# Patient Record
Sex: MALE | Race: WHITE | NOT HISPANIC OR LATINO | Employment: OTHER | URBAN - METROPOLITAN AREA
[De-identification: names, ages, dates, MRNs, and addresses within clinical notes are randomized per-mention and may not be internally consistent; named-entity substitution may affect disease eponyms.]

---

## 2017-02-06 ENCOUNTER — ALLSCRIPTS OFFICE VISIT (OUTPATIENT)
Dept: OTHER | Facility: OTHER | Age: 77
End: 2017-02-06

## 2017-02-17 ENCOUNTER — ALLSCRIPTS OFFICE VISIT (OUTPATIENT)
Dept: OTHER | Facility: OTHER | Age: 77
End: 2017-02-17

## 2017-02-24 ENCOUNTER — ALLSCRIPTS OFFICE VISIT (OUTPATIENT)
Dept: OTHER | Facility: OTHER | Age: 77
End: 2017-02-24

## 2017-03-13 ENCOUNTER — HOSPITAL ENCOUNTER (OUTPATIENT)
Dept: RADIOLOGY | Facility: MEDICAL CENTER | Age: 77
Discharge: HOME | End: 2017-03-13
Payer: COMMERCIAL

## 2017-03-13 ENCOUNTER — TRANSCRIBE ORDERS (OUTPATIENT)
Dept: ADMINISTRATIVE | Facility: HOSPITAL | Age: 77
End: 2017-03-13

## 2017-03-13 ENCOUNTER — ALLSCRIPTS OFFICE VISIT (OUTPATIENT)
Dept: OTHER | Facility: OTHER | Age: 77
End: 2017-03-13

## 2017-03-13 DIAGNOSIS — J20.9 ACUTE BRONCHITIS: ICD-10-CM

## 2017-03-13 DIAGNOSIS — J30.9 ALLERGIC RHINITIS: ICD-10-CM

## 2017-03-13 PROCEDURE — 71020 HB CHEST X-RAY 2VW FRONTAL&LATL: CPT

## 2017-03-15 ENCOUNTER — GENERIC CONVERSION - ENCOUNTER (OUTPATIENT)
Dept: OTHER | Facility: OTHER | Age: 77
End: 2017-03-15

## 2017-04-06 ENCOUNTER — ALLSCRIPTS OFFICE VISIT (OUTPATIENT)
Dept: OTHER | Facility: OTHER | Age: 77
End: 2017-04-06

## 2017-04-06 ENCOUNTER — LAB REQUISITION (OUTPATIENT)
Dept: LAB | Facility: HOSPITAL | Age: 77
End: 2017-04-06
Payer: COMMERCIAL

## 2017-04-06 DIAGNOSIS — J30.9 ALLERGIC RHINITIS: ICD-10-CM

## 2017-04-06 DIAGNOSIS — R05.9 COUGH: ICD-10-CM

## 2017-04-06 PROCEDURE — 86003 ALLG SPEC IGE CRUDE XTRC EA: CPT | Performed by: FAMILY MEDICINE

## 2017-04-06 PROCEDURE — 82785 ASSAY OF IGE: CPT | Performed by: FAMILY MEDICINE

## 2017-04-07 LAB
A ALTERNATA IGE QN: <0.1 KUA/I
A FUMIGATUS IGE QN: <0.1 KUA/I
ALLERGEN COMMENT: ABNORMAL
BERMUDA GRASS IGE QN: <0.1 KUA/I
BOXELDER IGE QN: <0.1 KUA/I
C HERBARUM IGE QN: <0.1 KUA/I
CAT DANDER IGE QN: <0.1 KUA/I
CMN PIGWEED IGE QN: <0.1 KUA/I
COMMON RAGWEED IGE QN: <0.1 KUA/I
COTTONWOOD IGE QN: <0.1 KUA/I
D FARINAE IGE QN: <0.1 KUA/I
D PTERONYSS IGE QN: <0.1 KUA/I
DOG DANDER IGE QN: <0.1 KUA/I
LONDON PLANE IGE QN: <0.1 KUA/I
MOUSE URINE PROT IGE QN: <0.1 KUA/I
MT JUNIPER IGE QN: <0.1 KUA/I
MUGWORT IGE QN: <0.1 KUA/I
P NOTATUM IGE QN: <0.1 KUA/I
ROACH IGE QN: <0.1 KUA/I
SHEEP SORREL IGE QN: <0.1 KUA/I
SILVER BIRCH IGE QN: <0.1 KUA/I
TIMOTHY IGE QN: <0.1 KUA/I
TOTAL IGE SMQN RAST: 150 KU/L (ref 0–113)
WALNUT IGE QN: <0.1 KUA/I
WHITE ASH IGE QN: <0.1 KUA/I
WHITE ELM IGE QN: <0.1 KUA/I
WHITE MULBERRY IGE QN: <0.1 KUA/I
WHITE OAK IGE QN: <0.1 KUA/I

## 2017-04-10 ENCOUNTER — GENERIC CONVERSION - ENCOUNTER (OUTPATIENT)
Dept: OTHER | Facility: OTHER | Age: 77
End: 2017-04-10

## 2017-04-18 ENCOUNTER — HOSPITAL ENCOUNTER (OUTPATIENT)
Dept: CT IMAGING | Facility: CLINIC | Age: 77
Discharge: HOME | End: 2017-04-18
Payer: COMMERCIAL

## 2017-04-18 DIAGNOSIS — J30.9 ALLERGIC RHINITIS: ICD-10-CM

## 2017-04-18 PROCEDURE — 70486 CT MAXILLOFACIAL W/O DYE: CPT

## 2017-04-20 ENCOUNTER — ALLSCRIPTS OFFICE VISIT (OUTPATIENT)
Dept: OTHER | Facility: OTHER | Age: 77
End: 2017-04-20

## 2017-04-25 ENCOUNTER — TRANSCRIBE ORDERS (OUTPATIENT)
Dept: ADMINISTRATIVE | Facility: HOSPITAL | Age: 77
End: 2017-04-25

## 2017-04-25 ENCOUNTER — ALLSCRIPTS OFFICE VISIT (OUTPATIENT)
Dept: OTHER | Facility: OTHER | Age: 77
End: 2017-04-25

## 2017-04-25 DIAGNOSIS — R05.9 COUGH: Primary | ICD-10-CM

## 2017-04-25 DIAGNOSIS — R06.02 SHORTNESS OF BREATH: ICD-10-CM

## 2017-05-04 ENCOUNTER — ALLSCRIPTS OFFICE VISIT (OUTPATIENT)
Dept: OTHER | Facility: OTHER | Age: 77
End: 2017-05-04

## 2017-05-19 ENCOUNTER — ALLSCRIPTS OFFICE VISIT (OUTPATIENT)
Dept: OTHER | Facility: OTHER | Age: 77
End: 2017-05-19

## 2017-06-19 ENCOUNTER — ALLSCRIPTS OFFICE VISIT (OUTPATIENT)
Dept: OTHER | Facility: OTHER | Age: 77
End: 2017-06-19

## 2017-06-27 ENCOUNTER — HOSPITAL ENCOUNTER (OUTPATIENT)
Dept: PULMONOLOGY | Facility: HOSPITAL | Age: 77
Discharge: HOME | End: 2017-06-27
Attending: INTERNAL MEDICINE
Payer: COMMERCIAL

## 2017-06-27 ENCOUNTER — GENERIC CONVERSION - ENCOUNTER (OUTPATIENT)
Dept: OTHER | Facility: OTHER | Age: 77
End: 2017-06-27

## 2017-06-27 DIAGNOSIS — R06.02 SHORTNESS OF BREATH: ICD-10-CM

## 2017-06-27 DIAGNOSIS — R05.9 COUGH: ICD-10-CM

## 2017-06-27 PROCEDURE — 94726 PLETHYSMOGRAPHY LUNG VOLUMES: CPT

## 2017-06-27 PROCEDURE — 94010 BREATHING CAPACITY TEST: CPT

## 2017-06-27 PROCEDURE — 94729 DIFFUSING CAPACITY: CPT

## 2017-06-27 PROCEDURE — 94760 N-INVAS EAR/PLS OXIMETRY 1: CPT

## 2017-06-27 RX ORDER — ALBUTEROL SULFATE 2.5 MG/3ML
2.5 SOLUTION RESPIRATORY (INHALATION) ONCE AS NEEDED
Status: DISCONTINUED | OUTPATIENT
Start: 2017-06-27 | End: 2017-07-01 | Stop reason: HOSPADM

## 2017-06-27 RX ORDER — ALBUTEROL SULFATE 2.5 MG/3ML
SOLUTION RESPIRATORY (INHALATION)
Status: DISCONTINUED
Start: 2017-06-27 | End: 2017-06-27 | Stop reason: WASHOUT

## 2017-07-19 ENCOUNTER — ALLSCRIPTS OFFICE VISIT (OUTPATIENT)
Dept: OTHER | Facility: OTHER | Age: 77
End: 2017-07-19

## 2017-07-24 ENCOUNTER — LAB REQUISITION (OUTPATIENT)
Dept: LAB | Facility: HOSPITAL | Age: 77
End: 2017-07-24
Payer: COMMERCIAL

## 2017-07-24 ENCOUNTER — ALLSCRIPTS OFFICE VISIT (OUTPATIENT)
Dept: OTHER | Facility: OTHER | Age: 77
End: 2017-07-24

## 2017-07-24 DIAGNOSIS — E78.2 MIXED HYPERLIPIDEMIA: ICD-10-CM

## 2017-07-24 DIAGNOSIS — Z12.5 ENCOUNTER FOR SCREENING FOR MALIGNANT NEOPLASM OF PROSTATE: ICD-10-CM

## 2017-07-24 LAB
ALBUMIN SERPL BCP-MCNC: 3.4 G/DL (ref 3.5–5)
ALP SERPL-CCNC: 73 U/L (ref 46–116)
ALT SERPL W P-5'-P-CCNC: 16 U/L (ref 12–78)
ANION GAP SERPL CALCULATED.3IONS-SCNC: 4 MMOL/L (ref 4–13)
AST SERPL W P-5'-P-CCNC: 16 U/L (ref 5–45)
BASOPHILS # BLD AUTO: 0.01 THOUSANDS/ΜL (ref 0–0.1)
BASOPHILS NFR BLD AUTO: 0 % (ref 0–1)
BILIRUB SERPL-MCNC: 0.54 MG/DL (ref 0.2–1)
BUN SERPL-MCNC: 23 MG/DL (ref 5–25)
CALCIUM SERPL-MCNC: 8.7 MG/DL (ref 8.3–10.1)
CHLORIDE SERPL-SCNC: 108 MMOL/L (ref 100–108)
CHOLEST SERPL-MCNC: 175 MG/DL (ref 50–200)
CO2 SERPL-SCNC: 28 MMOL/L (ref 21–32)
CREAT SERPL-MCNC: 0.97 MG/DL (ref 0.6–1.3)
EOSINOPHIL # BLD AUTO: 0.16 THOUSAND/ΜL (ref 0–0.61)
EOSINOPHIL NFR BLD AUTO: 3 % (ref 0–6)
ERYTHROCYTE [DISTWIDTH] IN BLOOD BY AUTOMATED COUNT: 14.7 % (ref 11.6–15.1)
GFR SERPL CREATININE-BSD FRML MDRD: 74 ML/MIN/1.73SQ M
GLUCOSE P FAST SERPL-MCNC: 90 MG/DL (ref 65–99)
HCT VFR BLD AUTO: 40.1 % (ref 36.5–49.3)
HDLC SERPL-MCNC: 62 MG/DL (ref 40–60)
HGB BLD-MCNC: 13.2 G/DL (ref 12–17)
LDLC SERPL CALC-MCNC: 96 MG/DL (ref 0–100)
LYMPHOCYTES # BLD AUTO: 1.69 THOUSANDS/ΜL (ref 0.6–4.47)
LYMPHOCYTES NFR BLD AUTO: 30 % (ref 14–44)
MCH RBC QN AUTO: 30.7 PG (ref 26.8–34.3)
MCHC RBC AUTO-ENTMCNC: 32.9 G/DL (ref 31.4–37.4)
MCV RBC AUTO: 93 FL (ref 82–98)
MONOCYTES # BLD AUTO: 0.69 THOUSAND/ΜL (ref 0.17–1.22)
MONOCYTES NFR BLD AUTO: 12 % (ref 4–12)
NEUTROPHILS # BLD AUTO: 3.09 THOUSANDS/ΜL (ref 1.85–7.62)
NEUTS SEG NFR BLD AUTO: 55 % (ref 43–75)
NRBC BLD AUTO-RTO: 0 /100 WBCS
PLATELET # BLD AUTO: 218 THOUSANDS/UL (ref 149–390)
PMV BLD AUTO: 10.3 FL (ref 8.9–12.7)
POTASSIUM SERPL-SCNC: 4.7 MMOL/L (ref 3.5–5.3)
PROT SERPL-MCNC: 6.8 G/DL (ref 6.4–8.2)
PSA SERPL-MCNC: 0.2 NG/ML (ref 0–4)
RBC # BLD AUTO: 4.3 MILLION/UL (ref 3.88–5.62)
SODIUM SERPL-SCNC: 140 MMOL/L (ref 136–145)
TRIGL SERPL-MCNC: 87 MG/DL
TSH SERPL DL<=0.05 MIU/L-ACNC: 1.21 UIU/ML (ref 0.36–3.74)
WBC # BLD AUTO: 5.64 THOUSAND/UL (ref 4.31–10.16)

## 2017-07-24 PROCEDURE — 84443 ASSAY THYROID STIM HORMONE: CPT | Performed by: FAMILY MEDICINE

## 2017-07-24 PROCEDURE — G0103 PSA SCREENING: HCPCS | Performed by: FAMILY MEDICINE

## 2017-07-24 PROCEDURE — 80061 LIPID PANEL: CPT | Performed by: FAMILY MEDICINE

## 2017-07-24 PROCEDURE — 80053 COMPREHEN METABOLIC PANEL: CPT | Performed by: FAMILY MEDICINE

## 2017-07-24 PROCEDURE — 85025 COMPLETE CBC W/AUTO DIFF WBC: CPT | Performed by: FAMILY MEDICINE

## 2017-07-26 ENCOUNTER — GENERIC CONVERSION - ENCOUNTER (OUTPATIENT)
Dept: OTHER | Facility: OTHER | Age: 77
End: 2017-07-26

## 2017-09-01 ENCOUNTER — ALLSCRIPTS OFFICE VISIT (OUTPATIENT)
Dept: OTHER | Facility: OTHER | Age: 77
End: 2017-09-01

## 2017-10-12 ENCOUNTER — ALLSCRIPTS OFFICE VISIT (OUTPATIENT)
Dept: OTHER | Facility: OTHER | Age: 77
End: 2017-10-12

## 2017-10-13 NOTE — PROGRESS NOTES
Assessment  1  Memory difficulty (780 93) (R41 3)   2  Mild cognitive impairment (331 83) (G31 84)   3  Shortness of breath (786 05) (R06 02)   4  Renal lithiasis (592 0) (N20 0)    Plan  Memory difficulty, Mild cognitive impairment    · *1 -  NEUROLOGY Co-Management  *  Status: Hold For - Scheduling  Requested for:  87UVG5866  Care Summary provided  : Yes  PMH: History of renal calculi    · TraMADol HCl - 50 MG Oral Tablet; TAKE 1 TABLET EVERY 6 TO 8 HOURS AS  NEEDED FOR PAIN    Discussion/Summary    Reviewed the patient's pulmonary workup  He does go to the gym and is working on his cardiovascular in [place] son encouraged him to continue  I do not believe additional workup is necessary though he will continue to follow up with Pulmonary Medicine  His next visit should be in January  far as the patient's cognitive decline  His symptoms and his exam at this time did not show any significant deficits however we will refer him back to Neurology for an evaluation  We also discussed the possibility starting medication such as Aricept at a low dose though he is somewhat reluctant to start medication at this time  lithiasis  This is been present for 6 some time and is asymptomatic though the patient would like to see a urologist regarding this problem and I will send him though I think it is unlikely that any treatment is necessary  The patient was counseled regarding diagnostic results,-instructions for management,-risk factor reductions,-impressions,-risks and benefits of treatment options,-importance of compliance with treatment  total time of encounter was 30 [id number] minutes was spent counseling  Chief Complaint  Patient presents for a f/u from pulmonologist He stated that he still has SOB and his PFT showed an oxygen problem  He stated that he would also like to discuss his memory problems and discuss possible dementia with someone        History of Present Illness  lung  sleep issue staying timeemelissa referralpresented for for follow-up of multiple topics  He continues to have concerns about shortness of breath  He saw Pulmonary Medicine on multiple occasions and his workup was essentially negative  Back pulmonologist discontinued his inhaler  Pulmonary function tests were essentially normal with only a very mild decrease in diffusing capacity  He feels that the patient's shortness of breath is more likely due to some mild cardiac deconditioning  He will be seeing him again in 4 months  Patient also complains of some mild memory issues and cognitive decline  He had an appointment with neurology but began to think that he was doing better and cancel that appointment  Now he is concerned that he is still having his memory issues  He does have remote history of Alzheimer's disease within his family  Lastly he wishes to discuss his kidney stone  He does have renal lithiasis noted on CT scan of the abdomen and pelvis done approximately 1 year ago which did show stable renal cysts in the right kidney along with a right midpole stone  An acquaintance of his had a good result seeing a urologist in [address] [place] [address] and the patient would like to be referred to the same doctor for a consult  Review of Systems    Constitutional: feeling tired  Eyes: No complaints of eye pain, no red eyes, no discharge from eyes, no itchy eyes  ENT: no complaints of earache, no hearing loss, no nosebleeds, no nasal discharge, no sore throat, no hoarseness  Cardiovascular: No complaints of slow heart rate, no fast heart rate, no chest pain, no palpitations, no leg claudication, no lower extremity  Gastrointestinal: No complaints of abdominal pain, no constipation, no nausea or vomiting, no diarrhea or bloody stools  Genitourinary: as noted in HPI  The patient presents with complaints of gradual onset of intermittent episodes of mild confusion     Psychiatric: Is not suicidal, no sleep disturbances, no anxiety or depression, no change in personality, no emotional problems  Active Problems  1  Abnormal PFT (794 2) (R94 2)   2  Acute right-sided low back pain without sciatica (724 2) (M54 5)   3  Allergic rhinitis (477 9) (J30 9)   4  Antithrombinemia (286 59) (D68 318)   5  Anxiety (300 00) (F41 9)   6  Arthritis (716 90) (M19 90)   7  Cervicalgia (723 1) (M54 2)   8  Dysplastic nevus of face (216 3) (D22 30)   9  Ear itch (698 9) (L29 9)   10  Enlarged prostate without lower urinary tract symptoms (luts) (600 00) (N40 0)   11  Gastroesophageal reflux disease without esophagitis (530 81) (K21 9)   12  Glaucoma (365 9) (H40 9)   13  Irritable bowel syndrome (564 1) (K58 9)   14  Lipoma of right upper extremity (214 8) (D17 21)   15  Memory loss (780 93) (R41 3)   16  Mixed hyperlipidemia (272 2) (E78 2)   17  Mouth pain (528 9) (K13 79)   18  Oropharyngeal dysphagia (787 22) (R13 12)   19  Persistent cough for 3 weeks or longer (786 2) (R05)   20  Shortness of breath (786 05) (R06 02)   21  Sicca syndrome (710 2) (M35 00)    Past Medical History  1  History of Abdominal pain, periumbilical (582 00) (N39 30)   2  History of Abdominal pain, suprapubic (789 09) (R10 2)   3  History of Abrasion Of The Right Leg (916 0)   4  History of Acute bronchitis, unspecified organism (466 0) (J20 9)   5  History of Acute sinusitis, recurrence not specified, unspecified location (461 9) (J01 90)   6  History of Adverse Effect Of Medicinal Substance Properly Administered (995 20)   7  History of Allergic rhinitis (477 9) (J30 9)   8  History of Arthralgia (719 40) (M25 50)   9  History of Bruise (924 9) (T14 8XXA)   10  History of Bruise without fracture or open wound (924 9) (T14 8XXA)   11  History of Change in bowel habits (787 99) (R19 4)   12  History of Change in bowel habits (787 99) (R19 4)   13  History of Chest pain (786 50) (R07 9)   14  History of Chronic allergic rhinitis (477 9) (J30 9)   15   History of Chronic bronchitis, unspecified chronic bronchitis type (491 9) (J42)   16  History of Chronic sinusitis, unspecified location (473 9) (J32 9)   17  History of Depression screen (V79 0) (Z13 89)   18  History of Diverticulosis (562 10) (K57 90)   19  History of Diverticulosis (562 10) (K57 90)   20  History of Dizziness (780 4) (R42)   21  History of Dry mouth (527 7) (R68 2)   22  History of Dysphagia (787 20) (R13 10)   23  History of Dysphagia (787 20) (R13 10)   24  History of Dysphagia (787 20) (R13 10)   25  History of Encounter for prostate cancer screening (V76 44) (Z12 5)   26  History of Encounter for prostate cancer screening (V76 44) (Z12 5)   27  History of Encounter for screening colonoscopy (V76 51) (Z12 11)   28  History of Fatigue (780 79) (R53 83)   29  History of Flank pain (789 09) (R10 9)   30  History of abdominal pain (V13 89) (Z87 898)   31  History of abdominal pain (V13 89) (Z87 898)   32  History of actinic keratosis (V13 3) (Z87 2)   33  History of acute sinusitis (V12 69) (Z87 09)   34  History of candidiasis of mouth (V12 09) (Z86 19)   35  History of dermatitis (V13 3) (Z87 2)   36  History of glaucoma (V12 49) (Z86 69)   37  History of influenza vaccination (V49 89) (Z92 29)   38  History of left inguinal hernia repair (V15 29) (Z98 890,Z87 19)   39  History of renal calculi (V13 01) (Z87 442)   40  History of sebaceous cyst (V13 3) (Z87 2)   41  History of sebaceous cyst (V13 3) (Z87 2)   42  History of sebaceous cyst (V13 3) (Z87 2)   43  History of seborrheic keratosis (V13 3) (Z87 2)   44  History of skin disorder (V13 3) (Z87 2)   45  History of Influenza vaccine needed (V04 81) (Z23)   46  History of Inguinal hernia (550 90) (K40 90)   47  History of Inguinal hernia, left (550 90) (K40 90)   48  History of Joint pain (719 40) (M25 50)   49  History of Left groin pain (789 09) (R10 32)   50  History of Left inguinal hernia (550 90) (K40 90)   51   History of Left lower quadrant pain (789 04) (R10 32)   52  History of Lower back pain (724 2) (M54 5)   53  History of Lump of skin (782 2) (R22 9)   54  History of Malaise and fatigue (780 79) (R53 81,R53 83)   55  History of Male erectile dysfunction (607 84) (N52 9)   56  History of Need for immunization against influenza (V04 81) (Z23)   57  History of Need for vaccination for pneumococcus (V03 82) (Z23)   58  History of Nonvenomous Insect Bite Of Right Ear (910 4)   59  History of Open comedone (706 1) (L70 0)   60  History of Otitis externa, unspecified laterality   61  History of Pain of scalp (784 0) (R51)   62  History of Palpitations (785 1) (R00 2)   63  History of Pharyngitis (462) (J02 9)   64  History of Preop examination (V72 84) (Z01 818)   65  History of Pre-op examination (V72 84) (Z01 818)   66  History of Pre-procedural laboratory examination (V72 63) (Z01 812)   67  History of Prostate cancer screening (V76 44) (Z12 5)   68  History of Prostate cancer screening (V76 44) (Z12 5)   69  History of Scratch (919 0) (T14 8XXA)   70  History of Screening for depression (V79 0) (Z13 89)   71  History of Screening for other and unspecified genitourinary condition (V81 6) (Z13 89)   72  History of Screening for other and unspecified genitourinary condition (V81 6) (Z13 89)   73  History of Screening for other and unspecified genitourinary condition (V81 6) (Z13 89)   74  History of Skin lesion (709 9) (L98 9)   75  History of Skin lesion (709 9) (L98 9)   76  History of Skin lesion (709 9) (L98 9)   77  History of Skin lesion (709 9) (L98 9)   78  History of Skin lesion (709 9) (L98 9)   79  History of Skin rash (782 1) (R21)   80  History of Skin rash (782 1) (R21)   81  History of Special screening for other neurological conditions (V80 09) (Z13 89)   82  History of Special screening for other neurological conditions (V80 09) (Z13 89)   83  History of Special screening for other neurological conditions (V80 09) (Z13 89)   84   History of Standardised adult depression screening tool completed   85  History of Symptoms involving urinary system (788 99) (R39 9)   86  History of Tick bite (919 4,E906 4) (W57 XXXA)   87  History of Tick bite (919 4,E906 4) (W57 XXXA)   88  History of Tick bite of abdominal wall, initial encounter (911 4,E906 4)    (Z31 230N,Q66  XXXA)   89  History of Tick bite, initial encounter (919 4,E906 4) (W57 XXXA)   90  History of Urinary frequency (788 41) (R35 0)   91  History of Urticaria Allergic Due To Envenomation (708 0)   92  History of Visual disturbances (368 9) (H53 9)   93  History of Wound, open, leg (891 0) (S81 809A)    The active problems and past medical history were reviewed and updated today  Surgical History  1  History of Complete Colonoscopy   2  History of Hernia Repair Inguinal Unilateral   3  History of Oral Surgery    Family History  Mother    1  Family history of Brain Cancer (V16 8)   2  Family history of Congestive Heart Failure   3  Family history of Prostate Cancer (V16 42)    Social History   · Denied: History of Alcohol Use (History)   · Former smoker (V15 82) (Y14 943)   · Primary language is English   · Reads English   · History of Social alcohol use (Z78 9)  The social history was reviewed and updated today  Current Meds   1  Centrum Silver Ultra Mens Oral Tablet; TAKE 1 TABLET DAILY; Therapy: 51HUU3673 to Recorded   2  ChlordiazePOXIDE HCl - 10 MG Oral Capsule; TAKE 1 CAPSULE Every morning; Therapy: 02QYN2227 to (Evaluate:18Feb2018); Last Rx:37Iji4955 Ordered   3  Levocetirizine Dihydrochloride 5 MG Oral Tablet; TAKE 1 TABLET DAILY IN THE EVENING; Therapy: 69Rgb8537 to (Marcial Cuevas)  Requested for: 36HNS9672; Last   Rx:07Nov2016 Ordered   4  Meclizine HCl - 25 MG Oral Tablet; take 0 5 tablet bedtime  Requested for: 03Apr2017;   Last Rx:03Apr2017 Ordered   5  PrednisoLONE Sodium Phosphate 1 % Ophthalmic Solution;  Insert 2-3 drops in affected   ear 4 times daily as needed; Therapy: 43Usq4107 to (Last Rx:38Gzv3958)  Requested for: 74Eqv9974 Ordered   6  Rapaflo 8 MG Oral Capsule; take 1 capsule daily; Therapy: 75HXW9579 to (Last Rx:56Nim5325)  Requested for: 13Efq2772 Ordered   7  Red Yeast Rice 600 MG Oral Tablet; Take 1 tablet twice daily; Therapy: (30-62-69-73) to Recorded   8  Simbrinza 1-0 2 % Ophthalmic Suspension; twice daily; Therapy: 17JKK8530 to (Evaluate:87Uou6835) Recorded   9  Travatan Z 0 004 % Ophthalmic Solution; once daily; Therapy: 34JRV0091 to (Evaluate:13Nov2014) Recorded   10  Triamcinolone Acetonide 0 1 % External Cream; APPLY  AND RUB  IN A THIN FILM TO    AFFECTED AREAS TWICE DAILY  (AM AND PM); Therapy: 89NIS0035 to (Last Rx:66Nvi9995)  Requested for: 92UZE2656 Ordered    The medication list was reviewed and updated today  Allergies  1  Levaquin TABS   2  Azithromycin TABS   3  Penicillins   4  ketorolac    Vitals  Vital Signs    Recorded: 77DZP4014 11:08AM   Temperature 96 9 F, Tympanic   Heart Rate 82   Pulse Quality Normal   Systolic 453, RUE, Sitting   Diastolic 74, RUE, Sitting   Height 5 ft 9 in   Weight 163 lb 3 oz   BMI Calculated 24 1   BSA Calculated 1 89   O2 Saturation 98, RA     Physical Exam    Constitutional   General appearance: No acute distress, well appearing and well nourished  Pulmonary   Respiratory effort: No increased work of breathing or signs of respiratory distress  Auscultation of lungs: Clear to auscultation, equal breath sounds bilaterally, no wheezes, no rales, no rhonci  Cardiovascular   Palpation of heart: Normal PMI, no thrills  Auscultation of heart: Normal rate and rhythm, normal S1 and S2, without murmurs  Examination of extremities for edema and/or varicosities: Normal     Carotid pulses: Normal     Lymphatic   Palpation of lymph nodes in neck: No lymphadenopathy  Neurologic   Cranial nerves: Cranial nerves 2-12 intact      Psychiatric   Orientation to person, place and time: Normal     Mood and affect: Normal          Health Management  History of glaucoma   *VB - Eye Exam; every 1 year; Last 99PRE2944; Next Due: 32EEX4845;  Overdue    Signatures   Electronically signed by : Ernst Cruz DO; Oct 12 2017 12:08PM EST                       (Author)

## 2017-10-30 ENCOUNTER — GENERIC CONVERSION - ENCOUNTER (OUTPATIENT)
Dept: OTHER | Facility: OTHER | Age: 77
End: 2017-10-30

## 2017-12-06 ENCOUNTER — ALLSCRIPTS OFFICE VISIT (OUTPATIENT)
Dept: OTHER | Facility: OTHER | Age: 77
End: 2017-12-06

## 2017-12-11 ENCOUNTER — ALLSCRIPTS OFFICE VISIT (OUTPATIENT)
Dept: OTHER | Facility: OTHER | Age: 77
End: 2017-12-11

## 2017-12-12 NOTE — PROGRESS NOTES
Assessment    1  Acute bronchitis, unspecified organism (466 0) (J20 9)    Plan  Acute bronchitis, unspecified organism    · PredniSONE 10 MG Oral Tablet; 6 x1 day 5x 1 day 4 x 2 days 3 x 2 days2 x 2 days 1x 2 days    Discussion/Summary    Patient is a 27-year-old maleacute bronchitis - patient's symptoms have been persisting  He was advised that he must take his antibiotics as directed  He must not miss doses of this treatment  He does have to multiple continue with antibiotics until Thursday of this week  Start treatment with prednisone taper  He may continue as well with his Breo  If his symptoms are persisting, he may likely need to see pulmonology for further evaluation of his persistent respiratory problems  Chief Complaint  Patient presents for a f/u for bronchitis  He stated that he is not much better and developed diarrhea last night  History of Present Illness  Patient is a 65year-old male presents today with a persistent cough  He states that he was seen 1 week ago secondary to bronchitis  He was placed on doxycycline as well as [facility] [employer] is he has been using his inhaler regularly  He has been taking his antibiotic ink intermittently  Over the past 3 days, he states that he has been taking more regularly  He states that he noticed mild however last night started developing diarrhea  He has recently started taking probiotics  Review of Systems   Constitutional: feeling poorly-- and-- feeling tired  Eyes: no eyesight problems-- and-- no purulent discharge from the eyes  ENT: no sore throat-- and-- no nasal discharge  Cardiovascular: no chest pain-- and-- no palpitations  Respiratory: cough, but-- no shortness of breath during exertion  Gastrointestinal: no nausea-- and-- no diarrhea  Genitourinary: no dysuria-- and-- no nocturia  Musculoskeletal: no arthralgias-- and-- no myalgias  Integumentary: no rashes-- and-- no skin lesions    Neurological: no headache,-- no numbness-- and-- no dizziness  Psychiatric: no anxiety-- and-- no depression  Endocrine: no muscle weakness-- and-- no erectile dysfunction  Hematologic/Lymphatic: no tendency for easy bleeding-- and-- no tendency for easy bruising  Active Problems  1  Abnormal PFT (794 2) (R94 2)   2  Acute bronchitis, unspecified organism (466 0) (J20 9)   3  Acute right-sided low back pain without sciatica (724 2) (M54 5)   4  Allergic rhinitis (477 9) (J30 9)   5  Anxiety (300 00) (F41 9)   6  Arthritis (716 90) (M19 90)   7  Cervicalgia (723 1) (M54 2)   8  Dysplastic nevus of face (216 3) (D23 30)   9  Enlarged prostate without lower urinary tract symptoms (luts) (600 00) (N40 0)   10  Gastroesophageal reflux disease without esophagitis (530 81) (K21 9)   11  Glaucoma (365 9) (H40 9)   12  Irritable bowel syndrome (564 1) (K58 9)   13  Lipoma of right upper extremity (214 8) (D17 21)   14  Memory difficulty (780 93) (R41 3)   15  Memory loss (780 93) (R41 3)   16  Mild cognitive impairment (331 83) (G31 84)   17  Mixed hyperlipidemia (272 2) (E78 2)   18  Mouth pain (528 9) (K13 79)   19  Oropharyngeal dysphagia (787 22) (R13 12)   20  Persistent cough for 3 weeks or longer (786 2) (R05)   21  Renal lithiasis (592 0) (N20 0)   22  Shortness of breath (786 05) (R06 02)   23  Sicca syndrome (710 2) (M35 00)    Past Medical History    1  History of Abdominal pain, periumbilical (411 12) (Q85 65)   2  History of Abdominal pain, suprapubic (789 09) (R10 2)   3  History of Abrasion Of The Right Leg (916 0)   4  History of Acute sinusitis, recurrence not specified, unspecified location (461 9) (J01 90)   5  History of Adverse Effect Of Medicinal Substance Properly Administered (995 20)   6  History of Allergic rhinitis (477 9) (J30 9)   7  History of Arthralgia (719 40) (M25 50)   8  History of Bruise (924 9) (T14 8XXA)   9  History of Bruise without fracture or open wound (924 9) (T14 8XXA)   10   History of Change in bowel habits (787 99) (R19 4)   11  History of Change in bowel habits (787 99) (R19 4)   12  History of Chest pain (786 50) (R07 9)   13  History of Chronic allergic rhinitis (477 9) (J30 9)   14  History of Chronic bronchitis, unspecified chronic bronchitis type (491 9) (J42)   15  History of Chronic sinusitis, unspecified location (473 9) (J32 9)   16  History of Depression screen (V79 0) (Z13 89)   17  History of Diverticulosis (562 10) (K57 90)   18  History of Diverticulosis (562 10) (K57 90)   19  History of Dizziness (780 4) (R42)   20  History of Dry mouth (527 7) (R68 2)   21  History of Dysphagia (787 20) (R13 10)   22  History of Dysphagia (787 20) (R13 10)   23  History of Dysphagia (787 20) (R13 10)   24  History of Encounter for prostate cancer screening (V76 44) (Z12 5)   25  History of Encounter for prostate cancer screening (V76 44) (Z12 5)   26  History of Encounter for screening colonoscopy (V76 51) (Z12 11)   27  History of Fatigue (780 79) (R53 83)   28  History of Flank pain (789 09) (R10 9)   29  History of abdominal pain (V13 89) (Z87 898)   30  History of abdominal pain (V13 89) (Z87 898)   31  History of actinic keratosis (V13 3) (Z87 2)   32  History of acute sinusitis (V12 69) (Z87 09)   33  History of candidiasis of mouth (V12 09) (Z86 19)   34  History of dermatitis (V13 3) (Z87 2)   35  History of glaucoma (V12 49) (Z86 69)   36  History of influenza vaccination (V49 89) (Z92 29)   37  History of left inguinal hernia repair (V15 29) (Z98 890,Z87 19)   38  History of renal calculi (V13 01) (Z87 442)   39  History of sebaceous cyst (V13 3) (Z87 2)   40  History of sebaceous cyst (V13 3) (Z87 2)   41  History of sebaceous cyst (V13 3) (Z87 2)   42  History of seborrheic keratosis (V13 3) (Z87 2)   43  History of skin disorder (V13 3) (Z87 2)   44  History of Influenza vaccine needed (V04 81) (Z23)   45  History of Inguinal hernia (550 90) (K40 90)   46  History of Inguinal hernia, left (550 90) (K40 90)   47  History of Joint pain (719 40) (M25 50)   48  History of Left groin pain (789 09) (R10 32)   49  History of Left inguinal hernia (550 90) (K40 90)   50  History of Left lower quadrant pain (789 04) (R10 32)   51  History of Lower back pain (724 2) (M54 5)   52  History of Lump of skin (782 2) (R22 9)   53  History of Malaise and fatigue (780 79) (R53 81,R53 83)   54  History of Male erectile dysfunction (607 84) (N52 9)   55  History of Need for immunization against influenza (V04 81) (Z23)   56  History of Need for vaccination for pneumococcus (V03 82) (Z23)   57  History of Nonvenomous Insect Bite Of Right Ear (910 4)   58  History of Open comedone (706 1) (L70 0)   59  History of Otitis externa, unspecified laterality   60  History of Pain of scalp (784 0) (R51)   61  History of Palpitations (785 1) (R00 2)   62  History of Pharyngitis (462) (J02 9)   63  History of Preop examination (V72 84) (Z01 818)   64  History of Pre-op examination (V72 84) (Z01 818)   65  History of Pre-procedural laboratory examination (V72 63) (Z01 812)   66  History of Prostate cancer screening (V76 44) (Z12 5)   67  History of Prostate cancer screening (V76 44) (Z12 5)   68  History of Scratch (919 0) (T14 8XXA)   69  History of Screening for depression (V79 0) (Z13 89)   70  History of Screening for other and unspecified genitourinary condition (V81 6) (Z13 89)   71  History of Screening for other and unspecified genitourinary condition (V81 6) (Z13 89)   72  History of Screening for other and unspecified genitourinary condition (V81 6) (Z13 89)   73  History of Skin lesion (709 9) (L98 9)   74  History of Skin lesion (709 9) (L98 9)   75  History of Skin lesion (709 9) (L98 9)   76  History of Skin lesion (709 9) (L98 9)   77  History of Skin lesion (709 9) (L98 9)   78  History of Skin rash (782 1) (R21)   79  History of Skin rash (782 1) (R21)   80   History of Special screening for other neurological conditions (V80 09) (Z13 89) 81  History of Special screening for other neurological conditions (V80 09) (Z13 89)   82  History of Special screening for other neurological conditions (V80 09) (Z13 89)   83  History of Standardised adult depression screening tool completed   80  History of Symptoms involving urinary system (788 99) (R39 9)   85  History of Tick bite (919 4,E906 4) (W57 XXXA)   86  History of Tick bite (919 4,E906 4) (W57 XXXA)   87  History of Tick bite of abdominal wall, initial encounter (911 4,E906 4)  (K27 609Q,C13  XXXA)   88  History of Tick bite, initial encounter (919 4,E906 4) (W57 XXXA)   89  History of Urinary frequency (788 41) (R35 0)   90  History of Urticaria Allergic Due To Envenomation (708 0)   91  History of Visual disturbances (368 9) (H53 9)   92  History of Wound, open, leg (891 0) (S81 809A)    The active problems and past medical history were reviewed and updated today  Surgical History  1  History of Complete Colonoscopy   2  History of Hernia Repair Inguinal Unilateral   3  History of Oral Surgery    The surgical history was reviewed and updated today  Family History  Mother    1  Family history of Brain Cancer (V16 8)   2  Family history of Congestive Heart Failure   3  Family history of Prostate Cancer (V16 42)    The family history was reviewed and updated today  Social History     · Denied: History of Alcohol Use (History)   · Former smoker (V15 82) (C97 051)   · Primary language is English   · Reads English   · History of Social alcohol use (Z78 9)  The social history was reviewed and updated today  The social history was reviewed and is unchanged  Current Meds   1  Breo Ellipta 100-25 MCG/INH Inhalation Aerosol Powder Breath Activated; INHALE 1 PUFFS Daily rinse mouth after using; Therapy: 98QIZ1059 to (Complete:85Ysr4875); Last Rx:26Sbb4412 Ordered   2  Centrum Silver Ultra Mens Oral Tablet; TAKE 1 TABLET DAILY; Therapy: 26DJL9633 to Recorded   3   ChlordiazePOXIDE HCl - 10 MG Oral Capsule; TAKE 1 CAPSULE Every morning; Therapy: 30OEZ2936 to (Evaluate:87Cqe5949); Last Rx:56Wlu9609 Ordered   4  Donepezil HCl - 5 MG Oral Tablet; TAKE 1 TABLET AT BEDTIME; Therapy: 38HSM5713 to (Evaluate:48Lqp9586)  Requested for: 19QSM1871; Last Rx:12Oct2017 Ordered   5  Doxycycline Hyclate 100 MG Oral Capsule; Take 1 capsule twice daily; Therapy: 18XKN4038 to (Evaluate:90Yfr0935)  Requested for: 01ZPV7755; Last Rx:17Yzk2138 Ordered   6  Levocetirizine Dihydrochloride 5 MG Oral Tablet; TAKE 1 TABLET DAILY IN THE EVENING; Therapy: 56Ouw2526 to (95 886082)  Requested for: 56QKJ3469; Last Rx:07Nov2016 Ordered   7  Meclizine HCl - 25 MG Oral Tablet; take 0 5 tablet bedtime  Requested for: 96Lxb1640; Last Rx:08Kbm8092 Ordered   8  PrednisoLONE Sodium Phosphate 1 % Ophthalmic Solution; Insert 2-3 drops in affected ear 4 times daily as needed; Therapy: 56Byj8380 to (Last Rx:17Nov2017)  Requested for: 71NLC8936 Ordered   9  Rapaflo 8 MG Oral Capsule; take 1 capsule daily; Therapy: 07JDH8077 to (Last Rx:90Iht9496)  Requested for: 04Duo0587 Ordered   10  Red Yeast Rice 600 MG Oral Tablet; Take 1 tablet twice daily; Therapy: ([phone]) to Recorded   11  Simbrinza 1-0 2 % Ophthalmic Suspension; twice daily; Therapy: 84UNH4120 to (Evaluate:55Iyq8601) Recorded   12  TraMADol HCl - 50 MG Oral Tablet; TAKE 1 TABLET EVERY 6 TO 8 HOURS AS NEEDED  FOR PAIN;  Therapy: 51Mlh4361 to (Last Rx:12Oct2017) Ordered   13  Travatan Z 0 004 % Ophthalmic Solution; once daily; Therapy: 14OIW0071 to (Evaluate:13Nov2014) Recorded   14  Triamcinolone Acetonide 0 1 % External Cream; APPLY  AND RUB  IN A THIN FILM TO  AFFECTED AREAS TWICE DAILY  (AM AND PM); Therapy: 27BKT0119 to (Last Rx:77Udz1631)  Requested for: 52XWH6268 Ordered    The medication list was reviewed and updated today  Allergies  1  Levaquin TABS   2  Azithromycin TABS   3   Penicillins   4  ketorolac    Vitals  Vital Signs    Recorded: 38EQO8517 01: 09PM   Temperature 95 6 F, Tympanic   Heart Rate 96   Pulse Quality Normal   Systolic 467, LUE, Sitting   Diastolic 78, LUE, Sitting   BP CUFF SIZE Standard   Height 5 ft 9 in   Weight 158 lb 6 oz   BMI Calculated 23 39   BSA Calculated 1 87   O2 Saturation 95, RA       Physical Exam   Constitutional  General appearance: No acute distress, well appearing and well nourished  Eyes  Conjunctiva and lids: No swelling, erythema, or discharge  Pupils and irises: Equal, round and reactive to light  Ears, Nose, Mouth, and Throat  External inspection of ears and nose: Normal    Nasal mucosa, septum, and turbinates: Normal without edema or erythema  Oropharynx: Normal with no erythema, edema, exudate or lesions  Pulmonary  Respiratory effort: No increased work of breathing or signs of respiratory distress  Auscultation of lungs: Abnormal   Auscultation of the lungs revealed expiratory wheezing-- and-- inspiratory wheezing  diffuse rhonchi bilaterally  Cardiovascular  Auscultation of heart: Normal rate and rhythm, normal S1 and S2, without murmurs  Examination of extremities for edema and/or varicosities: Normal    Abdomen  Abdomen: Non-tender, no masses  Liver and spleen: No hepatomegaly or splenomegaly  Lymphatic  Palpation of lymph nodes in neck: No lymphadenopathy  Musculoskeletal  Gait and station: Normal    Inspection/palpation of joints, bones, and muscles: Normal    Skin  Skin and subcutaneous tissue: Normal without rashes or lesions  Neurologic  Cranial nerves: Cranial nerves 2-12 intact  Sensation: No sensory loss  Psychiatric  Orientation to person, place and time: Normal    Mood and affect: Normal          Health Management  History of glaucoma   *VB - Eye Exam; every 1 year; Last 37VXF3083; Next Due: 55ICZ0741; Overdue    Signatures   Electronically signed by :  Lindie Dancer, DO; Dec 11 2017  1:40PM EST                       (Author)

## 2017-12-21 ENCOUNTER — GENERIC CONVERSION - ENCOUNTER (OUTPATIENT)
Dept: FAMILY MEDICINE CLINIC | Facility: CLINIC | Age: 77
End: 2017-12-21

## 2017-12-26 NOTE — PROGRESS NOTES
Assessment   1  Acute bronchitis, unspecified organism (466 0) (J20 9)    Plan    Acute bronchitis, unspecified organism    · Doxycycline Hyclate 100 MG Oral Capsule; Take 1 capsule twice daily      Acute bronchitis, unspecified organism (466 0) (J20 9)               Discussion/Summary      76y/o male here today for acute resp  sxs  suma cover for bacteria with a course of doxycycline BID  I recommended muciex DM OTC  he also feels he would benefit from an inhaler which he has had in the past, so I provided him with a sample of Breo, and used his first dose in office to ensure proper use of the device  advised to use 1 puff a day and rinse mouth out after  was advised to sleep propped up on pillows at night  will see how he does with tx over the next few days and was advised to call with any concerns or persistent sxs  Possible side effects of new medications were reviewed with the patient/guardian today  The treatment plan was reviewed with the patient/guardian  The patient/guardian understands and agrees with the treatment plan      Chief Complaint   Pt c/o cough and congestion x 1 week  History of Present Illness   HPI: 76y/o male here today for URI sxs past week  reports fatigue, weakness, excessive cough dry and chest congestion  Denies nasal sxs or ear pain  no fevers  tried cough drops  Review of Systems        Constitutional: as noted in HPI       ENT: as noted in HPI  Cardiovascular: no complaints of slow or fast heart rate, no chest pain, no palpitations, no leg claudication or lower extremity edema  Respiratory: as noted in HPI  Active Problems   1  Abnormal PFT (794 2) (R94 2)   2  Acute right-sided low back pain without sciatica (724 2) (M54 5)   3  Allergic rhinitis (477 9) (J30 9)   4  Anxiety (300 00) (F41 9)   5  Arthritis (716 90) (M19 90)   6  Cervicalgia (723 1) (M54 2)   7  Dysplastic nevus of face (216 3) (D23 30)   8   Enlarged prostate without lower urinary tract symptoms (luts) (600 00) (N40 0)   9  Gastroesophageal reflux disease without esophagitis (530 81) (K21 9)   10  Glaucoma (365 9) (H40 9)   11  Irritable bowel syndrome (564 1) (K58 9)   12  Lipoma of right upper extremity (214 8) (D17 21)   13  Memory difficulty (780 93) (R41 3)   14  Memory loss (780 93) (R41 3)   15  Mild cognitive impairment (331 83) (G31 84)   16  Mixed hyperlipidemia (272 2) (E78 2)   17  Mouth pain (528 9) (K13 79)   18  Oropharyngeal dysphagia (787 22) (R13 12)   19  Persistent cough for 3 weeks or longer (786 2) (R05)   20  Renal lithiasis (592 0) (N20 0)   21  Shortness of breath (786 05) (R06 02)   22  Sicca syndrome (710 2) (M35 00)    Past Medical History   1  History of Abdominal pain, periumbilical (917 39) (S28 82)   2  History of Abdominal pain, suprapubic (789 09) (R10 2)   3  History of Abrasion Of The Right Leg (916 0)   4  History of Acute sinusitis, recurrence not specified, unspecified location (461 9) (J01 90)   5  History of Adverse Effect Of Medicinal Substance Properly Administered (995 20)   6  History of Allergic rhinitis (477 9) (J30 9)   7  History of Arthralgia (719 40) (M25 50)   8  History of Bruise (924 9) (T14 8XXA)   9  History of Bruise without fracture or open wound (924 9) (T14 8XXA)   10  History of Change in bowel habits (787 99) (R19 4)   11  History of Change in bowel habits (787 99) (R19 4)   12  History of Chest pain (786 50) (R07 9)   13  History of Chronic allergic rhinitis (477 9) (J30 9)   14  History of Chronic bronchitis, unspecified chronic bronchitis type (491 9) (J42)   15  History of Chronic sinusitis, unspecified location (473 9) (J32 9)   16  History of Depression screen (V79 0) (Z13 89)   17  History of Diverticulosis (562 10) (K57 90)   18  History of Diverticulosis (562 10) (K57 90)   19  History of Dizziness (780 4) (R42)   20  History of Dry mouth (527 7) (R68 2)   21  History of Dysphagia (787 20) (R13 10)   22   History of Dysphagia (787 20) (R13 10)   23  History of Dysphagia (787 20) (R13 10)   24  History of Encounter for prostate cancer screening (V76 44) (Z12 5)   25  History of Encounter for prostate cancer screening (V76 44) (Z12 5)   26  History of Encounter for screening colonoscopy (V76 51) (Z12 11)   27  History of Fatigue (780 79) (R53 83)   28  History of Flank pain (789 09) (R10 9)   29  History of abdominal pain (V13 89) (Z87 898)   30  History of abdominal pain (V13 89) (Z87 898)   31  History of actinic keratosis (V13 3) (Z87 2)   32  History of acute sinusitis (V12 69) (Z87 09)   33  History of candidiasis of mouth (V12 09) (Z86 19)   34  History of dermatitis (V13 3) (Z87 2)   35  History of glaucoma (V12 49) (Z86 69)   36  History of influenza vaccination (V49 89) (Z92 29)   37  History of left inguinal hernia repair (V15 29) (Z98 890,Z87 19)   38  History of renal calculi (V13 01) (Z87 442)   39  History of sebaceous cyst (V13 3) (Z87 2)   40  History of sebaceous cyst (V13 3) (Z87 2)   41  History of sebaceous cyst (V13 3) (Z87 2)   42  History of seborrheic keratosis (V13 3) (Z87 2)   43  History of skin disorder (V13 3) (Z87 2)   44  History of Influenza vaccine needed (V04 81) (Z23)   45  History of Inguinal hernia (550 90) (K40 90)   46  History of Inguinal hernia, left (550 90) (K40 90)   47  History of Joint pain (719 40) (M25 50)   48  History of Left groin pain (789 09) (R10 32)   49  History of Left inguinal hernia (550 90) (K40 90)   50  History of Left lower quadrant pain (789 04) (R10 32)   51  History of Lower back pain (724 2) (M54 5)   52  History of Lump of skin (782 2) (R22 9)   53  History of Malaise and fatigue (780 79) (R53 81,R53 83)   54  History of Male erectile dysfunction (607 84) (N52 9)   55  History of Need for immunization against influenza (V04 81) (Z23)   56  History of Need for vaccination for pneumococcus (V03 82) (Z23)   57  History of Nonvenomous Insect Bite Of Right Ear (910 4)   58  History of Open comedone (706 1) (L70 0)   59  History of Otitis externa, unspecified laterality   60  History of Pain of scalp (784 0) (R51)   61  History of Palpitations (785 1) (R00 2)   62  History of Pharyngitis (462) (J02 9)   63  History of Preop examination (V72 84) (Z01 818)   64  History of Pre-op examination (V72 84) (Z01 818)   65  History of Pre-procedural laboratory examination (V72 63) (Z01 812)   66  History of Prostate cancer screening (V76 44) (Z12 5)   67  History of Prostate cancer screening (V76 44) (Z12 5)   68  History of Scratch (919 0) (T14 8XXA)   69  History of Screening for depression (V79 0) (Z13 89)   70  History of Screening for other and unspecified genitourinary condition (V81 6) (Z13 89)   71  History of Screening for other and unspecified genitourinary condition (V81 6) (Z13 89)   72  History of Screening for other and unspecified genitourinary condition (V81 6) (Z13 89)   73  History of Skin lesion (709 9) (L98 9)   74  History of Skin lesion (709 9) (L98 9)   75  History of Skin lesion (709 9) (L98 9)   76  History of Skin lesion (709 9) (L98 9)   77  History of Skin lesion (709 9) (L98 9)   78  History of Skin rash (782 1) (R21)   79  History of Skin rash (782 1) (R21)   80  History of Special screening for other neurological conditions (V80 09) (Z13 89)   81  History of Special screening for other neurological conditions (V80 09) (Z13 89)   82  History of Special screening for other neurological conditions (V80 09) (Z13 89)   83  History of Standardised adult depression screening tool completed   80  History of Symptoms involving urinary system (788 99) (R39 9)   85  History of Tick bite (919 4,E906 4) (W57 XXXA)   86  History of Tick bite (919 4,E906 4) (W57 XXXA)   87  History of Tick bite of abdominal wall, initial encounter (911 4,E906 4)      (N13 669Q,N16  XXXA)   88  History of Tick bite, initial encounter (919 4,E906 4) (W57 XXXA)   89   History of Urinary frequency (788 41) (R35 0)   90  History of Urticaria Allergic Due To Envenomation (708 0)   91  History of Visual disturbances (368 9) (H53 9)   92  History of Wound, open, leg (891 0) (S81 809A)    Family History   Mother    1  Family history of Brain Cancer (V16 8)   2  Family history of Congestive Heart Failure   3  Family history of Prostate Cancer (V16 42)    Social History    · Denied: History of Alcohol Use (History)   · Former smoker (V15 82) (X84 849)   · Primary language is English   · Reads English   · History of Social alcohol use (Z78 9)  The social history was reviewed and updated today  Surgical History   1  History of Complete Colonoscopy   2  History of Hernia Repair Inguinal Unilateral   3  History of Oral Surgery    Current Meds    1  Centrum Silver Ultra Mens Oral Tablet; TAKE 1 TABLET DAILY; Therapy: 60FZB3736 to Recorded   2  ChlordiazePOXIDE HCl - 10 MG Oral Capsule; TAKE 1 CAPSULE Every morning; Therapy: 64APO7040 to (Evaluate:47Fie2370); Last Rx:97Mfu5833 Ordered   3  Donepezil HCl - 5 MG Oral Tablet; TAKE 1 TABLET AT BEDTIME; Therapy: 36NCI9852 to (Evaluate:38Wcl8003)  Requested for: 02QRZ9524; Last     Rx:62Fis8141 Ordered   4  Levocetirizine Dihydrochloride 5 MG Oral Tablet; TAKE 1 TABLET DAILY IN THE     EVENING; Therapy: 76Cfx9980 to (Shoaib Garcia)  Requested for: 46IAN9793; Last     Rx:07Nov2016 Ordered   5  Meclizine HCl - 25 MG Oral Tablet; take 0 5 tablet bedtime  Requested for: 63Whg6472;     Last Rx:48Akx3274 Ordered   6  PrednisoLONE Sodium Phosphate 1 % Ophthalmic Solution; Insert 2-3 drops in affected     ear 4 times daily as needed; Therapy: 23Tiq5836 to (Last Rx:04Wvx1946)  Requested for: 32PXT9584 Ordered   7  Rapaflo 8 MG Oral Capsule; take 1 capsule daily; Therapy: 52NRH0874 to (Last Rx:19Guu7021)  Requested for: 56Ziq9113 Ordered   8  Red Yeast Rice 600 MG Oral Tablet; Take 1 tablet twice daily; Therapy: (Dada Briceño) to Recorded   9  Simbrinza 1-0 2 % Ophthalmic Suspension; twice daily; Therapy: 73HRG9181 to (Evaluate:78Bxf6961) Recorded   10  TraMADol HCl - 50 MG Oral Tablet; TAKE 1 TABLET EVERY 6 TO 8 HOURS AS NEEDED      FOR PAIN;      Therapy: 81Ics0456 to (Last Rx:12Oct2017) Ordered   11  Travatan Z 0 004 % Ophthalmic Solution; once daily; Therapy: 60YAC6400 to (Evaluate:13Nov2014) Recorded   12  Triamcinolone Acetonide 0 1 % External Cream; APPLY  AND RUB  IN A THIN FILM TO      AFFECTED AREAS TWICE DAILY  (AM AND PM); Therapy: 51NIF0356 to (Last Rx:83Ybm4049)  Requested for: 75MOJ8057 Ordered     The medication list was reviewed and updated today  Allergies   1  Levaquin TABS   2  Azithromycin TABS   3  Penicillins   4  ketorolac    Vitals    Recorded: 97IDO1158 11:26AM   Temperature 98 9 F, Tympanic   Heart Rate 86   Respiration Quality Normal   Respiration 16   Systolic 897, LUE, Sitting   Diastolic 68, LUE, Sitting   Weight 163 lb 6 oz   BMI Calculated 24 13   BSA Calculated 1 9   O2 Saturation 96, RA   Pain Scale 0     Physical Exam        Constitutional      General appearance: Abnormal   acutely ill,-- comfortable,-- within normal limits of ideal weight,-- appears tired-- and-- appearance reflects stated age  Eyes      Conjunctiva and lids: No swelling, erythema, or discharge  Ears, Nose, Mouth, and Throat      External inspection of ears and nose: Normal        Otoscopic examination: Tympanic membrance translucent with normal light reflex  Canals patent without erythema  Nasal mucosa, septum, and turbinates: Normal without edema or erythema  Oropharynx: Abnormal  -- PND  Pulmonary      Respiratory effort: Abnormal  -- occasional dry cough  Auscultation of lungs: Clear to auscultation, equal breath sounds bilaterally, no wheezes, no rales, no rhonci  Cardiovascular      Auscultation of heart: Normal rate and rhythm, normal S1 and S2, without murmurs         Lymphatic Palpation of lymph nodes in neck: No lymphadenopathy  Psychiatric      Orientation to person, place and time: Normal        Mood and affect: Normal        Additional Exam:  vitals reviewed  Future Appointments      Date/Time Provider Specialty Site   01/25/2018 02:40 PM SHIRLEY Lopez   Pulmonary Medicine [address] [facility] [place]     Signatures    Electronically signed by : Carmen Kim [facility]; Dec  6 2017 12:12PM EST                       (Author)     Electronically signed by : Blaze Salazar DO; Dec 26 2017  2:32AM EST                       (Co-author)

## 2018-01-09 NOTE — PROGRESS NOTES
Assessment    1  Memory loss (780 93) (R41 3)   2  Dysphagia (787 20) (R13 10)   3  Dysplastic nevus of face (216 3) (D22 30)   4  Irritable bowel syndrome (564 1) (K58 9)    Plan  Dysphagia    · 2 - Justice Hyde MD, Matt Bui  (Gastroenterology) Physician Referral  Consult  Status: Hold For -  Scheduling  Requested for: 32JNW9995  Care Summary provided  : Yes  Dysplastic nevus of face    · 2 Anastacio Araya MD, Carley Ernst  (Plastic And Reconstructive Surgery) Physician Referral   Consult  Status: Hold For - Scheduling  Requested for: 65FBU6035  Care Summary provided  : Yes  Memory loss    · *1 -  NEUROLOGY Physician Referral  Consult  Status: Hold For - Scheduling   Requested for: 20RTQ8352  Care Summary provided  : Yes    Discussion/Summary    --Dysplastic nevus L cheek: hx of basal cell CA (seen by advanced derm)  lesion is suspicious  darkly pigmented w/ irregular borders  will refer to plastic surgeon (dr Kalpana Marshall)  --Memory probs: worsening lately  will refer to  neuro for cognitive eval  --Dysphagia: x years, but worsening again  will refer back to GI (dr Claudia Bar)  --IBS: still w/ frequent bouts of cramping and loose BMS  (non-bloody)  cont anticholinergics and dietary modification  Chief Complaint  Pt c/o a brown skin lesion located on the L/cheek which he noticed yesterday while getting a shape up at the [employer]  Pt states about a week ago he had a skin lesion removed on the R/side of his neck and it was determined to be cancer unknown type  History of Present Illness  HPI: pt had his beard trimmed yesterday revealing a spot on his L cheek  he is uncertain how long lesion has been present  pt also has been having increasing probs w/ his memory lately    pt also having swallowing probs    Ken Manifold he will be seeing GI next week  Review of Systems    Constitutional: no fever or chills, feels well, no tiredness, no recent weight loss or weight gain     Cardiovascular: no complaints of slow or fast heart rate, no chest pain, no palpitations, no leg claudication or lower extremity edema  Respiratory: no complaints of shortness of breath, no wheezing or cough, no dyspnea on exertion, no orthopnea or PND  Integumentary: as noted in HPI  Neurological: as noted in HPI  Active Problems    1  Anxiety (300 00) (F41 9)   2  Arthritis (716 90) (M19 90)   3  Benign enlargement of prostate (600 00) (N40 0)   4  Cervicalgia (723 1) (M54 2)   5  Chronic sinusitis, unspecified location (473 9) (J32 9)   6  Ear itch (698 9) (L29 9)   7  Glaucoma (365 9) (H40 9)   8  Irritable bowel syndrome (564 1) (K58 9)   9  Mixed hyperlipidemia (272 2) (E78 2)   10  Pain of scalp (784 0) (R51)   11  Sicca syndrome (710 2) (M35 00)    Past Medical History    1  History of Abdominal pain (789 00) (R10 9)   2  History of Abdominal pain, periumbilical (823 19) (P10 71)   3  History of Abdominal pain, suprapubic (789 09) (R10 30)   4  History of Abrasion Of The Right Leg (916 0)   5  History of Adverse Effect Of Medicinal Substance Properly Administered (995 20)   6  History of Allergic rhinitis (477 9) (J30 9)   7  History of Arthralgia (719 40) (M25 50)   8  History of Bruise (924 9) (T14 8)   9  History of Change in bowel habits (787 99) (R19 4)   10  History of Change in bowel habits (787 99) (R19 4)   11  History of Chest pain (786 50) (R07 9)   12  History of Diverticulosis (562 10) (K57 90)   13  History of Diverticulosis (562 10) (K57 90)   14  History of Dizziness (780 4) (R42)   15  History of Dry mouth (527 7) (R68 2)   16  History of Dysphagia (787 20) (R13 10)   17  History of Dysphagia (787 20) (R13 10)   18  History of Encounter for screening colonoscopy (V76 51) (Z12 11)   19  History of Fatigue (780 79) (R53 83)   20  History of Flank pain (789 09) (R10 9)   21  History of abdominal pain (V13 89) (Z87 898)   22  History of actinic keratosis (V13 3) (Z87 2)   23  History of acute sinusitis (V12 69) (Z87 09)   24   History of left inguinal hernia repair (V15 29) (Z98 89)   25  History of sebaceous cyst (V13 3) (Z87 2)   26  History of sebaceous cyst (V13 3) (Z87 2)   27  History of seborrheic keratosis (V13 3) (Z87 2)   28  History of skin disorder (V13 3) (Z87 2)   29  History of Influenza vaccine needed (V04 81) (Z23)   30  History of Inguinal hernia (550 90) (K40 90)   31  History of Inguinal hernia, left (550 90) (K40 90)   32  History of Joint pain (719 40) (M25 50)   33  History of Left groin pain (789 09) (R10 30)   34  History of Left inguinal hernia (550 90) (K40 90)   35  History of Lower back pain (724 2) (M54 5)   36  History of Lump of skin (782 2) (R22 9)   37  History of Male erectile dysfunction (607 84) (N52 9)   38  History of Need for immunization against influenza (V04 81) (Z23)   39  History of Need for vaccination for pneumococcus (V03 82) (Z23)   40  History of Nonvenomous Insect Bite Of Right Ear (910 4)   41  History of Otitis externa, unspecified laterality   42  History of Palpitations (785 1) (R00 2)   43  History of Pharyngitis (462) (J02 9)   44  History of Preop examination (V72 84) (Z01 818)   45  History of Prostate cancer screening (V76 44) (Z12 5)   46  History of Prostate cancer screening (V76 44) (Z12 5)   47  History of Scratch (919 0) (T14 8)   48  History of Screening for other and unspecified genitourinary condition (V81 6) (Z13 89)   49  History of Skin lesion (709 9) (L98 9)   50  History of Skin lesion (709 9) (L98 9)   51  History of Skin lesion (709 9) (L98 9)   52  History of Skin lesion (709 9) (L98 9)   53  History of Skin rash (782 1) (R21)   54  History of Special screening for other neurological conditions (V80 09) (Z13 89)   55  History of Standardised adult depression screening tool completed   64  History of Symptoms involving urinary system (788 99) (R39 9)   57  History of Tick bite (919 4,E906 4) (T14 8,W57  XXXA)   62  History of Urinary frequency (788 41) (R35 0)   59   History of Urticaria Allergic Due To Envenomation (708 0)   60  History of Visual disturbances (368 9) (H53 9)   61  History of Wound, open, leg (891 0) (C86 330J)  Active Problems And Past Medical History Reviewed: The active problems and past medical history were reviewed and updated today  Family History    1  Family history of Brain Cancer (V16 8)   2  Family history of Congestive Heart Failure   3  Family history of Prostate Cancer (O18 52)  Family History Reviewed: The family history was reviewed and updated today  Social History    · Denied: History of Alcohol Use (History)   · Former smoker (V15 82) (T93 951)   · Primary language is English   · Reads English   · History of Social alcohol use (F10 99)  The social history was reviewed and updated today  The social history was reviewed and is unchanged  Surgical History    1  History of Complete Colonoscopy   2  History of Hernia Repair Inguinal Unilateral   3  History of Oral Surgery  Surgical History Reviewed: The surgical history was reviewed and updated today  Current Meds   1  Centrum Silver Ultra Mens Oral Tablet; Therapy: 92JZI5554 to Recorded   2  ChlordiazePOXIDE HCl - 10 MG Oral Capsule; TAKE 1 CAPSULE Every morning; Therapy: 49VZV2520 to (Evaluate:07Nov2015); Last Rx:76Zfv7259 Ordered   3  Levocetirizine Dihydrochloride 5 MG Oral Tablet; TAKE 1 TABLET DAILY IN THE   EVENING; Therapy: 80Jtb6890 to (Evaluate:11Aug2015)  Requested for: 97WEA8187; Last   Rx:15Esu9418 Ordered   4  Meclizine HCl - 25 MG Oral Tablet; take 0 5 tablet bedtime  Requested for: 70BXO6425; Last Rx:16Jan2015 Ordered   5  Metoclopramide HCl - 5 MG Oral Tablet; TAKE 1 TABLET TWICE DAILY; Therapy: 41YDE0377 to (Evaluate:19Apr2016)  Requested for: 21Jan2016; Last   Rx:21Jan2016 Ordered   6  Neomycin-Polymyxin-HC 3 5-96960-2 Otic Solution; SOLN OT X 7;   Therapy: 25QKN1021 to (Vinayak Youngblood)  Requested for: 02MQE0440; Last   Rx:11Nov2015 Ordered   7  Rapaflo 8 MG Oral Capsule; take 1 capsule daily; Therapy: 89ZNQ8368 to (Last Rx:13Jan2016)  Requested for: 42NIC7741 Ordered   8  Red Yeast Rice 600 MG Oral Tablet; Take 1 tablet twice daily; Therapy: ([phone]) to Recorded   9  Simbrinza 1-0 2 % Ophthalmic Suspension; Therapy: 83NRA7154 to (Evaluate:41Rvx5021) Recorded   10  Travatan Z 0 004 % Ophthalmic Solution; Therapy: 57VLR9615 to (Evaluate:13Nov2014) Recorded    The medication list was reviewed and updated today  Allergies    1  Levaquin TABS   2  Azithromycin TABS   3  Penicillins   4  ketorolac    Vitals   Recorded: 21MKQ9818 09:23AM   Temperature 97 8 F   Heart Rate 85   Pulse Quality Norm   Respiration 15   Respiration Quality Norm   Systolic 998, LUE, Sitting   Diastolic 64, LUE, Sitting   Height 5 ft 9 in   Weight 179 lb 6 08 oz   BMI Calculated 26 49   BSA Calculated 1 97   O2 Saturation 98     Physical Exam    Constitutional   General appearance: No acute distress, well appearing and well nourished  Pulmonary   Respiratory effort: No increased work of breathing or signs of respiratory distress  Auscultation of lungs: Clear to auscultation, equal breath sounds bilaterally, no wheezes, no rales, no rhonci  Cardiovascular   Palpation of heart: Normal PMI, no thrills  Auscultation of heart: Normal rate and rhythm, normal S1 and S2, without murmurs  Examination of extremities for edema and/or varicosities: Normal     Carotid pulses: Normal     Lymphatic   Palpation of lymph nodes in neck: No lymphadenopathy  Skin   Skin and subcutaneous tissue: Abnormal   5 mm pigmented lesion L cheek  Psychiatric   Orientation to person, place and time: Normal     Mood and affect: Normal          Signatures   Electronically signed by :  Chon Quispe DO; Feb 5 2016  9:45AM EST                       (Author)

## 2018-01-10 NOTE — RESULT NOTES
Verified Results  (1) CBC/PLT/DIFF [id number] 10:54AM Alissa Jimenes Order Number: [id number]     Test Name Result Flag Reference   WBC COUNT 5 64 Thousand/uL  4 31-10 16   RBC COUNT 4 30 Million/uL  3 88-5 62   HEMOGLOBIN 13 2 g/dL  12 0-17 0   HEMATOCRIT 40 1 %  36 5-49 3   MCV 93 fL  82-98   MCH 30 7 pg  26 8-34 3   MCHC 32 9 g/dL  31 4-37 4   RDW 14 7 %  11 6-15 1   MPV 10 3 fL  8 9-12 7   PLATELET COUNT 031 Thousands/uL  149-390   nRBC AUTOMATED 0 /100 WBCs     NEUTROPHILS RELATIVE PERCENT 55 %  43-75   LYMPHOCYTES RELATIVE PERCENT 30 %  14-44   MONOCYTES RELATIVE PERCENT 12 %  4-12   EOSINOPHILS RELATIVE PERCENT 3 %  0-6   BASOPHILS RELATIVE PERCENT 0 %  0-1   NEUTROPHILS ABSOLUTE COUNT 3 09 Thousands/? ??L  1 85-7 62   LYMPHOCYTES ABSOLUTE COUNT 1 69 Thousands/? ??L  0 60-4 47   MONOCYTES ABSOLUTE COUNT 0 69 Thousand/? ??L  0 17-1 22   EOSINOPHILS ABSOLUTE COUNT 0 16 Thousand/? ??L  0 00-0 61   BASOPHILS ABSOLUTE COUNT 0 01 Thousands/? ??L  0 00-0 10     (1) COMPREHENSIVE METABOLIC PANEL [id number] 92:33FU Alissa Jimenes Order Number: [id number]     Test Name Result Flag Reference   SODIUM 140 mmol/L  136-145   POTASSIUM 4 7 mmol/L  3 5-5 3   CHLORIDE 108 mmol/L  100-108   CARBON DIOXIDE 28 mmol/L  21-32   ANION GAP (CALC) 4 mmol/L  4-13   BLOOD UREA NITROGEN 23 mg/dL  5-25   CREATININE 0 97 mg/dL  0 60-1 30   Standardized to IDMS reference method   CALCIUM 8 7 mg/dL  8 3-10 1   BILI, TOTAL 0 54 mg/dL  0 20-1 00   ALK PHOSPHATAS 73 U/L     ALT (SGPT) 16 U/L  12-78   AST(SGOT) 16 U/L  5-45   ALBUMIN 3 4 g/dL L 3 5-5 0   TOTAL PROTEIN 6 8 g/dL  6 4-8 2   eGFR 74 ml/min/1 73sq m     National Kidney Disease Education Program recommendations are as follows:  GFR calculation is accurate only with a steady state creatinine  Chronic Kidney disease less than 60 ml/min/1 73 sq  meters  Kidney failure less than 15 ml/min/1 73 sq  meters     GLUCOSE FASTING 90 mg/dL  65-99     (1) LIPID PANEL FASTING W DIRECT LDL REFLEX [id number] 10:54AM Bj Abdullahi Order Number: [id number]     Test Name Result Flag Reference   CHOLESTEROL 175 mg/dL     LDL CHOLESTEROL CALCULATED 96 mg/dL  0-100   Triglyceride:         Normal              <150 mg/dl       Borderline High    150-199 mg/dl       High               200-499 mg/dl       Very High          >499 mg/dl  Cholesterol:         Desirable        <200 mg/dl      Borderline High  200-239 mg/dl      High             >239 mg/dl  HDL Cholesterol:        High    >59 mg/dL      Low     <41 mg/dL  LDL Cholesterol:        Optimal          <100 mg/dl        Near Optimal     100-129 mg/dl        Above Optimal          Borderline High   130-159 mg/dl          High              160-189 mg/dl          Very High        >189 mg/dl  LDL CALCULATED:    This screening LDL is a calculated result  It does not have the accuracy of the Direct Measured LDL in the monitoring of patients with hyperlipidemia and/or statin therapy  Direct Measure LDL (WKI002) must be ordered separately in these patients  TRIGLYCERIDES 87 mg/dL  <=150   Specimen collection should occur prior to N-Acetylcysteine or Metamizole administration due to the potential for falsely depressed results  HDL,DIRECT 62 mg/dL H 40-60   Specimen collection should occur prior to Metamizole administration due to the potential for falsely depressed results  (1) TSH [id number] 10:54AM Bj Abdullahi Order Number: [id number]     Test Name Result Flag Reference   TSH 1 210 uIU/mL  0 358-3 740   Patients undergoing fluorescein dye angiography may retain small amounts of fluorescein in the body for 48-72 hours post procedure  Samples containing fluorescein can produce falsely depressed TSH values  If the patient had this procedure,a specimen should be resubmitted post fluorescein clearance       (1) PSA (SCREEN) (Dx V76 44 Screen for Prostate Cancer) [id number] 10:54AM Hannah Belia Santizo Order Number: [id number]     Test Name Result Flag Reference   PROSTATE SPECIFIC ANTIGEN 0 2 ng/mL  0 0-4 0   American Urological Association Guidelines define biochemical recurrence of prostate cancer as a detectable or rising PSA value post-radical prostatectomy that is greater than or equal to 0 2 ng/mL with a second confirmatory level of greater than or equal to 0 2 ng/mL

## 2018-01-10 NOTE — RESULT NOTES
Verified Results  * XR CHEST PA & LATERAL [id number] 03:20PM Mendoza Singer Order Number: [id number]     Test Name Result Flag Reference   XR CHEST PA & LATERAL (Report)     CHEST      INDICATION: Productive cough, shortness of breath, wheezing, fatigue     COMPARISON: None     VIEWS: Frontal and lateral projections     IMAGES: 2     FINDINGS:        Cardiomediastinal silhouette appears unremarkable  Coarsened chronic interstitial lung densities  No focal consolidation  No pneumothorax or pleural effusion  Visualized osseous structures appear within normal limits for the patient's age  IMPRESSION:   Coarsened chronic interstitial lung densities  No focal consolidation         Workstation performed: [id number]     Signed by:   Edson Renee MD   3/14/17

## 2018-01-10 NOTE — RESULT NOTES
Message   Please call patient, recent Lyme test was normal   Thank you     Verified Results  (1) LYME ANTIBODY PROFILE W/REFLEX TO WESTERN BLOT [id number] 10:27AM Edilberto Parcel Order Number: [id number]     Test Name Result Flag Reference   LYME IGG 0 07  0 00-0 79   NEGATIVE(0 00-0 79)-Absence of detectable Borrelia IgG Antibodies  A negative result does not exclude the possibility of Borrelia infection  If early Lyme disease is suspected,a second sample should be collected & tested 4 weeks after initial testing  LYME IGM 0 13  0 00-0 79   NEGATIVE (0 00-0 79)-Absence of detectable Borrelia IgM antibodies  A negative result does not exclude the possibility of Borrelia infection  If early lyme disease is suspected, a second sample should be collected & tested 4 weeks after initial testing

## 2018-01-11 NOTE — PROCEDURES
Procedures by SONIA Morales at 5/16/2016  2:00 PM      Author:  SONIA Morales Service:  (none) Author Type:  Speech and Language Pathologist     Filed:  5/16/2016  3:25 PM Date of Service:  5/16/2016  2:00 PM Status:  Signed     :  SONIA Morales (Speech and Language Pathologist)         Pre-procedure Diagnoses:       1  Gastroesophageal reflux disease, esophagitis presence not specified [K21 9]                Post-procedure Diagnoses:       1  Pharyngoesophageal dysphagia [R13 14]                Procedures:       1  FL BARIUM Quinn Cap SPEECH [FWM519 (Custom)]                                                      Video Swallow Study      Patient Name: Yeimy Tinoco  Today's Date: 5/16/2016  par  par  Past Medical History  No past medical history on file  par  Past Surgical History  No past surgical history on file  General Information:  General Information  Ordering Physician: Dr Lindsey Zhang  Date of Order: 05/16/16  Type of Study: Repeat MBS  Diet Prior to this Study: Regular c thins  Past Medical History: Refer to OP SLP information for details  Positioning: Upright 90 degrees   Materials Adminstered: Puree, Soft solid food, Hard solid food, Mixed food/liquid, Honey thick liquid, Nectar thick liquid, Thin liquid      Oral Stage:        Oral Stage Performance  Oral Stage Performance: WFL  Oral Phase - Comment  Oral Phase - Comment: Pt demonstrated full mastication of softer solids, hard solids s oral residual  A-p transfer of puree, ambient fluid of mixed consistency, HT, NT and thin liquids by cup was [facility]       Pharyngeal Stage:        Pharnygeal Stage  Pharyngeal Stage Performance: Mild impaired  Swallow Mechanics  Swallowing Initiation was[de-identified] Prompt  Hyloaryngeal Excursion was[de-identified] Mildly limited  Epiglottic Inversion was[de-identified] Present  Tongue Drive was[de-identified] Appeared, Mildly weak  Pharyngeal Constriction was[de-identified] Appeared, Mildly weal  Cricopharyngeal Opening/Relaxation was[de-identified] Mildly impaired  Cricopharyngeal Prominence/Bar: Other (Comment)  Cervical Osteophytes[de-identified] Resulted in mild retention of food in the pharynx  Pharyngeal Phase - Comment  Pharyngeal Comment: Pt's swallow initiation was prompt c all consistencies  Hyolaryngeal elevation, tongue drive and pharyngeal constriction are mildly impaired, leading to mildly decreased relaxation of the cricopharyngeus observed c all consistnecies  Pt's epiglottis inverted c all consistencies assessed, but pt did have inconsistent high transient penetration c NT and thin liquids by cup  Pt noted to have moderate vallecular retention c puree, soft and hard solids which pt had mild clearance upon  subsequent secondary swallows  When taking liquids, improved clearance of vallecular retention observed  However, pt was noted to have mild pyriform sinus retention c all consistencies, due to decreased relaxation of the cricopharyngeus  Suspect cervical   degeneration at the C3-C4 level could be impacting clearance  As assessment progressed, also observed trace hold up of liquid barium around the C6 level , which did not retropulse barium to pyriform sinus  ? beginning of a cricopharygneal prominence  vs  beginning of a Zenker's divertitulum  No lauren penetration/aspiration observed on assessment  Esophageal Stage:  Esophageal Stage  Esophageal Stage: Within functional limits       Assessment Summary:  Assessment Summary  Assessment Summary[de-identified] Pt's oral stage is [facility]  Mild phayrngeal dysphagia observed due to the overall mild decreased pharyngeal constriction, hyolaryngeal excursion and tongue drive c consistencies assessed  Mild vallecular retention noted c puree, soft  and hard solids, which minimal clearance observed c secondary swallows, but decrased c liquids  Pt did have mild pyriform sinus retention c all consistencies, which lead to decreased opening of the cricopharyngeus  , 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 possible cricopharyngeal prominence vs  possible beginning  of Zenker's diverticulum  Diagnosis/Prognosis:  Diagnosis/Prognosis  Dysphagia Diagnosis: Mild pharyngeal stage dysphagia         Recommendations:  Recommendations/Treat  Diet Texture:  (regular c extra moistening agents)  Liquid Consistency: Thin  Liquid Administration: Cup, Straw  Medication Administration: As tolerated  Suggested Precautions: Aspiration precautions, Remain upright 45 degrees after meals, Feed fully upright position, Small frequent meals  Strategies:  Alternate food/liquid, Clear throat during intake to clear penetration/airway  Consider Follow-up VBS: In 4-6 months                             Received for:Provider  EPIC   May 18 2016  3:43PM [facility] Standard Time

## 2018-01-12 VITALS
DIASTOLIC BLOOD PRESSURE: 70 MMHG | TEMPERATURE: 98.3 F | BODY MASS INDEX: 23.34 KG/M2 | HEIGHT: 69 IN | OXYGEN SATURATION: 98 % | RESPIRATION RATE: 16 BRPM | SYSTOLIC BLOOD PRESSURE: 114 MMHG | HEART RATE: 75 BPM | WEIGHT: 157.56 LBS

## 2018-01-12 NOTE — PROCEDURES
Procedures by SONIA Aden at 5/16/2016  2:00 PM      Author:  SONIA Aden Service:  (none) Author Type:  Speech and Language Pathologist     Filed:  5/16/2016  3:21 PM Date of Service:  5/16/2016  2:00 PM Status:  Signed     :  SONIA Aden (Speech and Language Pathologist)         Pre-procedure Diagnoses:       1  Gastroesophageal reflux disease, esophagitis presence not specified [K21 9]                Post-procedure Diagnoses:       1  Pharyngoesophageal dysphagia [R13 14]                Procedures:       1  FL BARIUM Viv Alonso SPEECH [DZE841 (Custom)]                                                      Video Swallow Study      Patient Name: Betsey Wilkins  Today's Date: 5/16/2016  par  par  Past Medical History  No past medical history on file  par  Past Surgical History  No past surgical history on file  General Information:  General Information  Ordering Physician: Dr Ramin Greenwood      Oral Stage:        Oral Stage Performance  Oral Stage Performance: [facility]  Oral Phase - Comment  Oral Phase - Comment: Pt demonstrated full mastication of softer solids, hard solids s oral residual  A-p transfer of puree, ambient fluid of mixed consistency, HT, NT and thin liquids by cup was [facility]  Pharyngeal Stage:        Pharnygeal Stage  Pharyngeal Stage Performance: Mild impaired  Swallow Mechanics  Swallowing Initiation was[de-identified] Prompt  Hyloaryngeal Excursion was[de-identified] Mildly limited  Epiglottic Inversion was[de-identified] Present  Tongue Drive was[de-identified] Appeared, Mildly weak  Pharyngeal Constriction was[de-identified] Appeared, Mildly weal  Cricopharyngeal Opening/Relaxation was[de-identified] Mildly impaired  Cricopharyngeal Prominence/Bar: Other (Comment)  Cervical Osteophytes[de-identified] Resulted in mild retention of food in the pharynx  Pharyngeal Phase - Comment  Pharyngeal Comment: Pt's swallow initiation was prompt c all consistencies   Hyolaryngeal elevation, tongue drive and pharyngeal constriction are mildly impaired, leading to mildly decreased relaxation of the cricopharyngeus observed c all consistnecies  Pt's epiglottis inverted c all consistencies assessed, but pt did have inconsistent high transient penetration c NT and thin liquids by cup  Pt noted to have moderate vallecular retention c puree, soft and hard solids which pt had mild clearance upon  subsequent secondary swallows  When taking liquids, improved clearance of vallecular retention observed  However, pt was noted to have mild pyriform sinus retention c all consistencies, due to decreased relaxation of the cricopharyngeus  Suspect cervical   degeneration at the C3-C4 level could be impacting clearance  As assessment progressed, also observed trace hold up of liquid barium around the C6 level , which did not retropulse barium to pyriform sinus  ? beginning of a cricopharygneal prominence  vs  beginning of a Zenker's divertitulum  No lauren penetration/aspiration observed on assessment  Esophageal Stage:  Esophageal Stage  Esophageal Stage: Within functional limits       Assessment Summary:  Assessment Summary  Assessment Summary[de-identified] Pt's oral stage is [facility]  Mild phayrngeal dysphagia observed due to the overall mild decreased pharyngeal constriction, hyolaryngeal excursion and tongue drive c consistencies assessed  Mild vallecular retention noted c puree, soft  and hard solids, which minimal clearance observed c secondary swallows, but decrased c liquids  Pt did have mild pyriform sinus retention c all consistencies, which lead to decreased opening of the cricopharyngeus  , 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 possible cricopharyngeal prominence vs  possible beginning  of Zenker's diverticulum       Diagnosis/Prognosis:  Diagnosis/Prognosis  Dysphagia Diagnosis: Mild pharyngeal stage dysphagia         Recommendations:  Recommendations/Treat  Diet Texture:  (regular c extra moistening agents)  Liquid Consistency: Thin  Liquid Administration: Cup, Straw  Medication Administration: As tolerated  Suggested Precautions: Aspiration precautions, Remain upright 45 degrees after meals, Feed fully upright position, Small frequent meals  Strategies:  Alternate food/liquid, Clear throat during intake to clear penetration/airway  Consider Follow-up VBS: In 4-6 months                             Received for:Provider  EPIC   May 18 2016  3:43PM [facility] Standard Time

## 2018-01-12 NOTE — MISCELLANEOUS
Message  Message Free Text Note Form: Pt called while I was on call earlier this AM before coming into the office  I told him I would call him back when I returned because he is asking about medications that Dr Dee Anderson prescribed for him stating doxycycline is upsetting his stomach  I called him aback upon arrival to office  I spoke with dr Dee Anderson prior about patient since he has been seeing him for this issue  Dr Emile Gowers said ok to switching him to bactrim  Pt states still coughing with a lot of phlegm, asking about chest xray results he had done 3/13  Doxy causing upset stomach, wants different medication  Plan    1  Sulfamethoxazole-Trimethoprim 800-160 MG Oral Tablet; TAKE 1 TABLET TWICE   DAILY WITH FOOD    Discussion/Summary  Discussion Summary:   I told pt I would switch him to bactrim - med sent to pharm  I advised he start daily probiotic  Also recommended Mucinex DM OTC and continue dulera 2 puffs twice a day  I reviewed chest xray results with him, chronic coarse densities seen  I told pt if he is no better after this treatment he would need to f/u and determine if chest CT or pulm referral necessary  Pt rosibeltands        Signatures   Electronically signed by : Shawn Sanchez, [facility]; Mar 15 2017 12:23PM EST                       (Author)

## 2018-01-13 VITALS
RESPIRATION RATE: 17 BRPM | BODY MASS INDEX: 23.26 KG/M2 | HEART RATE: 69 BPM | DIASTOLIC BLOOD PRESSURE: 76 MMHG | WEIGHT: 162.5 LBS | TEMPERATURE: 97.9 F | SYSTOLIC BLOOD PRESSURE: 102 MMHG | HEIGHT: 70 IN | OXYGEN SATURATION: 99 %

## 2018-01-13 VITALS
BODY MASS INDEX: 24.56 KG/M2 | WEIGHT: 162.06 LBS | RESPIRATION RATE: 16 BRPM | DIASTOLIC BLOOD PRESSURE: 80 MMHG | HEIGHT: 68 IN | TEMPERATURE: 98.9 F | OXYGEN SATURATION: 97 % | HEART RATE: 85 BPM | SYSTOLIC BLOOD PRESSURE: 130 MMHG

## 2018-01-13 VITALS
OXYGEN SATURATION: 98 % | DIASTOLIC BLOOD PRESSURE: 70 MMHG | BODY MASS INDEX: 24.32 KG/M2 | HEART RATE: 65 BPM | HEIGHT: 68 IN | RESPIRATION RATE: 16 BRPM | WEIGHT: 160.5 LBS | SYSTOLIC BLOOD PRESSURE: 112 MMHG | TEMPERATURE: 96 F

## 2018-01-13 VITALS
SYSTOLIC BLOOD PRESSURE: 90 MMHG | HEIGHT: 69 IN | BODY MASS INDEX: 23.92 KG/M2 | OXYGEN SATURATION: 99 % | HEART RATE: 70 BPM | TEMPERATURE: 96.1 F | RESPIRATION RATE: 17 BRPM | WEIGHT: 161.5 LBS | DIASTOLIC BLOOD PRESSURE: 60 MMHG

## 2018-01-13 VITALS
WEIGHT: 160.31 LBS | BODY MASS INDEX: 23.75 KG/M2 | SYSTOLIC BLOOD PRESSURE: 120 MMHG | HEART RATE: 85 BPM | DIASTOLIC BLOOD PRESSURE: 60 MMHG | OXYGEN SATURATION: 98 % | HEIGHT: 69 IN | TEMPERATURE: 97.7 F

## 2018-01-14 VITALS
HEART RATE: 75 BPM | WEIGHT: 160.31 LBS | BODY MASS INDEX: 24.3 KG/M2 | OXYGEN SATURATION: 94 % | TEMPERATURE: 97.9 F | HEIGHT: 68 IN | RESPIRATION RATE: 16 BRPM | DIASTOLIC BLOOD PRESSURE: 70 MMHG | SYSTOLIC BLOOD PRESSURE: 120 MMHG

## 2018-01-14 VITALS
TEMPERATURE: 98.9 F | WEIGHT: 170.5 LBS | DIASTOLIC BLOOD PRESSURE: 72 MMHG | RESPIRATION RATE: 16 BRPM | OXYGEN SATURATION: 96 % | HEART RATE: 88 BPM | HEIGHT: 69 IN | SYSTOLIC BLOOD PRESSURE: 116 MMHG | BODY MASS INDEX: 25.25 KG/M2

## 2018-01-14 VITALS
HEIGHT: 69 IN | HEART RATE: 82 BPM | BODY MASS INDEX: 24.17 KG/M2 | OXYGEN SATURATION: 98 % | DIASTOLIC BLOOD PRESSURE: 74 MMHG | TEMPERATURE: 96.9 F | SYSTOLIC BLOOD PRESSURE: 124 MMHG | WEIGHT: 163.19 LBS

## 2018-01-14 VITALS
HEART RATE: 77 BPM | WEIGHT: 158.56 LBS | OXYGEN SATURATION: 99 % | TEMPERATURE: 97.5 F | SYSTOLIC BLOOD PRESSURE: 126 MMHG | HEIGHT: 69 IN | DIASTOLIC BLOOD PRESSURE: 74 MMHG | RESPIRATION RATE: 16 BRPM | BODY MASS INDEX: 23.48 KG/M2

## 2018-01-14 VITALS
SYSTOLIC BLOOD PRESSURE: 128 MMHG | RESPIRATION RATE: 16 BRPM | OXYGEN SATURATION: 100 % | WEIGHT: 160 LBS | TEMPERATURE: 97.1 F | DIASTOLIC BLOOD PRESSURE: 70 MMHG | BODY MASS INDEX: 23.7 KG/M2 | HEIGHT: 69 IN | HEART RATE: 73 BPM

## 2018-01-14 VITALS
OXYGEN SATURATION: 95 % | DIASTOLIC BLOOD PRESSURE: 70 MMHG | WEIGHT: 163 LBS | SYSTOLIC BLOOD PRESSURE: 110 MMHG | BODY MASS INDEX: 24.71 KG/M2 | HEART RATE: 104 BPM | RESPIRATION RATE: 16 BRPM | HEIGHT: 68 IN | TEMPERATURE: 98.9 F

## 2018-01-14 NOTE — RESULT NOTES
Verified Results  * CT ABDOMEN PELVIS W CONTRAST [id number] 10:22AM Jazmyn Leroy Order Number: [id number]   Performing Comments: Patient had CT on 7/19 - possible intussuception of small bowel   - Patient Instructions: To schedule this appointment, please contact Central Scheduling at [phone]  Test Name Result Flag Reference   CT ABDOMEN PELVIS W CONTRAST (Report)     CT ABDOMEN AND PELVIS WITH IV CONTRAST     INDICATION: 48-year-old male for follow-up left upper quadrant intussusception  COMPARISON: CT abdomen and pelvis 7/19/2016  TECHNIQUE: CT examination of the abdomen and pelvis was performed after the administration of intravenous contrast  This examination, like all CT scans performed in the [facility], was performed utilizing techniques to minimize    radiation dose exposure, including the use of iterative reconstruction and automated exposure control  Axial, sagittal, and coronal reformatted images were submitted for interpretation  100 cc of intravenous Omnipaque 350 was administered for this    examination  Enteric contrast was administered  FINDINGS:     ABDOMEN     LOWER CHEST: Atelectatic changes are noted at the lung bases  No other significant abnormality identified in the lower chest      LIVER/BILIARY TREE: Tiny hemangioma in the inferior right lobe measuring 1 cm, stable  No biliary ductal dilatation  GALLBLADDER: There are gallstone(s) within the gallbladder, without pericholecystic inflammatory changes  SPLEEN: Unremarkable  PANCREAS: Unremarkable  ADRENAL GLANDS: Unremarkable  KIDNEYS/URETERS: One or more simple renal cyst(s) is noted  Otherwise unremarkable kidneys  No hydronephrosis  Nonobstructing right mid pole nephrolithiasis stable  STOMACH AND BOWEL: There is colonic diverticulosis most severe in the sigmoid without evidence of acute diverticulitis   Previously described jejunal intussusception in the left upper quadrant has resolved and was likely transient  No bowel obstruction   or wall thickening  APPENDIX: A normal appendix was visualized  ABDOMINOPELVIC CAVITY: Postoperative changes in the left lower quadrant adjacent to the inguinal canal indicative of remote herniorrhaphy with small residual seroma stable  No abdominal pelvic lymphadenopathy or pneumoperitoneum  VESSELS: Unremarkable for patient's age  PELVIS     REPRODUCTIVE ORGANS: Unremarkable for patient's age  URINARY BLADDER: Unremarkable  ABDOMINAL WALL/INGUINAL REGIONS: Left inguinal herniorrhaphy  OSSEOUS STRUCTURES: No acute fracture or destructive osseous lesion  IMPRESSION:       1  Interval resolution of left upper quadrant small bowel intussusception, likely transient with no bowel obstruction or discrete mass  2  Sigmoid diverticulosis  3  Cholelithiasis  4  Stable 1 cm right hepatic lobe hemangioma  5  Stable renal cysts and right mid pole nephrolithiasis         Workstation performed: [id number]     Signed by:   Marciano Land MD   9/30/16   100

## 2018-01-15 VITALS
HEART RATE: 57 BPM | HEIGHT: 69 IN | BODY MASS INDEX: 23.85 KG/M2 | WEIGHT: 161 LBS | TEMPERATURE: 97 F | OXYGEN SATURATION: 100 % | RESPIRATION RATE: 16 BRPM | SYSTOLIC BLOOD PRESSURE: 98 MMHG | DIASTOLIC BLOOD PRESSURE: 58 MMHG

## 2018-01-15 VITALS
BODY MASS INDEX: 22.93 KG/M2 | HEART RATE: 70 BPM | DIASTOLIC BLOOD PRESSURE: 72 MMHG | RESPIRATION RATE: 16 BRPM | TEMPERATURE: 98.5 F | OXYGEN SATURATION: 97 % | HEIGHT: 68 IN | SYSTOLIC BLOOD PRESSURE: 104 MMHG | WEIGHT: 151.31 LBS

## 2018-01-15 NOTE — RESULT NOTES
Verified Results  (1) CBC/PLT/DIFF [id number] 12:00AM Mike Hernandez Order Number: [id number]     Order Number: [id number]     Test Name Result Flag Reference   WBC COUNT 5 94 Thousand/uL  4 31-10 16   RBC COUNT 4 33 Million/uL  3 88-5 62   HEMOGLOBIN 14 1 g/dL  12 0-17 0   HEMATOCRIT 40 9 %  36 5-49 3   MCV 95 fL  82-98   MCH 32 6 pg  26 8-34 3   MCHC 34 5 g/dL  31 4-37 4   RDW 14 4 %  11 6-15 1   MPV 10 2 fL  8 9-12 7   PLATELET COUNT 271 Thousands/uL  149-390   nRBC AUTOMATED 0 /100 WBCs     NEUTROPHILS RELATIVE PERCENT 60 %  43-75   LYMPHOCYTES RELATIVE PERCENT 30 %  14-44   MONOCYTES RELATIVE PERCENT 9 %  4-12   EOSINOPHILS RELATIVE PERCENT 1 %  0-6   BASOPHILS RELATIVE PERCENT 0 %  0-1   NEUTROPHILS ABSOLUTE COUNT 3 57 Thousands/µL  1 85-7 62   LYMPHOCYTES ABSOLUTE COUNT 1 77 Thousands/µL  0 60-4 47   MONOCYTES ABSOLUTE COUNT 0 53 Thousand/µL  0 17-1 22   EOSINOPHILS ABSOLUTE COUNT 0 06 Thousand/µL  0 00-0 61   BASOPHILS ABSOLUTE COUNT 0 00 Thousands/µL  0 00-0 10     (1) COMPREHENSIVE METABOLIC PANEL [id number] 86:00EH Mike Hernandez Order Number: [id number]      National Kidney Disease Education Program recommendations are as follows:  GFR calculation is accurate only with a steady state creatinine  Chronic Kidney disease less than 60 ml/min/1 73 sq  meters  Kidney failure less than 15 ml/min/1 73 sq  meters  Test Name Result Flag Reference   GLUCOSE,RANDM 85 mg/dL     If the patient is fasting, the ADA then defines impaired fasting glucose as > 100 mg/dL and diabetes as > or equal to 123 mg/dL     SODIUM 140 mmol/L  136-145   POTASSIUM 4 5 mmol/L  3 5-5 3   CHLORIDE 106 mmol/L  100-108   CARBON DIOXIDE 28 mmol/L  21-32   ANION GAP (CALC) 6 mmol/L  4-13   BLOOD UREA NITROGEN 21 mg/dL  5-25   CREATININE 0 88 mg/dL  0 60-1 30   Standardized to IDMS reference method   CALCIUM 8 6 mg/dL  8 3-10 1   BILI, TOTAL 0 55 mg/dL  0 20-1 00   ALK PHOSPHATAS 67 U/L     ALT (SGPT) 21 U/L 12-78   AST(SGOT) 15 U/L  5-45   ALBUMIN 3 7 g/dL  3 5-5 0   TOTAL PROTEIN 7 1 g/dL  6 4-8 2   eGFR Non-African American      >60 0 ml/min/1 73sq m     (1) TSH [id number] 12:00AM Yojana Humphreys Order Number: [id number]    Patients undergoing fluorescein dye angiography may retain small amounts of fluorescein in the body for 48-72 hours post procedure  Samples containing fluorescein can produce falsely depressed TSH values  If the patient had this procedure,a specimen should be resubmitted post fluorescein clearance       Test Name Result Flag Reference   TSH 1 630 uIU/mL  0 358-3 740

## 2018-01-15 NOTE — RESULT NOTES
Verified Results  CT SINUS WO CONTRAST 18Apr2017 10:13AM Arely Sosa Order Number: [id number]    - Patient Instructions: To schedule this appointment, please contact Central Scheduling at [phone]  Test Name Result Flag Reference   CT SINUS WO CONTRAST (Report)     CT SINUSES     INDICATION: J30 9: Allergic rhinitis, unspecified  History taken directly from the electronic ordering system  COMPARISON: None  TECHNIQUE: Axial CT imaging through the sinuses was performed  In addition, sagittal and coronal reformatted images were submitted for interpretation  Radiation dose length product (DLP) for this visit: 179 38 mGy-cm   This examination, like all CT scans performed in the [facility], was performed utilizing techniques to minimize radiation dose exposure, including the use of iterative   reconstruction and automated exposure control  IMAGE QUALITY: Diagnostic  FINDINGS:      FRONTAL SINUSES: Clear  ETHMOID AIR CELLS: Trace mucosal thickening the bilateral anterior ethmoid air cells  MAXILLARY SINUSES: Trace polypoid mucosal thickening in a septated left maxillary sinus  SPHENOID SINUSES: Clear  NASAL SEPTUM AND CAVITY: Leftward nasal septal deviation  3 mm leftward directed spur contacting the left inferior nasal turbinate  Distortion in the left middle nasal turbinate  ANTERIOR OSTEOMEATAL COMPLEX: Trace mucosal thickening within the left maxillary ostium  Right maxillary ostium remainder of the origin is patent  OSSEOUS STRUCTURES: No bony erosion or destruction  Normal cribiform plate and Planum Spendoidale  Visulaized mastoid temporal bones are clear  The bony walls of the carotid canals are intact  SOFT TISSUES: Changes of bilateral lens replacements noted  IMPRESSION:     Trace paranasal sinus disease  Mild mucosal thickening within the left maxillary ostium   Remainder of the major drainage pathways are patent  Leftward nasal septal deviation with leftward directed spurring         Workstation performed: [id number]     Signed by:   Neal Gutierrez MD   4/18/17

## 2018-01-18 NOTE — RESULT NOTES
Verified Results  (1) COMPREHENSIVE METABOLIC PANEL [id number] 77:82GK Reyes Caputoino Order Number: [id number]  TW Order Number: [id number]     Test Name Result Flag Reference   GLUCOSE,RANDM 89 mg/dL     If the patient is fasting, the ADA then defines impaired fasting glucose as > 100 mg/dL and diabetes as > or equal to 123 mg/dL  SODIUM 139 mmol/L  136-145   POTASSIUM 4 7 mmol/L  3 5-5 3   CHLORIDE 107 mmol/L  100-108   CARBON DIOXIDE 27 mmol/L  21-32   ANION GAP (CALC) 5 mmol/L  4-13   BLOOD UREA NITROGEN 22 mg/dL  5-25   CREATININE 0 97 mg/dL  0 60-1 30   Standardized to IDMS reference method   CALCIUM 8 8 mg/dL  8 3-10 1   BILI, TOTAL 0 47 mg/dL  0 20-1 00   ALK PHOSPHATAS 68 U/L     ALT (SGPT) 16 U/L  12-78   AST(SGOT) 18 U/L  5-45   ALBUMIN 3 7 g/dL  3 5-5 0   TOTAL PROTEIN 6 8 g/dL  6 4-8 2   eGFR Non-African American      >60 0 ml/min/1 73sq m   [facility] [employer] Disease Education Program recommendations are as follows:  GFR calculation is accurate only with a steady state creatinine  Chronic Kidney disease less than 60 ml/min/1 73 sq  meters  Kidney failure less than 15 ml/min/1 73 sq  meters       (1) PSA (SCREEN) (Dx V76 44 Screen for Prostate Cancer) [id number] 10:07AM Reyes Caputoino Order Number: [id number]  TW Order Number: [id number]     Test Name Result Flag Reference   PROSTATE SPECIFIC ANTIGEN <0 1 ng/mL  0 0-4 0     (1) LIPID PANEL FASTING W DIRECT LDL REFLEX [id number] 10:07AM Reyes Caputoino Order Number: [id number]  TW Order Number: [id number]     Test Name Result Flag Reference   CHOLESTEROL 190 mg/dL     LDL CHOLESTEROL CALCULATED 116 mg/dL H 0-100   Triglyceride:         Normal              <150 mg/dl       Borderline High    150-199 mg/dl       High               200-499 mg/dl       Very High          >499 mg/dl  Cholesterol:         Desirable        <200 mg/dl      Borderline High  200-239 mg/dl      High             >239 mg/dl  HDL Cholesterol:        High    >59 mg/dL      Low     <41 mg/dL  LDL Cholesterol:        Optimal          <100 mg/dl        Near Optimal     100-129 mg/dl        Above Optimal          Borderline High   130-159 mg/dl          High              160-189 mg/dl          Very High        >189 mg/dl  LDL CALCULATED:    This screening LDL is a calculated result  It does not have the accuracy of the Direct Measured LDL in the monitoring of patients with hyperlipidemia and/or statin therapy  Direct Measure LDL (WVN143) must be ordered separately in these patients  TRIGLYCERIDES 85 mg/dL  <=150   Specimen collection should occur prior to N-Acetylcysteine or Metamizole administration due to the potential for falsely depressed results  HDL,DIRECT 57 mg/dL  40-60   Specimen collection should occur prior to Metamizole administration due to the potential for falsely depressed results

## 2018-01-23 VITALS
TEMPERATURE: 95.6 F | HEART RATE: 96 BPM | DIASTOLIC BLOOD PRESSURE: 78 MMHG | WEIGHT: 158.38 LBS | SYSTOLIC BLOOD PRESSURE: 118 MMHG | OXYGEN SATURATION: 95 % | HEIGHT: 69 IN | BODY MASS INDEX: 23.46 KG/M2

## 2018-01-23 VITALS
RESPIRATION RATE: 16 BRPM | TEMPERATURE: 98.9 F | DIASTOLIC BLOOD PRESSURE: 68 MMHG | HEART RATE: 86 BPM | WEIGHT: 163.38 LBS | BODY MASS INDEX: 24.13 KG/M2 | OXYGEN SATURATION: 96 % | SYSTOLIC BLOOD PRESSURE: 104 MMHG

## 2018-01-25 ENCOUNTER — OFFICE VISIT (OUTPATIENT)
Dept: PULMONOLOGY | Facility: CLINIC | Age: 78
End: 2018-01-25
Payer: COMMERCIAL

## 2018-01-25 VITALS
HEIGHT: 69 IN | WEIGHT: 161.2 LBS | BODY MASS INDEX: 23.88 KG/M2 | SYSTOLIC BLOOD PRESSURE: 112 MMHG | TEMPERATURE: 97.6 F | HEART RATE: 78 BPM | DIASTOLIC BLOOD PRESSURE: 60 MMHG | OXYGEN SATURATION: 99 % | RESPIRATION RATE: 16 BRPM

## 2018-01-25 DIAGNOSIS — J30.9 CHRONIC ALLERGIC RHINITIS, UNSPECIFIED SEASONALITY, UNSPECIFIED TRIGGER: ICD-10-CM

## 2018-01-25 DIAGNOSIS — R94.2 ABNORMAL DIFFUSION CAPACITY DETERMINED BY PULMONARY FUNCTION TEST: ICD-10-CM

## 2018-01-25 DIAGNOSIS — R05.3 PERSISTENT COUGH FOR 3 WEEKS OR LONGER: Primary | ICD-10-CM

## 2018-01-25 DIAGNOSIS — R06.02 SHORTNESS OF BREATH: ICD-10-CM

## 2018-01-25 PROBLEM — K21.9 GASTROESOPHAGEAL REFLUX DISEASE WITHOUT ESOPHAGITIS: Status: ACTIVE | Noted: 2017-04-25

## 2018-01-25 PROBLEM — J20.9 ACUTE BRONCHITIS: Status: ACTIVE | Noted: 2017-02-17

## 2018-01-25 PROBLEM — M54.50 ACUTE RIGHT-SIDED LOW BACK PAIN WITHOUT SCIATICA: Status: ACTIVE | Noted: 2017-09-01

## 2018-01-25 PROBLEM — G31.84 MILD COGNITIVE IMPAIRMENT: Status: ACTIVE | Noted: 2017-10-12

## 2018-01-25 PROBLEM — J20.9 ACUTE BRONCHITIS: Status: RESOLVED | Noted: 2017-02-17 | Resolved: 2018-01-25

## 2018-01-25 PROBLEM — N20.0 RENAL LITHIASIS: Status: ACTIVE | Noted: 2017-10-12

## 2018-01-25 PROBLEM — R13.12 OROPHARYNGEAL DYSPHAGIA: Status: ACTIVE | Noted: 2017-04-25

## 2018-01-25 PROCEDURE — 99214 OFFICE O/P EST MOD 30 MIN: CPT | Performed by: INTERNAL MEDICINE

## 2018-01-25 RX ORDER — MECLIZINE HYDROCHLORIDE 25 MG/1
0.5 TABLET ORAL
COMMUNITY
End: 2021-04-14 | Stop reason: SDUPTHER

## 2018-01-25 RX ORDER — GUAIFENESIN 600 MG
1200 TABLET, EXTENDED RELEASE 12 HR ORAL EVERY 12 HOURS SCHEDULED
Status: ON HOLD | COMMUNITY

## 2018-01-25 RX ORDER — AMPICILLIN TRIHYDRATE 250 MG
1 CAPSULE ORAL 2 TIMES DAILY
Status: ON HOLD | COMMUNITY

## 2018-01-25 RX ORDER — LEVOCETIRIZINE DIHYDROCHLORIDE 5 MG/1
1 TABLET, FILM COATED ORAL DAILY
Status: ON HOLD | COMMUNITY
Start: 2013-08-27

## 2018-01-25 RX ORDER — SILODOSIN 8 MG/1
1 CAPSULE ORAL DAILY
COMMUNITY
Start: 2013-01-02 | End: 2018-07-12 | Stop reason: SDUPTHER

## 2018-01-25 RX ORDER — CHLORDIAZEPOXIDE HYDROCHLORIDE 10 MG/1
1 CAPSULE, GELATIN COATED ORAL
COMMUNITY
Start: 2013-11-08 | End: 2018-02-27 | Stop reason: SDUPTHER

## 2018-01-25 NOTE — ASSESSMENT & PLAN NOTE
· Discontinue Breo Ellipta as there is possibility for interference with his glaucoma  · Start Arnuity Ellipta 200 mcg    One puff daily, rinse after use  · Continue Mucinex

## 2018-01-25 NOTE — PATIENT INSTRUCTIONS
Stop taking Breo Ellipta  Start Arnuity Ellipta 200 mcg, 1 puff daily  Be sure to rinse after use  Return to the office in 3 months for re-evaluation of the cough

## 2018-01-25 NOTE — PROGRESS NOTES
Progress Note - Pulmonary   Niurka River  65 y o  male MRN: [id number]      Persistent cough for 3 weeks or longer  · Discontinue Ceclia Colby as there is possibility for interference with his glaucoma  · Start Arnuity Ellipta 200 mcg  One puff daily, rinse after use  · Continue Mucinex    Abnormal diffusion capacity determined by pulmonary function test  · Recent pulmonary function test shows reduced diffusing capacity  No evidence of restriction  · No evidence of anemia  · May be related to some mild basilar scarring and slight restriction due to kyphoscoliosis    Allergic rhinitis  · Continue antihistamine  · Could consider addition of nasal steroid spray if persistent        ----------------------------------------------------------------------------------------------------------------------    HPI/Interval History:   aPtrick Townsend returns today for pulmonary follow-up  He has been having persistent cough  He was recently given a sample of Breo Ellipta by his primary care physician and has been using it fairly regularly  He unfortunately has also been having some eye symptoms which may be a worsening of his glaucoma which could be related to Ceclia Colby  The cough is largely nonproductive but occasionally produces some phlegm  He has been taking Mucinex  He has some postnasal drip which may be contributing and he is on antihistamine preparations for this  He has not had chest pain or cardiac complaints  He has not had fever, chills, or other infection symptoms  He recently completed a course of doxycycline and prednisone  Review of Systems   Constitutional: Negative for appetite change and fever  HENT: Positive for ear pain (Ear itching bilaterally) and postnasal drip  Negative for sinus pressure and sore throat  Eyes: Positive for redness and visual disturbance  Has had active glaucoma symptoms   Cardiovascular: Negative for chest pain and leg swelling     Gastrointestinal: Positive for constipation  Negative for diarrhea  Endocrine: Negative for polyuria  Genitourinary:        Recent lithotripsy  Has chronic benign prostatic hypertrophy on [place]   Musculoskeletal: Negative  Skin: Negative for rash  Allergic/Immunologic: Positive for environmental allergies  Neurological:        Memory difficulty  Recently started on Aricept but is not taking it due to concerns for side effects   Hematological: Negative  Psychiatric/Behavioral: Negative  Vitals:   Blood pressure 112/60, pulse 78, temperature 97 6 °F (36 4 °C), resp  rate 16, height 5' 9" (1 753 m), weight 73 1 kg (161 lb 3 2 oz), SpO2 99 %  ,Body mass index is 23 81 kg/m²  SpO2: 99 %          Physical Exam:   Gen:   Alert and pleasant  HEENT:  Periorbital area is quite inflamed  Oropharynx with mild erythema, no thrush  Neck:, supple, no JVD, no adenopathy, trachea midline  Chest:  Limited chest wall excursion with thoracic kyphosis  Lungs are clear bilaterally however  Cardiac:  Regular, no murmur  Abdomen:  Soft, benign  Extremities:  No edema      Pulmonary function test were reviewed and discussed with him in detail      Imaging studies:  No new studies      Tiny Nguyen MD

## 2018-01-25 NOTE — ASSESSMENT & PLAN NOTE
· Recent pulmonary function test shows reduced diffusing capacity  No evidence of restriction    · No evidence of anemia  · May be related to some mild basilar scarring and slight restriction due to kyphoscoliosis

## 2018-01-26 ENCOUNTER — OFFICE VISIT (OUTPATIENT)
Dept: FAMILY MEDICINE CLINIC | Facility: CLINIC | Age: 78
End: 2018-01-26
Payer: COMMERCIAL

## 2018-01-26 VITALS
HEART RATE: 84 BPM | SYSTOLIC BLOOD PRESSURE: 136 MMHG | HEIGHT: 69 IN | OXYGEN SATURATION: 98 % | DIASTOLIC BLOOD PRESSURE: 82 MMHG | BODY MASS INDEX: 23.85 KG/M2 | TEMPERATURE: 97.2 F | WEIGHT: 161 LBS

## 2018-01-26 DIAGNOSIS — H60.60: Primary | ICD-10-CM

## 2018-01-26 PROCEDURE — 99213 OFFICE O/P EST LOW 20 MIN: CPT | Performed by: FAMILY MEDICINE

## 2018-01-26 RX ORDER — HYDROCORTISONE AND ACETIC ACID 20.75; 10.375 MG/ML; MG/ML
5 SOLUTION AURICULAR (OTIC) EVERY 6 HOURS SCHEDULED
Qty: 10 ML | Refills: 2 | Status: SHIPPED | OUTPATIENT
Start: 2018-01-26 | End: 2019-03-12 | Stop reason: SDUPTHER

## 2018-01-26 NOTE — PROGRESS NOTES
Assessment/Plan:    No problem-specific Assessment & Plan notes found for this encounter  Diagnoses and all orders for this visit:    Chronic non-infective otitis externa, unspecified laterality, unspecified type  -     hydrocortisone-acetic acid (VOSOL-HC) otic solution; Administer 5 drops to the right ear every 6 (six) hours          Subjective:      Patient ID: Patricia Bolivar  is a 65 y o  male  Patient presents with a chief complaint of left ear pain and irritation  He has a chronic history of irritation involving his external canals bilaterally but more so on the left ear  He has used Cortisporin drops in the past   More recently he has used prednisolone ophthalmic solution with only limited success  The following portions of the patient's history were reviewed and updated as appropriate: allergies, past family history, past medical history, past social history, past surgical history and problem list     Review of Systems   Constitutional: Negative  HENT: Positive for ear discharge and ear pain  Respiratory: Negative  Cardiovascular: Negative  Gastrointestinal: Negative  Genitourinary: Negative  Musculoskeletal: Negative  Psychiatric/Behavioral: Negative  Objective:     Physical Exam   Constitutional: He is oriented to person, place, and time  He appears well-developed and well-nourished  No distress  HENT:   Head: Normocephalic and atraumatic  Right Ear: External ear normal  No drainage or swelling  Tympanic membrane is not injected  Left Ear: External ear normal    Eyes: Conjunctivae are normal  Pupils are equal, round, and reactive to light  Right eye exhibits no discharge  Neck: Normal range of motion  No thyromegaly present  Cardiovascular: Normal rate and regular rhythm  Pulmonary/Chest: Effort normal and breath sounds normal  No respiratory distress  Lymphadenopathy:     He has no cervical adenopathy     Neurological: He is alert and oriented to person, place, and time  Skin: Skin is warm and dry  He is not diaphoretic  Psychiatric: He has a normal mood and affect   His behavior is normal  Judgment and thought content normal

## 2018-02-27 DIAGNOSIS — F41.9 ANXIETY: Primary | ICD-10-CM

## 2018-02-27 RX ORDER — CHLORDIAZEPOXIDE HYDROCHLORIDE 10 MG/1
10 CAPSULE, GELATIN COATED ORAL DAILY
Qty: 90 CAPSULE | Refills: 1 | OUTPATIENT
Start: 2018-02-27 | End: 2018-09-02 | Stop reason: SDUPTHER

## 2018-03-12 ENCOUNTER — TELEPHONE (OUTPATIENT)
Dept: FAMILY MEDICINE CLINIC | Facility: CLINIC | Age: 78
End: 2018-03-12

## 2018-03-12 NOTE — TELEPHONE ENCOUNTER
Pt called into the office and stated that he heard on the news that certain shingle shots were not working  I looked up his immunizations, and we did not give him one  He thinks it maybe [facility], so he called there, and they told him they only go back one year  Pt is concerned if he should get another shingles shot  Please advise  Thank you!

## 2018-03-13 NOTE — TELEPHONE ENCOUNTER
Call patient  Tell him there is a new shingles shot that was recently released that is much more effective than the current one  Unfortunately, we do not know which insurance is are covering it yet    I would recommend scheduling an appointment in approximately 2-3 months with us (by then we should know more, and possibly be able to give him the new shot if eligible)

## 2018-03-29 ENCOUNTER — TELEPHONE (OUTPATIENT)
Dept: FAMILY MEDICINE CLINIC | Facility: CLINIC | Age: 78
End: 2018-03-29

## 2018-03-29 DIAGNOSIS — Z23 NEED FOR VACCINATION: Primary | ICD-10-CM

## 2018-03-29 NOTE — TELEPHONE ENCOUNTER
Rx written  He can't  prescription  He should take to pharmacy and get injection    He will also need to have a booster in 6 months

## 2018-03-29 NOTE — TELEPHONE ENCOUNTER
Pt called to verify if the script that Buffy Rich wrote was for Shingrix which is the newest vaccine for shingles  I  Confirmed with one of our medical assistants that his script is for  [facility] which is the new shingles vaccine  Pt was informed he needs to  the written script and take it to the pharmacy where he will receive the injection  Pt was advised that if he has any other questions to please call the office

## 2018-03-29 NOTE — TELEPHONE ENCOUNTER
Pt stated his insurance will cover the new shingles vaccine  Pt would like to get a script to have it done at pharmacy   Thank you

## 2018-06-26 ENCOUNTER — OFFICE VISIT (OUTPATIENT)
Dept: FAMILY MEDICINE CLINIC | Facility: CLINIC | Age: 78
End: 2018-06-26
Payer: COMMERCIAL

## 2018-06-26 VITALS
OXYGEN SATURATION: 99 % | HEART RATE: 65 BPM | BODY MASS INDEX: 25.31 KG/M2 | HEIGHT: 68 IN | RESPIRATION RATE: 16 BRPM | DIASTOLIC BLOOD PRESSURE: 60 MMHG | WEIGHT: 167 LBS | TEMPERATURE: 96.1 F | SYSTOLIC BLOOD PRESSURE: 112 MMHG

## 2018-06-26 DIAGNOSIS — K12.0 APHTHOUS ULCER OF MOUTH: Primary | ICD-10-CM

## 2018-06-26 PROCEDURE — 99213 OFFICE O/P EST LOW 20 MIN: CPT | Performed by: FAMILY MEDICINE

## 2018-06-26 RX ORDER — TRIAMCINOLONE ACETONIDE 0.1 %
1 PASTE (GRAM) DENTAL 2 TIMES DAILY
Qty: 5 G | Refills: 0 | Status: SHIPPED | OUTPATIENT
Start: 2018-06-26 | End: 2019-09-24 | Stop reason: ALTCHOICE

## 2018-06-26 NOTE — PROGRESS NOTES
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  He has had them on and off for years feels that there may be slightly worse  Usually uses over-the-counter Anbesol  The following portions of the patient's history were reviewed and updated as appropriate: allergies, current medications, past family history, past medical history, past social history, past surgical history and problem list     Review of Systems   Constitutional: Negative  HENT:        Recurrent aphthous ulcers   Respiratory: Negative  Cardiovascular: Negative  Gastrointestinal: Negative  Genitourinary: Negative  Musculoskeletal: Negative  Right shoulder pain mild intermittent   Psychiatric/Behavioral: Negative  Objective:      /60 (BP Location: Left arm, Patient Position: Sitting, Cuff Size: Adult)   Pulse 65   Temp (!) 96 1 °F (35 6 °C) (Tympanic)   Resp 16   Ht 5' 8 2" (1 732 m)   Wt 75 8 kg (167 lb)   SpO2 99%   BMI 25 24 kg/m²          Physical Exam   Constitutional: He is oriented to person, place, and time  He appears well-developed and well-nourished  No distress  HENT:   Head: Normocephalic and atraumatic  Eyes: Conjunctivae are normal  Pupils are equal, round, and reactive to light  Right eye exhibits no discharge  Neck: Normal range of motion  No thyromegaly present  Cardiovascular: Normal rate and regular rhythm  Pulmonary/Chest: Effort normal and breath sounds normal  No respiratory distress  Lymphadenopathy:     He has no cervical adenopathy     Neurological: He is alert and oriented to person, place, and time  Skin: Skin is warm and dry  He is not diaphoretic  Psychiatric: He has a normal mood and affect  His behavior is normal  Judgment and thought content normal    Nursing note and vitals reviewed

## 2018-07-12 DIAGNOSIS — N40.0 BENIGN PROSTATIC HYPERPLASIA, UNSPECIFIED WHETHER LOWER URINARY TRACT SYMPTOMS PRESENT: Primary | ICD-10-CM

## 2018-07-12 RX ORDER — SILODOSIN 8 MG/1
1 CAPSULE ORAL DAILY
Qty: 90 CAPSULE | Refills: 0 | Status: SHIPPED | OUTPATIENT
Start: 2018-07-12 | End: 2018-11-20 | Stop reason: SDUPTHER

## 2018-08-03 ENCOUNTER — TELEPHONE (OUTPATIENT)
Dept: FAMILY MEDICINE CLINIC | Facility: CLINIC | Age: 78
End: 2018-08-03

## 2018-08-03 NOTE — TELEPHONE ENCOUNTER
Would not take any special action based on 1 seed  Unlikely that this is enough to cause any significant problem

## 2018-08-03 NOTE — TELEPHONE ENCOUNTER
FYI: I called and spoke to [facility] per your recommendations  Pt was thinking about mixing lemon juice and milk to make him vomit to get the seed out  Pt has no symptoms reported and is not nauseous  I recommended per your instructions to not take any special action  I informed pt that we have hours this weekend and Cuong Coyne is on call if he has nay problems

## 2018-08-03 NOTE — TELEPHONE ENCOUNTER
Pt called the office he states he has diverticulosis  And today he was eating a watermelon which had the black seeds and he swallowed one  What would you recommend? Should put make himself nasous   himself  Pt avoids seeds and nuts what should he do

## 2018-08-27 ENCOUNTER — OFFICE VISIT (OUTPATIENT)
Dept: FAMILY MEDICINE CLINIC | Facility: CLINIC | Age: 78
End: 2018-08-27
Payer: COMMERCIAL

## 2018-08-27 VITALS
TEMPERATURE: 98 F | RESPIRATION RATE: 16 BRPM | OXYGEN SATURATION: 98 % | BODY MASS INDEX: 24.59 KG/M2 | WEIGHT: 166 LBS | HEIGHT: 69 IN | HEART RATE: 69 BPM | SYSTOLIC BLOOD PRESSURE: 100 MMHG | DIASTOLIC BLOOD PRESSURE: 70 MMHG

## 2018-08-27 DIAGNOSIS — M19.90 ARTHRITIS: ICD-10-CM

## 2018-08-27 DIAGNOSIS — Z00.00 ROUTINE ADULT HEALTH MAINTENANCE: ICD-10-CM

## 2018-08-27 DIAGNOSIS — Z23 NEED FOR VACCINATION: ICD-10-CM

## 2018-08-27 DIAGNOSIS — Z00.00 MEDICARE ANNUAL WELLNESS VISIT, SUBSEQUENT: Primary | ICD-10-CM

## 2018-08-27 DIAGNOSIS — E78.2 MIXED HYPERLIPIDEMIA: ICD-10-CM

## 2018-08-27 DIAGNOSIS — K21.9 GASTROESOPHAGEAL REFLUX DISEASE WITHOUT ESOPHAGITIS: ICD-10-CM

## 2018-08-27 LAB
ALBUMIN SERPL BCP-MCNC: 3.5 G/DL (ref 3.5–5)
ALP SERPL-CCNC: 64 U/L (ref 46–116)
ALT SERPL W P-5'-P-CCNC: 16 U/L (ref 12–78)
ANION GAP SERPL CALCULATED.3IONS-SCNC: 5 MMOL/L (ref 4–13)
AST SERPL W P-5'-P-CCNC: 21 U/L (ref 5–45)
BASOPHILS # BLD AUTO: 0.01 THOUSANDS/ΜL (ref 0–0.1)
BASOPHILS NFR BLD AUTO: 0 % (ref 0–1)
BILIRUB SERPL-MCNC: 0.58 MG/DL (ref 0.2–1)
BUN SERPL-MCNC: 21 MG/DL (ref 5–25)
CALCIUM SERPL-MCNC: 8.3 MG/DL (ref 8.3–10.1)
CHLORIDE SERPL-SCNC: 105 MMOL/L (ref 100–108)
CHOLEST SERPL-MCNC: 193 MG/DL (ref 50–200)
CO2 SERPL-SCNC: 28 MMOL/L (ref 21–32)
CREAT SERPL-MCNC: 0.96 MG/DL (ref 0.6–1.3)
EOSINOPHIL # BLD AUTO: 0.11 THOUSAND/ΜL (ref 0–0.61)
EOSINOPHIL NFR BLD AUTO: 2 % (ref 0–6)
ERYTHROCYTE [DISTWIDTH] IN BLOOD BY AUTOMATED COUNT: 14.6 % (ref 11.6–15.1)
GFR SERPL CREATININE-BSD FRML MDRD: 75 ML/MIN/1.73SQ M
GLUCOSE P FAST SERPL-MCNC: 100 MG/DL (ref 65–99)
HCT VFR BLD AUTO: 40.2 % (ref 36.5–49.3)
HDLC SERPL-MCNC: 62 MG/DL (ref 40–60)
HGB BLD-MCNC: 13 G/DL (ref 12–17)
IMM GRANULOCYTES # BLD AUTO: 0.01 THOUSAND/UL (ref 0–0.2)
IMM GRANULOCYTES NFR BLD AUTO: 0 % (ref 0–2)
LDLC SERPL CALC-MCNC: 114 MG/DL (ref 0–100)
LYMPHOCYTES # BLD AUTO: 1.51 THOUSANDS/ΜL (ref 0.6–4.47)
LYMPHOCYTES NFR BLD AUTO: 27 % (ref 14–44)
MCH RBC QN AUTO: 31.6 PG (ref 26.8–34.3)
MCHC RBC AUTO-ENTMCNC: 32.3 G/DL (ref 31.4–37.4)
MCV RBC AUTO: 98 FL (ref 82–98)
MONOCYTES # BLD AUTO: 0.55 THOUSAND/ΜL (ref 0.17–1.22)
MONOCYTES NFR BLD AUTO: 10 % (ref 4–12)
NEUTROPHILS # BLD AUTO: 3.33 THOUSANDS/ΜL (ref 1.85–7.62)
NEUTS SEG NFR BLD AUTO: 61 % (ref 43–75)
NRBC BLD AUTO-RTO: 0 /100 WBCS
PLATELET # BLD AUTO: 217 THOUSANDS/UL (ref 149–390)
PMV BLD AUTO: 10.2 FL (ref 8.9–12.7)
POTASSIUM SERPL-SCNC: 4.2 MMOL/L (ref 3.5–5.3)
PROT SERPL-MCNC: 7 G/DL (ref 6.4–8.2)
RBC # BLD AUTO: 4.12 MILLION/UL (ref 3.88–5.62)
SODIUM SERPL-SCNC: 138 MMOL/L (ref 136–145)
TRIGL SERPL-MCNC: 84 MG/DL
TSH SERPL DL<=0.05 MIU/L-ACNC: 1.25 UIU/ML (ref 0.36–3.74)
WBC # BLD AUTO: 5.52 THOUSAND/UL (ref 4.31–10.16)

## 2018-08-27 PROCEDURE — 1036F TOBACCO NON-USER: CPT | Performed by: FAMILY MEDICINE

## 2018-08-27 PROCEDURE — 80053 COMPREHEN METABOLIC PANEL: CPT | Performed by: FAMILY MEDICINE

## 2018-08-27 PROCEDURE — 99397 PER PM REEVAL EST PAT 65+ YR: CPT | Performed by: FAMILY MEDICINE

## 2018-08-27 PROCEDURE — G0439 PPPS, SUBSEQ VISIT: HCPCS | Performed by: FAMILY MEDICINE

## 2018-08-27 PROCEDURE — 85025 COMPLETE CBC W/AUTO DIFF WBC: CPT | Performed by: FAMILY MEDICINE

## 2018-08-27 PROCEDURE — 90662 IIV NO PRSV INCREASED AG IM: CPT | Performed by: FAMILY MEDICINE

## 2018-08-27 PROCEDURE — 36415 COLL VENOUS BLD VENIPUNCTURE: CPT | Performed by: FAMILY MEDICINE

## 2018-08-27 PROCEDURE — 90471 IMMUNIZATION ADMIN: CPT | Performed by: FAMILY MEDICINE

## 2018-08-27 PROCEDURE — 80061 LIPID PANEL: CPT | Performed by: FAMILY MEDICINE

## 2018-08-27 PROCEDURE — 84443 ASSAY THYROID STIM HORMONE: CPT | Performed by: FAMILY MEDICINE

## 2018-08-27 RX ORDER — TRAVOPROST 0.004 %
DROPS OPHTHALMIC (EYE)
Refills: 2 | Status: ON HOLD | COMMUNITY
Start: 2018-08-07

## 2018-08-27 NOTE — PROGRESS NOTES
Assessment and Plan:  Problem List Items Addressed This Visit        Digestive    Gastroesophageal reflux disease without esophagitis    Relevant Orders    Comprehensive metabolic panel    Lipid Panel with Direct LDL reflex    TSH, 3rd generation    CBC and differential       Musculoskeletal and Integument    Arthritis    Relevant Orders    Comprehensive metabolic panel    Lipid Panel with Direct LDL reflex    TSH, 3rd generation    CBC and differential       Other    Mixed hyperlipidemia    Relevant Orders    Comprehensive metabolic panel    Lipid Panel with Direct LDL reflex    TSH, 3rd generation    CBC and differential      Other Visit Diagnoses     Medicare annual wellness visit, subsequent    -  Primary    AWV questionnaire reviewed  No new concerns  Depression screen positive but not depressed  Immunizations and screenings up-to-date    Routine adult health maintenance        Annual physical exam unremarkable  No new health concerns  Due for routine blood work today  Screenings and immunizations up-to-date    Need for vaccination        Relevant Orders    influenza vaccine, 9350-4453, high-dose, PF 0 5 mL, for patients 65 yr+ (FLUZONE HIGH-DOSE)        Health Maintenance Due   Topic Date Due    Fall Risk  02/16/2004    DTaP,Tdap,and Td Vaccines (1 - Tdap) 10/23/2014    Pneumococcal PPSV23/PCV13 65+ Years / High and Highest Risk (2 of 2 - PPSV23) 08/03/2015         HPI:  Jerman Bahena  is a 77 y o  male here for his Subsequent Wellness Visit      Patient Active Problem List   Diagnosis    Acute right-sided low back pain without sciatica    Renal lithiasis    Persistent cough for 3 weeks or longer    Mild cognitive impairment    Mixed hyperlipidemia    Oropharyngeal dysphagia    Abnormal diffusion capacity determined by pulmonary function test    Sjogren's syndrome (HCC)    Irritable bowel syndrome    Gastroesophageal reflux disease without esophagitis    Enlarged prostate without lower urinary tract symptoms (luts)    Dysplastic nevus of face    Arthritis    Anxiety    Antithrombinemia (HCC)    Allergic rhinitis    Neck pain    Glaucoma     Past Medical History:   Diagnosis Date    Abnormal diffusion capacity determined by pulmonary function test 4/25/2017    Actinic keratosis     Adverse effect of correct medicinal substance properly administered     Arthralgia     Candidiasis, mouth     Chronic allergic rhinitis     Chronic sinusitis     Dermatitis     Diverticulosis     Glaucoma     Inguinal hernia, left     Lump of skin     Male erectile dysfunction     Open comedone     Palpitations     Renal calculi     Sebaceous cyst     Seborrheic keratosis     Skin disorder     Unspecified chronic bronchitis (HCC)     Visual disturbances      Past Surgical History:   Procedure Laterality Date    COLONOSCOPY      INGUINAL HERNIA REPAIR Bilateral     left- last assessed: 11/25/14, Resolved: 5/18/15, onset 12/11/13    MOUTH SURGERY       Family History   Problem Relation Age of Onset    Brain cancer Mother     Heart failure Mother     Prostate cancer Mother      History   Smoking Status    Former Smoker   Smokeless Tobacco    Never Used     History   Alcohol Use    Yes     Comment: social      History   Drug Use No         Current Outpatient Prescriptions   Medication Sig Dispense Refill    Brinzolamide-Brimonidine (SIMBRINZA) 1-0 2 % SUSP Apply 1 drop to eye 2 (two) times a day 1 drop each eye twice daily      chlordiazePOXIDE (LIBRIUM) 10 mg capsule Take 1 capsule (10 mg total) by mouth daily 90 capsule 1    levocetirizine (XYZAL) 5 MG tablet Take 1 tablet by mouth Daily      meclizine (ANTIVERT) 25 mg tablet Take 0 5 tablets by mouth      Multiple Vitamins-Minerals (CENTRUM SILVER ULTRA MENS) TABS Take 1 tablet by mouth daily      Red Yeast Rice 600 MG CAPS Take 1 tablet by mouth 2 (two) times a day      Silodosin (RAPAFLO) 8 MG CAPS Take 1 capsule by mouth daily 90 capsule 0    Fluticasone Furoate (ARNUITY ELLIPTA) 200 MCG/ACT AEPB Inhale 1 Act daily (Patient not taking: Reported on 8/27/2018 ) 1 each 6    guaiFENesin (MUCINEX) 600 mg 12 hr tablet Take 1,200 mg by mouth every 12 (twelve) hours      hydrocortisone-acetic acid (VOSOL-HC) otic solution Administer 5 drops to the right ear every 6 (six) hours (Patient not taking: Reported on 8/27/2018 ) 10 mL 2    TRAVATAN Z 0 004 % ophthalmic solution INSTILL 1 DROP INTO BOTH EYES IN THE EVENING  2    triamcinolone (KENALOG) 0 1 % oral topical paste Apply 1 application topically 2 (two) times a day (Patient not taking: Reported on 8/27/2018 ) 5 g 0     No current facility-administered medications for this visit  Allergies   Allergen Reactions    Levofloxacin Anaphylaxis    Azithromycin GI Intolerance    Ketorolac     Penicillins Hives     Immunization History   Administered Date(s) Administered    Influenza 09/18/2015, 10/18/2016, 09/01/2017    Influenza Quadrivalent, 6-35 Months IM 09/01/2017    Influenza Split High Dose Preservative Free IM 09/25/2014, 09/18/2015, 10/18/2016    Influenza TIV (IM) 10/17/2012, 10/09/2013    Pneumococcal Conjugate 13-Valent 06/08/2015    Td (adult), adsorbed 10/22/2014       Patient Care Team:  Verona Bowling DO as PCP - Garima Cantor MD    Medicare Screening Tests and Risk Assessments:  Mar Lugo is here for his Subsequent Wellness visit  Last Medicare Wellness visit information reviewed, patient interviewed, no change since last AWV  Health Risk Assessment:  Patient rates overall health as fair  Patient feels that their physical health rating is Same  Eyesight was rated as Same  Hearing was rated as Same  Patient feels that their emotional and mental health rating is Same  Pain experienced by patient in the last 7 days has been None  Patient states that he has experienced no weight loss or gain in last 6 months       Emotional/Mental Health:  Patient has been feeling nervous/anxious  PHQ-9 Depression Screening:    Frequency of the following problems over the past two weeks:      1  Little interest or pleasure in doing things: 3 - nearly every day      2  Feeling down, depressed, or hopeless: 3 - nearly every day      3  Trouble falling or staying asleep, or sleeping too much: 3 - nearly every day      4  Feeling tired or having little energy: 2 - more than half the days      5  Poor appetite or overeatin - not at all      6  Feeling bad about yourself - or that you are a failure or have let yourself or your family down: 3 - nearly every day      7  Trouble concentrating on things, such as reading the newspaper or watching television: 3 - nearly every day      8  Moving or speaking so slowly that other people could have noticed  Or the opposite - being so fidgety or restless that you have been moving around a lot more than usual: 0 - not at all      9  Thoughts that you would be better off dead, or of hurting yourself in some way: 0 - not at all  PHQ-2 Score: 6  PHQ-9 Score: 14    Broken Bones/Falls: Fall Risk Assessment:    In the past year, patient has experienced: No history of falling in past year          Bladder/Bowel:  Patient has not leaked urine accidently in the last six months  Patient reports no loss of bowel control  Immunizations:  Patient has had a flu vaccination within the last year  Patient has not received a pneumonia shot  Patient has not received a shingles shot  Patient has not received tetanus/diphtheria shot  Home Safety:  Patient does not have trouble with stairs inside or outside of their home  Patient currently reports that there are no safety hazards present in home, working smoke alarms, working carbon monoxide detectors        Preventative Screenings:   no prostate cancer screen performed, no colon cancer screen completed, no cholesterol screen completed, glaucoma eye exam completed,     Nutrition:  Current diet: Regular and Limited junk food with servings of the following:    Medications:  Patient is currently taking over-the-counter supplements  List of OTC medications includes: multivitamins  Patient is able to manage medications  Lifestyle Choices:  Patient reports no tobacco use  Patient has not smoked or used tobacco in the past   Patient reports no alcohol use  Patient drives a vehicle  Patient wears seat belt  Activities of Daily Living:  Can get out of bed by his or her self, able to dress self, able to make own meals, able to do own shopping, able to bathe self, can do own laundry/housekeeping, can manage own money, pay bills and track expenses    Previous Hospitalizations:  No hospitalization or ED visit in past 12 months        Advanced Directives:  Patient has not decided on power of   Patient has spoken to designated power of   Patient has not completed advanced directive  Preventative Screening/Counseling:      Cardiovascular:      General: Risks and Benefits Discussed          Diabetes:      Due for labs: Blood Glucose          Colorectal Cancer:      General: Screening Current          Prostate Cancer:      General: Screening Not Indicated          Osteoporosis:      General: Screening Not Indicated          AAA:      General: Risks and Benefits Discussed          Glaucoma:      General: Screening Current          HIV:      General: Screening Not Indicated          Hepatitis C:      General: Screening Not Indicated        Advanced Directives:   Patient has living will for healthcare, has durable POA for healthcare, patient has an advanced directive  Information on ACP and/or AD not provided  No 5 wishes given  No end of life assessment reviewed with patient       Immunizations:      Influenza: Risks & Benefits Discussed and Influenza Due Today      Pneumococcal: Lifetime Vaccine Completed      Shingrix: Shingrix Vaccine UTD      Hepatitis B (Low risk patients): Series Not Indicated      TD: Td Vaccine UTD          No exam data present  Physical Exam :  History obtained from the patient  General ROS: negative  Psychological ROS: negative  Ophthalmic ROS: negative  ENT ROS: negative  Allergy and Immunology ROS: negative  Hematological and Lymphatic ROS: negative  Endocrine ROS: negative  Respiratory ROS: no cough, shortness of breath, or wheezing  Cardiovascular ROS: no chest pain or dyspnea on exertion  Gastrointestinal ROS: no abdominal pain, change in bowel habits, or black or bloody stools  Genito-Urinary ROS: no dysuria, trouble voiding, or hematuria  Musculoskeletal ROS: negative  Neurological ROS: positive for - memory loss  Dermatological ROS: negative  Physical Exam   Constitutional: He is oriented to person, place, and time  He appears well-developed and well-nourished  HENT:   Head: Atraumatic  Right Ear: External ear normal    Left Ear: External ear normal    Eyes: EOM are normal  Pupils are equal, round, and reactive to light  Neck: Neck supple  Cardiovascular: Normal rate and regular rhythm  Pulmonary/Chest: Effort normal and breath sounds normal    Musculoskeletal: Normal range of motion  Neurological: He is oriented to person, place, and time  Skin: Skin is warm and dry  Psychiatric: He has a normal mood and affect  His behavior is normal  Judgment and thought content normal    Nursing note and vitals reviewed

## 2018-09-02 ENCOUNTER — OFFICE VISIT (OUTPATIENT)
Dept: FAMILY MEDICINE CLINIC | Facility: CLINIC | Age: 78
End: 2018-09-02
Payer: COMMERCIAL

## 2018-09-02 VITALS
SYSTOLIC BLOOD PRESSURE: 130 MMHG | BODY MASS INDEX: 24.69 KG/M2 | TEMPERATURE: 97.6 F | WEIGHT: 166.7 LBS | OXYGEN SATURATION: 99 % | HEART RATE: 81 BPM | DIASTOLIC BLOOD PRESSURE: 72 MMHG

## 2018-09-02 DIAGNOSIS — F41.9 ANXIETY: ICD-10-CM

## 2018-09-02 DIAGNOSIS — L30.9 ECZEMA, UNSPECIFIED TYPE: Primary | ICD-10-CM

## 2018-09-02 PROCEDURE — 1160F RVW MEDS BY RX/DR IN RCRD: CPT | Performed by: FAMILY MEDICINE

## 2018-09-02 PROCEDURE — 99213 OFFICE O/P EST LOW 20 MIN: CPT | Performed by: FAMILY MEDICINE

## 2018-09-02 RX ORDER — ERYTHROMYCIN 5 MG/G
OINTMENT OPHTHALMIC
Refills: 5 | Status: ON HOLD | COMMUNITY
Start: 2018-08-23

## 2018-09-02 RX ORDER — CHLORDIAZEPOXIDE HYDROCHLORIDE 10 MG/1
10 CAPSULE, GELATIN COATED ORAL DAILY
Qty: 90 CAPSULE | Refills: 0 | OUTPATIENT
Start: 2018-09-02 | End: 2018-09-04 | Stop reason: SDUPTHER

## 2018-09-02 NOTE — PROGRESS NOTES
Assessment/Plan:    Anxiety  Doing well on librium 10 mg qd w/o side effects or falls  Med refilled today  Eczema  Hx of chronic foot eczema  Pt sees podiatry (dr Gisell Cardenas)  No evidence of infection  Rec: trial otc Vanicream  F/u w/ podiatry for further probs  Diagnoses and all orders for this visit:    Eczema, unspecified type    Anxiety  -     chlordiazePOXIDE (LIBRIUM) 10 mg capsule; Take 1 capsule (10 mg total) by mouth daily          Subjective:      Patient ID: Rajesh Chacon  is a 77 y o  male  History of chronic eczema /dry skin of feet  Patient has sore excoriated area on foot he would like checked today  The following portions of the patient's history were reviewed and updated as appropriate: allergies, current medications, past family history, past medical history, past social history, past surgical history and problem list     Review of Systems   HENT: Negative  Skin: Positive for rash  Objective:      /72 (BP Location: Left arm, Patient Position: Sitting)   Pulse 81   Temp 97 6 °F (36 4 °C) (Tympanic)   Wt 75 6 kg (166 lb 11 2 oz)   SpO2 99%   BMI 24 69 kg/m²          Physical Exam   Skin:   Dry cracked skin on feet b/l   No evid of infection open wound

## 2018-09-02 NOTE — ASSESSMENT & PLAN NOTE
Hx of chronic foot eczema  Pt sees podiatry (dr Medina Leal)  No evidence of infection  Rec: trial otc Vanicream  F/u w/ podiatry for further probs

## 2018-09-04 DIAGNOSIS — F41.9 ANXIETY: ICD-10-CM

## 2018-09-04 RX ORDER — CHLORDIAZEPOXIDE HYDROCHLORIDE 10 MG/1
10 CAPSULE, GELATIN COATED ORAL DAILY
Qty: 90 CAPSULE | Refills: 1 | Status: SHIPPED | OUTPATIENT
Start: 2018-09-04 | End: 2019-03-29 | Stop reason: SDUPTHER

## 2018-11-20 DIAGNOSIS — N40.0 BENIGN PROSTATIC HYPERPLASIA, UNSPECIFIED WHETHER LOWER URINARY TRACT SYMPTOMS PRESENT: ICD-10-CM

## 2018-11-20 RX ORDER — SILODOSIN 8 MG/1
1 CAPSULE ORAL DAILY
Qty: 90 CAPSULE | Refills: 1 | Status: SHIPPED | OUTPATIENT
Start: 2018-11-20 | End: 2019-04-20 | Stop reason: SDUPTHER

## 2019-03-12 DIAGNOSIS — H60.60: ICD-10-CM

## 2019-03-12 RX ORDER — HYDROCORTISONE AND ACETIC ACID 20.75; 10.375 MG/ML; MG/ML
5 SOLUTION AURICULAR (OTIC) EVERY 6 HOURS SCHEDULED
Qty: 10 ML | Refills: 0 | Status: SHIPPED | OUTPATIENT
Start: 2019-03-12 | End: 2019-04-29

## 2019-03-15 ENCOUNTER — OFFICE VISIT (OUTPATIENT)
Dept: FAMILY MEDICINE CLINIC | Facility: CLINIC | Age: 79
End: 2019-03-15
Payer: COMMERCIAL

## 2019-03-15 VITALS
HEIGHT: 69 IN | DIASTOLIC BLOOD PRESSURE: 60 MMHG | WEIGHT: 175.8 LBS | HEART RATE: 61 BPM | SYSTOLIC BLOOD PRESSURE: 104 MMHG | RESPIRATION RATE: 16 BRPM | TEMPERATURE: 98.6 F | OXYGEN SATURATION: 97 % | BODY MASS INDEX: 26.04 KG/M2

## 2019-03-15 DIAGNOSIS — L98.491 ULCER OF EXTERNAL EAR, LIMITED TO BREAKDOWN OF SKIN (HCC): Primary | ICD-10-CM

## 2019-03-15 PROCEDURE — 1160F RVW MEDS BY RX/DR IN RCRD: CPT | Performed by: FAMILY MEDICINE

## 2019-03-15 PROCEDURE — 99213 OFFICE O/P EST LOW 20 MIN: CPT | Performed by: FAMILY MEDICINE

## 2019-03-15 RX ORDER — DORZOLAMIDE HYDROCHLORIDE AND TIMOLOL MALEATE 20; 5 MG/ML; MG/ML
SOLUTION/ DROPS OPHTHALMIC
Refills: 11 | COMMUNITY
Start: 2019-02-18 | End: 2021-06-23 | Stop reason: SINTOL

## 2019-03-15 RX ORDER — POTASSIUM CITRATE 15 MEQ/1
TABLET, EXTENDED RELEASE ORAL
COMMUNITY
Start: 2019-03-10 | End: 2019-06-21 | Stop reason: SINTOL

## 2019-03-15 RX ORDER — FINASTERIDE 5 MG/1
TABLET, FILM COATED ORAL
COMMUNITY
Start: 2019-03-10 | End: 2019-06-21 | Stop reason: SINTOL

## 2019-03-15 NOTE — PROGRESS NOTES
Assessment/Plan:   1  Ulcer of external ear, limited to breakdown of skin ([address] )  Unclear as to the exact going to cause of patient's discomfort over his eyebrow  He does not appear to have any abnormalities on his exam   He did appear to have a ulceration over his left ear  At this time, he was advised on the importance of maintaining proper wound care  He may apply antibiotic ointment once a day over this area  If any symptoms are worsening, he was advised to call or follow up immediately  There are no diagnoses linked to this encounter  Subjective:    Chief Complaint   Patient presents with    Pain     above R eye brow for the past few weeks  Pt states that derm scraped the area and said it was dandruff         Patient ID: Manuel Powell  is a [de-identified] y o  male  Patient is a 67-year-old male presents today with a CC of right eyebrow discomfort  He has had this for the past few weeks  He was seen by his dermatologist and was advised that his symptoms were unclear  He only had signs of dandruff over his eyebrows  He was given a cream which she has been using with minimal relief  Review of Systems   Constitutional: Negative for activity change, chills, fatigue and fever  HENT: Negative for congestion, ear pain, sinus pressure and sore throat  Eyes: Negative for redness, itching and visual disturbance  Respiratory: Negative for cough and shortness of breath  Cardiovascular: Negative for chest pain and palpitations  Gastrointestinal: Negative for abdominal pain, diarrhea and nausea  Endocrine: Negative for cold intolerance and heat intolerance  Genitourinary: Negative for dysuria, flank pain and frequency  Musculoskeletal: Negative for arthralgias, back pain, gait problem and myalgias  Skin: Negative for color change  Allergic/Immunologic: Negative for environmental allergies  Neurological: Negative for dizziness, numbness and headaches     Psychiatric/Behavioral: Negative for behavioral problems and sleep disturbance  The following portions of the patient's history were reviewed and updated as appropriate : past family history, past medical history, past social history and past surgical history        Current Outpatient Medications:     chlordiazePOXIDE (LIBRIUM) 10 mg capsule, Take 1 capsule (10 mg total) by mouth daily, Disp: 90 capsule, Rfl: 1    dorzolamide-timolol (COSOPT) 22 3-6 8 MG/ML ophthalmic solution, INSTILL 1 DROP INTO BOTH EYES TWICE A DAY, Disp: , Rfl: 11    erythromycin (ILOTYCIN) ophthalmic ointment, APPLY (1CM) BY OPHTHALMIC ROUTE RIBBON INTO THE LOWER CONJUNCTIVAL SAC IN THE RIGHT EYE AS NEEDED, Disp: , Rfl: 5    finasteride (PROSCAR) 5 mg tablet, , Disp: , Rfl:     Fluticasone Furoate (ARNUITY ELLIPTA) 200 MCG/ACT AEPB, Inhale 1 Act daily, Disp: 1 each, Rfl: 6    guaiFENesin (MUCINEX) 600 mg 12 hr tablet, Take 1,200 mg by mouth every 12 (twelve) hours, Disp: , Rfl:     hydrocortisone 2 5 % cream, , Disp: , Rfl:     hydrocortisone-acetic acid (VOSOL-HC) otic solution, ADMINISTER 5 DROPS TO THE RIGHT EAR EVERY 6 (SIX) HOURS, Disp: 10 mL, Rfl: 0    levocetirizine (XYZAL) 5 MG tablet, Take 1 tablet by mouth Daily, Disp: , Rfl:     meclizine (ANTIVERT) 25 mg tablet, Take 0 5 tablets by mouth, Disp: , Rfl:     Multiple Vitamins-Minerals (CENTRUM SILVER ULTRA MENS) TABS, Take 1 tablet by mouth daily, Disp: , Rfl:     Potassium Citrate ER 15 MEQ (1620 MG) TBCR, , Disp: , Rfl:     Red Yeast Rice 600 MG CAPS, Take 1 tablet by mouth 2 (two) times a day, Disp: , Rfl:     Silodosin (RAPAFLO) 8 MG CAPS, Take 1 capsule by mouth daily, Disp: 90 capsule, Rfl: 1    TRAVATAN Z 0 004 % ophthalmic solution, INSTILL 1 DROP INTO BOTH EYES IN THE EVENING, Disp: , Rfl: 2    triamcinolone (KENALOG) 0 1 % oral topical paste, Apply 1 application topically 2 (two) times a day, Disp: 5 g, Rfl: 0    Objective:    Vitals:    03/15/19 1010   BP: 104/60   BP Location: Right arm   Patient Position: Sitting   Cuff Size: Adult   Pulse: 61   Resp: 16   Temp: 98 6 °F (37 °C)   TempSrc: Tympanic   SpO2: 97%   Weight: 79 7 kg (175 lb 12 8 oz)   Height: 5' 9" (1 753 m)        Physical Exam   Constitutional: He is oriented to person, place, and time  He appears well-developed and well-nourished  HENT:   Head: Normocephalic and atraumatic  Ears:    Nose: Nose normal    Mouth/Throat: No oropharyngeal exudate  Eyes: Pupils are equal, round, and reactive to light  Right eye exhibits no discharge  Left eye exhibits no discharge  Neck: Normal range of motion  Neck supple  No tracheal deviation present  Cardiovascular: Normal rate, regular rhythm and intact distal pulses  Exam reveals no gallop and no friction rub  No murmur heard  Pulses:       Dorsalis pedis pulses are 2+ on the right side, and 2+ on the left side  Posterior tibial pulses are 2+ on the right side, and 2+ on the left side  Pulmonary/Chest: Effort normal and breath sounds normal  No respiratory distress  He has no wheezes  He has no rales  Abdominal: Soft  Bowel sounds are normal  He exhibits no distension  There is no tenderness  There is no rebound and no guarding  Musculoskeletal: Normal range of motion  He exhibits no edema  Lymphadenopathy:        Head (right side): No submental and no submandibular adenopathy present  Head (left side): No submental and no submandibular adenopathy present  He has no cervical adenopathy  Right cervical: No superficial cervical, no deep cervical and no posterior cervical adenopathy present  Left cervical: No superficial cervical, no deep cervical and no posterior cervical adenopathy present  Neurological: He is alert and oriented to person, place, and time  No cranial nerve deficit or sensory deficit  Skin: Skin is warm, dry and intact  Psychiatric: His speech is normal and behavior is normal  Judgment normal  His mood appears not anxious  Cognition and memory are normal  He does not exhibit a depressed mood  Vitals reviewed

## 2019-03-29 DIAGNOSIS — F41.9 ANXIETY: ICD-10-CM

## 2019-03-29 RX ORDER — CHLORDIAZEPOXIDE HYDROCHLORIDE 10 MG/1
CAPSULE, GELATIN COATED ORAL
Qty: 90 CAPSULE | Refills: 1 | OUTPATIENT
Start: 2019-03-29

## 2019-03-31 RX ORDER — CHLORDIAZEPOXIDE HYDROCHLORIDE 10 MG/1
10 CAPSULE, GELATIN COATED ORAL DAILY
Qty: 90 CAPSULE | Refills: 1 | Status: SHIPPED | OUTPATIENT
Start: 2019-03-31 | End: 2019-04-09 | Stop reason: SDUPTHER

## 2019-04-09 ENCOUNTER — OFFICE VISIT (OUTPATIENT)
Dept: FAMILY MEDICINE CLINIC | Facility: CLINIC | Age: 79
End: 2019-04-09
Payer: COMMERCIAL

## 2019-04-09 VITALS
OXYGEN SATURATION: 98 % | HEART RATE: 84 BPM | SYSTOLIC BLOOD PRESSURE: 130 MMHG | WEIGHT: 178.7 LBS | BODY MASS INDEX: 26.47 KG/M2 | TEMPERATURE: 97.7 F | HEIGHT: 69 IN | DIASTOLIC BLOOD PRESSURE: 70 MMHG

## 2019-04-09 DIAGNOSIS — F41.9 ANXIETY: ICD-10-CM

## 2019-04-09 DIAGNOSIS — J30.2 SEASONAL ALLERGIC RHINITIS, UNSPECIFIED TRIGGER: Primary | ICD-10-CM

## 2019-04-09 PROCEDURE — 1160F RVW MEDS BY RX/DR IN RCRD: CPT | Performed by: FAMILY MEDICINE

## 2019-04-09 PROCEDURE — 1036F TOBACCO NON-USER: CPT | Performed by: FAMILY MEDICINE

## 2019-04-09 PROCEDURE — 99213 OFFICE O/P EST LOW 20 MIN: CPT | Performed by: FAMILY MEDICINE

## 2019-04-09 RX ORDER — CHLORDIAZEPOXIDE HYDROCHLORIDE 10 MG/1
10 CAPSULE, GELATIN COATED ORAL DAILY
Qty: 90 CAPSULE | Refills: 1 | Status: SHIPPED | OUTPATIENT
Start: 2019-04-09 | End: 2019-04-29

## 2019-04-09 RX ORDER — FLUTICASONE PROPIONATE 50 MCG
1 SPRAY, SUSPENSION (ML) NASAL DAILY
Qty: 1 BOTTLE | Refills: 3 | Status: SHIPPED | OUTPATIENT
Start: 2019-04-09 | End: 2019-06-30 | Stop reason: SDUPTHER

## 2019-04-20 DIAGNOSIS — N40.0 BENIGN PROSTATIC HYPERPLASIA, UNSPECIFIED WHETHER LOWER URINARY TRACT SYMPTOMS PRESENT: ICD-10-CM

## 2019-04-22 RX ORDER — SILODOSIN 8 MG/1
1 CAPSULE ORAL DAILY
Qty: 90 CAPSULE | Refills: 1 | Status: SHIPPED | OUTPATIENT
Start: 2019-04-22 | End: 2019-05-16 | Stop reason: SDUPTHER

## 2019-04-29 ENCOUNTER — OFFICE VISIT (OUTPATIENT)
Dept: FAMILY MEDICINE CLINIC | Facility: CLINIC | Age: 79
End: 2019-04-29
Payer: COMMERCIAL

## 2019-04-29 VITALS
RESPIRATION RATE: 16 BRPM | BODY MASS INDEX: 26.19 KG/M2 | HEIGHT: 69 IN | DIASTOLIC BLOOD PRESSURE: 68 MMHG | OXYGEN SATURATION: 99 % | TEMPERATURE: 98 F | WEIGHT: 176.8 LBS | SYSTOLIC BLOOD PRESSURE: 106 MMHG | HEART RATE: 72 BPM

## 2019-04-29 DIAGNOSIS — R07.81 RIB PAIN ON LEFT SIDE: Primary | ICD-10-CM

## 2019-04-29 PROCEDURE — 99213 OFFICE O/P EST LOW 20 MIN: CPT | Performed by: PHYSICIAN ASSISTANT

## 2019-05-16 DIAGNOSIS — N40.0 BENIGN PROSTATIC HYPERPLASIA, UNSPECIFIED WHETHER LOWER URINARY TRACT SYMPTOMS PRESENT: ICD-10-CM

## 2019-05-16 RX ORDER — SILODOSIN 8 MG/1
1 CAPSULE ORAL DAILY
Qty: 90 CAPSULE | Refills: 1 | Status: SHIPPED | OUTPATIENT
Start: 2019-05-16 | End: 2020-05-16 | Stop reason: SDUPTHER

## 2019-05-29 ENCOUNTER — OFFICE VISIT (OUTPATIENT)
Dept: FAMILY MEDICINE CLINIC | Facility: CLINIC | Age: 79
End: 2019-05-29
Payer: COMMERCIAL

## 2019-05-29 VITALS
HEIGHT: 69 IN | SYSTOLIC BLOOD PRESSURE: 110 MMHG | TEMPERATURE: 98.9 F | HEART RATE: 67 BPM | OXYGEN SATURATION: 98 % | WEIGHT: 174 LBS | BODY MASS INDEX: 25.77 KG/M2 | RESPIRATION RATE: 16 BRPM | DIASTOLIC BLOOD PRESSURE: 70 MMHG

## 2019-05-29 DIAGNOSIS — R22.1 NECK MASS: Primary | ICD-10-CM

## 2019-05-29 DIAGNOSIS — M54.2 NECK PAIN: ICD-10-CM

## 2019-05-29 PROCEDURE — 99214 OFFICE O/P EST MOD 30 MIN: CPT | Performed by: FAMILY MEDICINE

## 2019-06-03 ENCOUNTER — HOSPITAL ENCOUNTER (OUTPATIENT)
Dept: ULTRASOUND IMAGING | Facility: MEDICAL CENTER | Age: 79
Discharge: HOME | End: 2019-06-03
Payer: COMMERCIAL

## 2019-06-03 DIAGNOSIS — R22.1 NECK MASS: ICD-10-CM

## 2019-06-03 PROCEDURE — 76536 US EXAM OF HEAD AND NECK: CPT

## 2019-06-21 ENCOUNTER — OFFICE VISIT (OUTPATIENT)
Dept: FAMILY MEDICINE CLINIC | Facility: CLINIC | Age: 79
End: 2019-06-21
Payer: COMMERCIAL

## 2019-06-21 VITALS
HEIGHT: 69 IN | WEIGHT: 175.4 LBS | DIASTOLIC BLOOD PRESSURE: 70 MMHG | SYSTOLIC BLOOD PRESSURE: 110 MMHG | BODY MASS INDEX: 25.98 KG/M2 | OXYGEN SATURATION: 98 % | HEART RATE: 84 BPM | TEMPERATURE: 98.7 F

## 2019-06-21 DIAGNOSIS — R53.83 FATIGUE, UNSPECIFIED TYPE: ICD-10-CM

## 2019-06-21 DIAGNOSIS — I88.9 LYMPHADENITIS: Primary | ICD-10-CM

## 2019-06-21 PROCEDURE — 99214 OFFICE O/P EST MOD 30 MIN: CPT | Performed by: FAMILY MEDICINE

## 2019-06-21 PROCEDURE — 1160F RVW MEDS BY RX/DR IN RCRD: CPT | Performed by: FAMILY MEDICINE

## 2019-06-21 PROCEDURE — 1036F TOBACCO NON-USER: CPT | Performed by: FAMILY MEDICINE

## 2019-06-21 RX ORDER — DOXYCYCLINE 100 MG/1
100 TABLET ORAL 2 TIMES DAILY
Qty: 20 TABLET | Refills: 0 | Status: SHIPPED | OUTPATIENT
Start: 2019-06-21 | End: 2019-07-01

## 2019-06-21 RX ORDER — DOXYCYCLINE 100 MG/1
100 TABLET ORAL 2 TIMES DAILY
Qty: 20 TABLET | Refills: 0 | Status: SHIPPED | OUTPATIENT
Start: 2019-06-21 | End: 2019-06-21 | Stop reason: SDUPTHER

## 2019-06-25 ENCOUNTER — APPOINTMENT (OUTPATIENT)
Dept: LAB | Facility: CLINIC | Age: 79
End: 2019-06-25
Payer: COMMERCIAL

## 2019-06-25 DIAGNOSIS — R53.83 FATIGUE, UNSPECIFIED TYPE: ICD-10-CM

## 2019-06-25 LAB
ALBUMIN SERPL BCP-MCNC: 3.4 G/DL (ref 3.5–5)
ALP SERPL-CCNC: 58 U/L (ref 46–116)
ALT SERPL W P-5'-P-CCNC: 17 U/L (ref 12–78)
ANION GAP SERPL CALCULATED.3IONS-SCNC: 4 MMOL/L (ref 4–13)
AST SERPL W P-5'-P-CCNC: 14 U/L (ref 5–45)
BASOPHILS # BLD AUTO: 0.02 THOUSANDS/ΜL (ref 0–0.1)
BASOPHILS NFR BLD AUTO: 0 % (ref 0–1)
BILIRUB SERPL-MCNC: 0.54 MG/DL (ref 0.2–1)
BUN SERPL-MCNC: 27 MG/DL (ref 5–25)
CALCIUM SERPL-MCNC: 9 MG/DL (ref 8.3–10.1)
CHLORIDE SERPL-SCNC: 107 MMOL/L (ref 100–108)
CO2 SERPL-SCNC: 26 MMOL/L (ref 21–32)
CREAT SERPL-MCNC: 0.95 MG/DL (ref 0.6–1.3)
EOSINOPHIL # BLD AUTO: 0.18 THOUSAND/ΜL (ref 0–0.61)
EOSINOPHIL NFR BLD AUTO: 3 % (ref 0–6)
ERYTHROCYTE [DISTWIDTH] IN BLOOD BY AUTOMATED COUNT: 14.7 % (ref 11.6–15.1)
GFR SERPL CREATININE-BSD FRML MDRD: 75 ML/MIN/1.73SQ M
GLUCOSE SERPL-MCNC: 91 MG/DL (ref 65–140)
HCT VFR BLD AUTO: 39.8 % (ref 36.5–49.3)
HGB BLD-MCNC: 12.9 G/DL (ref 12–17)
IMM GRANULOCYTES # BLD AUTO: 0.01 THOUSAND/UL (ref 0–0.2)
IMM GRANULOCYTES NFR BLD AUTO: 0 % (ref 0–2)
LYMPHOCYTES # BLD AUTO: 1.98 THOUSANDS/ΜL (ref 0.6–4.47)
LYMPHOCYTES NFR BLD AUTO: 34 % (ref 14–44)
MCH RBC QN AUTO: 32 PG (ref 26.8–34.3)
MCHC RBC AUTO-ENTMCNC: 32.4 G/DL (ref 31.4–37.4)
MCV RBC AUTO: 99 FL (ref 82–98)
MONOCYTES # BLD AUTO: 0.63 THOUSAND/ΜL (ref 0.17–1.22)
MONOCYTES NFR BLD AUTO: 11 % (ref 4–12)
NEUTROPHILS # BLD AUTO: 3.06 THOUSANDS/ΜL (ref 1.85–7.62)
NEUTS SEG NFR BLD AUTO: 52 % (ref 43–75)
NRBC BLD AUTO-RTO: 0 /100 WBCS
PLATELET # BLD AUTO: 213 THOUSANDS/UL (ref 149–390)
PMV BLD AUTO: 10.1 FL (ref 8.9–12.7)
POTASSIUM SERPL-SCNC: 4.3 MMOL/L (ref 3.5–5.3)
PROT SERPL-MCNC: 7.2 G/DL (ref 6.4–8.2)
RBC # BLD AUTO: 4.03 MILLION/UL (ref 3.88–5.62)
SODIUM SERPL-SCNC: 137 MMOL/L (ref 136–145)
TSH SERPL DL<=0.05 MIU/L-ACNC: 1.18 UIU/ML (ref 0.36–3.74)
WBC # BLD AUTO: 5.88 THOUSAND/UL (ref 4.31–10.16)

## 2019-06-25 PROCEDURE — 80053 COMPREHEN METABOLIC PANEL: CPT

## 2019-06-25 PROCEDURE — 84443 ASSAY THYROID STIM HORMONE: CPT

## 2019-06-25 PROCEDURE — 36415 COLL VENOUS BLD VENIPUNCTURE: CPT

## 2019-06-25 PROCEDURE — 85025 COMPLETE CBC W/AUTO DIFF WBC: CPT

## 2019-06-30 DIAGNOSIS — J30.2 SEASONAL ALLERGIC RHINITIS, UNSPECIFIED TRIGGER: ICD-10-CM

## 2019-06-30 RX ORDER — FLUTICASONE PROPIONATE 50 MCG
SPRAY, SUSPENSION (ML) NASAL
Qty: 48 ML | Refills: 1 | Status: SHIPPED | OUTPATIENT
Start: 2019-06-30 | End: 2019-09-24 | Stop reason: ALTCHOICE

## 2019-07-16 ENCOUNTER — OFFICE VISIT (OUTPATIENT)
Dept: FAMILY MEDICINE CLINIC | Facility: CLINIC | Age: 79
End: 2019-07-16
Payer: COMMERCIAL

## 2019-07-16 VITALS
HEIGHT: 69 IN | HEART RATE: 67 BPM | BODY MASS INDEX: 26.04 KG/M2 | WEIGHT: 175.8 LBS | SYSTOLIC BLOOD PRESSURE: 110 MMHG | OXYGEN SATURATION: 99 % | DIASTOLIC BLOOD PRESSURE: 60 MMHG | TEMPERATURE: 97.6 F

## 2019-07-16 DIAGNOSIS — M26.621 ARTHRALGIA OF RIGHT TEMPOROMANDIBULAR JOINT: Primary | ICD-10-CM

## 2019-07-16 PROCEDURE — 99213 OFFICE O/P EST LOW 20 MIN: CPT | Performed by: FAMILY MEDICINE

## 2019-07-16 NOTE — PROGRESS NOTES
[facility]      NAME: Luís Ambrosio  AGE: [de-identified] y o  SEX: male  : 1940   MRN: [id number]    DATE: 2019  TIME: 12:04 PM    Assessment and Plan     Problem List Items Addressed This Visit     None      Visit Diagnoses     Arthralgia of right temporomandibular joint    -  Primary        Current symptoms and physical exam consistent with TMJ arthralgia  Discussed starting NSAID but patient declined  Recommended moist heat soft diet  Continue with Tylenol as needed for pain control  Return as scheduled next month for annual wellness visit  Return to office in:  P r n  Chief Complaint     Chief Complaint   Patient presents with    Follow-up       History of Present Illness     Patient returns to the office to follow-up on his right-sided jaw and neck pain  When seen last month the diagnosis of lymphadenitis was made and he was placed on antibiotics  He did complete a 10 day course of doxycycline but did not feel that it helped much  He did have some more significant pain in the region of his right TMJ over this past week though he states that is somewhat improved today  He also does complain of some fatigue ongoing  The following portions of the patient's history were reviewed and updated as appropriate: allergies, current medications, past family history, past medical history, past social history, past surgical history and problem list     Review of Systems   Review of Systems   Constitutional: Positive for fatigue  Respiratory: Negative  Cardiovascular: Negative  Gastrointestinal: Negative  Genitourinary: Negative  Musculoskeletal: Negative  Psychiatric/Behavioral: Negative          Active Problem List     Patient Active Problem List   Diagnosis    Acute right-sided low back pain without sciatica    Renal lithiasis    Persistent cough for 3 weeks or longer    Mild cognitive impairment    Mixed hyperlipidemia    Oropharyngeal dysphagia    Abnormal diffusion capacity determined by pulmonary function test    Sjogren's syndrome (HCC)    Irritable bowel syndrome    Gastroesophageal reflux disease without esophagitis    Enlarged prostate without lower urinary tract symptoms (luts)    Dysplastic nevus of face    Arthritis    Anxiety    Antithrombinemia (HCC)    Allergic rhinitis    Neck pain    Glaucoma    Eczema    Neck mass       Objective   /60 (BP Location: Right arm, Patient Position: Sitting, Cuff Size: Adult)   Pulse 67   Temp 97 6 °F (36 4 °C) (Tympanic)   Ht 5' 9" (1 753 m)   Wt 79 7 kg (175 lb 12 8 oz)   SpO2 99%   BMI 25 96 kg/m²     Physical Exam   HENT:   Positive crepitance right TMJ region           Current Medications     Current Outpatient Medications:     dorzolamide-timolol (COSOPT) 22 3-6 8 MG/ML ophthalmic solution, INSTILL 1 DROP INTO BOTH EYES TWICE A DAY, Disp: , Rfl: 11    erythromycin (ILOTYCIN) ophthalmic ointment, APPLY (1CM) BY OPHTHALMIC ROUTE RIBBON INTO THE LOWER CONJUNCTIVAL SAC IN THE RIGHT EYE AS NEEDED, Disp: , Rfl: 5    fluticasone (FLONASE) 50 mcg/act nasal spray, SPRAY 1 SPRAY INTO EACH NOSTRIL EVERY DAY, Disp: 48 mL, Rfl: 1    Fluticasone Furoate (ARNUITY ELLIPTA) 200 MCG/ACT AEPB, Inhale 1 Act daily, Disp: 1 each, Rfl: 6    guaiFENesin (MUCINEX) 600 mg 12 hr tablet, Take 1,200 mg by mouth every 12 (twelve) hours, Disp: , Rfl:     hydrocortisone 2 5 % cream, , Disp: , Rfl:     levocetirizine (XYZAL) 5 MG tablet, Take 1 tablet by mouth Daily, Disp: , Rfl:     meclizine (ANTIVERT) 25 mg tablet, Take 0 5 tablets by mouth, Disp: , Rfl:     Multiple Vitamins-Minerals (CENTRUM SILVER ULTRA MENS) TABS, Take 1 tablet by mouth daily, Disp: , Rfl:     Red Yeast Rice 600 MG CAPS, Take 1 tablet by mouth 2 (two) times a day, Disp: , Rfl:     Silodosin (RAPAFLO) 8 MG CAPS, Take 1 capsule by mouth daily, Disp: 90 capsule, Rfl: 1    TRAVATAN Z 0 004 % ophthalmic solution, INSTILL 1 DROP INTO BOTH EYES IN THE EVENING, Disp: , Rfl: 2    triamcinolone (KENALOG) 0 1 % oral topical paste, Apply 1 application topically 2 (two) times a day, Disp: 5 g, Rfl: 0    Health Maintenance     Health Maintenance   Topic Date Due    BMI: Followup Plan  02/16/1957    Pneumococcal Vaccine: 65+ Years (2 of 2 - PPSV23) 06/08/2016    INFLUENZA VACCINE  09/16/2019 (Originally 7/1/2019)    DTaP,Tdap,and Td Vaccines (1 - Tdap) 04/09/2020 (Originally 10/23/2014)    Fall Risk  08/27/2019    Depression Screening PHQ  08/27/2019    BMI: Adult  06/21/2020    Pneumococcal Vaccine: Pediatrics (0 to 5 Years) and At-Risk Patients (6 to 59 Years)  Aged Out    HEPATITIS B VACCINES  Aged [employer] History   Administered Date(s) Administered    INFLUENZA 09/18/2015, 10/18/2016, 09/01/2017, 08/27/2018    Influenza Quadrivalent, 6-35 Months IM 09/01/2017    Influenza Split High Dose Preservative Free IM 09/25/2014, 09/18/2015, 10/18/2016    Influenza TIV (IM) 10/17/2012, 10/09/2013    Influenza, high dose seasonal 0 5 mL 08/27/2018    Pneumococcal Conjugate 13-Valent 06/08/2015    Td (adult), adsorbed 10/22/2014    Zoster Vaccine Recombinant 03/30/2018, 05/29/2018       Clarisa Camarillo DO  [facility]

## 2019-08-13 ENCOUNTER — OFFICE VISIT (OUTPATIENT)
Dept: FAMILY MEDICINE CLINIC | Facility: CLINIC | Age: 79
End: 2019-08-13
Payer: COMMERCIAL

## 2019-08-13 VITALS
HEART RATE: 83 BPM | TEMPERATURE: 97.5 F | BODY MASS INDEX: 25.37 KG/M2 | WEIGHT: 177.2 LBS | RESPIRATION RATE: 16 BRPM | HEIGHT: 70 IN | SYSTOLIC BLOOD PRESSURE: 122 MMHG | OXYGEN SATURATION: 97 % | DIASTOLIC BLOOD PRESSURE: 84 MMHG
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
## 2019-08-13 DIAGNOSIS — S50.862A INSECT BITE OF LEFT FOREARM, INITIAL ENCOUNTER: Primary | ICD-10-CM

## 2019-08-13 PROCEDURE — 99213 OFFICE O/P EST LOW 20 MIN: CPT | Performed by: FAMILY MEDICINE

## 2019-08-13 PROCEDURE — 1160F RVW MEDS BY RX/DR IN RCRD: CPT | Performed by: FAMILY MEDICINE

## 2019-08-13 NOTE — PROGRESS NOTES
Assessment/Plan:  Insect bite on left forearm - can use cortisone cream that he has at home  Right TMJ/ tender cervical node - continue warm compresses  Did have US and non-suspicious lymph node is seen  Diagnoses and all orders for this visit:    Insect bite of left forearm, initial encounter          Subjective:   Chief Complaint   Patient presents with    Mass     L arm x 2 days, R swollen gland in neck x months        Patient ID: Shara Tang  is a [de-identified] y o  male  Patient has a lump on left forearm - was under the skin - he is afraid that it is a mosquito bite  Also continues with pain in right TMJ and swollen gland in neck  The following portions of the patient's history were reviewed and updated as appropriate: allergies, current medications, past family history, past medical history, past social history, past surgical history and problem list     Review of Systems   Constitutional: Negative for chills and fever  HENT: Negative for congestion and sore throat  Respiratory: Negative for chest tightness  Cardiovascular: Negative for chest pain and palpitations  Gastrointestinal: Negative for abdominal pain, constipation, diarrhea and nausea  Genitourinary: Negative for difficulty urinating  Skin: Positive for rash  Neurological: Negative for dizziness and headaches  Hematological: Positive for adenopathy  Psychiatric/Behavioral: Negative  Objective:      /84 (BP Location: Left arm, Patient Position: Sitting)   Pulse 83   Temp 97 5 °F (36 4 °C) (Tympanic)   Resp 16   Ht 5' 10" (1 778 m)   Wt 80 4 kg (177 lb 3 2 oz)   SpO2 97%   BMI 25 43 kg/m²          Physical Exam   Constitutional: He is oriented to person, place, and time  He appears well-developed  No distress  HENT:   Tenderness over right TMJ, crepitus  Neck: Carotid bruit is not present  No thyromegaly present  Scar right side of neck     Cardiovascular: Normal rate, regular rhythm and normal heart sounds  Pulmonary/Chest: Effort normal and breath sounds normal    Musculoskeletal: He exhibits no edema  Lymphadenopathy:     He has no cervical adenopathy  Neurological: He is alert and oriented to person, place, and time  Skin: Skin is warm and dry  Raised erythematous spot - on left forearm  Looks like insect bite at center  Psychiatric: He has a normal mood and affect  Nursing note and vitals reviewed

## 2019-08-30 ENCOUNTER — OFFICE VISIT (OUTPATIENT)
Dept: FAMILY MEDICINE CLINIC | Facility: CLINIC | Age: 79
End: 2019-08-30
Payer: COMMERCIAL

## 2019-08-30 VITALS
TEMPERATURE: 98 F | DIASTOLIC BLOOD PRESSURE: 70 MMHG | RESPIRATION RATE: 16 BRPM | HEART RATE: 65 BPM | HEIGHT: 69 IN | SYSTOLIC BLOOD PRESSURE: 110 MMHG | BODY MASS INDEX: 26.22 KG/M2 | OXYGEN SATURATION: 97 % | WEIGHT: 177 LBS

## 2019-08-30 DIAGNOSIS — Z00.00 MEDICARE ANNUAL WELLNESS VISIT, SUBSEQUENT: ICD-10-CM

## 2019-08-30 DIAGNOSIS — R53.83 FATIGUE, UNSPECIFIED TYPE: Primary | ICD-10-CM

## 2019-08-30 DIAGNOSIS — E78.2 MIXED HYPERLIPIDEMIA: ICD-10-CM

## 2019-08-30 DIAGNOSIS — Z23 NEED FOR VACCINATION: ICD-10-CM

## 2019-08-30 DIAGNOSIS — M19.90 ARTHRITIS: ICD-10-CM

## 2019-08-30 PROCEDURE — 1101F PT FALLS ASSESS-DOCD LE1/YR: CPT | Performed by: FAMILY MEDICINE

## 2019-08-30 PROCEDURE — 99213 OFFICE O/P EST LOW 20 MIN: CPT | Performed by: FAMILY MEDICINE

## 2019-08-30 PROCEDURE — G0439 PPPS, SUBSEQ VISIT: HCPCS | Performed by: FAMILY MEDICINE

## 2019-08-30 PROCEDURE — 90732 PPSV23 VACC 2 YRS+ SUBQ/IM: CPT | Performed by: FAMILY MEDICINE

## 2019-08-30 PROCEDURE — 90471 IMMUNIZATION ADMIN: CPT | Performed by: FAMILY MEDICINE

## 2019-08-30 NOTE — PROGRESS NOTES
Assessment and Plan:     Problem List Items Addressed This Visit     Mixed hyperlipidemia    Relevant Orders    Lipid Panel with Direct LDL reflex      Other Visit Diagnoses     Fatigue, unspecified type    -  Primary    Relevant Orders    CBC and differential    Comprehensive metabolic panel    TSH, 3rd generation with Free T4 reflex    Need for vaccination        Relevant Orders    PNEUMOCOCCAL POLYSACCHARIDE VACCINE 23-VALENT =>3YO SQ IM    Medicare annual wellness visit, subsequent        Questionnaire reviewed  Immunization and health screening guidelines discussed  Due for pneumococcal vaccine  Advance directive reviewed with patient  History of Present Illness:     Patient presents for Welcome to Medicare visit  Patient Care Team:  Kelsie Arthur DO as PCP - Francie Thomas MD     Review of Systems:     Review of Systems   Gastrointestinal: Negative for bowel incontinence  Psychiatric/Behavioral: The patient is not nervous/anxious           Problem List:     Patient Active Problem List   Diagnosis    Acute right-sided low back pain without sciatica    Renal lithiasis    Persistent cough for 3 weeks or longer    Mild cognitive impairment    Mixed hyperlipidemia    Oropharyngeal dysphagia    Abnormal diffusion capacity determined by pulmonary function test    Sjogren's syndrome (HCC)    Irritable bowel syndrome    Gastroesophageal reflux disease without esophagitis    Enlarged prostate without lower urinary tract symptoms (luts)    Dysplastic nevus of face    Arthritis    Anxiety    Antithrombinemia (HCC)    Allergic rhinitis    Neck pain    Glaucoma    Eczema    Neck mass      Past Medical and Surgical History:     Past Medical History:   Diagnosis Date    Abnormal diffusion capacity determined by pulmonary function test 4/25/2017    Actinic keratosis     Adverse effect of correct medicinal substance properly administered     Arthralgia     Candidiasis, mouth     Chronic allergic rhinitis     Chronic sinusitis     Dermatitis     Diverticulosis     Glaucoma     Inguinal hernia, left     Lump of skin     Male erectile dysfunction     Open comedone     Palpitations     Renal calculi     Sebaceous cyst     Seborrheic keratosis     Skin disorder     Unspecified chronic bronchitis (HCC)     Visual disturbances      Past Surgical History:   Procedure Laterality Date    COLONOSCOPY      INGUINAL HERNIA REPAIR Bilateral     left- last assessed: 11/25/14, Resolved: 5/18/15, onset 12/11/13    MOUTH SURGERY        Family History:     Family History   Problem Relation Age of Onset    Brain cancer Mother     Heart failure Mother     Prostate cancer Mother       Social History:     Social History     Tobacco Use   Smoking Status Former Smoker   Smokeless Tobacco Never Used     Social History     Substance and Sexual Activity   Alcohol Use Yes    Comment: social     Social History     Substance and Sexual Activity   Drug Use No      Medications and Allergies:     Current Outpatient Medications   Medication Sig Dispense Refill    dorzolamide-timolol (COSOPT) 22 3-6 8 MG/ML ophthalmic solution INSTILL 1 DROP INTO BOTH EYES TWICE A DAY  11    hydrocortisone 2 5 % cream       levocetirizine (XYZAL) 5 MG tablet Take 1 tablet by mouth Daily      meclizine (ANTIVERT) 25 mg tablet Take 0 5 tablets by mouth      Red Yeast Rice 600 MG CAPS Take 1 tablet by mouth 2 (two) times a day      Silodosin (RAPAFLO) 8 MG CAPS Take 1 capsule by mouth daily 90 capsule 1    TRAVATAN Z 0 004 % ophthalmic solution INSTILL 1 DROP INTO BOTH EYES IN THE EVENING  2    erythromycin (ILOTYCIN) ophthalmic ointment APPLY (1CM) BY OPHTHALMIC ROUTE RIBBON INTO THE LOWER CONJUNCTIVAL SAC IN THE RIGHT EYE AS NEEDED  5    fluticasone (FLONASE) 50 mcg/act nasal spray SPRAY 1 SPRAY INTO EACH NOSTRIL EVERY DAY (Patient not taking: Reported on 8/30/2019) 48 mL 1    Fluticasone Furoate (ARNUITY ELLIPTA) 200 MCG/ACT AEPB Inhale 1 Act daily (Patient not taking: Reported on 8/30/2019) 1 each 6    guaiFENesin (MUCINEX) 600 mg 12 hr tablet Take 1,200 mg by mouth every 12 (twelve) hours      Multiple Vitamins-Minerals (CENTRUM SILVER ULTRA MENS) TABS Take 1 tablet by mouth daily      triamcinolone (KENALOG) 0 1 % oral topical paste Apply 1 application topically 2 (two) times a day (Patient not taking: Reported on 8/30/2019) 5 g 0     No current facility-administered medications for this visit  Allergies   Allergen Reactions    Levofloxacin Anaphylaxis    Azithromycin GI Intolerance    Ketorolac     Penicillins Hives      Immunizations:     Immunization History   Administered Date(s) Administered    INFLUENZA 09/18/2015, 10/18/2016, 09/01/2017, 08/27/2018    Influenza Quadrivalent, 6-35 Months IM 09/01/2017    Influenza Split High Dose Preservative Free IM 09/25/2014, 09/18/2015, 10/18/2016    Influenza TIV (IM) 10/17/2012, 10/09/2013    Influenza, high dose seasonal 0 5 mL 08/27/2018    Pneumococcal Conjugate 13-Valent 06/08/2015    Td (adult), adsorbed 10/22/2014    Zoster Vaccine Recombinant 03/30/2018, 05/29/2018      Medicare Screening Tests and Risk Assessments:     Valeria Barthel is here for his Subsequent Wellness visit  Last Medicare Wellness visit information reviewed, patient interviewed and updates made to the record today  Health Risk Assessment:  Patient rates overall health as good  Patient feels that their physical health rating is Same  Eyesight was rated as Same  Hearing was rated as Same  Patient feels that their emotional and mental health rating is Same  Pain experienced by patient in the last 7 days has been None  Patient states that he has experienced no weight loss or gain in last 6 months  Emotional/Mental Health:  Patient has not been feeling nervous/anxious      PHQ-9 Depression Screening:    Frequency of the following problems over the past two weeks: 1  Little interest or pleasure in doing things: 0 - not at all      2  Feeling down, depressed, or hopeless: 0 - not at all  PHQ-2 Score: 0          Broken Bones/Falls: Fall Risk Assessment:    In the past year, patient has experienced: No history of falling in past year          Bladder/Bowel:  Patient has not leaked urine accidently in the last six months  Patient reports no loss of bowel control  Immunizations:  Patient has had a flu vaccination within the last year  Patient has received a pneumonia shot  Patient has received a shingles shot  Patient has received tetanus/diphtheria shot  Home Safety:  Patient does not have trouble with stairs inside or outside of their home  Patient currently reports that there are no safety hazards present in home, working smoke alarms, no working carbon monoxide detectors  Preventative Screenings:   prostate cancer screen performed, colon cancer screen completed, cholesterol screen completed, glaucoma eye exam completed,     Nutrition:  Current diet: Regular with servings of the following:    Medications:  Patient is currently taking over-the-counter supplements  List of OTC medications includes: vitamins  Patient is able to manage medications  Lifestyle Choices:  Patient reports no tobacco use  Patient has not smoked or used tobacco in the past   Patient reports no alcohol use  Patient drives a vehicle  Patient wears seat belt  Current level of exercise of physical activity described by patient as: walking  Activities of Daily Living:  Can get out of bed by his or her self, able to dress self, able to make own meals, able to do own shopping, able to bathe self, can do own laundry/housekeeping, can manage own money, pay bills and track expenses    Previous Hospitalizations:  No hospitalization or ED visit in past 12 months        Advanced Directives:  Patient has decided on a power of     Patient has spoken to designated power of   Patient has not completed advanced directive  Preventative Screening/Counseling:      Cardiovascular:      General: Screening Current          Diabetes:      General: Screening Current          Colorectal Cancer:      General: Screening Not Indicated          Prostate Cancer:      General: Screening Not Indicated          Osteoporosis:      General: Screening Not Indicated          AAA:      General: Screening Not Indicated          Glaucoma:      General: Risks and Benefits Discussed          HIV:      General: Screening Not Indicated          Hepatitis C:      General: Screening Not Indicated        Advanced Directives:   has durable POA for healthcare, patient does not have an advanced directive  Information on ACP and/or AD not provided  No end of life assessment reviewed with patient       Immunizations:      Influenza: Risks & Benefits Discussed and Influenza Recommended Annually      Pneumococcal: Pneumococcal Due Today      Shingrix: Shingrix Vaccine UTD      Hepatitis B (Low risk patients): Series Not Indicated      Zostavax: Vaccine Status Unknown      TD: Vaccine Status Unknown      TDAP: Vaccine Status Unknown          No exam data present     Physical Exam:     /70 (BP Location: Left arm, Patient Position: Sitting, Cuff Size: Adult)   Pulse 65   Temp 98 °F (36 7 °C) (Tympanic)   Resp 16   Ht 5' 8 9" (1 75 m)   Wt 80 3 kg (177 lb)   SpO2 97%   BMI 26 22 kg/m²     Physical Exam

## 2019-08-30 NOTE — PATIENT INSTRUCTIONS
Obesity   AMBULATORY CARE:   Obesity  is when your body mass index (BMI) is greater than 30  Your healthcare provider will use your height and weight to measure your BMI  The risks of obesity include  many health problems, such as injuries or physical disability  You may need tests to check for the following:  · Diabetes     · High blood pressure or high cholesterol     · Heart disease     · Gallbladder or liver disease     · Cancer of the colon, breast, prostate, liver, or kidney     · Sleep apnea     · Arthritis or gout  Seek care immediately if:   · You have a severe headache, confusion, or difficulty speaking  · You have weakness on one side of your body  · You have chest pain, sweating, or shortness of breath  Contact your healthcare provider if:   · You have symptoms of gallbladder or liver disease, such as pain in your upper abdomen  · You have knee or hip pain and discomfort while walking  · You have symptoms of diabetes, such as intense hunger and thirst, and frequent urination  · You have symptoms of sleep apnea, such as snoring or daytime sleepiness  · You have questions or concerns about your condition or care  Treatment for obesity  focuses on helping you lose weight to improve your health  Even a small decrease in BMI can reduce the risk for many health problems  Your healthcare provider will help you set a weight-loss goal   · Lifestyle changes  are the first step in treating obesity  These include making healthy food choices and getting regular physical activity  Your healthcare provider may suggest a weight-loss program that involves coaching, education, and therapy  · Medicine  may help you lose weight when it is used with a healthy diet and physical activity  · Surgery  can help you lose weight if you are very obese and have other health problems  There are several types of weight-loss surgery  Ask your healthcare provider for more information    Be successful losing weight:   · Set small, realistic goals  An example of a small goal is to walk for 20 minutes 5 days a week  Anther goal is to lose 5% of your body weight  · Tell friends, family members, and coworkers about your goals  and ask for their support  Ask a friend to lose weight with you, or join a weight-loss support group  · Identify foods or triggers that may cause you to overeat , and find ways to avoid them  Remove tempting high-calorie foods from your home and workplace  Place a bowl of fresh fruit on your kitchen counter  If stress causes you to eat, then find other ways to cope with stress  · Keep a diary to track what you eat and drink  Also write down how many minutes of physical activity you do each day  Weigh yourself once a week and record it in your diary  Eating changes: You will need to eat 500 to 1,000 fewer calories each day than you currently eat to lose 1 to 2 pounds a week  The following changes will help you cut calories:  · Eat smaller portions  Use small plates, no larger than 9 inches in diameter  Fill your plate half full of fruits and vegetables  Measure your food using measuring cups until you know what a serving size looks like  · Eat 3 meals and 1 or 2 snacks each day  Plan your meals in advance  Karen Stalls and eat at home most of the time  Eat slowly  · Eat fruits and vegetables at every meal   They are low in calories and high in fiber, which makes you feel full  Do not add butter, margarine, or cream sauce to vegetables  Use herbs to season steamed vegetables  · Eat less fat and fewer fried foods  Eat more baked or grilled chicken and fish  These protein sources are lower in calories and fat than red meat  Limit fast food  Dress your salads with olive oil and vinegar instead of bottled dressing  · Limit the amount of sugar you eat  Do not drink sugary beverages  Limit alcohol  Activity changes:  Physical activity is good for your body in many ways   It helps you burn calories and build strong muscles  It decreases stress and depression, and improves your mood  It can also help you sleep better  Talk to your healthcare provider before you begin an exercise program   · Exercise for at least 30 minutes 5 days a week  Start slowly  Set aside time each day for physical activity that you enjoy and that is convenient for you  It is best to do both weight training and an activity that increases your heart rate, such as walking, bicycling, or swimming  · Find ways to be more active  Do yard work and housecleaning  Walk up the stairs instead of using elevators  Spend your leisure time going to events that require walking, such as outdoor festivals or fairs  This extra physical activity can help you lose weight and keep it off  Follow up with your healthcare provider as directed: You may need to meet with a dietitian  Write down your questions so you remember to ask them during your visits  © 2017 [address] [employer] Information is for End User's use only and may not be sold, redistributed or otherwise used for commercial purposes  All illustrations and images included in CareNotes® are the copyrighted property of [employer]  or Sukumar Snider  The above information is an  only  It is not intended as medical advice for individual conditions or treatments  Talk to your doctor, nurse or pharmacist before following any medical regimen to see if it is safe and effective for you  Urinary Incontinence   WHAT YOU NEED TO KNOW:   What is urinary incontinence? Urinary incontinence (UI) is when you lose control of your bladder  What causes UI? UI occurs because your bladder cannot store or empty urine properly  The following are the most common types of UI:  · Stress incontinence  is when you leak urine due to increased bladder pressure  This may happen when you cough, sneeze, or exercise       · Urge incontinence  is when you feel the need to urinate right away and leak urine accidentally  · Mixed incontinence  is when you have both stress and urge UI  What are the signs and symptoms of UI?   · You feel like your bladder does not empty completely when you urinate  · You urinate often and need to urinate immediately  · You leak urine when you sleep, or you wake up with the urge to urinate  · You leak urine when you cough, sneeze, exercise, or laugh  How is UI diagnosed? Your healthcare provider will ask how often you leak urine and whether you have stress or urge symptoms  Tell him which medicines you take, how often you urinate, and how much liquid you drink each day  You may need any of the following tests:  · Urine tests  may show infection or kidney function  · A pelvic exam  may be done to check for blockages  A pelvic exam will also show if your bladder, uterus, or other organs have moved out of place  · An x-ray, ultrasound, or CT  may show problems with parts of your urinary system  You may be given contrast liquid to help your organs show up better in the pictures  Tell the healthcare provider if you have ever had an allergic reaction to contrast liquid  Do not enter the MRI room with anything metal  Metal can cause serious injury  Tell the healthcare provider if you have any metal in or on your body  · A bladder scan  will show how much urine is left in your bladder after you urinate  You will be asked to urinate and then healthcare providers will use a small ultrasound machine to check the urine left in your bladder  · Cystometry  is used to check the function of your urinary system  Your healthcare provider checks the pressure in your bladder while filling it with fluid  Your bladder pressure may also be tested when your bladder is full and while you urinate  How is UI treated? · Medicines  can help strengthen your bladder control      · Electrical stimulation  is used to send a small amount of electrical energy to your pelvic floor muscles  This helps control your bladder function  Electrodes may be placed outside your body or in your rectum  For women, the electrodes may be placed in the vagina  · A bulking agent  may be injected into the wall of your urethra to make it thicker  This helps keep your urethra closed and decreases urine leakage  · Devices  such as a clamp, pessary, or tampon may help stop urine leaks  Ask your healthcare provider for more information about these and other devices  · Surgery  may be needed if other treatments do not work  Several types of surgery can help improve your bladder control  Ask your healthcare provider for more information about the surgery you may need  How can I manage my symptoms? · Do pelvic muscle exercises often  Your pelvic muscles help you stop urinating  Squeeze these muscles tight for 5 seconds, then relax for 5 seconds  Gradually work up to squeezing for 10 seconds  Do 3 sets of 15 repetitions a day, or as directed  This will help strengthen your pelvic muscles and improve bladder control  · A catheter  may be used to help empty your bladder  A catheter is a tiny, plastic tube that is put into your bladder to drain your urine  Your healthcare provider may tell you to use a catheter to prevent your bladder from getting too full and leaking urine  · Keep a UI record  Write down how often you leak urine and how much you leak  Make a note of what you were doing when you leaked urine  · Train your bladder  Go to the bathroom at set times, such as every 2 hours, even if you do not feel the urge to go  You can also try to hold your urine when you feel the urge to go  For example, hold your urine for 5 minutes when you feel the urge to go  As that becomes easier, hold your urine for 10 minutes  · Drink liquids as directed  Ask your healthcare provider how much liquid to drink each day and which liquids are best for you   You may need to limit the amount of liquid you drink to help control your urine leakage  Limit or do not have drinks that contain caffeine or alcohol  Do not drink any liquid right before you go to bed  · Prevent constipation  Eat a variety of high-fiber foods  Good examples are high-fiber cereals, beans, vegetables, and whole-grain breads  Prune juice may help make your bowel movement softer  Walking is the best way to trigger your intestines to have a bowel movement  · Exercise regularly and maintain a healthy weight  Ask your healthcare provider how much you should weigh and about the best exercise plan for you  Weight loss and exercise will decrease pressure on your bladder and help you control your leakage  Ask him to help you create a weight loss plan if you are overweight  When should I seek immediate care? · You have severe pain  · You are confused or cannot think clearly  When should I contact my healthcare provider? · You have a fever  · You see blood in your urine  · You have pain when you urinate  · You have new or worse pain, even after treatment  · Your mouth feels dry or you have vision changes  · Your urine is cloudy or smells bad  · You have questions or concerns about your condition or care  CARE AGREEMENT:   You have the right to help plan your care  Learn about your health condition and how it may be treated  Discuss treatment options with your caregivers to decide what care you want to receive  You always have the right to refuse treatment  The above information is an  only  It is not intended as medical advice for individual conditions or treatments  Talk to your doctor, nurse or pharmacist before following any medical regimen to see if it is safe and effective for you  © 2017 [address] [employer] Information is for End User's use only and may not be sold, redistributed or otherwise used for commercial purposes   All illustrations and images included in CareNotes® are the copyrighted property of [employer]  or Sukumar Snider  Cigarette Smoking and Your Health   AMBULATORY CARE:   Risks to your health if you smoke:  Nicotine and other chemicals found in tobacco damage every cell in your body  Even if you are a light smoker, you have an increased risk for cancer, heart disease, and lung disease  If you are pregnant or have diabetes, smoking increases your risk for complications  Benefits to your health if you stop smoking:   · You decrease respiratory symptoms such as coughing, wheezing, and shortness of breath  · You reduce your risk for cancers of the lung, mouth, throat, kidney, bladder, pancreas, stomach, and cervix  If you already have cancer, you increase the benefits of chemotherapy  You also reduce your risk for cancer returning or a second cancer from developing  · You reduce your risk for heart disease, blood clots, heart attack, and stroke  · You reduce your risk for lung infections, and diseases such as pneumonia, asthma, chronic bronchitis, and emphysema  · Your circulation improves  More oxygen can be delivered to your body  If you have diabetes, you lower your risk for complications, such as kidney, artery, and eye diseases  You also lower your risk for nerve damage  Nerve damage can lead to amputations, poor vision, and blindness  · You improve your body's ability to heal and to fight infections  Benefits to the health of others if you stop smoking:  Tobacco is harmful to nonsmokers who breathe in your secondhand smoke  The following are ways the health of others around you may improve when you stop smoking:  · You lower the risks for lung cancer and heart disease in nonsmoking adults  · If you are pregnant, you lower the risk for miscarriage, early delivery, low birth weight, and stillbirth  You also lower your baby's risk for SIDS, obesity, developmental delay, and neurobehavioral problems, such as ADHD  · If you have children, you lower their risk for ear infections, colds, pneumonia, bronchitis, and asthma  For more information and support to stop smoking:   · Smokefree  gov  Phone: 4- 167 - 839-4984  Web Address: www smokefrHypios  Follow up with your healthcare provider as directed:  Write down your questions so you remember to ask them during your visits  © 2017 [address] [employer] Information is for End User's use only and may not be sold, redistributed or otherwise used for commercial purposes  All illustrations and images included in CareNotes® are the copyrighted property of [employer]  or Sukumar Snider  The above information is an  only  It is not intended as medical advice for individual conditions or treatments  Talk to your doctor, nurse or pharmacist before following any medical regimen to see if it is safe and effective for you  Fall Prevention   WHAT YOU NEED TO KNOW:   What is fall prevention? Fall prevention includes ways to make your home and other areas safer  It also includes ways you can move more carefully to prevent a fall  What increases my risk for falls? · Lack of vitamin D    · Not getting enough sleep each night    · Trouble walking or keeping your balance, or foot problems    · Health conditions that cause changes in your blood pressure, vision, or muscle strength and coordination    · Medicines that make you dizzy, weak, or sleepy    · Problems seeing clearly    · Shoes that have high heels or are not supportive    · Tripping hazards, such as items left on the floor, no handrails on the stairs, or broken steps  How can I help protect myself from falls? · Stand or sit up slowly  This may help you keep your balance and prevent falls  If you need to get up during the night, sit up first  Be sure you are fully awake before you stand  Turn on the light before you start walking  Go slowly in case you are still sleepy   Make sure you will not trip over any pets sleeping in the bedroom  · Use assistive devices as directed  Your healthcare provider may suggest that you use a cane or walker to help you keep your balance  You may need to have grab bars put in your bathroom near the toilet or in the shower  · Wear shoes that fit well and have soles that   Wear shoes both inside and outside  Use slippers with good   Do not wear shoes with high heels  · Wear a personal alarm  This is a device that allows you to call 911 if you fall and need help  Ask your healthcare provider for more information  · Stay active  Exercise can help strengthen your muscles and improve your balance  Your healthcare provider may recommend water aerobics or walking  He or she may also recommend physical therapy to improve your coordination  Never start an exercise program without talking to your healthcare provider first      · Manage medical conditions  Keep all appointments with your healthcare providers  Visit your eye doctor as directed  How can I make my home safer? · Add items to prevent falls in the bathroom  Put nonslip strips on your bath or shower floor to prevent you from slipping  Use a bath mat if you do not have carpet in the bathroom  This will prevent you from falling when you step out of the bath or shower  Use a shower seat so you do not need to stand while you shower  Sit on the toilet or a chair in your bathroom to dry yourself and put on clothing  This will prevent you from losing your balance from drying or dressing yourself while you are standing  · Keep paths clear  Remove books, shoes, and other objects from walkways and stairs  Place cords for telephones and lamps out of the way so that you do not need to walk over them  Tape them down if you cannot move them  Remove small rugs  If you cannot remove a rug, secure it with double-sided tape  This will prevent you from tripping  · Install bright lights in your home  Use night lights to help light paths to the bathroom or kitchen  Always turn on the light before you start walking  · Keep items you use often on shelves within reach  Do not use a step stool to help you reach an item  · Paint or place reflective tape on the edges of your stairs  This will help you see the stairs better  Call 911 or have someone else call if:   · You have fallen and are unconscious  · You have fallen and cannot move part of your body  Contact your healthcare provider if:   · You have fallen and have pain or a headache  · You have questions or concerns about your condition or care  CARE AGREEMENT:   You have the right to help plan your care  Learn about your health condition and how it may be treated  Discuss treatment options with your caregivers to decide what care you want to receive  You always have the right to refuse treatment  The above information is an  only  It is not intended as medical advice for individual conditions or treatments  Talk to your doctor, nurse or pharmacist before following any medical regimen to see if it is safe and effective for you  © 2017 [address] [facility] Information is for End User's use only and may not be sold, redistributed or otherwise used for commercial purposes  All illustrations and images included in CareNotes® are the copyrighted property of [employer]  or Sukumar Snider  Advance Directives   WHAT YOU NEED TO KNOW:   What are advance directives? Advance directives are legal documents that state your wishes and plans for medical care  These plans are made ahead of time in case you lose your ability to make decisions for yourself  Advance directives can apply to any medical decision, such as the treatments you want, and if you want to donate organs  What are the types of advance directives? There are many types of advance directives, and each state has rules about how to use them   You may choose a combination of any of the following:  · Living will: This is a written record of the treatment you want  You can also choose which treatments you do not want, which to limit, and which to stop at a certain time  This includes surgery, medicine, IV fluid, and tube feedings  · Durable power of  for healthcare [facility]): This is a written record that states who you want to make healthcare choices for you when you are unable to make them for yourself  This person, called a proxy, is usually a family member or a friend  You may choose more than 1 proxy  · Do not resuscitate (DNR) order:  A DNR order is used in case your heart stops beating or you stop breathing  It is a request not to have certain forms of treatment, such as CPR  A DNR order may be included in other types of advance directives  · Medical directive: This covers the care that you want if you are in a coma, near death, or unable to make decisions for yourself  You can list the treatments you want for each condition  Treatment may include pain medicine, surgery, blood transfusions, dialysis, IV or tube feedings, and a ventilator (breathing machine)  · Values history: This document has questions about your views, beliefs, and how you feel and think about life  This information can help others choose the care that you would choose  Why are advance directives important? An advance directive helps you control your care  Although spoken wishes may be used, it is better to have your wishes written down  Spoken wishes can be misunderstood, or not followed  Treatments may be given even if you do not want them  An advance directive may make it easier for your family to make difficult choices about your care  How do I decide what to put in my advance directives? · Make informed decisions:  Make sure you fully understand treatments or care you may receive   Think about the benefits and problems your decisions could cause for you or your family  Talk to healthcare providers if you have concerns or questions before you write down your wishes  You may also want to talk with your Mandaen or , or a   Check your state laws to make sure that what you put in your advance directive is legal      · Sign all forms:  Sign and date your advance directive when you have finished  You may also need 2 witnesses to sign the forms  Witnesses cannot be your doctor or his staff, your spouse, heirs or beneficiaries, people you owe money to, or your chosen proxy  Talk to your family, proxy, and healthcare providers about your advance directive  Give each person a copy, and keep one for yourself in a place you can get to easily  Do not keep it hidden or locked away  · Review and revise your plans: You can revise your advance directive at any time, as long as you are able to make decisions  Review your plan every year, and when there are changes in your life, or your health  When you make changes, let your family, proxy, and healthcare providers know  Give each a new copy  Where can I find more information? · [employer]  [address] , [address]  Phone: 1- 127 - 254-9861  Phone: 2- 779 - 085-7105  Web Address: http://www  aafp org  · [address] [facility])  [address] [facility], [address]  Phone: 0- [phone] - [phone]  Phone: [phone]  Web Address: Maninder ruffin  CARE AGREEMENT:   You have the right to help plan your care  To help with this plan, you must learn about your health condition and treatment options  You must also learn about advance directives and how they are used  Work with your healthcare providers to decide what care will be used to treat you  You always have the right to refuse treatment  The above information is an  only   It is not intended as medical advice for individual conditions or treatments  Talk to your doctor, nurse or pharmacist before following any medical regimen to see if it is safe and effective for you  © 2017 [address] [employer] Information is for End User's use only and may not be sold, redistributed or otherwise used for commercial purposes  All illustrations and images included in CareNotes® are the copyrighted property of [employer]  or Sukumar Snider

## 2019-08-30 NOTE — PROGRESS NOTES
[facility]      NAME: Jason Guzman  AGE: [de-identified] y o  SEX: male  : 1940   MRN: [id number]    DATE: 2019  TIME: 9:45 AM    Assessment and Plan     Problem List Items Addressed This Visit     Arthritis     Arthritis stable  Can continue with Tylenol or over-the-counter NSAIDs as necessary  Mixed hyperlipidemia     On no medications presently  Continue current treatment including diet and exercise when possible  Due for next lipid panel in February         Relevant Orders    Lipid Panel with Direct LDL reflex      Other Visit Diagnoses     Fatigue, unspecified type    -  Primary    Relevant Orders    CBC and differential    Comprehensive metabolic panel    TSH, 3rd generation with Free T4 reflex    Need for vaccination        Relevant Orders    PNEUMOCOCCAL POLYSACCHARIDE VACCINE 23-VALENT =>3YO SQ IM    Medicare annual wellness visit, subsequent        Questionnaire reviewed  Immunization and health screening guidelines discussed  Due for pneumococcal vaccine  Advance directive reviewed with patient  Return to office in:  6 months    Chief Complaint     Chief Complaint   Patient presents with    Medicare Wellness Visit       History of Present Illness     Patient was seen for routine follow-up of chronic medical problems and  He has no new concerns at this time  Overall feels generally well  The only issue he mentions is some mild changes in bowel habit  This is not a new concern to him  The following portions of the patient's history were reviewed and updated as appropriate: allergies, current medications, past family history, past medical history, past social history, past surgical history and problem list     Review of Systems   Review of Systems   Constitutional: Negative  Respiratory: Negative  Cardiovascular: Negative  Gastrointestinal: Negative  Genitourinary: Negative  Musculoskeletal: Negative      Psychiatric/Behavioral: Negative  Active Problem List     Patient Active Problem List   Diagnosis    Acute right-sided low back pain without sciatica    Renal lithiasis    Persistent cough for 3 weeks or longer    Mild cognitive impairment    Mixed hyperlipidemia    Oropharyngeal dysphagia    Abnormal diffusion capacity determined by pulmonary function test    Sjogren's syndrome (HCC)    Irritable bowel syndrome    Gastroesophageal reflux disease without esophagitis    Enlarged prostate without lower urinary tract symptoms (luts)    Dysplastic nevus of face    Arthritis    Anxiety    Antithrombinemia (HCC)    Allergic rhinitis    Neck pain    Glaucoma    Eczema    Neck mass       Objective   /70 (BP Location: Left arm, Patient Position: Sitting, Cuff Size: Adult)   Pulse 65   Temp 98 °F (36 7 °C) (Tympanic)   Resp 16   Ht 5' 8 9" (1 75 m)   Wt 80 3 kg (177 lb)   SpO2 97%   BMI 26 22 kg/m²     Physical Exam   Constitutional: He is oriented to person, place, and time  He appears well-developed and well-nourished  No distress  HENT:   Head: Normocephalic and atraumatic  Eyes: Pupils are equal, round, and reactive to light  Conjunctivae are normal  Right eye exhibits no discharge  Neck: Normal range of motion  No thyromegaly present  Cardiovascular: Normal rate and regular rhythm  Pulmonary/Chest: Effort normal and breath sounds normal  No respiratory distress  Lymphadenopathy:     He has no cervical adenopathy  Neurological: He is alert and oriented to person, place, and time  Skin: Skin is warm and dry  He is not diaphoretic  Psychiatric: He has a normal mood and affect  His behavior is normal  Judgment and thought content normal    Nursing note and vitals reviewed          Current Medications     Current Outpatient Medications:     dorzolamide-timolol (COSOPT) 22 3-6 8 MG/ML ophthalmic solution, INSTILL 1 DROP INTO BOTH EYES TWICE A DAY, Disp: , Rfl: 11    hydrocortisone 2 5 % cream, , Disp: , Rfl:     levocetirizine (XYZAL) 5 MG tablet, Take 1 tablet by mouth Daily, Disp: , Rfl:     meclizine (ANTIVERT) 25 mg tablet, Take 0 5 tablets by mouth, Disp: , Rfl:     Red Yeast Rice 600 MG CAPS, Take 1 tablet by mouth 2 (two) times a day, Disp: , Rfl:     Silodosin (RAPAFLO) 8 MG CAPS, Take 1 capsule by mouth daily, Disp: 90 capsule, Rfl: 1    TRAVATAN Z 0 004 % ophthalmic solution, INSTILL 1 DROP INTO BOTH EYES IN THE EVENING, Disp: , Rfl: 2    erythromycin (ILOTYCIN) ophthalmic ointment, APPLY (1CM) BY OPHTHALMIC ROUTE RIBBON INTO THE LOWER CONJUNCTIVAL SAC IN THE RIGHT EYE AS NEEDED, Disp: , Rfl: 5    fluticasone (FLONASE) 50 mcg/act nasal spray, SPRAY 1 SPRAY INTO EACH NOSTRIL EVERY DAY (Patient not taking: Reported on 8/30/2019), Disp: 48 mL, Rfl: 1    Fluticasone Furoate (ARNUITY ELLIPTA) 200 MCG/ACT AEPB, Inhale 1 Act daily (Patient not taking: Reported on 8/30/2019), Disp: 1 each, Rfl: 6    guaiFENesin (MUCINEX) 600 mg 12 hr tablet, Take 1,200 mg by mouth every 12 (twelve) hours, Disp: , Rfl:     Multiple Vitamins-Minerals (CENTRUM SILVER ULTRA MENS) TABS, Take 1 tablet by mouth daily, Disp: , Rfl:     triamcinolone (KENALOG) 0 1 % oral topical paste, Apply 1 application topically 2 (two) times a day (Patient not taking: Reported on 8/30/2019), Disp: 5 g, Rfl: 0    Health Maintenance     Health Maintenance   Topic Date Due    BMI: Followup Plan  02/16/1957    Pneumococcal Vaccine: 65+ Years (2 of 2 - PPSV23) 06/08/2016    INFLUENZA VACCINE  09/16/2019 (Originally 7/1/2019)    DTaP,Tdap,and Td Vaccines (1 - Tdap) 04/09/2020 (Originally 10/23/2014)    Fall Risk  08/30/2020    Depression Screening PHQ  08/30/2020    BMI: Adult  08/30/2020    Pneumococcal Vaccine: Pediatrics (0 to 5 Years) and At-Risk Patients (6 to 59 Years)  Aged Out    HEPATITIS B VACCINES  Aged [employer] History   Administered Date(s) Administered    INFLUENZA 09/18/2015, 10/18/2016, 09/01/2017, 08/27/2018    Influenza Quadrivalent, 6-35 Months IM 09/01/2017    Influenza Split High Dose Preservative Free IM 09/25/2014, 09/18/2015, 10/18/2016    Influenza TIV (IM) 10/17/2012, 10/09/2013    Influenza, high dose seasonal 0 5 mL 08/27/2018    Pneumococcal Conjugate 13-Valent 06/08/2015    Td (adult), adsorbed 10/22/2014    Zoster Vaccine Recombinant 03/30/2018, 05/29/2018       Clarisa Camarillo, DO  [facility]

## 2019-08-30 NOTE — ASSESSMENT & PLAN NOTE
On no medications presently  Continue current treatment including diet and exercise when possible    Due for next lipid panel in February

## 2019-09-24 ENCOUNTER — OFFICE VISIT (OUTPATIENT)
Dept: FAMILY MEDICINE CLINIC | Facility: CLINIC | Age: 79
End: 2019-09-24
Payer: COMMERCIAL

## 2019-09-24 VITALS
WEIGHT: 176.4 LBS | DIASTOLIC BLOOD PRESSURE: 76 MMHG | BODY MASS INDEX: 26.13 KG/M2 | HEART RATE: 56 BPM | RESPIRATION RATE: 16 BRPM | OXYGEN SATURATION: 96 % | TEMPERATURE: 97.2 F | HEIGHT: 69 IN | SYSTOLIC BLOOD PRESSURE: 124 MMHG

## 2019-09-24 DIAGNOSIS — R10.84 GENERALIZED ABDOMINAL PAIN: Primary | ICD-10-CM

## 2019-09-24 DIAGNOSIS — Z23 NEED FOR IMMUNIZATION AGAINST INFLUENZA: ICD-10-CM

## 2019-09-24 PROCEDURE — 99213 OFFICE O/P EST LOW 20 MIN: CPT | Performed by: FAMILY MEDICINE

## 2019-09-24 NOTE — PROGRESS NOTES
Assessment/Plan:   patient is an 48-year-old male seen for abdominal pain  The pain started about 3-4 days ago  He believes it was related to seeds in some pickles he ate  States he does have a history of diverticulitis  I did see a CT scan from 2016 that noted diverticulosis  Patient ate a light diet on Sunday and his pain improved  He did get some pain again Sunday night but took a Tums and this relieved it  He ate bland again yesterday and today complains of no pain  His abdominal exam is negative  Since he is asymptomatic at this time and his exam is negative we will hold off on doing any unnecessary testing  Patient was instructed to call if he does start with pain again  We will then order a CT of the abdomen and pelvis to look for diverticulitis  I did also note that he has gallstones on the CT scan, but his symptoms did not seem to be related to this  He was given a flu shot at the visit  Diagnoses and all orders for this visit:    Generalized abdominal pain    Need for immunization against influenza  -     influenza vaccine, 4601-4722, high-dose, PF 0 5 mL (FLUZONE HIGH-DOSE)          Subjective:   Chief Complaint   Patient presents with    Abdominal Pain     Pt c/o lower abd pain x3 days  Pt states he has a Hx of IBS  Patient ID: Evangelina Cartagena  is a [de-identified] y o  male  Patient started with burning pain in gut after eating pickles - concerned about the seeds  Bothered him on Sunday  Yesterday seemed to be easing up - ate cream of wheat, oat meal and rice krispies  Was also chewing TUMS  Then started to hurt again last night and he took [place]  No pain this AM       The following portions of the patient's history were reviewed and updated as appropriate: allergies, current medications, past family history, past medical history, past social history, past surgical history and problem list     Review of Systems   Constitutional: Negative for chills and fever  HENT: Negative  Respiratory: Positive for shortness of breath (gets short of breath going stairs)  Gastrointestinal: Positive for abdominal pain  Negative for constipation, diarrhea, nausea and vomiting  Genitourinary: Negative  Objective:      /76 (BP Location: Right arm, Patient Position: Sitting, Cuff Size: Adult)   Pulse 56   Temp (!) 97 2 °F (36 2 °C) (Tympanic)   Resp 16   Ht 5' 9 49" (1 765 m)   Wt 80 kg (176 lb 6 4 oz)   SpO2 96%   BMI 25 69 kg/m²          Physical Exam   Constitutional: He is oriented to person, place, and time  No distress  Eyes: No scleral icterus  Pulmonary/Chest: Effort normal and breath sounds normal    Abdominal: Bowel sounds are normal  There is no tenderness  Neurological: He is alert and oriented to person, place, and time  Skin: Skin is warm and dry  Psychiatric: He has a normal mood and affect  Nursing note and vitals reviewed

## 2019-10-17 DIAGNOSIS — F41.9 ANXIETY: ICD-10-CM

## 2019-10-20 RX ORDER — CHLORDIAZEPOXIDE HYDROCHLORIDE 10 MG/1
CAPSULE, GELATIN COATED ORAL
Qty: 90 CAPSULE | Refills: 1 | Status: SHIPPED | OUTPATIENT
Start: 2019-10-20 | End: 2020-04-21 | Stop reason: SDUPTHER

## 2019-11-01 ENCOUNTER — OFFICE VISIT (OUTPATIENT)
Dept: FAMILY MEDICINE CLINIC | Facility: CLINIC | Age: 79
End: 2019-11-01
Payer: COMMERCIAL

## 2019-11-01 ENCOUNTER — APPOINTMENT (OUTPATIENT)
Dept: RADIOLOGY | Facility: CLINIC | Age: 79
End: 2019-11-01
Payer: COMMERCIAL

## 2019-11-01 VITALS
OXYGEN SATURATION: 98 % | DIASTOLIC BLOOD PRESSURE: 70 MMHG | HEIGHT: 69 IN | WEIGHT: 179 LBS | RESPIRATION RATE: 16 BRPM | SYSTOLIC BLOOD PRESSURE: 120 MMHG | TEMPERATURE: 98 F | HEART RATE: 74 BPM | BODY MASS INDEX: 26.51 KG/M2

## 2019-11-01 DIAGNOSIS — M54.2 NECK PAIN ON RIGHT SIDE: ICD-10-CM

## 2019-11-01 DIAGNOSIS — M54.2 NECK PAIN ON RIGHT SIDE: Primary | ICD-10-CM

## 2019-11-01 PROCEDURE — 72050 X-RAY EXAM NECK SPINE 4/5VWS: CPT

## 2019-11-01 PROCEDURE — 99213 OFFICE O/P EST LOW 20 MIN: CPT | Performed by: FAMILY MEDICINE

## 2019-11-01 NOTE — PROGRESS NOTES
[facility]      NAME: Corinne Hoyt  AGE: [de-identified] y o  SEX: male  : 1940   MRN: [id number]    DATE: 2019  TIME: 8:33 AM    Assessment and Plan     Problem List Items Addressed This Visit     None      Visit Diagnoses     Neck pain on right side    -  Primary    Relevant Orders    XR spine cervical complete 4 or 5 vw non injury              Return to office in:   P r n  Chief Complaint     Chief Complaint   Patient presents with    Neck Pain     x 4 months check up       History of Present Illness      Patient presents with a chief complaint of right-sided neck pain  This is been intermittent and has gone on for at least several months  Does not seem to be related to activity  He noticed it yesterday while sitting in his recliner  It lasted for several hours  He took Tylenol which seemed to help  Today the symptoms are not present  He had a soft tissue ultrasound of his neck done several months ago which was normal except for some incidental small lymph nodes  The following portions of the patient's history were reviewed and updated as appropriate: allergies, current medications, past family history, past medical history, past social history, past surgical history and problem list     Review of Systems   Review of Systems   Musculoskeletal: Positive for neck pain         Active Problem List     Patient Active Problem List   Diagnosis    Acute right-sided low back pain without sciatica    Renal lithiasis    Persistent cough for 3 weeks or longer    Mild cognitive impairment    Mixed hyperlipidemia    Oropharyngeal dysphagia    Abnormal diffusion capacity determined by pulmonary function test    Sjogren's syndrome (HCC)    Irritable bowel syndrome    Gastroesophageal reflux disease without esophagitis    Enlarged prostate without lower urinary tract symptoms (luts)    Dysplastic nevus of face    Arthritis    Anxiety    Antithrombinemia ([address] )    Allergic rhinitis    Neck pain    Glaucoma    Eczema    Neck mass       Objective   /70 (BP Location: Left arm, Patient Position: Sitting, Cuff Size: Adult)   Pulse 74   Temp 98 °F (36 7 °C) (Tympanic)   Resp 16   Ht 5' 9 29" (1 76 m)   Wt 81 2 kg (179 lb)   SpO2 98%   BMI 26 21 kg/m²     Physical Exam   Constitutional: He is oriented to person, place, and time  He appears well-developed and well-nourished  No distress  HENT:   Head: Normocephalic and atraumatic  Eyes: Pupils are equal, round, and reactive to light  Conjunctivae are normal  Right eye exhibits no discharge  Neck: Normal range of motion  No thyromegaly present  Cardiovascular: Normal rate and regular rhythm  Pulmonary/Chest: Effort normal and breath sounds normal  No respiratory distress  Musculoskeletal:   Cervical spine normal range of motion  Neck ultrasound normal   Check C-spine x-rays  Tylenol as needed for pain  Lymphadenopathy:     He has no cervical adenopathy  Neurological: He is alert and oriented to person, place, and time  Skin: Skin is warm and dry  He is not diaphoretic  Psychiatric: He has a normal mood and affect  His behavior is normal  Judgment and thought content normal    Nursing note and vitals reviewed          Current Medications     Current Outpatient Medications:     chlordiazePOXIDE (LIBRIUM) 10 mg capsule, TAKE 1 CAPSULE BY MOUTH EVERY DAY, Disp: 90 capsule, Rfl: 1    dorzolamide-timolol (COSOPT) 22 3-6 8 MG/ML ophthalmic solution, INSTILL 1 DROP INTO BOTH EYES TWICE A DAY, Disp: , Rfl: 11    hydrocortisone 2 5 % cream, , Disp: , Rfl:     levocetirizine (XYZAL) 5 MG tablet, Take 1 tablet by mouth Daily, Disp: , Rfl:     meclizine (ANTIVERT) 25 mg tablet, Take 0 5 tablets by mouth, Disp: , Rfl:     Multiple Vitamins-Minerals (CENTRUM SILVER ULTRA MENS) TABS, Take 1 tablet by mouth daily, Disp: , Rfl:     Red Yeast Rice 600 MG CAPS, Take 1 tablet by mouth 2 (two) times a day, Disp: , Rfl:   Silodosin (RAPAFLO) 8 MG CAPS, Take 1 capsule by mouth daily, Disp: 90 capsule, Rfl: 1    TRAVATAN Z 0 004 % ophthalmic solution, INSTILL 1 DROP INTO BOTH EYES IN THE EVENING, Disp: , Rfl: 2    erythromycin (ILOTYCIN) ophthalmic ointment, APPLY (1CM) BY OPHTHALMIC ROUTE RIBBON INTO THE LOWER CONJUNCTIVAL SAC IN THE RIGHT EYE AS NEEDED, Disp: , Rfl: 5    guaiFENesin (MUCINEX) 600 mg 12 hr tablet, Take 1,200 mg by mouth every 12 (twelve) hours, Disp: , Rfl:     Health Maintenance     Health Maintenance   Topic Date Due    BMI: Followup Plan  02/16/1957    DTaP,Tdap,and Td Vaccines (1 - Tdap) 04/09/2020 (Originally 10/23/2014)    Fall Risk  08/30/2020    Depression Screening PHQ  08/30/2020    Medicare Annual Wellness Visit (AWV)  08/30/2020    BMI: Adult  11/01/2020    INFLUENZA VACCINE  Completed    Pneumococcal Vaccine: 65+ Years  Completed    Pneumococcal Vaccine: Pediatrics (0 to 5 Years) and At-Risk Patients (6 to 59 Years)  Aged Out    HEPATITIS B VACCINES  Aged [employer] History   Administered Date(s) Administered    INFLUENZA 09/18/2015, 10/18/2016, 09/01/2017, 08/27/2018    Influenza Quadrivalent, 6-35 Months IM 09/01/2017    Influenza Split High Dose Preservative Free IM 09/25/2014, 09/18/2015, 10/18/2016    Influenza TIV (IM) 10/17/2012, 10/09/2013    Influenza, high dose seasonal 0 5 mL 08/27/2018, 09/24/2019    Pneumococcal Conjugate 13-Valent 06/08/2015    Pneumococcal Polysaccharide PPV23 08/30/2019    Td (adult), adsorbed 10/22/2014    Zoster Vaccine Recombinant 03/30/2018, 05/29/2018       Emily Lowery DO  [facility]

## 2019-11-05 ENCOUNTER — TELEPHONE (OUTPATIENT)
Dept: FAMILY MEDICINE CLINIC | Facility: CLINIC | Age: 79
End: 2019-11-05

## 2019-11-05 NOTE — TELEPHONE ENCOUNTER
----- Message from Leonardo Soliman DO sent at 11/4/2019  5:29 PM EST -----  X-ray of neck shows arthritis and narrowing of discs  I do not believe however this is likely the cause of his symptoms

## 2020-01-11 ENCOUNTER — OFFICE VISIT (OUTPATIENT)
Dept: FAMILY MEDICINE CLINIC | Facility: CLINIC | Age: 80
End: 2020-01-11
Payer: COMMERCIAL

## 2020-01-11 VITALS
OXYGEN SATURATION: 98 % | HEART RATE: 80 BPM | TEMPERATURE: 97.8 F | DIASTOLIC BLOOD PRESSURE: 68 MMHG | WEIGHT: 180.4 LBS | BODY MASS INDEX: 26.72 KG/M2 | SYSTOLIC BLOOD PRESSURE: 120 MMHG | HEIGHT: 69 IN

## 2020-01-11 DIAGNOSIS — M79.604 RIGHT LEG PAIN: Primary | ICD-10-CM

## 2020-01-11 PROCEDURE — 99213 OFFICE O/P EST LOW 20 MIN: CPT | Performed by: FAMILY MEDICINE

## 2020-01-11 PROCEDURE — 1160F RVW MEDS BY RX/DR IN RCRD: CPT | Performed by: FAMILY MEDICINE

## 2020-01-11 RX ORDER — ASPIRIN 81 MG/1
81 TABLET, CHEWABLE ORAL AS NEEDED
Status: ON HOLD | COMMUNITY

## 2020-01-11 RX ORDER — LANOLIN ALCOHOL/MO/W.PET/CERES
1 CREAM (GRAM) TOPICAL 3 TIMES DAILY
Status: ON HOLD | COMMUNITY

## 2020-01-11 NOTE — PROGRESS NOTES
[facility]      NAME: Avelino Mercedes  AGE: [de-identified] y o  SEX: male  : 1940   MRN: [id number]    DATE: 2020  TIME: 9:20 AM    Assessment and Plan     Problem List Items Addressed This Visit     None      Visit Diagnoses     Right leg pain    -  Primary    Etiology unclear  Resolved with hot bath yesterday  Patient reassured that his physical exam and symptoms at this time are not consistent with DVT  Return to office in:  P r n  Chief Complaint     Chief Complaint   Patient presents with    Leg Pain     Pt c/o right leg pain symptoms going on for one day  History of Present Illness     Patient presents with a complaint of right leg pain  He woke yesterday morning with right calf pain  He has no trauma or incident to explain the onset of pain  It lasted for several hours  He took a hot bath after which it resolved  He became concerned though after that that perhaps it was a blood clot  He returns to the office today for evaluation  He still has no symptoms in his leg whatsoever at this time  The following portions of the patient's history were reviewed and updated as appropriate: allergies, current medications, past family history, past medical history, past social history, past surgical history and problem list     Review of Systems   Review of Systems   Constitutional: Negative  Respiratory: Negative  Cardiovascular: Negative  Gastrointestinal: Negative  Genitourinary: Negative  Musculoskeletal: Negative  Psychiatric/Behavioral: Negative          Active Problem List     Patient Active Problem List   Diagnosis    Acute right-sided low back pain without sciatica    Renal lithiasis    Persistent cough for 3 weeks or longer    Mild cognitive impairment    Mixed hyperlipidemia    Oropharyngeal dysphagia    Abnormal diffusion capacity determined by pulmonary function test    Sjogren's syndrome (HCC)    Irritable bowel syndrome  Gastroesophageal reflux disease without esophagitis    Enlarged prostate without lower urinary tract symptoms (luts)    Dysplastic nevus of face    Arthritis    Anxiety    Antithrombinemia (HCC)    Allergic rhinitis    Neck pain    Glaucoma    Eczema    Neck mass       Objective   /68 (BP Location: Left arm, Patient Position: Sitting, Cuff Size: Adult)   Pulse 80   Temp 97 8 °F (36 6 °C) (Tympanic)   Ht 5' 9" (1 753 m)   Wt 81 8 kg (180 lb 6 4 oz)   SpO2 98%   BMI 26 64 kg/m²     Physical Exam   Constitutional: He is oriented to person, place, and time  He appears well-developed and well-nourished  No distress  HENT:   Head: Normocephalic and atraumatic  Eyes: Pupils are equal, round, and reactive to light  Conjunctivae are normal  Right eye exhibits no discharge  Neck: Normal range of motion  No thyromegaly present  Cardiovascular: Normal rate and regular rhythm  Pulmonary/Chest: Effort normal and breath sounds normal  No respiratory distress  Lymphadenopathy:     He has no cervical adenopathy  Neurological: He is alert and oriented to person, place, and time  Skin: Skin is warm and dry  He is not diaphoretic  Psychiatric: He has a normal mood and affect  His behavior is normal  Judgment and thought content normal    Nursing note and vitals reviewed          Current Medications     Current Outpatient Medications:     aspirin 81 mg chewable tablet, Chew 81 mg daily, Disp: , Rfl:     chlordiazePOXIDE (LIBRIUM) 10 mg capsule, TAKE 1 CAPSULE BY MOUTH EVERY DAY, Disp: 90 capsule, Rfl: 1    dorzolamide-timolol (COSOPT) 22 3-6 8 MG/ML ophthalmic solution, INSTILL 1 DROP INTO BOTH EYES TWICE A DAY, Disp: , Rfl: 11    erythromycin (ILOTYCIN) ophthalmic ointment, APPLY (1CM) BY OPHTHALMIC ROUTE RIBBON INTO THE LOWER CONJUNCTIVAL SAC IN THE RIGHT EYE AS NEEDED, Disp: , Rfl: 5    glucosamine-chondroitin 500-400 MG tablet, Take 1 tablet by mouth Three times a day, Disp: , Rfl:    guaiFENesin (MUCINEX) 600 mg 12 hr tablet, Take 1,200 mg by mouth every 12 (twelve) hours, Disp: , Rfl:     hydrocortisone 2 5 % cream, , Disp: , Rfl:     levocetirizine (XYZAL) 5 MG tablet, Take 1 tablet by mouth Daily, Disp: , Rfl:     meclizine (ANTIVERT) 25 mg tablet, Take 0 5 tablets by mouth, Disp: , Rfl:     Multiple Vitamins-Minerals (CENTRUM SILVER ULTRA MENS) TABS, Take 1 tablet by mouth daily, Disp: , Rfl:     Red Yeast Rice 600 MG CAPS, Take 1 tablet by mouth 2 (two) times a day, Disp: , Rfl:     Silodosin (RAPAFLO) 8 MG CAPS, Take 1 capsule by mouth daily, Disp: 90 capsule, Rfl: 1    TRAVATAN Z 0 004 % ophthalmic solution, INSTILL 1 DROP INTO BOTH EYES IN THE EVENING, Disp: , Rfl: 2    Health Maintenance     Health Maintenance   Topic Date Due    BMI: Followup Plan  02/16/1957    DTaP,Tdap,and Td Vaccines (1 - Tdap) 04/09/2020 (Originally 2/16/1951)    Fall Risk  08/30/2020    Depression Screening PHQ  08/30/2020    BMI: Adult  01/11/2021    Influenza Vaccine  Completed    Pneumococcal Vaccine: 65+ Years  Completed    Pneumococcal Vaccine: Pediatrics (0 to 5 Years) and At-Risk Patients (6 to 59 Years)  Aged Out    HIB Vaccine  Aged Out    Hepatitis B Vaccine  Aged Out    IPV Vaccine  Aged Out    Hepatitis A Vaccine  Aged Out    Meningococcal ACWY Vaccine  Aged Out    HPV Vaccine  Aged [employer] History   Administered Date(s) Administered    INFLUENZA 09/18/2015, 10/18/2016, 09/01/2017, 08/27/2018    Influenza Quadrivalent, 6-35 Months IM 09/01/2017    Influenza Split High Dose Preservative Free IM 09/25/2014, 09/18/2015, 10/18/2016    Influenza TIV (IM) 10/17/2012, 10/09/2013    Influenza, high dose seasonal 0 5 mL 08/27/2018, 09/24/2019    Pneumococcal Conjugate 13-Valent 06/08/2015    Pneumococcal Polysaccharide PPV23 08/30/2019    Td (adult), adsorbed 10/22/2014    Zoster Vaccine Recombinant 03/30/2018, 05/29/2018       Kota Delgado DO  [facility] [address] [place] [address]

## 2020-02-10 ENCOUNTER — OFFICE VISIT (OUTPATIENT)
Dept: FAMILY MEDICINE CLINIC | Facility: CLINIC | Age: 80
End: 2020-02-10
Payer: COMMERCIAL

## 2020-02-10 VITALS
HEART RATE: 105 BPM | WEIGHT: 176 LBS | TEMPERATURE: 97.6 F | HEIGHT: 69 IN | BODY MASS INDEX: 26.07 KG/M2 | SYSTOLIC BLOOD PRESSURE: 110 MMHG | RESPIRATION RATE: 17 BRPM | DIASTOLIC BLOOD PRESSURE: 70 MMHG | OXYGEN SATURATION: 96 %

## 2020-02-10 DIAGNOSIS — J40 BRONCHITIS: Primary | ICD-10-CM

## 2020-02-10 PROCEDURE — 4040F PNEUMOC VAC/ADMIN/RCVD: CPT | Performed by: FAMILY MEDICINE

## 2020-02-10 PROCEDURE — 3008F BODY MASS INDEX DOCD: CPT | Performed by: FAMILY MEDICINE

## 2020-02-10 PROCEDURE — 99213 OFFICE O/P EST LOW 20 MIN: CPT | Performed by: FAMILY MEDICINE

## 2020-02-10 PROCEDURE — 1160F RVW MEDS BY RX/DR IN RCRD: CPT | Performed by: FAMILY MEDICINE

## 2020-02-10 PROCEDURE — 1036F TOBACCO NON-USER: CPT | Performed by: FAMILY MEDICINE

## 2020-02-10 RX ORDER — PREDNISONE 10 MG/1
10 TABLET ORAL DAILY
Qty: 21 TABLET | Refills: 0 | Status: SHIPPED | OUTPATIENT
Start: 2020-02-10 | End: 2020-02-25 | Stop reason: SDUPTHER

## 2020-02-10 RX ORDER — DOXYCYCLINE 100 MG/1
100 TABLET ORAL 2 TIMES DAILY
Qty: 20 TABLET | Refills: 0 | Status: SHIPPED | OUTPATIENT
Start: 2020-02-10 | End: 2020-02-20

## 2020-02-10 NOTE — PROGRESS NOTES
[facility]      NAME: Lisa Avendaño  AGE: [de-identified] y o  SEX: male  : 1940   MRN: [id number]    DATE: 2/10/2020  TIME: 9:58 AM    Assessment and Plan     Problem List Items Addressed This Visit     None      Visit Diagnoses     Bronchitis    -  Primary    Relevant Medications    doxycycline (ADOXA) 100 MG tablet    predniSONE 10 mg tablet              Return to office in: PRN    Chief Complaint     Chief Complaint   Patient presents with    Sore Throat     x3d (Pt taking Mucinex & using tea/honey)    Cough     x3d     Nasal Congestion     x3d    Diarrhea     x3d    Generalized Body Aches     x3d    Fever     x3d       History of Present Illness     4 days   ST, productive cough, diarrhea, weakness  ,fever      The following portions of the patient's history were reviewed and updated as appropriate: allergies, current medications, past family history, past medical history, past social history, past surgical history and problem list     Review of Systems   Review of Systems   Constitutional: Negative  HENT: Positive for congestion, postnasal drip, rhinorrhea, sinus pressure, sinus pain and sore throat  Respiratory: Positive for cough and wheezing  Cardiovascular: Negative  Gastrointestinal: Negative  Genitourinary: Negative  Musculoskeletal: Negative  Psychiatric/Behavioral: Negative          Active Problem List     Patient Active Problem List   Diagnosis    Acute right-sided low back pain without sciatica    Renal lithiasis    Persistent cough for 3 weeks or longer    Mild cognitive impairment    Mixed hyperlipidemia    Oropharyngeal dysphagia    Abnormal diffusion capacity determined by pulmonary function test    Sjogren's syndrome (HCC)    Irritable bowel syndrome    Gastroesophageal reflux disease without esophagitis    Enlarged prostate without lower urinary tract symptoms (luts)    Dysplastic nevus of face    Arthritis    Anxiety    Antithrombinemia ([facility] [address] )    Allergic rhinitis    Neck pain    Glaucoma    Eczema    Neck mass       Objective   /70 (BP Location: Left arm, Patient Position: Sitting, Cuff Size: Standard)   Pulse 105   Temp 97 6 °F (36 4 °C) (Tympanic)   Resp 17   Ht 5' 9" (1 753 m)   Wt 79 8 kg (176 lb)   SpO2 96%   BMI 25 99 kg/m²     Physical Exam   Constitutional: He is oriented to person, place, and time  He appears well-developed and well-nourished  He appears ill  No distress  HENT:   Head: Normocephalic and atraumatic  Injected pharynx, PND   Eyes: Pupils are equal, round, and reactive to light  Conjunctivae are normal  Right eye exhibits no discharge  Neck: Normal range of motion  No thyromegaly present  Cardiovascular: Normal rate and regular rhythm  Pulmonary/Chest: Effort normal  No respiratory distress  Rhonchi, wheezes   Lymphadenopathy:     He has no cervical adenopathy  Neurological: He is alert and oriented to person, place, and time  Skin: Skin is warm and dry  He is not diaphoretic  Psychiatric: He has a normal mood and affect  His behavior is normal  Judgment and thought content normal    Nursing note and vitals reviewed          Current Medications     Current Outpatient Medications:     aspirin 81 mg chewable tablet, Chew 81 mg daily, Disp: , Rfl:     chlordiazePOXIDE (LIBRIUM) 10 mg capsule, TAKE 1 CAPSULE BY MOUTH EVERY DAY, Disp: 90 capsule, Rfl: 1    dorzolamide-timolol (COSOPT) 22 3-6 8 MG/ML ophthalmic solution, INSTILL 1 DROP INTO BOTH EYES TWICE A DAY, Disp: , Rfl: 11    erythromycin (ILOTYCIN) ophthalmic ointment, APPLY (1CM) BY OPHTHALMIC ROUTE RIBBON INTO THE LOWER CONJUNCTIVAL SAC IN THE RIGHT EYE AS NEEDED, Disp: , Rfl: 5    glucosamine-chondroitin 500-400 MG tablet, Take 1 tablet by mouth Three times a day, Disp: , Rfl:     guaiFENesin (MUCINEX) 600 mg 12 hr tablet, Take 1,200 mg by mouth every 12 (twelve) hours, Disp: , Rfl:     hydrocortisone 2 5 % cream, , Disp: , Rfl:     levocetirizine (XYZAL) 5 MG tablet, Take 1 tablet by mouth Daily, Disp: , Rfl:     meclizine (ANTIVERT) 25 mg tablet, Take 0 5 tablets by mouth, Disp: , Rfl:     Multiple Vitamins-Minerals (CENTRUM SILVER ULTRA MENS) TABS, Take 1 tablet by mouth daily, Disp: , Rfl:     Red Yeast Rice 600 MG CAPS, Take 1 tablet by mouth 2 (two) times a day, Disp: , Rfl:     Silodosin (RAPAFLO) 8 MG CAPS, Take 1 capsule by mouth daily, Disp: 90 capsule, Rfl: 1    TRAVATAN Z 0 004 % ophthalmic solution, INSTILL 1 DROP INTO BOTH EYES IN THE EVENING, Disp: , Rfl: 2    doxycycline (ADOXA) 100 MG tablet, Take 1 tablet (100 mg total) by mouth 2 (two) times a day for 10 days, Disp: 20 tablet, Rfl: 0    predniSONE 10 mg tablet, Take 1 tablet (10 mg total) by mouth daily Six p o  for 1 day then 5 p o  for 1 day then 4 p o  for 1 day then 3 po for 1 day and 2 p o  for 1 day then 1 p o , Disp: 21 tablet, Rfl: 0    Health Maintenance     Health Maintenance   Topic Date Due    BMI: Followup Plan  02/16/1957    Annual Physical  08/27/2019    DTaP,Tdap,and Td Vaccines (1 - Tdap) 04/09/2020 (Originally 2/16/1951)    Fall Risk  08/30/2020    Depression Screening PHQ  08/30/2020    BMI: Adult  01/11/2021    Influenza Vaccine  Completed    Pneumococcal Vaccine: 65+ Years  Completed    Pneumococcal Vaccine: Pediatrics (0 to 5 Years) and At-Risk Patients (6 to 59 Years)  Aged Out    HIB Vaccine  Aged Out    Hepatitis B Vaccine  Aged Out    IPV Vaccine  Aged Out    Hepatitis A Vaccine  Aged Out    Meningococcal ACWY Vaccine  Aged Out    HPV Vaccine  Aged [employer] History   Administered Date(s) Administered    INFLUENZA 09/18/2015, 10/18/2016, 09/01/2017, 08/27/2018    Influenza Quadrivalent, 6-35 Months IM 09/01/2017    Influenza Split High Dose Preservative Free IM 09/25/2014, 09/18/2015, 10/18/2016    Influenza TIV (IM) 10/17/2012, 10/09/2013    Influenza, high dose seasonal 0 5 mL 08/27/2018, 09/24/2019    Pneumococcal Conjugate 13-Valent 06/08/2015    Pneumococcal Polysaccharide PPV23 08/30/2019    Td (adult), adsorbed 10/22/2014    Zoster Vaccine Recombinant 03/30/2018, 05/29/2018       Vincenzo Dixon,   [facility]

## 2020-02-11 ENCOUNTER — TELEPHONE (OUTPATIENT)
Dept: FAMILY MEDICINE CLINIC | Facility: CLINIC | Age: 80
End: 2020-02-11

## 2020-02-11 NOTE — TELEPHONE ENCOUNTER
Spoke to patient regarding his gastrointestinal concerns  He is having some loose stools since taking the Pepto-Bismol over the last several days  His having some irritation of a pre-existing hemorrhoid  I recommended that he cut back on the Pepto-Bismol  He can use preparation H, hydrocortisone cream and/or Vaseline  I also recommended warm Sitz baths

## 2020-02-11 NOTE — TELEPHONE ENCOUNTER
Pt was seen on 2/10  Pt stated he was advised to give you a call if he had any concerns  Pt stated his bowel movement is not consistent  He stated the pepto is not helping and when he goes to the bathroom it is really irritating his hemorroid  Pt would like if you could personally give him a call

## 2020-02-25 ENCOUNTER — OFFICE VISIT (OUTPATIENT)
Dept: FAMILY MEDICINE CLINIC | Facility: CLINIC | Age: 80
End: 2020-02-25
Payer: COMMERCIAL

## 2020-02-25 VITALS
BODY MASS INDEX: 25.77 KG/M2 | HEIGHT: 69 IN | HEART RATE: 74 BPM | DIASTOLIC BLOOD PRESSURE: 70 MMHG | WEIGHT: 174 LBS | RESPIRATION RATE: 17 BRPM | TEMPERATURE: 98 F | OXYGEN SATURATION: 100 % | SYSTOLIC BLOOD PRESSURE: 110 MMHG

## 2020-02-25 DIAGNOSIS — J40 BRONCHITIS: ICD-10-CM

## 2020-02-25 PROCEDURE — 4040F PNEUMOC VAC/ADMIN/RCVD: CPT | Performed by: FAMILY MEDICINE

## 2020-02-25 PROCEDURE — 3008F BODY MASS INDEX DOCD: CPT | Performed by: FAMILY MEDICINE

## 2020-02-25 PROCEDURE — 1160F RVW MEDS BY RX/DR IN RCRD: CPT | Performed by: FAMILY MEDICINE

## 2020-02-25 PROCEDURE — 99214 OFFICE O/P EST MOD 30 MIN: CPT | Performed by: FAMILY MEDICINE

## 2020-02-25 PROCEDURE — 1036F TOBACCO NON-USER: CPT | Performed by: FAMILY MEDICINE

## 2020-02-25 RX ORDER — PREDNISONE 10 MG/1
10 TABLET ORAL DAILY
Qty: 21 TABLET | Refills: 0 | Status: SHIPPED | OUTPATIENT
Start: 2020-02-25 | End: 2020-09-01 | Stop reason: ALTCHOICE

## 2020-02-25 NOTE — PROGRESS NOTES
[facility]      NAME: Pat Murphy  AGE: 79 y o  SEX: male  : 1940   MRN: [id number]    DATE: 2020  TIME: 1:24 PM    Assessment and Plan     Problem List Items Addressed This Visit     None      Visit Diagnoses     Bronchitis        Relevant Medications    predniSONE 10 mg tablet        Will re-treat with a 6 day taper of prednisone  Prednisone will likely aid in any symptoms related to bursitis of his right hip  Patient reassured regarding his intermittent mild testicular pain that without any anatomical abnormality it is very unlikely that there is any serious pathology  Encouraged him to continue warm soaks and Tylenol as needed  Return to office in:  P r n  Chief Complaint     Chief Complaint   Patient presents with    Hip Pain       History of Present Illness     Patient presents with several concerns  First he complains of his cold symptoms  They have improved significantly with the antibiotic and steroid taper that he took 10 days ago  His symptoms are not completely resolved  Feels that he would like to taken additional steroid taper to aid in his symptoms  Secondly he complains of right hip pain which comes and goes  It only bothers him at night when he lays on that side  He has no pain generally with weight-bearing or walking  Thirdly he complains of occasional intermittent testicular pain which is occurred several times over a period of years  In the past he was advised to do warm soaks which she states does help significantly in the symptoms go away  He has no palpable mass or abnormality of his now to me that he can determine  He has no voiding symptoms        The following portions of the patient's history were reviewed and updated as appropriate: allergies, current medications, past family history, past medical history, past social history, past surgical history and problem list     Review of Systems   Review of Systems   Constitutional: Negative  HENT: Positive for congestion and sinus pressure  Respiratory: Negative  Cardiovascular: Negative  Gastrointestinal: Negative  Genitourinary: Positive for testicular pain  Musculoskeletal: Positive for arthralgias  Psychiatric/Behavioral: Negative  Active Problem List     Patient Active Problem List   Diagnosis    Acute right-sided low back pain without sciatica    Renal lithiasis    Persistent cough for 3 weeks or longer    Mild cognitive impairment    Mixed hyperlipidemia    Oropharyngeal dysphagia    Abnormal diffusion capacity determined by pulmonary function test    Sjogren's syndrome (HCC)    Irritable bowel syndrome    Gastroesophageal reflux disease without esophagitis    Enlarged prostate without lower urinary tract symptoms (luts)    Dysplastic nevus of face    Arthritis    Anxiety    Antithrombinemia (HCC)    Allergic rhinitis    Neck pain    Glaucoma    Eczema    Neck mass       Objective   /70 (BP Location: Left arm, Patient Position: Sitting, Cuff Size: Adult)   Pulse 74   Temp 98 °F (36 7 °C) (Tympanic)   Resp 17   Ht 5' 8 9" (1 75 m)   Wt 78 9 kg (174 lb)   SpO2 100%   BMI 25 77 kg/m²     Physical Exam   Constitutional: He is oriented to person, place, and time  He appears well-developed and well-nourished  No distress  HENT:   Head: Normocephalic and atraumatic  Scant clear postnasal drainage   Eyes: Pupils are equal, round, and reactive to light  Conjunctivae are normal  Right eye exhibits no discharge  Neck: Normal range of motion  No thyromegaly present  Cardiovascular: Normal rate and regular rhythm  Pulmonary/Chest: Effort normal and breath sounds normal  No respiratory distress  Lymphadenopathy:     He has no cervical adenopathy  Neurological: He is alert and oriented to person, place, and time  Skin: Skin is warm and dry  He is not diaphoretic  Psychiatric: He has a normal mood and affect   His behavior is normal  Judgment and thought content normal    Nursing note and vitals reviewed          Current Medications     Current Outpatient Medications:     aspirin 81 mg chewable tablet, Chew 81 mg daily, Disp: , Rfl:     chlordiazePOXIDE (LIBRIUM) 10 mg capsule, TAKE 1 CAPSULE BY MOUTH EVERY DAY, Disp: 90 capsule, Rfl: 1    dorzolamide-timolol (COSOPT) 22 3-6 8 MG/ML ophthalmic solution, INSTILL 1 DROP INTO BOTH EYES TWICE A DAY, Disp: , Rfl: 11    erythromycin (ILOTYCIN) ophthalmic ointment, APPLY (1CM) BY OPHTHALMIC ROUTE RIBBON INTO THE LOWER CONJUNCTIVAL SAC IN THE RIGHT EYE AS NEEDED, Disp: , Rfl: 5    glucosamine-chondroitin 500-400 MG tablet, Take 1 tablet by mouth Three times a day, Disp: , Rfl:     guaiFENesin (MUCINEX) 600 mg 12 hr tablet, Take 1,200 mg by mouth every 12 (twelve) hours, Disp: , Rfl:     levocetirizine (XYZAL) 5 MG tablet, Take 1 tablet by mouth Daily, Disp: , Rfl:     meclizine (ANTIVERT) 25 mg tablet, Take 0 5 tablets by mouth, Disp: , Rfl:     Multiple Vitamins-Minerals (CENTRUM SILVER ULTRA MENS) TABS, Take 1 tablet by mouth daily, Disp: , Rfl:     Red Yeast Rice 600 MG CAPS, Take 1 tablet by mouth 2 (two) times a day, Disp: , Rfl:     Silodosin (RAPAFLO) 8 MG CAPS, Take 1 capsule by mouth daily, Disp: 90 capsule, Rfl: 1    TRAVATAN Z 0 004 % ophthalmic solution, INSTILL 1 DROP INTO BOTH EYES IN THE EVENING, Disp: , Rfl: 2    hydrocortisone 2 5 % cream, , Disp: , Rfl:     predniSONE 10 mg tablet, Take 1 tablet (10 mg total) by mouth daily Six p o  for 1 day then 5 p o  for 1 day then 4 p o  for 1 day then 3 po for 1 day and 2 p o  for 1 day then 1 p o , Disp: 21 tablet, Rfl: 0    Health Maintenance     Health Maintenance   Topic Date Due    BMI: Followup Plan  02/16/1957    Annual Physical  08/27/2019    DTaP,Tdap,and Td Vaccines (1 - Tdap) 04/09/2020 (Originally 2/16/1951)    Fall Risk  08/30/2020    Depression Screening PHQ  08/30/2020    BMI: Adult  02/10/2021    Influenza Vaccine  Completed    Pneumococcal Vaccine: 65+ Years  Completed    Pneumococcal Vaccine: Pediatrics (0 to 5 Years) and At-Risk Patients (6 to 59 Years)  Aged Out    HIB Vaccine  Aged Out    Hepatitis B Vaccine  Aged Out    IPV Vaccine  Aged Out    Hepatitis A Vaccine  Aged Out    Meningococcal ACWY Vaccine  Aged Out    HPV Vaccine  Aged [employer] History   Administered Date(s) Administered    INFLUENZA 09/18/2015, 10/18/2016, 09/01/2017, 08/27/2018    Influenza Quadrivalent, 6-35 Months IM 09/01/2017    Influenza Split High Dose Preservative Free IM 09/25/2014, 09/18/2015, 10/18/2016    Influenza TIV (IM) 10/17/2012, 10/09/2013    Influenza, high dose seasonal 0 5 mL 08/27/2018, 09/24/2019    Pneumococcal Conjugate 13-Valent 06/08/2015    Pneumococcal Polysaccharide PPV23 08/30/2019    Td (adult), adsorbed 10/22/2014    Zoster Vaccine Recombinant 03/30/2018, 05/29/2018       Daiana Sparks DO  [facility]

## 2020-03-17 ENCOUNTER — OFFICE VISIT (OUTPATIENT)
Dept: FAMILY MEDICINE CLINIC | Facility: CLINIC | Age: 80
End: 2020-03-17
Payer: COMMERCIAL

## 2020-03-17 VITALS
WEIGHT: 177 LBS | BODY MASS INDEX: 26.22 KG/M2 | HEART RATE: 85 BPM | RESPIRATION RATE: 17 BRPM | TEMPERATURE: 97.7 F | DIASTOLIC BLOOD PRESSURE: 70 MMHG | OXYGEN SATURATION: 97 % | SYSTOLIC BLOOD PRESSURE: 110 MMHG | HEIGHT: 69 IN

## 2020-03-17 DIAGNOSIS — R05.3 PERSISTENT COUGH FOR 3 WEEKS OR LONGER: Primary | ICD-10-CM

## 2020-03-17 PROCEDURE — 1160F RVW MEDS BY RX/DR IN RCRD: CPT | Performed by: FAMILY MEDICINE

## 2020-03-17 PROCEDURE — 99213 OFFICE O/P EST LOW 20 MIN: CPT | Performed by: FAMILY MEDICINE

## 2020-03-17 PROCEDURE — 4040F PNEUMOC VAC/ADMIN/RCVD: CPT | Performed by: FAMILY MEDICINE

## 2020-03-17 PROCEDURE — 3008F BODY MASS INDEX DOCD: CPT | Performed by: FAMILY MEDICINE

## 2020-03-17 PROCEDURE — 1036F TOBACCO NON-USER: CPT | Performed by: FAMILY MEDICINE

## 2020-03-17 NOTE — PROGRESS NOTES
50 [facility]      NAME: Bernarda Simental  AGE: 79 y o  SEX: male  : 1940   MRN: [id number]    DATE: 3/17/2020  TIME: 10:47 AM    Assessment and Plan     Problem List Items Addressed This Visit     Persistent cough for 3 weeks or longer - Primary     Persistent cough following upper respiratory infection with no evidence ongoing infection  Patient reassured  Can take Robitussin or Mucinex  Return to office in:  P r n  Chief Complaint     Chief Complaint   Patient presents with    Cough     x 2 months        History of Present Illness     Patient presents to follow-up on his cough  He has had an upper respiratory symptoms for approximately 8 weeks  He has been treated with antibiotics and 2 courses steroids  At this time the cough is mild with production of scant amount of discolored sputum  Denies fever chills shortness of breath  He has had no obvious sick contacts  The following portions of the patient's history were reviewed and updated as appropriate: allergies, current medications, past family history, past medical history, past social history, past surgical history and problem list     Review of Systems   Review of Systems   Constitutional: Negative  Respiratory: Positive for cough  Negative for shortness of breath  Cardiovascular: Negative  Gastrointestinal: Negative  Genitourinary: Negative  Musculoskeletal: Negative  Psychiatric/Behavioral: Negative          Active Problem List     Patient Active Problem List   Diagnosis    Acute right-sided low back pain without sciatica    Renal lithiasis    Persistent cough for 3 weeks or longer    Mild cognitive impairment    Mixed hyperlipidemia    Oropharyngeal dysphagia    Abnormal diffusion capacity determined by pulmonary function test    Sjogren's syndrome (HCC)    Irritable bowel syndrome    Gastroesophageal reflux disease without esophagitis    Enlarged prostate without lower urinary tract symptoms (luts)    Dysplastic nevus of face    Arthritis    Anxiety    Antithrombinemia (HCC)    Allergic rhinitis    Neck pain    Glaucoma    Eczema    Neck mass       Objective   /70 (BP Location: Left arm, Patient Position: Sitting, Cuff Size: Adult)   Pulse 85   Temp 97 7 °F (36 5 °C) (Tympanic)   Resp 17   Ht 5' 8 9" (1 75 m)   Wt 80 3 kg (177 lb)   SpO2 97%   BMI 26 22 kg/m²     Physical Exam   Constitutional: He is oriented to person, place, and time  He appears well-developed and well-nourished  No distress  HENT:   Head: Normocephalic and atraumatic  Eyes: Pupils are equal, round, and reactive to light  Conjunctivae are normal  Right eye exhibits no discharge  Neck: Normal range of motion  No thyromegaly present  Cardiovascular: Normal rate and regular rhythm  Pulmonary/Chest: Effort normal and breath sounds normal  No respiratory distress  Lymphadenopathy:     He has no cervical adenopathy  Neurological: He is alert and oriented to person, place, and time  Skin: Skin is warm and dry  He is not diaphoretic  Psychiatric: He has a normal mood and affect  His behavior is normal  Judgment and thought content normal    Nursing note and vitals reviewed          Current Medications     Current Outpatient Medications:     aspirin 81 mg chewable tablet, Chew 81 mg daily, Disp: , Rfl:     chlordiazePOXIDE (LIBRIUM) 10 mg capsule, TAKE 1 CAPSULE BY MOUTH EVERY DAY, Disp: 90 capsule, Rfl: 1    dorzolamide-timolol (COSOPT) 22 3-6 8 MG/ML ophthalmic solution, INSTILL 1 DROP INTO BOTH EYES TWICE A DAY, Disp: , Rfl: 11    erythromycin (ILOTYCIN) ophthalmic ointment, APPLY (1CM) BY OPHTHALMIC ROUTE RIBBON INTO THE LOWER CONJUNCTIVAL SAC IN THE RIGHT EYE AS NEEDED, Disp: , Rfl: 5    glucosamine-chondroitin 500-400 MG tablet, Take 1 tablet by mouth Three times a day, Disp: , Rfl:     guaiFENesin (MUCINEX) 600 mg 12 hr tablet, Take 1,200 mg by mouth every 12 (twelve) hours, Disp: , Rfl:     hydrocortisone 2 5 % cream, , Disp: , Rfl:     levocetirizine (XYZAL) 5 MG tablet, Take 1 tablet by mouth Daily, Disp: , Rfl:     meclizine (ANTIVERT) 25 mg tablet, Take 0 5 tablets by mouth, Disp: , Rfl:     Multiple Vitamins-Minerals (CENTRUM SILVER ULTRA MENS) TABS, Take 1 tablet by mouth daily, Disp: , Rfl:     Red Yeast Rice 600 MG CAPS, Take 1 tablet by mouth 2 (two) times a day, Disp: , Rfl:     Silodosin (RAPAFLO) 8 MG CAPS, Take 1 capsule by mouth daily, Disp: 90 capsule, Rfl: 1    TRAVATAN Z 0 004 % ophthalmic solution, INSTILL 1 DROP INTO BOTH EYES IN THE EVENING, Disp: , Rfl: 2    predniSONE 10 mg tablet, Take 1 tablet (10 mg total) by mouth daily Six p o  for 1 day then 5 p o  for 1 day then 4 p o  for 1 day then 3 po for 1 day and 2 p o  for 1 day then 1 p o  (Patient not taking: Reported on 3/17/2020), Disp: 21 tablet, Rfl: 0    Health Maintenance     Health Maintenance   Topic Date Due    BMI: Followup Plan  02/16/1957    Annual Physical  08/27/2019    DTaP,Tdap,and Td Vaccines (1 - Tdap) 04/09/2020 (Originally 2/16/1951)    Fall Risk  08/30/2020    Depression Screening PHQ  08/30/2020    BMI: Adult  02/25/2021    Influenza Vaccine  Completed    Pneumococcal Vaccine: 65+ Years  Completed    Pneumococcal Vaccine: Pediatrics (0 to 5 Years) and At-Risk Patients (6 to 59 Years)  Aged Out    HIB Vaccine  Aged Out    Hepatitis B Vaccine  Aged Out    IPV Vaccine  Aged Out    Hepatitis A Vaccine  Aged Out    Meningococcal ACWY Vaccine  Aged Out    HPV Vaccine  Aged [employer] History   Administered Date(s) Administered    INFLUENZA 09/18/2015, 10/18/2016, 09/01/2017, 08/27/2018    Influenza Quadrivalent, 6-35 Months IM 09/01/2017    Influenza Split High Dose Preservative Free IM 09/25/2014, 09/18/2015, 10/18/2016    Influenza TIV (IM) 10/17/2012, 10/09/2013    Influenza, high dose seasonal 0 5 mL 08/27/2018, 09/24/2019    Pneumococcal Conjugate 13-Valent 06/08/2015    Pneumococcal Polysaccharide PPV23 08/30/2019    Td (adult), adsorbed 10/22/2014    Zoster Vaccine Recombinant 03/30/2018, 05/29/2018       Audelia Ceballos DO  [facility]

## 2020-03-17 NOTE — ASSESSMENT & PLAN NOTE
Persistent cough following upper respiratory infection with no evidence ongoing infection  Patient reassured  Can take Robitussin or Mucinex

## 2020-04-07 ENCOUNTER — TELEPHONE (OUTPATIENT)
Dept: FAMILY MEDICINE CLINIC | Facility: CLINIC | Age: 80
End: 2020-04-07

## 2020-04-15 ENCOUNTER — TELEPHONE (OUTPATIENT)
Dept: FAMILY MEDICINE CLINIC | Facility: CLINIC | Age: 80
End: 2020-04-15

## 2020-04-16 ENCOUNTER — OFFICE VISIT (OUTPATIENT)
Dept: URGENT CARE | Facility: CLINIC | Age: 80
End: 2020-04-16
Payer: COMMERCIAL

## 2020-04-16 VITALS
HEART RATE: 72 BPM | SYSTOLIC BLOOD PRESSURE: 154 MMHG | DIASTOLIC BLOOD PRESSURE: 75 MMHG | OXYGEN SATURATION: 98 % | TEMPERATURE: 96.9 F | RESPIRATION RATE: 20 BRPM

## 2020-04-16 DIAGNOSIS — T14.8XXA SUPERFICIAL LACERATION: Primary | ICD-10-CM

## 2020-04-16 PROCEDURE — 99283 EMERGENCY DEPT VISIT LOW MDM: CPT | Performed by: NURSE PRACTITIONER

## 2020-04-16 PROCEDURE — 90715 TDAP VACCINE 7 YRS/> IM: CPT

## 2020-04-16 PROCEDURE — G0382 LEV 3 HOSP TYPE B ED VISIT: HCPCS | Performed by: NURSE PRACTITIONER

## 2020-04-16 PROCEDURE — 90471 IMMUNIZATION ADMIN: CPT | Performed by: NURSE PRACTITIONER

## 2020-04-16 RX ORDER — DUTASTERIDE 0.5 MG/1
0.5 CAPSULE, LIQUID FILLED ORAL DAILY
Status: ON HOLD | COMMUNITY
Start: 2020-03-19

## 2020-04-21 DIAGNOSIS — F41.9 ANXIETY: ICD-10-CM

## 2020-04-22 RX ORDER — CHLORDIAZEPOXIDE HYDROCHLORIDE 10 MG/1
10 CAPSULE, GELATIN COATED ORAL DAILY
Qty: 90 CAPSULE | Refills: 0 | Status: SHIPPED | OUTPATIENT
Start: 2020-04-22 | End: 2020-07-31

## 2020-05-16 DIAGNOSIS — N40.0 BENIGN PROSTATIC HYPERPLASIA, UNSPECIFIED WHETHER LOWER URINARY TRACT SYMPTOMS PRESENT: ICD-10-CM

## 2020-05-18 RX ORDER — SILODOSIN 8 MG/1
1 CAPSULE ORAL DAILY
Qty: 90 CAPSULE | Refills: 0 | Status: SHIPPED | OUTPATIENT
Start: 2020-05-18 | End: 2020-08-11

## 2020-07-31 DIAGNOSIS — F41.9 ANXIETY: ICD-10-CM

## 2020-07-31 RX ORDER — CHLORDIAZEPOXIDE HYDROCHLORIDE 10 MG/1
CAPSULE, GELATIN COATED ORAL
Qty: 90 CAPSULE | Refills: 0 | Status: SHIPPED | OUTPATIENT
Start: 2020-07-31 | End: 2020-11-05

## 2020-08-11 DIAGNOSIS — N40.0 BENIGN PROSTATIC HYPERPLASIA, UNSPECIFIED WHETHER LOWER URINARY TRACT SYMPTOMS PRESENT: ICD-10-CM

## 2020-08-11 RX ORDER — SILODOSIN 8 MG/1
CAPSULE ORAL
Qty: 90 CAPSULE | Refills: 0 | Status: SHIPPED | OUTPATIENT
Start: 2020-08-11 | End: 2020-11-03

## 2020-09-01 ENCOUNTER — APPOINTMENT (OUTPATIENT)
Dept: LAB | Facility: CLINIC | Age: 80
End: 2020-09-01
Payer: COMMERCIAL

## 2020-09-01 ENCOUNTER — OFFICE VISIT (OUTPATIENT)
Dept: FAMILY MEDICINE CLINIC | Facility: CLINIC | Age: 80
End: 2020-09-01
Payer: COMMERCIAL

## 2020-09-01 VITALS
DIASTOLIC BLOOD PRESSURE: 70 MMHG | WEIGHT: 181.6 LBS | TEMPERATURE: 97.8 F | SYSTOLIC BLOOD PRESSURE: 122 MMHG | HEART RATE: 74 BPM | OXYGEN SATURATION: 99 % | BODY MASS INDEX: 26.9 KG/M2 | HEIGHT: 69 IN

## 2020-09-01 DIAGNOSIS — R53.83 FATIGUE, UNSPECIFIED TYPE: ICD-10-CM

## 2020-09-01 DIAGNOSIS — E78.2 MIXED HYPERLIPIDEMIA: ICD-10-CM

## 2020-09-01 DIAGNOSIS — F41.9 ANXIETY: ICD-10-CM

## 2020-09-01 DIAGNOSIS — Z23 NEED FOR INFLUENZA VACCINATION: ICD-10-CM

## 2020-09-01 DIAGNOSIS — Z00.00 MEDICARE ANNUAL WELLNESS VISIT, SUBSEQUENT: Primary | ICD-10-CM

## 2020-09-01 DIAGNOSIS — M19.90 ARTHRITIS: ICD-10-CM

## 2020-09-01 DIAGNOSIS — K21.9 GASTROESOPHAGEAL REFLUX DISEASE WITHOUT ESOPHAGITIS: ICD-10-CM

## 2020-09-01 DIAGNOSIS — M35.00 SJOGREN'S SYNDROME, WITH UNSPECIFIED ORGAN INVOLVEMENT (HCC): ICD-10-CM

## 2020-09-01 LAB
ALBUMIN SERPL BCP-MCNC: 3.7 G/DL (ref 3.5–5)
ALP SERPL-CCNC: 63 U/L (ref 46–116)
ALT SERPL W P-5'-P-CCNC: 20 U/L (ref 12–78)
ANION GAP SERPL CALCULATED.3IONS-SCNC: 5 MMOL/L (ref 4–13)
AST SERPL W P-5'-P-CCNC: 15 U/L (ref 5–45)
BASOPHILS # BLD AUTO: 0.03 THOUSANDS/ΜL (ref 0–0.1)
BASOPHILS NFR BLD AUTO: 0 % (ref 0–1)
BILIRUB SERPL-MCNC: 0.59 MG/DL (ref 0.2–1)
BUN SERPL-MCNC: 27 MG/DL (ref 5–25)
CALCIUM SERPL-MCNC: 8.7 MG/DL (ref 8.3–10.1)
CHLORIDE SERPL-SCNC: 111 MMOL/L (ref 100–108)
CHOLEST SERPL-MCNC: 215 MG/DL (ref 50–200)
CO2 SERPL-SCNC: 27 MMOL/L (ref 21–32)
CREAT SERPL-MCNC: 0.94 MG/DL (ref 0.6–1.3)
EOSINOPHIL # BLD AUTO: 0.67 THOUSAND/ΜL (ref 0–0.61)
EOSINOPHIL NFR BLD AUTO: 8 % (ref 0–6)
ERYTHROCYTE [DISTWIDTH] IN BLOOD BY AUTOMATED COUNT: 14.9 % (ref 11.6–15.1)
GFR SERPL CREATININE-BSD FRML MDRD: 76 ML/MIN/1.73SQ M
GLUCOSE P FAST SERPL-MCNC: 85 MG/DL (ref 65–99)
HCT VFR BLD AUTO: 41.9 % (ref 36.5–49.3)
HDLC SERPL-MCNC: 62 MG/DL
HGB BLD-MCNC: 13.5 G/DL (ref 12–17)
IMM GRANULOCYTES # BLD AUTO: 0.02 THOUSAND/UL (ref 0–0.2)
IMM GRANULOCYTES NFR BLD AUTO: 0 % (ref 0–2)
LDLC SERPL CALC-MCNC: 129 MG/DL (ref 0–100)
LYMPHOCYTES # BLD AUTO: 2.33 THOUSANDS/ΜL (ref 0.6–4.47)
LYMPHOCYTES NFR BLD AUTO: 28 % (ref 14–44)
MCH RBC QN AUTO: 32.6 PG (ref 26.8–34.3)
MCHC RBC AUTO-ENTMCNC: 32.2 G/DL (ref 31.4–37.4)
MCV RBC AUTO: 101 FL (ref 82–98)
MONOCYTES # BLD AUTO: 0.76 THOUSAND/ΜL (ref 0.17–1.22)
MONOCYTES NFR BLD AUTO: 9 % (ref 4–12)
NEUTROPHILS # BLD AUTO: 4.52 THOUSANDS/ΜL (ref 1.85–7.62)
NEUTS SEG NFR BLD AUTO: 55 % (ref 43–75)
NRBC BLD AUTO-RTO: 0 /100 WBCS
PLATELET # BLD AUTO: 221 THOUSANDS/UL (ref 149–390)
PMV BLD AUTO: 10.4 FL (ref 8.9–12.7)
POTASSIUM SERPL-SCNC: 4.4 MMOL/L (ref 3.5–5.3)
PROT SERPL-MCNC: 7.2 G/DL (ref 6.4–8.2)
RBC # BLD AUTO: 4.14 MILLION/UL (ref 3.88–5.62)
SODIUM SERPL-SCNC: 143 MMOL/L (ref 136–145)
TRIGL SERPL-MCNC: 122 MG/DL
TSH SERPL DL<=0.05 MIU/L-ACNC: 2.22 UIU/ML (ref 0.36–3.74)
WBC # BLD AUTO: 8.33 THOUSAND/UL (ref 4.31–10.16)

## 2020-09-01 PROCEDURE — 84443 ASSAY THYROID STIM HORMONE: CPT

## 2020-09-01 PROCEDURE — 3725F SCREEN DEPRESSION PERFORMED: CPT | Performed by: FAMILY MEDICINE

## 2020-09-01 PROCEDURE — G0439 PPPS, SUBSEQ VISIT: HCPCS | Performed by: FAMILY MEDICINE

## 2020-09-01 PROCEDURE — 1160F RVW MEDS BY RX/DR IN RCRD: CPT | Performed by: FAMILY MEDICINE

## 2020-09-01 PROCEDURE — 1036F TOBACCO NON-USER: CPT | Performed by: FAMILY MEDICINE

## 2020-09-01 PROCEDURE — 90662 IIV NO PRSV INCREASED AG IM: CPT | Performed by: FAMILY MEDICINE

## 2020-09-01 PROCEDURE — 1170F FXNL STATUS ASSESSED: CPT | Performed by: FAMILY MEDICINE

## 2020-09-01 PROCEDURE — 99214 OFFICE O/P EST MOD 30 MIN: CPT | Performed by: FAMILY MEDICINE

## 2020-09-01 PROCEDURE — 85025 COMPLETE CBC W/AUTO DIFF WBC: CPT

## 2020-09-01 PROCEDURE — 36415 COLL VENOUS BLD VENIPUNCTURE: CPT

## 2020-09-01 PROCEDURE — G0008 ADMIN INFLUENZA VIRUS VAC: HCPCS | Performed by: FAMILY MEDICINE

## 2020-09-01 PROCEDURE — 1125F AMNT PAIN NOTED PAIN PRSNT: CPT | Performed by: FAMILY MEDICINE

## 2020-09-01 PROCEDURE — 80061 LIPID PANEL: CPT

## 2020-09-01 PROCEDURE — 80053 COMPREHEN METABOLIC PANEL: CPT

## 2020-09-01 RX ORDER — DOCUSATE SODIUM 100 MG/1
100 CAPSULE, LIQUID FILLED ORAL 2 TIMES DAILY
Status: ON HOLD | COMMUNITY

## 2020-09-01 NOTE — PROGRESS NOTES
[facility]      NAME: Leanne Shepard  AGE: 79 y o  SEX: male  : 1940   MRN: [id number]    DATE: 2020  TIME: 10:33 AM    Assessment and Plan     Problem List Items Addressed This Visit     Sjogren's syndrome ([address] )       Symptoms stable at this time  Mixed hyperlipidemia       Continue red yeast rice  Check lipid panel today  Relevant Orders    Lipid Panel with Direct LDL reflex    Gastroesophageal reflux disease without esophagitis    Arthritis    Anxiety      Other Visit Diagnoses     Medicare annual wellness visit, subsequent    -  Primary      Questionnaire reviewed  Immunization and health screening history is updated  Advance directive in place  Need for influenza vaccination        Relevant Orders    influenza vaccine, high-dose, PF 0 7 mL (FLUZONE HIGH-DOSE) (Completed)    Fatigue, unspecified type        Relevant Orders    CBC and differential    Comprehensive metabolic panel    TSH, 3rd generation              Return to office in:   Six months    Chief Complaint     Chief Complaint   Patient presents with    Medicare Wellness Visit     Pt is here for a SUB AWV    Follow-up     Pt is here for a 6 month follow up  History of Present Illness       Patient presents for routine follow-up of chronic medical problems  He is being treated for anxiety, arthritis, allergic rhinitis, irritable bowel syndrome and Sjogren syndrome  He takes Librium for his IBS  He takes Avodart for BPH  Takes Xyzal for allergic rhinitis  He takes red yeast rice for hyperlipidemia  He has complaints generalized muscle aches and pains as well as fatigue  These have been chronic issues for him over it least the past 2-3 years        The following portions of the patient's history were reviewed and updated as appropriate: allergies, current medications, past family history, past medical history, past social history, past surgical history and problem list     Review of Systems   Review of Systems   Constitutional: Negative  Respiratory: Positive for cough  Cardiovascular: Negative  Gastrointestinal: Negative  Genitourinary: Negative  Musculoskeletal: Negative  Psychiatric/Behavioral: Negative  Active Problem List     Patient Active Problem List   Diagnosis    Acute right-sided low back pain without sciatica    Renal lithiasis    Persistent cough for 3 weeks or longer    Mild cognitive impairment    Mixed hyperlipidemia    Oropharyngeal dysphagia    Abnormal diffusion capacity determined by pulmonary function test    Sjogren's syndrome (HCC)    Irritable bowel syndrome    Gastroesophageal reflux disease without esophagitis    Enlarged prostate without lower urinary tract symptoms (luts)    Dysplastic nevus of face    Arthritis    Anxiety    Antithrombinemia (HCC)    Allergic rhinitis    Neck pain    Glaucoma    Eczema    Neck mass       Objective   /70 (BP Location: Right arm, Patient Position: Sitting, Cuff Size: Adult)   Pulse 74   Temp 97 8 °F (36 6 °C) (Tympanic)   Ht 5' 8 9" (1 75 m)   Wt 82 4 kg (181 lb 9 6 oz)   SpO2 99%   BMI 26 90 kg/m²     Physical Exam  Vitals signs and nursing note reviewed  Constitutional:       General: He is not in acute distress  Appearance: He is well-developed  He is not diaphoretic  HENT:      Head: Normocephalic and atraumatic  Eyes:      General:         Right eye: No discharge  Conjunctiva/sclera: Conjunctivae normal       Pupils: Pupils are equal, round, and reactive to light  Neck:      Musculoskeletal: Normal range of motion  Thyroid: No thyromegaly  Cardiovascular:      Rate and Rhythm: Normal rate and regular rhythm  Pulmonary:      Effort: Pulmonary effort is normal  No respiratory distress  Breath sounds: Normal breath sounds  Lymphadenopathy:      Cervical: No cervical adenopathy  Skin:     General: Skin is warm and dry     Neurological: Mental Status: He is alert and oriented to person, place, and time  Psychiatric:         Behavior: Behavior normal          Thought Content:  Thought content normal          Judgment: Judgment normal            Current Medications     Current Outpatient Medications:     aspirin 81 mg chewable tablet, Chew 81 mg daily, Disp: , Rfl:     chlordiazePOXIDE (LIBRIUM) 10 mg capsule, TAKE 1 CAPSULE BY MOUTH EVERY DAY, Disp: 90 capsule, Rfl: 0    docusate sodium (COLACE) 100 mg capsule, Take 100 mg by mouth 2 (two) times a day, Disp: , Rfl:     dorzolamide-timolol (COSOPT) 22 3-6 8 MG/ML ophthalmic solution, INSTILL 1 DROP INTO BOTH EYES TWICE A DAY, Disp: , Rfl: 11    dutasteride (AVODART) 0 5 mg capsule, Take 0 5 mg by mouth daily, Disp: , Rfl:     erythromycin (ILOTYCIN) ophthalmic ointment, APPLY (1CM) BY OPHTHALMIC ROUTE RIBBON INTO THE LOWER CONJUNCTIVAL SAC IN THE RIGHT EYE AS NEEDED, Disp: , Rfl: 5    glucosamine-chondroitin 500-400 MG tablet, Take 1 tablet by mouth Three times a day, Disp: , Rfl:     guaiFENesin (MUCINEX) 600 mg 12 hr tablet, Take 1,200 mg by mouth every 12 (twelve) hours, Disp: , Rfl:     hydrocortisone 2 5 % cream, , Disp: , Rfl:     levocetirizine (XYZAL) 5 MG tablet, Take 1 tablet by mouth Daily, Disp: , Rfl:     meclizine (ANTIVERT) 25 mg tablet, Take 0 5 tablets by mouth, Disp: , Rfl:     Multiple Vitamins-Minerals (CENTRUM SILVER ULTRA MENS) TABS, Take 1 tablet by mouth daily, Disp: , Rfl:     Red Yeast Rice 600 MG CAPS, Take 1 tablet by mouth 2 (two) times a day, Disp: , Rfl:     Silodosin 8 MG CAPS, TAKE 1 CAPSULE BY MOUTH EVERY DAY, Disp: 90 capsule, Rfl: 0    TRAVATAN Z 0 004 % ophthalmic solution, INSTILL 1 DROP INTO BOTH EYES IN THE EVENING, Disp: , Rfl: 2    Health Maintenance     Health Maintenance   Topic Date Due    BMI: Followup Plan  02/16/1958   [employer] Medicare Annual Wellness Visit (AWV)  08/30/2020    Fall Risk  09/01/2021    Depression Screening PHQ  09/01/2021  BMI: Adult  09/01/2021    DTaP,Tdap,and Td Vaccines (2 - Td) 04/16/2030    Influenza Vaccine  Completed    Pneumococcal Vaccine: 65+ Years  Completed    HIB Vaccine  Aged Out    Hepatitis B Vaccine  Aged Out    IPV Vaccine  Aged Out    Hepatitis A Vaccine  Aged Out    Meningococcal ACWY Vaccine  Aged Out    HPV Vaccine  Aged Out     Immunization History   Administered Date(s) Administered    INFLUENZA 09/18/2015, 10/18/2016, 09/01/2017, 08/27/2018    Influenza Quadrivalent, 6-35 Months IM 09/01/2017    Influenza Split High Dose Preservative Free IM 09/25/2014, 09/18/2015, 10/18/2016    Influenza TIV (IM) 10/17/2012, 10/09/2013    Influenza, high dose seasonal 0 7 mL 08/27/2018, 09/24/2019, 09/01/2020    Pneumococcal Conjugate 13-Valent 06/08/2015    Pneumococcal Polysaccharide PPV23 08/30/2019    Td (adult), adsorbed 10/22/2014    Tdap 04/16/2020    Zoster Vaccine Recombinant 03/30/2018, 05/29/2018       Shannan Ron DO  [facility]

## 2020-09-01 NOTE — PROGRESS NOTES
Assessment and Plan:     Problem List Items Addressed This Visit     None      Visit Diagnoses     Medicare annual wellness visit, subsequent    -  Primary      Questionnaire reviewed  Immunization and health screening history is updated  Advance directive in place  Need for influenza vaccination        Relevant Orders    influenza vaccine, high-dose, PF 0 7 mL (FLUZONE HIGH-DOSE) (Completed)        BMI Counseling: Body mass index is 26 9 kg/m²  The BMI is above normal  Nutrition recommendations include decreasing portion sizes, encouraging healthy choices of fruits and vegetables, consuming healthier snacks, moderation in carbohydrate intake and increasing intake of lean protein  Exercise recommendations include exercising 3-5 times per week  No pharmacotherapy was ordered  Preventive health issues were discussed with patient, and age appropriate screening tests were ordered as noted in patient's After Visit Summary  Personalized health advice and appropriate referrals for health education or preventive services given if needed, as noted in patient's After Visit Summary  History of Present Illness:     Patient presents for Welcome to Medicare visit       Patient Care Team:  Jocelyn Cox DO as PCP - Isaak Galindo MD     Review of Systems:     Review of Systems   Problem List:     Patient Active Problem List   Diagnosis    Acute right-sided low back pain without sciatica    Renal lithiasis    Persistent cough for 3 weeks or longer    Mild cognitive impairment    Mixed hyperlipidemia    Oropharyngeal dysphagia    Abnormal diffusion capacity determined by pulmonary function test    Sjogren's syndrome (HCC)    Irritable bowel syndrome    Gastroesophageal reflux disease without esophagitis    Enlarged prostate without lower urinary tract symptoms (luts)    Dysplastic nevus of face    Arthritis    Anxiety    Antithrombinemia ([address] )    Allergic rhinitis    Neck pain    Glaucoma    Eczema    Neck mass      Past Medical and Surgical History:     Past Medical History:   Diagnosis Date    Abnormal diffusion capacity determined by pulmonary function test 4/25/2017    Actinic keratosis     Adverse effect of correct medicinal substance properly administered     Arthralgia     Candidiasis, mouth     Chronic allergic rhinitis     Chronic sinusitis     Dermatitis     Diverticulosis     Glaucoma     Inguinal hernia, left     Lump of skin     Male erectile dysfunction     Open comedone     Palpitations     Renal calculi     Sebaceous cyst     Seborrheic keratosis     Skin disorder     Unspecified chronic bronchitis (HCC)     Visual disturbances      Past Surgical History:   Procedure Laterality Date    COLONOSCOPY      INGUINAL HERNIA REPAIR Bilateral     left- last assessed: 11/25/14, Resolved: 5/18/15, onset 12/11/13    MOUTH SURGERY        Family History:     Family History   Problem Relation Age of Onset    Brain cancer Mother     Heart failure Mother     Prostate cancer Mother       Social History:     E-Cigarette/Vaping    E-Cigarette Use Never User      E-Cigarette/Vaping Substances    Nicotine No     THC No     CBD No     Flavoring No     Other No     Unknown No      Social History     Socioeconomic History    Marital status:       Spouse name: None    Number of children: None    Years of education: None    Highest education level: None   Occupational History    None   Social Needs    Financial resource strain: None    Food insecurity     Worry: None     Inability: None    Transportation needs     Medical: None     Non-medical: None   Tobacco Use    Smoking status: Former Smoker    Smokeless tobacco: Never Used   Substance and Sexual Activity    Alcohol use: Yes     Frequency: Monthly or less     Drinks per session: 1 or 2     Binge frequency: Never     Comment: social    Drug use: No    Sexual activity: Not Currently   Lifestyle  Physical activity     Days per week: None     Minutes per session: None    Stress: None   Relationships    Social connections     Talks on phone: None     Gets together: None     Attends Taoist service: None     Active member of club or organization: None     Attends meetings of clubs or organizations: None     Relationship status: None    Intimate partner violence     Fear of current or ex partner: None     Emotionally abused: None     Physically abused: None     Forced sexual activity: None   Other Topics Concern    None   Social History Narrative    None      Medications and Allergies:     Current Outpatient Medications   Medication Sig Dispense Refill    aspirin 81 mg chewable tablet Chew 81 mg daily      chlordiazePOXIDE (LIBRIUM) 10 mg capsule TAKE 1 CAPSULE BY MOUTH EVERY DAY 90 capsule 0    docusate sodium (COLACE) 100 mg capsule Take 100 mg by mouth 2 (two) times a day      dorzolamide-timolol (COSOPT) 22 3-6 8 MG/ML ophthalmic solution INSTILL 1 DROP INTO BOTH EYES TWICE A DAY  11    dutasteride (AVODART) 0 5 mg capsule Take 0 5 mg by mouth daily      erythromycin (ILOTYCIN) ophthalmic ointment APPLY (1CM) BY OPHTHALMIC ROUTE RIBBON INTO THE LOWER CONJUNCTIVAL SAC IN THE RIGHT EYE AS NEEDED  5    glucosamine-chondroitin 500-400 MG tablet Take 1 tablet by mouth Three times a day      guaiFENesin (MUCINEX) 600 mg 12 hr tablet Take 1,200 mg by mouth every 12 (twelve) hours      hydrocortisone 2 5 % cream       levocetirizine (XYZAL) 5 MG tablet Take 1 tablet by mouth Daily      meclizine (ANTIVERT) 25 mg tablet Take 0 5 tablets by mouth      Multiple Vitamins-Minerals (CENTRUM SILVER ULTRA MENS) TABS Take 1 tablet by mouth daily      Red Yeast Rice 600 MG CAPS Take 1 tablet by mouth 2 (two) times a day      Silodosin 8 MG CAPS TAKE 1 CAPSULE BY MOUTH EVERY DAY 90 capsule 0    TRAVATAN Z 0 004 % ophthalmic solution INSTILL 1 DROP INTO BOTH EYES IN THE EVENING  2    predniSONE 10 mg tablet Take 1 tablet (10 mg total) by mouth daily Six p o  for 1 day then 5 p o  for 1 day then 4 p o  for 1 day then 3 po for 1 day and 2 p o  for 1 day then 1 p o  (Patient not taking: Reported on 3/17/2020) 21 tablet 0     No current facility-administered medications for this visit  Allergies   Allergen Reactions    Levofloxacin Anaphylaxis    Azithromycin GI Intolerance    Ketorolac     Penicillins Hives      Immunizations:     Immunization History   Administered Date(s) Administered    INFLUENZA 09/18/2015, 10/18/2016, 09/01/2017, 08/27/2018    Influenza Quadrivalent, 6-35 Months IM 09/01/2017    Influenza Split High Dose Preservative Free IM 09/25/2014, 09/18/2015, 10/18/2016    Influenza TIV (IM) 10/17/2012, 10/09/2013    Influenza, high dose seasonal 0 7 mL 08/27/2018, 09/24/2019, 09/01/2020    Pneumococcal Conjugate 13-Valent 06/08/2015    Pneumococcal Polysaccharide PPV23 08/30/2019    Td (adult), adsorbed 10/22/2014    Tdap 04/16/2020    Zoster Vaccine Recombinant 03/30/2018, 05/29/2018      Health Maintenance: There are no preventive care reminders to display for this patient  There are no preventive care reminders to display for this patient  Medicare Screening Tests and Risk Assessments:     Pop Mcnamara is here for his Subsequent Wellness visit  Last Medicare Wellness visit information reviewed, patient interviewed and updates made to the record today  Health Risk Assessment:   Patient rates overall health as fair  Patient feels that their physical health rating is same  Eyesight was rated as same  Hearing was rated as same  Patient feels that their emotional and mental health rating is same  Pain experienced in the last 7 days has been some  Patient's pain rating has been 4/10  Patient states that he has experienced no weight loss or gain in last 6 months  Depression Screening:   PHQ-2 Score: 1      Fall Risk Screening:    In the past year, patient has experienced: no history of falling in past year      Home Safety:  Patient does not have trouble with stairs inside or outside of their home  Patient has working smoke alarms and has no working carbon monoxide detector  Home safety hazards include: none  Nutrition:   Current diet is Regular, Low Cholesterol and Limited junk food  Medications:   Patient is currently taking over-the-counter supplements  OTC medications include: see medication list  Patient is able to manage medications  Activities of Daily Living (ADLs)/Instrumental Activities of Daily Living (IADLs):   Walk and transfer into and out of bed and chair?: Yes  Dress and groom yourself?: Yes    Bathe or shower yourself?: Yes    Feed yourself? Yes  Do your laundry/housekeeping?: Yes  Manage your money, pay your bills and track your expenses?: Yes  Make your own meals?: Yes    Do your own shopping?: Yes    Previous Hospitalizations:   Any hospitalizations or ED visits within the last 12 months?: No      Advance Care Planning:   Living will: Yes    Durable POA for healthcare:  Yes    Advanced directive: Yes      Cognitive Screening:   Provider or family/friend/caregiver concerned regarding cognition?: No    PREVENTIVE SCREENINGS      Cardiovascular Screening:    General: Screening Not Indicated and History Lipid Disorder      Colorectal Cancer Screening:     General: Screening Not Indicated      Prostate Cancer Screening:    General: Screening Not Indicated      Osteoporosis Screening:    General: Screening Not Indicated      Abdominal Aortic Aneurysm (AAA) Screening:    Risk factors include: tobacco use        General: Screening Not Indicated      Lung Cancer Screening:     General: Screening Not Indicated      Hepatitis C Screening:    General: Screening Not Indicated    No exam data present     Physical Exam:     /70 (BP Location: Right arm, Patient Position: Sitting, Cuff Size: Adult)   Pulse 74   Temp 97 8 °F (36 6 °C) (Tympanic)   Ht 5' 8 9" (1 75 m)   Wt 82 4 kg (181 lb 9 6 oz)   SpO2 99%   BMI 26 90 kg/m²     Physical Exam     Aakash Do, DO

## 2020-11-03 DIAGNOSIS — N40.0 BENIGN PROSTATIC HYPERPLASIA, UNSPECIFIED WHETHER LOWER URINARY TRACT SYMPTOMS PRESENT: ICD-10-CM

## 2020-11-03 RX ORDER — SILODOSIN 8 MG/1
CAPSULE ORAL
Qty: 90 CAPSULE | Refills: 0 | Status: SHIPPED | OUTPATIENT
Start: 2020-11-03 | End: 2021-02-10 | Stop reason: SDUPTHER

## 2020-11-05 DIAGNOSIS — F41.9 ANXIETY: ICD-10-CM

## 2020-11-05 RX ORDER — CHLORDIAZEPOXIDE HYDROCHLORIDE 10 MG/1
CAPSULE, GELATIN COATED ORAL
Qty: 90 CAPSULE | Refills: 0 | Status: SHIPPED | OUTPATIENT
Start: 2020-11-05 | End: 2021-01-29

## 2021-01-13 ENCOUNTER — TELEMEDICINE (OUTPATIENT)
Dept: FAMILY MEDICINE CLINIC | Facility: CLINIC | Age: 81
End: 2021-01-13
Payer: COMMERCIAL

## 2021-01-13 DIAGNOSIS — K21.9 GASTROESOPHAGEAL REFLUX DISEASE WITHOUT ESOPHAGITIS: Primary | ICD-10-CM

## 2021-01-13 PROCEDURE — 1036F TOBACCO NON-USER: CPT | Performed by: FAMILY MEDICINE

## 2021-01-13 PROCEDURE — 1160F RVW MEDS BY RX/DR IN RCRD: CPT | Performed by: FAMILY MEDICINE

## 2021-01-13 PROCEDURE — 99212 OFFICE O/P EST SF 10 MIN: CPT | Performed by: FAMILY MEDICINE

## 2021-01-13 NOTE — PROGRESS NOTES
Virtual Brief Visit    Assessment/Plan:    Problem List Items Addressed This Visit     Gastroesophageal reflux disease without esophagitis - Primary      Patient with typical acid reflux symptoms  Recommend he restart Pepcid 20 mg b i d   Also recommend that he take it for at least 1 month consistently and then reassess his symptoms  It was my intent to perform this visit via video technology but the patient was not able to do a video connection so the visit was completed via audio telephone only  Reason for visit is   Chief Complaint   Patient presents with    Virtual Brief Visit        Encounter provider Kota Delgado DO    Provider located at [place] [address]  [address] [facility] [address] [facility] [address]  [phone]    Recent Visits  No visits were found meeting these conditions  Showing recent visits within past 7 days and meeting all other requirements     Today's Visits  Date Type Provider Dept   01/13/21 Telemedicine Kota Delgado DO [facility]   Showing today's visits and meeting all other requirements     Future Appointments  No visits were found meeting these conditions  Showing future appointments within next 150 days and meeting all other requirements        After connecting through telephone, the patient was identified by name and date of birth  Corinne Hoyt  was informed that this is a telemedicine visit and that the visit is being conducted through telephone  My office door was closed  No one else was in the room  He acknowledged consent and understanding of privacy and security of the platform  The patient has agreed to participate and understands he can discontinue the visit at any time  Patient is aware this is a billable service  Phillip Moody  is a 79 y o  male with complaint regarding acid reflux  Patient relates that he has a long history of acid reflux dating back more than 50 years  He has taken omeprazole in the past but did not like it  He typically takes Tums on a regular basis  He does get occasional mild dysphagia but generally his symptoms are epigastric discomfort with some acid indigestion  He has had some benefit from Pepcid in the past but took it for only short periods of time         Past Medical History:   Diagnosis Date    Abnormal diffusion capacity determined by pulmonary function test 4/25/2017    Actinic keratosis     Adverse effect of correct medicinal substance properly administered     Arthralgia     Candidiasis, mouth     Chronic allergic rhinitis     Chronic sinusitis     Dermatitis     Diverticulosis     Glaucoma     Inguinal hernia, left     Lump of skin     Male erectile dysfunction     Open comedone     Palpitations     Renal calculi     Sebaceous cyst     Seborrheic keratosis     Skin disorder     Unspecified chronic bronchitis (HCC)     Visual disturbances        Past Surgical History:   Procedure Laterality Date    COLONOSCOPY      INGUINAL HERNIA REPAIR Bilateral     left- last assessed: 11/25/14, Resolved: 5/18/15, onset 12/11/13    MOUTH SURGERY         Current Outpatient Medications   Medication Sig Dispense Refill    aspirin 81 mg chewable tablet Chew 81 mg daily      chlordiazePOXIDE (LIBRIUM) 10 mg capsule TAKE 1 CAPSULE BY MOUTH EVERY DAY 90 capsule 0    docusate sodium (COLACE) 100 mg capsule Take 100 mg by mouth 2 (two) times a day      dorzolamide-timolol (COSOPT) 22 3-6 8 MG/ML ophthalmic solution INSTILL 1 DROP INTO BOTH EYES TWICE A DAY  11    dutasteride (AVODART) 0 5 mg capsule Take 0 5 mg by mouth daily      erythromycin (ILOTYCIN) ophthalmic ointment APPLY (1CM) BY OPHTHALMIC ROUTE RIBBON INTO THE LOWER CONJUNCTIVAL SAC IN THE RIGHT EYE AS NEEDED  5    glucosamine-chondroitin 500-400 MG tablet Take 1 tablet by mouth Three times a day      guaiFENesin (MUCINEX) 600 mg 12 hr tablet Take 1,200 mg by mouth every 12 (twelve) hours      hydrocortisone 2 5 % cream       levocetirizine (XYZAL) 5 MG tablet Take 1 tablet by mouth Daily      meclizine (ANTIVERT) 25 mg tablet Take 0 5 tablets by mouth      Multiple Vitamins-Minerals (CENTRUM SILVER ULTRA MENS) TABS Take 1 tablet by mouth daily      Red Yeast Rice 600 MG CAPS Take 1 tablet by mouth 2 (two) times a day      Silodosin 8 MG CAPS TAKE 1 CAPSULE BY MOUTH EVERY DAY 90 capsule 0    TRAVATAN Z 0 004 % ophthalmic solution INSTILL 1 DROP INTO BOTH EYES IN THE EVENING  2     No current facility-administered medications for this visit  Allergies   Allergen Reactions    Levofloxacin Anaphylaxis    Azithromycin GI Intolerance    Ketorolac     Penicillins Hives       Review of Systems   Constitutional: Negative  Respiratory: Negative  Cardiovascular: Negative  Gastrointestinal: Negative  Indigestion/heartburn   Genitourinary: Negative  Musculoskeletal: Negative  Psychiatric/Behavioral: Negative  There were no vitals filed for this visit  I spent Ten minutes directly with the patient during this visit    [address]  acknowledges that he has consented to an online visit or consultation  He understands that the online visit is based solely on information provided by him, and that, in the absence of a face-to-face physical evaluation by the physician, the diagnosis he receives is both limited and provisional in terms of accuracy and completeness  This is not intended to replace a full medical face-to-face evaluation by the physician  Veronica Marshall  understands and accepts these terms

## 2021-01-29 DIAGNOSIS — F41.9 ANXIETY: ICD-10-CM

## 2021-01-29 RX ORDER — CHLORDIAZEPOXIDE HYDROCHLORIDE 10 MG/1
CAPSULE, GELATIN COATED ORAL
Qty: 90 CAPSULE | Refills: 0 | Status: SHIPPED | OUTPATIENT
Start: 2021-01-29 | End: 2021-05-03 | Stop reason: SDUPTHER

## 2021-02-10 DIAGNOSIS — N40.0 BENIGN PROSTATIC HYPERPLASIA, UNSPECIFIED WHETHER LOWER URINARY TRACT SYMPTOMS PRESENT: ICD-10-CM

## 2021-02-10 RX ORDER — SILODOSIN 8 MG/1
1 CAPSULE ORAL DAILY
Qty: 90 CAPSULE | Refills: 0 | Status: SHIPPED | OUTPATIENT
Start: 2021-02-10 | End: 2021-05-04 | Stop reason: SDUPTHER

## 2021-02-11 ENCOUNTER — TELEPHONE (OUTPATIENT)
Dept: FAMILY MEDICINE CLINIC | Facility: CLINIC | Age: 81
End: 2021-02-11

## 2021-02-12 NOTE — TELEPHONE ENCOUNTER
He can try omeprazole or Prilosec, both of which are over the counter  If they help I can order a prescription for them as well

## 2021-02-15 ENCOUNTER — TELEPHONE (OUTPATIENT)
Dept: FAMILY MEDICINE CLINIC | Facility: CLINIC | Age: 81
End: 2021-02-15

## 2021-02-15 NOTE — TELEPHONE ENCOUNTER
Patient called again regarding medications concerns    Patient is schedule for a f/u appointment on 2/22/2021  to discuss options     WINDY Hawkins

## 2021-02-17 NOTE — TELEPHONE ENCOUNTER
Tried contacting patient, but person that answered the phone was not on communication consent  He told me to call back later and hung up

## 2021-04-14 ENCOUNTER — OFFICE VISIT (OUTPATIENT)
Dept: FAMILY MEDICINE CLINIC | Facility: CLINIC | Age: 81
End: 2021-04-14
Payer: COMMERCIAL

## 2021-04-14 VITALS
TEMPERATURE: 98.6 F | SYSTOLIC BLOOD PRESSURE: 112 MMHG | HEART RATE: 83 BPM | OXYGEN SATURATION: 96 % | DIASTOLIC BLOOD PRESSURE: 80 MMHG | BODY MASS INDEX: 27.28 KG/M2 | WEIGHT: 184.2 LBS | HEIGHT: 69 IN

## 2021-04-14 DIAGNOSIS — R42 VERTIGO: ICD-10-CM

## 2021-04-14 DIAGNOSIS — R93.89 ABNORMAL CXR: ICD-10-CM

## 2021-04-14 DIAGNOSIS — R06.00 DYSPNEA ON EXERTION: Primary | ICD-10-CM

## 2021-04-14 PROCEDURE — 99213 OFFICE O/P EST LOW 20 MIN: CPT | Performed by: FAMILY MEDICINE

## 2021-04-14 PROCEDURE — 1036F TOBACCO NON-USER: CPT | Performed by: FAMILY MEDICINE

## 2021-04-14 PROCEDURE — 1160F RVW MEDS BY RX/DR IN RCRD: CPT | Performed by: FAMILY MEDICINE

## 2021-04-14 RX ORDER — MECLIZINE HYDROCHLORIDE 25 MG/1
12.5 TABLET ORAL EVERY 8 HOURS PRN
Qty: 30 TABLET | Refills: 1 | Status: SHIPPED | OUTPATIENT
Start: 2021-04-14 | End: 2022-06-14

## 2021-04-14 NOTE — PROGRESS NOTES
[facility]      NAME: Nasir Carmichael  AGE: 79 y o  SEX: male  : 1940   MRN: [id number]    DATE: 2021  TIME: 4:33 PM    Assessment and Plan     Problem List Items Addressed This Visit     None      Visit Diagnoses     Dyspnea on exertion    -  Primary    Relevant Orders    CT chest high resolution    Abnormal CXR        Relevant Orders    CT chest high resolution    Vertigo        Relevant Medications    meclizine (ANTIVERT) 25 mg tablet         patient with  Complaint shortness of breath  Chest x-ray in 2017 did show some chronic changes  Will check high-resolution CT to rule out interstitial lung disease  Physical exam today not suggestive of COPD or asthma  Return to office in:   P r n  Chief Complaint     Chief Complaint   Patient presents with    Shortness of Breath       History of Present Illness      Patient presents with a chief complaint of shortness of breath  He notes that he fatigues easily and has shortness of breath with mild exertion  He does not cough or wheeze  Symptoms have been on and off over the past several years but seem to be getting worse  States that the other day at a friend's house his pulse ox was 84%      The following portions of the patient's history were reviewed and updated as appropriate: allergies, current medications, past family history, past medical history, past social history, past surgical history and problem list     Review of Systems   Review of Systems   Constitutional: Negative  Respiratory: Positive for shortness of breath  Cardiovascular: Negative  Gastrointestinal: Negative  Genitourinary: Negative  Musculoskeletal: Negative  Psychiatric/Behavioral: Negative          Active Problem List     Patient Active Problem List   Diagnosis    Acute right-sided low back pain without sciatica    Renal lithiasis    Persistent cough for 3 weeks or longer    Mild cognitive impairment    Mixed hyperlipidemia  Oropharyngeal dysphagia    Abnormal diffusion capacity determined by pulmonary function test    Sjogren's syndrome (HCC)    Irritable bowel syndrome    Gastroesophageal reflux disease without esophagitis    Enlarged prostate without lower urinary tract symptoms (luts)    Dysplastic nevus of face    Arthritis    Anxiety    Antithrombinemia (HCC)    Allergic rhinitis    Neck pain    Glaucoma    Eczema    Neck mass       Objective   /80 (BP Location: Right arm, Patient Position: Sitting, Cuff Size: Standard)   Pulse 83   Temp 98 6 °F (37 °C) (Temporal)   Ht 5' 8 9" (1 75 m)   Wt 83 6 kg (184 lb 3 2 oz)   SpO2 96%   BMI 27 28 kg/m²     Physical Exam  Vitals signs and nursing note reviewed  Constitutional:       General: He is not in acute distress  Appearance: He is well-developed  He is not diaphoretic  HENT:      Head: Normocephalic and atraumatic  Eyes:      General:         Right eye: No discharge  Conjunctiva/sclera: Conjunctivae normal       Pupils: Pupils are equal, round, and reactive to light  Neck:      Musculoskeletal: Normal range of motion  Thyroid: No thyromegaly  Cardiovascular:      Rate and Rhythm: Normal rate and regular rhythm  Pulmonary:      Effort: Pulmonary effort is normal  No respiratory distress  Breath sounds: Normal breath sounds  Lymphadenopathy:      Cervical: No cervical adenopathy  Skin:     General: Skin is warm and dry  Neurological:      Mental Status: He is alert and oriented to person, place, and time  Psychiatric:         Behavior: Behavior normal          Thought Content:  Thought content normal          Judgment: Judgment normal            Current Medications     Current Outpatient Medications:     aspirin 81 mg chewable tablet, Chew 81 mg daily, Disp: , Rfl:     chlordiazePOXIDE (LIBRIUM) 10 mg capsule, TAKE 1 CAPSULE BY MOUTH EVERY DAY, Disp: 90 capsule, Rfl: 0    dorzolamide-timolol (COSOPT) 22 3-6 8 MG/ML ophthalmic solution, INSTILL 1 DROP INTO BOTH EYES TWICE A DAY, Disp: , Rfl: 11    dutasteride (AVODART) 0 5 mg capsule, Take 0 5 mg by mouth daily, Disp: , Rfl:     glucosamine-chondroitin 500-400 MG tablet, Take 1 tablet by mouth Three times a day, Disp: , Rfl:     guaiFENesin (MUCINEX) 600 mg 12 hr tablet, Take 1,200 mg by mouth every 12 (twelve) hours, Disp: , Rfl:     hydrocortisone 2 5 % cream, , Disp: , Rfl:     levocetirizine (XYZAL) 5 MG tablet, Take 1 tablet by mouth Daily, Disp: , Rfl:     meclizine (ANTIVERT) 25 mg tablet, Take 0 5 tablets (12 5 mg total) by mouth every 8 (eight) hours as needed for dizziness, Disp: 30 tablet, Rfl: 1    Multiple Vitamins-Minerals (CENTRUM SILVER ULTRA MENS) TABS, Take 1 tablet by mouth daily, Disp: , Rfl:     Red Yeast Rice 600 MG CAPS, Take 1 tablet by mouth 2 (two) times a day, Disp: , Rfl:     Silodosin 8 MG CAPS, Take 1 capsule by mouth daily, Disp: 90 capsule, Rfl: 0    TRAVATAN Z 0 004 % ophthalmic solution, INSTILL 1 DROP INTO BOTH EYES IN THE EVENING, Disp: , Rfl: 2    docusate sodium (COLACE) 100 mg capsule, Take 100 mg by mouth 2 (two) times a day, Disp: , Rfl:     erythromycin (ILOTYCIN) ophthalmic ointment, APPLY (1CM) BY OPHTHALMIC ROUTE RIBBON INTO THE LOWER CONJUNCTIVAL SAC IN THE RIGHT EYE AS NEEDED, Disp: , Rfl: 5    Health Maintenance     Health Maintenance   Topic Date Due    COVID-19 Vaccine (1) Never done    Annual Physical  08/27/2019    Depression Screening PHQ  09/01/2021    BMI: Followup Plan  09/01/2021    BMI: Adult  09/01/2021    Fall Risk  09/01/2021    DTaP,Tdap,and Td Vaccines (2 - Td) 04/16/2030    Pneumococcal Vaccine: 65+ Years  Completed    Influenza Vaccine  Completed    HIB Vaccine  Aged Out    Hepatitis B Vaccine  Aged Out    IPV Vaccine  Aged Out    Hepatitis A Vaccine  Aged Out    Meningococcal ACWY Vaccine  Aged Out    HPV Vaccine  Aged [employer] History   Administered Date(s) Administered   Too [employer] INFLUENZA 09/18/2015, 10/18/2016, 09/01/2017, 08/27/2018    Influenza Quadrivalent, 6-35 Months IM 09/01/2017    Influenza Split High Dose Preservative Free IM 09/25/2014, 09/18/2015, 10/18/2016    Influenza, high dose seasonal 0 7 mL 08/27/2018, 09/24/2019, 09/01/2020    Influenza, seasonal, injectable 10/17/2012, 10/09/2013    Pneumococcal Conjugate 13-Valent 06/08/2015    Pneumococcal Polysaccharide PPV23 08/30/2019    Td (adult), adsorbed 10/22/2014    Tdap 04/16/2020    Zoster Vaccine Recombinant 03/30/2018, 05/29/2018       Emmy Heredia DO  [facility]

## 2021-04-27 ENCOUNTER — HOSPITAL ENCOUNTER (OUTPATIENT)
Dept: CT IMAGING | Facility: HOSPITAL | Age: 81
Discharge: HOME | End: 2021-04-27
Payer: COMMERCIAL

## 2021-04-27 DIAGNOSIS — R06.00 DYSPNEA ON EXERTION: ICD-10-CM

## 2021-04-27 DIAGNOSIS — R93.89 ABNORMAL CXR: ICD-10-CM

## 2021-04-27 PROCEDURE — 71250 CT THORAX DX C-: CPT

## 2021-04-27 PROCEDURE — G1004 CDSM NDSC: HCPCS

## 2021-05-03 ENCOUNTER — TELEPHONE (OUTPATIENT)
Dept: FAMILY MEDICINE CLINIC | Facility: CLINIC | Age: 81
End: 2021-05-03

## 2021-05-03 DIAGNOSIS — F41.9 ANXIETY: ICD-10-CM

## 2021-05-03 RX ORDER — CHLORDIAZEPOXIDE HYDROCHLORIDE 10 MG/1
10 CAPSULE, GELATIN COATED ORAL DAILY
Qty: 90 CAPSULE | Refills: 0 | Status: SHIPPED | OUTPATIENT
Start: 2021-05-03 | End: 2021-08-09

## 2021-05-03 NOTE — TELEPHONE ENCOUNTER
Pt called to f/u on chest CT  Also requesting refill for Librium to [facility]

## 2021-05-04 DIAGNOSIS — N40.0 BENIGN PROSTATIC HYPERPLASIA, UNSPECIFIED WHETHER LOWER URINARY TRACT SYMPTOMS PRESENT: ICD-10-CM

## 2021-05-04 RX ORDER — SILODOSIN 8 MG/1
1 CAPSULE ORAL DAILY
Qty: 90 CAPSULE | Refills: 3 | Status: SHIPPED | OUTPATIENT
Start: 2021-05-04 | End: 2022-05-06

## 2021-05-06 ENCOUNTER — OFFICE VISIT (OUTPATIENT)
Dept: FAMILY MEDICINE CLINIC | Facility: CLINIC | Age: 81
End: 2021-05-06
Payer: COMMERCIAL

## 2021-05-06 VITALS
OXYGEN SATURATION: 100 % | HEART RATE: 72 BPM | DIASTOLIC BLOOD PRESSURE: 60 MMHG | SYSTOLIC BLOOD PRESSURE: 100 MMHG | WEIGHT: 181.6 LBS | TEMPERATURE: 96.4 F | HEIGHT: 69 IN | BODY MASS INDEX: 26.9 KG/M2
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
## 2021-05-06 DIAGNOSIS — S80.862A TICK BITE OF LEFT LOWER LEG, INITIAL ENCOUNTER: Primary | ICD-10-CM

## 2021-05-06 DIAGNOSIS — L98.9 SKIN LESION: ICD-10-CM

## 2021-05-06 PROCEDURE — 10120 INC&RMVL FB SUBQ TISS SMPL: CPT | Performed by: FAMILY MEDICINE

## 2021-05-06 PROCEDURE — 1160F RVW MEDS BY RX/DR IN RCRD: CPT | Performed by: FAMILY MEDICINE

## 2021-05-06 PROCEDURE — 1036F TOBACCO NON-USER: CPT | Performed by: FAMILY MEDICINE

## 2021-05-06 PROCEDURE — 99213 OFFICE O/P EST LOW 20 MIN: CPT | Performed by: FAMILY MEDICINE

## 2021-05-06 RX ORDER — DOXYCYCLINE HYCLATE 100 MG/1
200 CAPSULE ORAL ONCE
Qty: 2 CAPSULE | Refills: 0 | Status: SHIPPED | OUTPATIENT
Start: 2021-05-06 | End: 2021-05-06

## 2021-05-06 NOTE — PROGRESS NOTES
Assessment/Plan:    1  Tick bite of left lower leg, initial encounter  Assessment & Plan:  Engorged deer tick imbedded in skin of left lower calf likely there for 1-2 days  The tick was removed using forceps  Area was cleaned with alcohol prep and antibiotic ointment was applied  Patient was instructed to take Doxycycline 200 mg x 1 dose today to prevent lyme disease  Check blood work for lyme disease in 6 weeks  Orders:  -     doxycycline hyclate (VIBRAMYCIN) 100 mg capsule; Take 2 capsules (200 mg total) by mouth once for 1 dose  -     Lyme Antibody Profile with reflex to WB; Future; Expected date: 06/06/2021  -     Foreign body removal    2  Skin lesion  -     Lyme Antibody Profile with reflex to WB; Future; Expected date: 06/06/2021      Subjective:      Patient ID: Tino Reyes  is a 79 y o  male  HPI    Patient is presenting with skin lesion on his left leg  He is concerned of possible Lyme disease  He noticed a small red lesion on his left leg but can not tell if there is a tick there  It does not bother him  He spends a lot of time outdoors  All other ROS negative       The following portions of the patient's history were reviewed and updated as appropriate: allergies, current medications, past family history, past medical history, past social history, past surgical history, and problem list       Current Outpatient Medications:     aspirin 81 mg chewable tablet, Chew 81 mg daily, Disp: , Rfl:     b complex vitamins tablet, Take 1 tablet by mouth daily, Disp: , Rfl:     chlordiazePOXIDE (LIBRIUM) 10 mg capsule, Take 1 capsule (10 mg total) by mouth daily, Disp: 90 capsule, Rfl: 0    docusate sodium (COLACE) 100 mg capsule, Take 100 mg by mouth 2 (two) times a day, Disp: , Rfl:     dorzolamide-timolol (COSOPT) 22 3-6 8 MG/ML ophthalmic solution, INSTILL 1 DROP INTO BOTH EYES TWICE A DAY, Disp: , Rfl: 11    dutasteride (AVODART) 0 5 mg capsule, Take 0 5 mg by mouth daily, Disp: , Rfl:     erythromycin (ILOTYCIN) ophthalmic ointment, APPLY (1CM) BY OPHTHALMIC ROUTE RIBBON INTO THE LOWER CONJUNCTIVAL SAC IN THE RIGHT EYE AS NEEDED, Disp: , Rfl: 5    glucosamine-chondroitin 500-400 MG tablet, Take 1 tablet by mouth Three times a day, Disp: , Rfl:     guaiFENesin (MUCINEX) 600 mg 12 hr tablet, Take 1,200 mg by mouth every 12 (twelve) hours, Disp: , Rfl:     hydrocortisone 2 5 % cream, , Disp: , Rfl:     meclizine (ANTIVERT) 25 mg tablet, Take 0 5 tablets (12 5 mg total) by mouth every 8 (eight) hours as needed for dizziness, Disp: 30 tablet, Rfl: 1    Multiple Vitamins-Minerals (CENTRUM SILVER ULTRA MENS) TABS, Take 1 tablet by mouth daily, Disp: , Rfl:     Red Yeast Rice 600 MG CAPS, Take 1 tablet by mouth 2 (two) times a day, Disp: , Rfl:     Silodosin 8 MG CAPS, Take 1 capsule by mouth daily, Disp: 90 capsule, Rfl: 3    TRAVATAN Z 0 004 % ophthalmic solution, INSTILL 1 DROP INTO BOTH EYES IN THE EVENING, Disp: , Rfl: 2    doxycycline hyclate (VIBRAMYCIN) 100 mg capsule, Take 2 capsules (200 mg total) by mouth once for 1 dose, Disp: 2 capsule, Rfl: 0    levocetirizine (XYZAL) 5 MG tablet, Take 1 tablet by mouth Daily, Disp: , Rfl:       Review of Systems   Constitutional: Negative for chills and fever  HENT: Negative for ear pain and sore throat  Eyes: Negative for pain and visual disturbance  Respiratory: Negative for cough and shortness of breath  Cardiovascular: Negative for chest pain and palpitations  Gastrointestinal: Negative for abdominal pain and vomiting  Genitourinary: Negative for dysuria and hematuria  Musculoskeletal: Negative for arthralgias and back pain  Skin: Positive for color change  Negative for rash  Neurological: Negative for seizures and syncope  All other systems reviewed and are negative          Objective:      /60 (BP Location: Left arm, Patient Position: Sitting, Cuff Size: Large)   Pulse 72   Temp (!) 96 4 °F (35 8 °C) (Tympanic)   Ht 5' 8 9" (1 75 m)   Wt 82 4 kg (181 lb 9 6 oz)   SpO2 100%   BMI 26 90 kg/m²     Universal Protocol:  Procedure performed by:  Consent: Verbal consent obtained  Risks and benefits: risks, benefits and alternatives were discussed  Consent given by: patient  Time out: Immediately prior to procedure a "time out" was called to verify the correct patient, procedure, equipment, support staff and site/side marked as required  Timeout called at: 5/6/2021 2:30 PM   Patient understanding: patient states understanding of the procedure being performed  Patient identity confirmed: verbally with patient    Foreign body removal    Date/Time: 5/6/2021 2:30 PM  Performed by: Mani Luna DO  Authorized by: Mani Luna DO   Body area: skin  General location: lower extremity  Location details: left lower leg    Sedation:  Patient sedated: no  Patient restrained: no  Localization method: visualized  Removal mechanism: forceps  Dressing: antibiotic ointment  Tendon involvement: none  Depth: subcutaneous  Complexity: simple  1 objects recovered  Objects recovered: Tick   Post-procedure assessment: foreign body removed  Patient tolerance: patient tolerated the procedure well with no immediate complications           Physical Exam  Vitals signs and nursing note reviewed  Constitutional:       General: He is not in acute distress  Appearance: Normal appearance  He is not ill-appearing  HENT:      Head: Normocephalic and atraumatic  Eyes:      Extraocular Movements: Extraocular movements intact  Conjunctiva/sclera: Conjunctivae normal    Neck:      Musculoskeletal: Normal range of motion  Cardiovascular:      Rate and Rhythm: Normal rate and regular rhythm  Pulmonary:      Effort: Pulmonary effort is normal  No respiratory distress  Breath sounds: Normal breath sounds  No wheezing  Skin:     General: Skin is warm  Findings: Lesion present            Neurological:      General: No focal deficit present  Mental Status: He is alert and oriented to person, place, and time     Psychiatric:         Mood and Affect: Mood normal          Behavior: Behavior normal

## 2021-05-06 NOTE — ASSESSMENT & PLAN NOTE
Engorged deer tick imbedded in skin of left lower calf likely there for 1-2 days  The tick was removed using forceps  Area was cleaned with alcohol prep and antibiotic ointment was applied  Patient was instructed to take Doxycycline 200 mg x 1 dose today to prevent lyme disease  Check blood work for lyme disease in 6 weeks

## 2021-05-07 DIAGNOSIS — R91.1 LUNG NODULE < 6CM ON CT: Primary | ICD-10-CM

## 2021-06-17 ENCOUNTER — APPOINTMENT (OUTPATIENT)
Dept: LAB | Facility: CLINIC | Age: 81
End: 2021-06-17
Payer: COMMERCIAL

## 2021-06-17 DIAGNOSIS — S80.862A TICK BITE OF LEFT LOWER LEG, INITIAL ENCOUNTER: ICD-10-CM

## 2021-06-17 DIAGNOSIS — L98.9 SKIN LESION: ICD-10-CM
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
## 2021-06-17 PROCEDURE — 86618 LYME DISEASE ANTIBODY: CPT

## 2021-06-17 PROCEDURE — 36415 COLL VENOUS BLD VENIPUNCTURE: CPT

## 2021-06-18 LAB — B BURGDOR IGG+IGM SER-ACNC: 14

## 2021-06-23 ENCOUNTER — OFFICE VISIT (OUTPATIENT)
Dept: FAMILY MEDICINE CLINIC | Facility: CLINIC | Age: 81
End: 2021-06-23
Payer: COMMERCIAL

## 2021-06-23 VITALS
DIASTOLIC BLOOD PRESSURE: 80 MMHG | BODY MASS INDEX: 27.37 KG/M2 | SYSTOLIC BLOOD PRESSURE: 120 MMHG | OXYGEN SATURATION: 98 % | HEIGHT: 69 IN | HEART RATE: 67 BPM | WEIGHT: 184.8 LBS | TEMPERATURE: 98 F

## 2021-06-23 DIAGNOSIS — R53.83 FATIGUE, UNSPECIFIED TYPE: Primary | ICD-10-CM

## 2021-06-23 DIAGNOSIS — E78.2 MIXED HYPERLIPIDEMIA: ICD-10-CM

## 2021-06-23 PROCEDURE — 99213 OFFICE O/P EST LOW 20 MIN: CPT | Performed by: FAMILY MEDICINE

## 2021-06-23 PROCEDURE — 1036F TOBACCO NON-USER: CPT | Performed by: FAMILY MEDICINE

## 2021-06-23 PROCEDURE — 1160F RVW MEDS BY RX/DR IN RCRD: CPT | Performed by: FAMILY MEDICINE

## 2021-06-23 RX ORDER — TAFLUPROST 0 MG/.3ML
SOLUTION/ DROPS OPHTHALMIC
Status: ON HOLD | COMMUNITY

## 2021-06-23 NOTE — PROGRESS NOTES
[facility]      NAME: Winston Shelton  AGE: 79 y o  SEX: male  : 1940   MRN: [id number]    DATE: 2021  TIME: 4:34 PM    Assessment and Plan     Problem List Items Addressed This Visit     Mixed hyperlipidemia    Relevant Orders    Lipid Panel with Direct LDL reflex      Other Visit Diagnoses     Fatigue, unspecified type    -  Primary    Relevant Orders    CBC and differential    Comprehensive metabolic panel    TSH, 3rd generation with Free T4 reflex      Reviewed package insert for patient's previous glaucoma drops  He is now on product without any beta-blocker in it  Will watch over the next several months to see if his fatigue changes  He has follow-up with his eye doctor in August   We also reviewed his recent CT scan which did show a 1 6 cm ground-glass opacity  He will have and a follow-up CT scan in July  He will be due for his annual wellness exam in September and he will have routine labs done prior to that  Return to office in:  P r n  Chief Complaint     Chief Complaint   Patient presents with    Fatigue     wants to discuss medication        History of Present Illness     Patient return to the office to discuss his ongoing symptoms malaise and fatigue  He read the packaging information for his eyedrops, Cosopt which includes timolol and saw that it listed fatigue as a possible side effect  He has discussed this with his ophthalmologist and his eyedrops have been changed within the last several weeks  He has not noticed any particular change in his symptoms as of yet  The following portions of the patient's history were reviewed and updated as appropriate: allergies, current medications, past family history, past medical history, past social history, past surgical history and problem list     Review of Systems   Review of Systems   Constitutional: Positive for fatigue  Respiratory: Negative  Cardiovascular: Negative      Gastrointestinal: Negative  Genitourinary: Negative  Musculoskeletal: Negative  Psychiatric/Behavioral: Negative  Active Problem List     Patient Active Problem List   Diagnosis    Acute right-sided low back pain without sciatica    Renal lithiasis    Persistent cough for 3 weeks or longer    Mild cognitive impairment    Mixed hyperlipidemia    Oropharyngeal dysphagia    Abnormal diffusion capacity determined by pulmonary function test    Sjogren's syndrome (HCC)    Irritable bowel syndrome    Gastroesophageal reflux disease without esophagitis    Enlarged prostate without lower urinary tract symptoms (luts)    Dysplastic nevus of face    Arthritis    Anxiety    Antithrombinemia (HCC)    Allergic rhinitis    Neck pain    Glaucoma    Eczema    Neck mass    Tick bite of left lower leg       Objective   /80 (BP Location: Right arm, Patient Position: Sitting, Cuff Size: Standard)   Pulse 67   Temp 98 °F (36 7 °C) (Temporal)   Ht 5' 8 9" (1 75 m)   Wt 83 8 kg (184 lb 12 8 oz)   SpO2 98%   BMI 27 37 kg/m²     Physical Exam  Vitals and nursing note reviewed  Constitutional:       General: He is not in acute distress  Appearance: He is well-developed  He is not diaphoretic  HENT:      Head: Normocephalic and atraumatic  Eyes:      General:         Right eye: No discharge  Conjunctiva/sclera: Conjunctivae normal       Pupils: Pupils are equal, round, and reactive to light  Neck:      Thyroid: No thyromegaly  Cardiovascular:      Rate and Rhythm: Normal rate and regular rhythm  Pulmonary:      Effort: Pulmonary effort is normal  No respiratory distress  Breath sounds: Normal breath sounds  Musculoskeletal:      Cervical back: Normal range of motion  Lymphadenopathy:      Cervical: No cervical adenopathy  Skin:     General: Skin is warm and dry  Neurological:      Mental Status: He is alert and oriented to person, place, and time     Psychiatric:         Behavior: Behavior normal          Thought Content:  Thought content normal          Judgment: Judgment normal            Current Medications     Current Outpatient Medications:     aspirin 81 mg chewable tablet, Chew 81 mg daily, Disp: , Rfl:     b complex vitamins tablet, Take 1 tablet by mouth daily, Disp: , Rfl:     chlordiazePOXIDE (LIBRIUM) 10 mg capsule, Take 1 capsule (10 mg total) by mouth daily, Disp: 90 capsule, Rfl: 0    docusate sodium (COLACE) 100 mg capsule, Take 100 mg by mouth 2 (two) times a day, Disp: , Rfl:     dutasteride (AVODART) 0 5 mg capsule, Take 0 5 mg by mouth daily, Disp: , Rfl:     erythromycin (ILOTYCIN) ophthalmic ointment, APPLY (1CM) BY OPHTHALMIC ROUTE RIBBON INTO THE LOWER CONJUNCTIVAL SAC IN THE RIGHT EYE AS NEEDED, Disp: , Rfl: 5    glucosamine-chondroitin 500-400 MG tablet, Take 1 tablet by mouth Three times a day, Disp: , Rfl:     guaiFENesin (MUCINEX) 600 mg 12 hr tablet, Take 1,200 mg by mouth every 12 (twelve) hours, Disp: , Rfl:     hydrocortisone 2 5 % cream, , Disp: , Rfl:     levocetirizine (XYZAL) 5 MG tablet, Take 1 tablet by mouth Daily, Disp: , Rfl:     meclizine (ANTIVERT) 25 mg tablet, Take 0 5 tablets (12 5 mg total) by mouth every 8 (eight) hours as needed for dizziness, Disp: 30 tablet, Rfl: 1    Multiple Vitamins-Minerals (CENTRUM SILVER ULTRA MENS) TABS, Take 1 tablet by mouth daily, Disp: , Rfl:     Red Yeast Rice 600 MG CAPS, Take 1 tablet by mouth 2 (two) times a day, Disp: , Rfl:     Silodosin 8 MG CAPS, Take 1 capsule by mouth daily, Disp: 90 capsule, Rfl: 3    Tafluprost, PF, (Zioptan) 0 0015 % SOLN, Apply to eye, Disp: , Rfl:     TRAVATAN Z 0 004 % ophthalmic solution, INSTILL 1 DROP INTO BOTH EYES IN THE EVENING, Disp: , Rfl: 2    Health Maintenance     Health Maintenance   Topic Date Due    Annual Physical  08/27/2019    Fall Risk  09/01/2021    Depression Screening PHQ  09/01/2021    BMI: Followup Plan  09/01/2021    BMI: Adult 06/23/2022    DTaP,Tdap,and Td Vaccines (2 - Td or Tdap) 04/16/2030    Pneumococcal Vaccine: 65+ Years  Completed    Influenza Vaccine  Completed    COVID-19 Vaccine  Completed    HIB Vaccine  Aged Out    Hepatitis B Vaccine  Aged Out    IPV Vaccine  Aged Out    Hepatitis A Vaccine  Aged Out    Meningococcal ACWY Vaccine  Aged Out    HPV Vaccine  Aged Out     Immunization History   Administered Date(s) Administered    INFLUENZA 09/18/2015, 10/18/2016, 09/01/2017, 08/27/2018    Influenza Quadrivalent, 6-35 Months IM 09/01/2017    Influenza Split High Dose Preservative Free IM 09/25/2014, 09/18/2015, 10/18/2016    Influenza, high dose seasonal 0 7 mL 08/27/2018, 09/24/2019, 09/01/2020    Influenza, seasonal, injectable 10/17/2012, 10/09/2013    Pneumococcal Conjugate 13-Valent 06/08/2015    Pneumococcal Polysaccharide PPV23 08/30/2019    SARS-CoV-2 / COVID-19 mRNA IM (Moderna) 01/19/2021, 02/17/2021    Td (adult), adsorbed 10/22/2014    Tdap 04/16/2020    Zoster Vaccine Recombinant 03/30/2018, 05/29/2018       Salvatore Cyr DO  [facility]

## 2021-07-29 ENCOUNTER — OFFICE VISIT (OUTPATIENT)
Dept: URGENT CARE | Facility: CLINIC | Age: 81
End: 2021-07-29
Payer: COMMERCIAL

## 2021-07-29 VITALS
TEMPERATURE: 97.9 F | HEIGHT: 69 IN | DIASTOLIC BLOOD PRESSURE: 76 MMHG | SYSTOLIC BLOOD PRESSURE: 132 MMHG | WEIGHT: 186 LBS | BODY MASS INDEX: 27.55 KG/M2 | OXYGEN SATURATION: 97 % | HEART RATE: 52 BPM | RESPIRATION RATE: 14 BRPM

## 2021-07-29 DIAGNOSIS — R21 RASH: Primary | ICD-10-CM

## 2021-07-29 PROCEDURE — 99213 OFFICE O/P EST LOW 20 MIN: CPT | Performed by: NURSE PRACTITIONER

## 2021-07-29 NOTE — PROGRESS NOTES
[facility]        NAME: Georgetta Babinski  is a 79 y o  male  : 1940    MRN: [id number]  DATE: 2021  TIME: 4:02 PM    Assessment and Plan   Rash [R21]  1  Rash  hydrocortisone 2 5 % cream     Suspect secondary to a bite  Recommend continue current at home tx with neosporin and hydrocortisone  F/u with derm or pcp with worsening or unresolved symptoms  Patient Instructions     Follow up with PCP in 3-5 days  Proceed to  ER if symptoms worsen  Chief Complaint     Chief Complaint   Patient presents with    Rash     Pt c/o red circular rash on left ankle that he noticed yesterday  Denies fever  Pt has been using Neosporin for symptoms  History of Present Illness   Georgetta Babinski  presents to the clinic c/o    Pt c/o red circular rash on left ankle that he noticed yesterday  Denies fever  Pt has been using Neosporin for symptoms  Podiatry noticed it when he was at the office getting nails trimmed  Denies itching, pain      Review of Systems   Review of Systems   All other systems reviewed and are negative          Current Medications     Long-Term Medications   Medication Sig Dispense Refill    aspirin 81 mg chewable tablet Chew 81 mg daily      b complex vitamins tablet Take 1 tablet by mouth daily      chlordiazePOXIDE (LIBRIUM) 10 mg capsule Take 1 capsule (10 mg total) by mouth daily 90 capsule 0    docusate sodium (COLACE) 100 mg capsule Take 100 mg by mouth 2 (two) times a day      dutasteride (AVODART) 0 5 mg capsule Take 0 5 mg by mouth daily      hydrocortisone 2 5 % cream Apply topically 2 (two) times a day 30 g 0    levocetirizine (XYZAL) 5 MG tablet Take 1 tablet by mouth Daily      meclizine (ANTIVERT) 25 mg tablet Take 0 5 tablets (12 5 mg total) by mouth every 8 (eight) hours as needed for dizziness 30 tablet 1    Multiple Vitamins-Minerals (CENTRUM SILVER ULTRA MENS) TABS Take 1 tablet by mouth daily      Silodosin 8 MG CAPS Take 1 capsule by mouth daily 90 capsule 3    TRAVATAN Z 0 004 % ophthalmic solution INSTILL 1 DROP INTO BOTH EYES IN THE EVENING  2    [DISCONTINUED] hydrocortisone 2 5 % cream          Current Allergies     Allergies as of 07/29/2021 - Reviewed 07/29/2021   Allergen Reaction Noted    Levofloxacin Anaphylaxis 05/21/2012    Azithromycin GI Intolerance 05/07/2013    Ketorolac  10/09/2015    Penicillins Hives 12/07/2012            The following portions of the patient's history were reviewed and updated as appropriate: allergies, current medications, past family history, past medical history, past social history, past surgical history and problem list     Objective   /76   Pulse (!) 52   Temp 97 9 °F (36 6 °C) (Tympanic)   Resp 14   Ht 5' 9" (1 753 m)   Wt 84 4 kg (186 lb)   SpO2 97%   BMI 27 47 kg/m²        Physical Exam     Physical Exam  Vitals and nursing note reviewed  Constitutional:       Appearance: Normal appearance  He is well-developed  HENT:      Head: Normocephalic and atraumatic  Eyes:      General: Lids are normal       Conjunctiva/sclera: Conjunctivae normal       Pupils: Pupils are equal, round, and reactive to light  Cardiovascular:      Rate and Rhythm: Normal rate and regular rhythm  Heart sounds: Normal heart sounds, S1 normal and S2 normal    Pulmonary:      Effort: Pulmonary effort is normal       Breath sounds: Normal breath sounds  Skin:     General: Skin is warm and dry  Findings: Rash present  Rash is macular  Neurological:      Mental Status: He is alert  Psychiatric:         Speech: Speech normal          Behavior: Behavior normal          Thought Content:  Thought content normal          Judgment: Judgment normal

## 2021-08-09 DIAGNOSIS — F41.9 ANXIETY: ICD-10-CM

## 2021-08-09 RX ORDER — CHLORDIAZEPOXIDE HYDROCHLORIDE 10 MG/1
CAPSULE, GELATIN COATED ORAL
Qty: 90 CAPSULE | Refills: 0 | Status: SHIPPED | OUTPATIENT
Start: 2021-08-09 | End: 2021-08-18 | Stop reason: SDUPTHER

## 2021-08-18 ENCOUNTER — TELEPHONE (OUTPATIENT)
Dept: FAMILY MEDICINE CLINIC | Facility: CLINIC | Age: 81
End: 2021-08-18

## 2021-08-18 ENCOUNTER — OFFICE VISIT (OUTPATIENT)
Dept: FAMILY MEDICINE CLINIC | Facility: CLINIC | Age: 81
End: 2021-08-18
Payer: COMMERCIAL

## 2021-08-18 VITALS
TEMPERATURE: 97.1 F | HEIGHT: 69 IN | WEIGHT: 187.8 LBS | BODY MASS INDEX: 27.81 KG/M2 | DIASTOLIC BLOOD PRESSURE: 72 MMHG | SYSTOLIC BLOOD PRESSURE: 110 MMHG | OXYGEN SATURATION: 96 % | HEART RATE: 66 BPM

## 2021-08-18 DIAGNOSIS — M25.511 CHRONIC RIGHT SHOULDER PAIN: Primary | ICD-10-CM

## 2021-08-18 DIAGNOSIS — K58.9 IRRITABLE BOWEL SYNDROME, UNSPECIFIED TYPE: ICD-10-CM

## 2021-08-18 DIAGNOSIS — G89.29 CHRONIC RIGHT SHOULDER PAIN: Primary | ICD-10-CM

## 2021-08-18 DIAGNOSIS — F41.9 ANXIETY: ICD-10-CM

## 2021-08-18 PROCEDURE — 1036F TOBACCO NON-USER: CPT | Performed by: FAMILY MEDICINE

## 2021-08-18 PROCEDURE — 99213 OFFICE O/P EST LOW 20 MIN: CPT | Performed by: FAMILY MEDICINE

## 2021-08-18 PROCEDURE — 1160F RVW MEDS BY RX/DR IN RCRD: CPT | Performed by: FAMILY MEDICINE

## 2021-08-18 RX ORDER — BRINZOLAMIDE 1 %
SUSPENSION, DROPS(FINAL DOSAGE FORM)(ML) OPHTHALMIC (EYE)
Status: ON HOLD | COMMUNITY
Start: 2021-08-15

## 2021-08-18 RX ORDER — CHLORDIAZEPOXIDE HYDROCHLORIDE 10 MG/1
10 CAPSULE, GELATIN COATED ORAL DAILY
Qty: 90 CAPSULE | Refills: 2 | Status: SHIPPED | OUTPATIENT
Start: 2021-08-18 | End: 2021-12-02 | Stop reason: SDUPTHER

## 2021-08-18 NOTE — PROGRESS NOTES
[facility]      NAME: Mik Monaco  AGE: 79 y o  SEX: male  : 1940   MRN: [id number]    DATE: 2021  TIME: 2:34 PM    Assessment and Plan     Problem List Items Addressed This Visit     Irritable bowel syndrome    Relevant Medications    chlordiazePOXIDE (LIBRIUM) 10 mg capsule    Anxiety    Relevant Medications    chlordiazePOXIDE (LIBRIUM) 10 mg capsule      Other Visit Diagnoses     Chronic right shoulder pain    -  Primary    advil prn or topical pain reliever   Home exercise/stretches              Return to office in:  P r n  Chief Complaint     Chief Complaint   Patient presents with    Shoulder Pain     Right shoulder pain        History of Present Illness     Who presents with a primary complaint of right shoulder pain  This is been a chronic issue and off recently somewhat worse  Bothers him primarily in the morning where he has stiffness and loss of range of motion  After he uses it a bit throughout the course of the day it does loosen up  He has some mild discomfort  He has taken Tylenol for it as needed  The following portions of the patient's history were reviewed and updated as appropriate: allergies, current medications, past family history, past medical history, past social history, past surgical history and problem list     Review of Systems   Review of Systems   Musculoskeletal: Positive for arthralgias (Right shoulder)         Active Problem List     Patient Active Problem List   Diagnosis    Acute right-sided low back pain without sciatica    Renal lithiasis    Persistent cough for 3 weeks or longer    Mild cognitive impairment    Mixed hyperlipidemia    Oropharyngeal dysphagia    Abnormal diffusion capacity determined by pulmonary function test    Sjogren's syndrome (HCC)    Irritable bowel syndrome    Gastroesophageal reflux disease without esophagitis    Enlarged prostate without lower urinary tract symptoms (luts)    Dysplastic nevus of face    Arthritis    Anxiety    Antithrombinemia (HCC)    Allergic rhinitis    Neck pain    Glaucoma    Eczema    Neck mass    Tick bite of left lower leg       Objective   /72 (BP Location: Left arm, Patient Position: Sitting, Cuff Size: Standard)   Pulse 66   Temp (!) 97 1 °F (36 2 °C) (Temporal)   Ht 5' 9" (1 753 m)   Wt 85 2 kg (187 lb 12 8 oz)   SpO2 96%   BMI 27 73 kg/m²     Physical Exam  Vitals and nursing note reviewed  Constitutional:       General: He is not in acute distress  Appearance: He is well-developed  He is not diaphoretic  HENT:      Head: Normocephalic and atraumatic  Eyes:      General:         Right eye: No discharge  Conjunctiva/sclera: Conjunctivae normal       Pupils: Pupils are equal, round, and reactive to light  Neck:      Thyroid: No thyromegaly  Cardiovascular:      Rate and Rhythm: Normal rate and regular rhythm  Pulmonary:      Effort: Pulmonary effort is normal  No respiratory distress  Breath sounds: Normal breath sounds  Musculoskeletal:      Cervical back: Normal range of motion  Comments: Right shoulder mildly reduced range of motion  Lymphadenopathy:      Cervical: No cervical adenopathy  Skin:     General: Skin is warm and dry  Neurological:      Mental Status: He is alert and oriented to person, place, and time  Psychiatric:         Behavior: Behavior normal          Thought Content:  Thought content normal          Judgment: Judgment normal            Current Medications     Current Outpatient Medications:     aspirin 81 mg chewable tablet, Chew 81 mg daily, Disp: , Rfl:     b complex vitamins tablet, Take 1 tablet by mouth daily, Disp: , Rfl:     chlordiazePOXIDE (LIBRIUM) 10 mg capsule, Take 1 capsule (10 mg total) by mouth daily, Disp: 90 capsule, Rfl: 2    docusate sodium (COLACE) 100 mg capsule, Take 100 mg by mouth 2 (two) times a day, Disp: , Rfl:     dutasteride (AVODART) 0 5 mg capsule, Take 0 5 mg by mouth daily, Disp: , Rfl:     erythromycin (ILOTYCIN) ophthalmic ointment, APPLY (1CM) BY OPHTHALMIC ROUTE RIBBON INTO THE LOWER CONJUNCTIVAL SAC IN THE RIGHT EYE AS NEEDED, Disp: , Rfl: 5    glucosamine-chondroitin 500-400 MG tablet, Take 1 tablet by mouth Three times a day, Disp: , Rfl:     guaiFENesin (MUCINEX) 600 mg 12 hr tablet, Take 1,200 mg by mouth every 12 (twelve) hours, Disp: , Rfl:     hydrocortisone 2 5 % cream, Apply topically 2 (two) times a day, Disp: 30 g, Rfl: 0    levocetirizine (XYZAL) 5 MG tablet, Take 1 tablet by mouth Daily, Disp: , Rfl:     meclizine (ANTIVERT) 25 mg tablet, Take 0 5 tablets (12 5 mg total) by mouth every 8 (eight) hours as needed for dizziness, Disp: 30 tablet, Rfl: 1    Multiple Vitamins-Minerals (CENTRUM SILVER ULTRA MENS) TABS, Take 1 tablet by mouth daily, Disp: , Rfl:     Red Yeast Rice 600 MG CAPS, Take 1 tablet by mouth 2 (two) times a day, Disp: , Rfl:     Silodosin 8 MG CAPS, Take 1 capsule by mouth daily, Disp: 90 capsule, Rfl: 3    Tafluprost, PF, (Zioptan) 0 0015 % SOLN, Apply to eye, Disp: , Rfl:     TRAVATAN Z 0 004 % ophthalmic solution, INSTILL 1 DROP INTO BOTH EYES IN THE EVENING, Disp: , Rfl: 2    Azopt 1 % ophthalmic suspension, , Disp: , Rfl:     Health Maintenance     Health Maintenance   Topic Date Due    Annual Physical  08/27/2019    Fall Risk  09/01/2021    Depression Screening PHQ  09/01/2021    BMI: Followup Plan  09/01/2021    Influenza Vaccine (1) 09/01/2021    BMI: Adult  07/29/2022    DTaP,Tdap,and Td Vaccines (2 - Td or Tdap) 04/16/2030    Pneumococcal Vaccine: 65+ Years  Completed    COVID-19 Vaccine  Completed    HIB Vaccine  Aged Out    Hepatitis B Vaccine  Aged Out    IPV Vaccine  Aged Out    Hepatitis A Vaccine  Aged Out    Meningococcal ACWY Vaccine  Aged Out    HPV Vaccine  Aged [employer] History   Administered Date(s) Administered    INFLUENZA 09/18/2015, 10/18/2016, 09/01/2017, 08/27/2018    Influenza Quadrivalent, 6-35 Months IM 09/01/2017    Influenza Split High Dose Preservative Free IM 09/25/2014, 09/18/2015, 10/18/2016    Influenza, high dose seasonal 0 7 mL 08/27/2018, 09/24/2019, 09/01/2020    Influenza, seasonal, injectable 10/17/2012, 10/09/2013    Pneumococcal Conjugate 13-Valent 06/08/2015    Pneumococcal Polysaccharide PPV23 08/30/2019    SARS-CoV-2 / COVID-19 mRNA IM (Moderna) 01/19/2021, 02/17/2021    Td (adult), adsorbed 10/22/2014    Tdap 04/16/2020    Zoster Vaccine Recombinant 03/30/2018, 05/29/2018       Perry Bowman DO  [facility]

## 2021-09-14 ENCOUNTER — OFFICE VISIT (OUTPATIENT)
Dept: FAMILY MEDICINE CLINIC | Facility: CLINIC | Age: 81
End: 2021-09-14
Payer: COMMERCIAL

## 2021-09-14 VITALS
TEMPERATURE: 98.7 F | OXYGEN SATURATION: 97 % | HEIGHT: 69 IN | HEART RATE: 82 BPM | BODY MASS INDEX: 27.81 KG/M2 | SYSTOLIC BLOOD PRESSURE: 130 MMHG | DIASTOLIC BLOOD PRESSURE: 60 MMHG | WEIGHT: 187.8 LBS

## 2021-09-14 DIAGNOSIS — K58.9 IRRITABLE BOWEL SYNDROME, UNSPECIFIED TYPE: ICD-10-CM

## 2021-09-14 DIAGNOSIS — E78.2 MIXED HYPERLIPIDEMIA: ICD-10-CM

## 2021-09-14 DIAGNOSIS — N40.0 ENLARGED PROSTATE WITHOUT LOWER URINARY TRACT SYMPTOMS (LUTS): ICD-10-CM

## 2021-09-14 DIAGNOSIS — R13.10 DYSPHAGIA, UNSPECIFIED TYPE: ICD-10-CM

## 2021-09-14 DIAGNOSIS — Z23 NEED FOR VACCINATION: ICD-10-CM

## 2021-09-14 DIAGNOSIS — R13.12 OROPHARYNGEAL DYSPHAGIA: ICD-10-CM

## 2021-09-14 DIAGNOSIS — K21.9 GASTROESOPHAGEAL REFLUX DISEASE WITHOUT ESOPHAGITIS: ICD-10-CM

## 2021-09-14 DIAGNOSIS — Z00.00 MEDICARE ANNUAL WELLNESS VISIT, SUBSEQUENT: Primary | ICD-10-CM

## 2021-09-14 PROBLEM — M54.50 ACUTE RIGHT-SIDED LOW BACK PAIN WITHOUT SCIATICA: Status: RESOLVED | Noted: 2017-09-01 | Resolved: 2021-09-14

## 2021-09-14 PROCEDURE — 99214 OFFICE O/P EST MOD 30 MIN: CPT | Performed by: FAMILY MEDICINE

## 2021-09-14 PROCEDURE — 1125F AMNT PAIN NOTED PAIN PRSNT: CPT | Performed by: FAMILY MEDICINE

## 2021-09-14 PROCEDURE — 3288F FALL RISK ASSESSMENT DOCD: CPT | Performed by: FAMILY MEDICINE

## 2021-09-14 PROCEDURE — 90471 IMMUNIZATION ADMIN: CPT | Performed by: FAMILY MEDICINE

## 2021-09-14 PROCEDURE — 1036F TOBACCO NON-USER: CPT | Performed by: FAMILY MEDICINE

## 2021-09-14 PROCEDURE — 3725F SCREEN DEPRESSION PERFORMED: CPT | Performed by: FAMILY MEDICINE

## 2021-09-14 PROCEDURE — G0439 PPPS, SUBSEQ VISIT: HCPCS | Performed by: FAMILY MEDICINE

## 2021-09-14 PROCEDURE — 1170F FXNL STATUS ASSESSED: CPT | Performed by: FAMILY MEDICINE

## 2021-09-14 PROCEDURE — 90662 IIV NO PRSV INCREASED AG IM: CPT | Performed by: FAMILY MEDICINE

## 2021-09-14 PROCEDURE — 1160F RVW MEDS BY RX/DR IN RCRD: CPT | Performed by: FAMILY MEDICINE

## 2021-09-14 RX ORDER — NETARSUDIL AND LATANOPROST OPHTHALMIC SOLUTION, 0.02%/0.005% .2; .05 MG/ML; MG/ML
SOLUTION/ DROPS OPHTHALMIC; TOPICAL
Status: ON HOLD | COMMUNITY
Start: 2021-08-31

## 2021-09-14 NOTE — PATIENT INSTRUCTIONS

## 2021-09-14 NOTE — PROGRESS NOTES
Assessment and Plan:     Problem List Items Addressed This Visit     Oropharyngeal dysphagia    Mixed hyperlipidemia    Irritable bowel syndrome    Gastroesophageal reflux disease without esophagitis    Enlarged prostate without lower urinary tract symptoms (luts)      Other Visit Diagnoses     Medicare annual wellness visit, subsequent    -  Primary      Questionnaire reviewed  Immunization health screening history updated  Advance directive in place  Dysphagia, unspecified type        Relevant Orders    Ambulatory Referral to Otolaryngology        BMI Counseling: Body mass index is 27 73 kg/m²  The BMI is above normal  Nutrition recommendations include encouraging healthy choices of fruits and vegetables, consuming healthier snacks and increasing intake of lean protein  Exercise recommendations include exercising 3-5 times per week  No pharmacotherapy was ordered  Rationale for BMI follow-up plan is due to patient being overweight or obese  Depression Screening and Follow-up Plan:   Patient was screened for depression during today's encounter  They screened negative with a PHQ-2 score of 2  Preventive health issues were discussed with patient, and age appropriate screening tests were ordered as noted in patient's After Visit Summary  Personalized health advice and appropriate referrals for health education or preventive services given if needed, as noted in patient's After Visit Summary  History of Present Illness:     Patient presents for Welcome to Medicare visit       Patient Care Team:  Mayra Elizabeth DO as PCP - Lydia Murillo MD     Review of Systems:     Review of Systems   Problem List:     Patient Active Problem List   Diagnosis    Renal lithiasis    Persistent cough for 3 weeks or longer    Mild cognitive impairment    Mixed hyperlipidemia    Oropharyngeal dysphagia    Abnormal diffusion capacity determined by pulmonary function test    Sjogren's syndrome ([facility] [address] )  Irritable bowel syndrome    Gastroesophageal reflux disease without esophagitis    Enlarged prostate without lower urinary tract symptoms (luts)    Dysplastic nevus of face    Arthritis    Anxiety    Antithrombinemia (HCC)    Allergic rhinitis    Neck pain    Glaucoma    Eczema    Neck mass    Tick bite of left lower leg      Past Medical and Surgical History:     Past Medical History:   Diagnosis Date    Abnormal diffusion capacity determined by pulmonary function test 4/25/2017    Actinic keratosis     Acute right-sided low back pain without sciatica 9/1/2017    Adverse effect of correct medicinal substance properly administered     Arthralgia     Candidiasis, mouth     Chronic allergic rhinitis     Chronic sinusitis     Dermatitis     Diverticulosis     Glaucoma     Inguinal hernia, left     Lump of skin     Male erectile dysfunction     Open comedone     Palpitations     Renal calculi     Sebaceous cyst     Seborrheic keratosis     Skin disorder     Unspecified chronic bronchitis (HCC)     Visual disturbances      Past Surgical History:   Procedure Laterality Date    COLONOSCOPY      INGUINAL HERNIA REPAIR Bilateral     left- last assessed: 11/25/14, Resolved: 5/18/15, onset 12/11/13    MOUTH SURGERY        Family History:     Family History   Problem Relation Age of Onset    Brain cancer Mother     Heart failure Mother     Prostate cancer Mother       Social History:     Social History     Socioeconomic History    Marital status:       Spouse name: None    Number of children: None    Years of education: None    Highest education level: None   Occupational History    None   Tobacco Use    Smoking status: Former Smoker    Smokeless tobacco: Never Used   Vaping Use    Vaping Use: Never used   Substance and Sexual Activity    Alcohol use: Yes     Comment: social    Drug use: No    Sexual activity: Not Currently   Other Topics Concern    None   Social History Narrative    None     Social Determinants of Health     Financial Resource Strain:     Difficulty of Paying Living Expenses:    Food Insecurity:     Worried About Running Out of Food in the Last Year:     [address] in the Last Year:    Transportation Needs:     Lack of Transportation (Medical):      Lack of Transportation (Non-Medical):    Physical Activity:     Days of Exercise per Week:     Minutes of Exercise per Session:    Stress:     Feeling of Stress :    Social Connections:     Frequency of Communication with Friends and Family:     Frequency of Social Gatherings with Friends and Family:     Attends Denominational Services:     Active Member of Clubs or Organizations:     Attends Club or Organization Meetings:     Marital Status:    Intimate Partner Violence:     Fear of Current or Ex-Partner:     Emotionally Abused:     Physically Abused:     Sexually Abused:       Medications and Allergies:     Current Outpatient Medications   Medication Sig Dispense Refill    aspirin 81 mg chewable tablet Chew 81 mg daily      Azopt 1 % ophthalmic suspension       b complex vitamins tablet Take 1 tablet by mouth daily      chlordiazePOXIDE (LIBRIUM) 10 mg capsule Take 1 capsule (10 mg total) by mouth daily 90 capsule 2    docusate sodium (COLACE) 100 mg capsule Take 100 mg by mouth 2 (two) times a day      dutasteride (AVODART) 0 5 mg capsule Take 0 5 mg by mouth daily      erythromycin (ILOTYCIN) ophthalmic ointment APPLY (1CM) BY OPHTHALMIC ROUTE RIBBON INTO THE LOWER CONJUNCTIVAL SAC IN THE RIGHT EYE AS NEEDED  5    glucosamine-chondroitin 500-400 MG tablet Take 1 tablet by mouth Three times a day      guaiFENesin (MUCINEX) 600 mg 12 hr tablet Take 1,200 mg by mouth every 12 (twelve) hours      hydrocortisone 2 5 % cream Apply topically 2 (two) times a day 30 g 0    levocetirizine (XYZAL) 5 MG tablet Take 1 tablet by mouth Daily      meclizine (ANTIVERT) 25 mg tablet Take 0 5 tablets (12 5 mg total) by mouth every 8 (eight) hours as needed for dizziness 30 tablet 1    Multiple Vitamins-Minerals (CENTRUM SILVER ULTRA MENS) TABS Take 1 tablet by mouth daily      Red Yeast Rice 600 MG CAPS Take 1 tablet by mouth 2 (two) times a day      Silodosin 8 MG CAPS Take 1 capsule by mouth daily 90 capsule 3    Tafluprost, PF, (Zioptan) 0 0015 % SOLN Apply to eye      TRAVATAN Z 0 004 % ophthalmic solution INSTILL 1 DROP INTO BOTH EYES IN THE EVENING  2    Rocklatan 0 02-0 005 % SOLN INSTILL 1 DROP INTO BOTH EYES EVERY DAY IN THE EVENING       No current facility-administered medications for this visit  Allergies   Allergen Reactions    Levofloxacin Anaphylaxis    Azithromycin GI Intolerance    Ketorolac     Penicillins Hives      Immunizations:     Immunization History   Administered Date(s) Administered    INFLUENZA 09/18/2015, 10/18/2016, 09/01/2017, 08/27/2018    Influenza Quadrivalent, 6-35 Months IM 09/01/2017    Influenza Split High Dose Preservative Free IM 09/25/2014, 09/18/2015, 10/18/2016    Influenza, high dose seasonal 0 7 mL 08/27/2018, 09/24/2019, 09/01/2020    Influenza, seasonal, injectable 10/17/2012, 10/09/2013    Pneumococcal Conjugate 13-Valent 06/08/2015    Pneumococcal Polysaccharide PPV23 08/30/2019    SARS-CoV-2 / COVID-19 mRNA IM (Moderna) 01/19/2021, 02/17/2021    Td (adult), adsorbed 10/22/2014    Tdap 04/16/2020    Zoster Vaccine Recombinant 03/30/2018, 05/29/2018      Health Maintenance: There are no preventive care reminders to display for this patient  Topic Date Due    Influenza Vaccine (1) 09/01/2021      Medicare Screening Tests and Risk Assessments:     Jennifer Norris is here for his Subsequent Wellness visit  Health Risk Assessment:   Patient rates overall health as fair  Patient feels that their physical health rating is slightly worse  Patient is dissatisfied with their life  Eyesight was rated as same   Hearing was rated as slightly worse  Patient feels that their emotional and mental health rating is same  Patients states they are never, rarely angry  Patient states they are never, rarely unusually tired/fatigued  Pain experienced in the last 7 days has been some  Patient's pain rating has been 3/10  Patient states that he has experienced no weight loss or gain in last 6 months  Depression Screening:   PHQ-2 Score: 2      Fall Risk Screening: In the past year, patient has experienced: no history of falling in past year      Home Safety:  Patient has trouble with stairs inside or outside of their home  Patient has working smoke alarms and has working carbon monoxide detector  Home safety hazards include: none  Nutrition:   Current diet is Regular  Medications:   Patient is currently taking over-the-counter supplements  OTC medications include: see medication list  Patient is able to manage medications  Activities of Daily Living (ADLs)/Instrumental Activities of Daily Living (IADLs):   Walk and transfer into and out of bed and chair?: Yes  Dress and groom yourself?: Yes    Bathe or shower yourself?: Yes    Feed yourself?  Yes  Do your laundry/housekeeping?: Yes  Manage your money, pay your bills and track your expenses?: Yes  Make your own meals?: Yes    Do your own shopping?: Yes    Previous Hospitalizations:   Any hospitalizations or ED visits within the last 12 months?: No      Advance Care Planning:   Living will: Yes    Advanced directive: Yes    Five wishes given: Yes      PREVENTIVE SCREENINGS      Cardiovascular Screening:    General: Screening Not Indicated and History Lipid Disorder      Colorectal Cancer Screening:     General: Screening Not Indicated      Prostate Cancer Screening:    General: Screening Not Indicated      Osteoporosis Screening:    General: Screening Not Indicated      Abdominal Aortic Aneurysm (AAA) Screening:    Risk factors include: tobacco use        Lung Cancer Screening:     General: Screening Not Indicated    Screening, Brief Intervention, and Referral to Treatment (SBIRT)    Screening  Typical number of drinks in a day: 1  Typical number of drinks in a week: 5  Interpretation: Low risk drinking behavior  Single Item Drug Screening:  How often have you used an illegal drug (including marijuana) or a prescription medication for non-medical reasons in the past year? never    Single Item Drug Screen Score: 0  Interpretation: Negative screen for possible drug use disorder    Brief Intervention  Alcohol & drug use screenings were reviewed  No concerns regarding substance use disorder identified       No exam data present     Physical Exam:     /60 (BP Location: Left arm, Patient Position: Sitting, Cuff Size: Standard)   Pulse 82   Temp 98 7 °F (37 1 °C) (Temporal)   Ht 5' 9" (1 753 m)   Wt 85 2 kg (187 lb 12 8 oz)   SpO2 97%   BMI 27 73 kg/m²     Physical Exam     Kaylee Hair, DO

## 2021-09-14 NOTE — PROGRESS NOTES
[facility]      NAME: Elaina Cramer  AGE: 79 y o  SEX: male  : 1940   MRN: [id number]    DATE: 2021  TIME: 2:55 PM    Assessment and Plan     Problem List Items Addressed This Visit     Oropharyngeal dysphagia     Follow-up/2nd opinion with ENT         Mixed hyperlipidemia     Due for recheck of lipid panel  Ordered and will be done in the near future         Irritable bowel syndrome    Gastroesophageal reflux disease without esophagitis    Enlarged prostate without lower urinary tract symptoms (luts)     Continue Avodart and silodosin           Other Visit Diagnoses     Medicare annual wellness visit, subsequent    -  Primary      Questionnaire reviewed  Immunization health screening history updated  Advance directive in place  Dysphagia, unspecified type        Relevant Orders    Ambulatory Referral to Otolaryngology    Need for vaccination        Relevant Orders    influenza vaccine, high-dose, PF 0 7 mL (FLUZONE HIGH-DOSE) (Completed)              Return to office in:  6 months    Chief Complaint     Chief Complaint   Patient presents with    Medicare Wellness Visit    Follow-up       History of Present Illness     Patient was seen for routine follow-up of chronic medical problems  He has 2 main concerns today  He complains of some issues with swallowing and food getting stuck at times  He was sent to GI approximately 5 years ago, he had an EGD and follow-up with speech therapy  There was some mild oropharyngeal dysfunction  He was given recommendations for diet  At this time he feels that the problem has gotten somewhat worse and would like a 2nd opinion  He also complained continues to complain of weakness and fatigue  This is very nonspecific  He does not describe shortness of breath chest pain or other specific associated symptoms  His chronic medications include Librium for anxiety and IBS  He takes Avodart and sildosin for BPH symptoms        The following portions of the patient's history were reviewed and updated as appropriate: allergies, current medications, past family history, past medical history, past social history, past surgical history and problem list     Review of Systems   Review of Systems   Constitutional: Negative  Respiratory: Negative  Cardiovascular: Negative  Gastrointestinal: Negative  Genitourinary: Negative  Musculoskeletal: Negative  Psychiatric/Behavioral: Negative  Active Problem List     Patient Active Problem List   Diagnosis    Renal lithiasis    Persistent cough for 3 weeks or longer    Mild cognitive impairment    Mixed hyperlipidemia    Oropharyngeal dysphagia    Abnormal diffusion capacity determined by pulmonary function test    Sjogren's syndrome (HCC)    Irritable bowel syndrome    Gastroesophageal reflux disease without esophagitis    Enlarged prostate without lower urinary tract symptoms (luts)    Dysplastic nevus of face    Arthritis    Anxiety    Antithrombinemia ([address] )    Allergic rhinitis    Neck pain    Glaucoma    Eczema    Neck mass    Tick bite of left lower leg       Objective   /60 (BP Location: Left arm, Patient Position: Sitting, Cuff Size: Standard)   Pulse 82   Temp 98 7 °F (37 1 °C) (Temporal)   Ht 5' 9" (1 753 m)   Wt 85 2 kg (187 lb 12 8 oz)   SpO2 97%   BMI 27 73 kg/m²     Physical Exam  Vitals and nursing note reviewed  Constitutional:       General: He is not in acute distress  Appearance: He is well-developed  He is not diaphoretic  HENT:      Head: Normocephalic and atraumatic  Eyes:      General:         Right eye: No discharge  Conjunctiva/sclera: Conjunctivae normal       Pupils: Pupils are equal, round, and reactive to light  Neck:      Thyroid: No thyromegaly  Cardiovascular:      Rate and Rhythm: Normal rate and regular rhythm  Pulmonary:      Effort: Pulmonary effort is normal  No respiratory distress        Breath sounds: Normal breath sounds  Musculoskeletal:      Cervical back: Normal range of motion  Lymphadenopathy:      Cervical: No cervical adenopathy  Skin:     General: Skin is warm and dry  Neurological:      Mental Status: He is alert and oriented to person, place, and time  Psychiatric:         Behavior: Behavior normal          Thought Content:  Thought content normal          Judgment: Judgment normal            Current Medications     Current Outpatient Medications:     aspirin 81 mg chewable tablet, Chew 81 mg daily, Disp: , Rfl:     Azopt 1 % ophthalmic suspension, , Disp: , Rfl:     b complex vitamins tablet, Take 1 tablet by mouth daily, Disp: , Rfl:     chlordiazePOXIDE (LIBRIUM) 10 mg capsule, Take 1 capsule (10 mg total) by mouth daily, Disp: 90 capsule, Rfl: 2    docusate sodium (COLACE) 100 mg capsule, Take 100 mg by mouth 2 (two) times a day, Disp: , Rfl:     dutasteride (AVODART) 0 5 mg capsule, Take 0 5 mg by mouth daily, Disp: , Rfl:     erythromycin (ILOTYCIN) ophthalmic ointment, APPLY (1CM) BY OPHTHALMIC ROUTE RIBBON INTO THE LOWER CONJUNCTIVAL SAC IN THE RIGHT EYE AS NEEDED, Disp: , Rfl: 5    glucosamine-chondroitin 500-400 MG tablet, Take 1 tablet by mouth Three times a day, Disp: , Rfl:     guaiFENesin (MUCINEX) 600 mg 12 hr tablet, Take 1,200 mg by mouth every 12 (twelve) hours, Disp: , Rfl:     hydrocortisone 2 5 % cream, Apply topically 2 (two) times a day, Disp: 30 g, Rfl: 0    levocetirizine (XYZAL) 5 MG tablet, Take 1 tablet by mouth Daily, Disp: , Rfl:     meclizine (ANTIVERT) 25 mg tablet, Take 0 5 tablets (12 5 mg total) by mouth every 8 (eight) hours as needed for dizziness, Disp: 30 tablet, Rfl: 1    Multiple Vitamins-Minerals (CENTRUM SILVER ULTRA MENS) TABS, Take 1 tablet by mouth daily, Disp: , Rfl:     Red Yeast Rice 600 MG CAPS, Take 1 tablet by mouth 2 (two) times a day, Disp: , Rfl:     Silodosin 8 MG CAPS, Take 1 capsule by mouth daily, Disp: 90 capsule, Rfl: 3    Tafluprost, PF, (Zioptan) 0 0015 % SOLN, Apply to eye, Disp: , Rfl:     TRAVATAN Z 0 004 % ophthalmic solution, INSTILL 1 DROP INTO BOTH EYES IN THE EVENING, Disp: , Rfl: 2    Rocklatan 0 02-0 005 % SOLN, INSTILL 1 DROP INTO BOTH EYES EVERY DAY IN THE EVENING, Disp: , Rfl:     Health Maintenance     Health Maintenance   Topic Date Due    Annual Physical  08/27/2019    BMI: Adult  08/18/2022    Fall Risk  09/14/2022    Depression Screening  09/14/2022    BMI: Followup Plan  09/14/2022    DTaP,Tdap,and Td Vaccines (2 - Td or Tdap) 04/16/2030    Pneumococcal Vaccine: 65+ Years  Completed    Influenza Vaccine  Completed    COVID-19 Vaccine  Completed    HIB Vaccine  Aged Out    Hepatitis B Vaccine  Aged Out    IPV Vaccine  Aged Out    Hepatitis A Vaccine  Aged Out    Meningococcal ACWY Vaccine  Aged Out    HPV Vaccine  Aged Out     Immunization History   Administered Date(s) Administered    INFLUENZA 09/18/2015, 10/18/2016, 09/01/2017, 08/27/2018    Influenza Quadrivalent, 6-35 Months IM 09/01/2017    Influenza Split High Dose Preservative Free IM 09/25/2014, 09/18/2015, 10/18/2016    Influenza, high dose seasonal 0 7 mL 08/27/2018, 09/24/2019, 09/01/2020, 09/14/2021    Influenza, seasonal, injectable 10/17/2012, 10/09/2013    Pneumococcal Conjugate 13-Valent 06/08/2015    Pneumococcal Polysaccharide PPV23 08/30/2019    SARS-CoV-2 / COVID-19 mRNA IM (Moderna) 01/19/2021, 02/17/2021    Td (adult), adsorbed 10/22/2014    Tdap 04/16/2020    Zoster Vaccine Recombinant 03/30/2018, 05/29/2018       Court Cea, DO  [facility]

## 2021-09-17 ENCOUNTER — APPOINTMENT (OUTPATIENT)
Dept: LAB | Facility: CLINIC | Age: 81
End: 2021-09-17
Payer: COMMERCIAL

## 2021-09-17 DIAGNOSIS — R53.83 FATIGUE, UNSPECIFIED TYPE: ICD-10-CM

## 2021-09-17 DIAGNOSIS — E78.2 MIXED HYPERLIPIDEMIA: ICD-10-CM

## 2021-09-17 LAB
ALBUMIN SERPL BCP-MCNC: 3.3 G/DL (ref 3.5–5)
ALP SERPL-CCNC: 64 U/L (ref 46–116)
ALT SERPL W P-5'-P-CCNC: 19 U/L (ref 12–78)
ANION GAP SERPL CALCULATED.3IONS-SCNC: 3 MMOL/L (ref 4–13)
AST SERPL W P-5'-P-CCNC: 17 U/L (ref 5–45)
BASOPHILS # BLD AUTO: 0.01 THOUSANDS/ΜL (ref 0–0.1)
BASOPHILS NFR BLD AUTO: 0 % (ref 0–1)
BILIRUB SERPL-MCNC: 0.82 MG/DL (ref 0.2–1)
BUN SERPL-MCNC: 21 MG/DL (ref 5–25)
CALCIUM ALBUM COR SERPL-MCNC: 9.7 MG/DL (ref 8.3–10.1)
CALCIUM SERPL-MCNC: 9.1 MG/DL (ref 8.3–10.1)
CHLORIDE SERPL-SCNC: 107 MMOL/L (ref 100–108)
CHOLEST SERPL-MCNC: 189 MG/DL (ref 50–200)
CO2 SERPL-SCNC: 29 MMOL/L (ref 21–32)
CREAT SERPL-MCNC: 0.95 MG/DL (ref 0.6–1.3)
EOSINOPHIL # BLD AUTO: 0.28 THOUSAND/ΜL (ref 0–0.61)
EOSINOPHIL NFR BLD AUTO: 4 % (ref 0–6)
ERYTHROCYTE [DISTWIDTH] IN BLOOD BY AUTOMATED COUNT: 14.6 % (ref 11.6–15.1)
GFR SERPL CREATININE-BSD FRML MDRD: 74 ML/MIN/1.73SQ M
GLUCOSE P FAST SERPL-MCNC: 90 MG/DL (ref 65–99)
HCT VFR BLD AUTO: 42.3 % (ref 36.5–49.3)
HDLC SERPL-MCNC: 57 MG/DL
HGB BLD-MCNC: 13.8 G/DL (ref 12–17)
IMM GRANULOCYTES # BLD AUTO: 0.02 THOUSAND/UL (ref 0–0.2)
IMM GRANULOCYTES NFR BLD AUTO: 0 % (ref 0–2)
LDLC SERPL CALC-MCNC: 114 MG/DL (ref 0–100)
LYMPHOCYTES # BLD AUTO: 2.21 THOUSANDS/ΜL (ref 0.6–4.47)
LYMPHOCYTES NFR BLD AUTO: 34 % (ref 14–44)
MCH RBC QN AUTO: 32.3 PG (ref 26.8–34.3)
MCHC RBC AUTO-ENTMCNC: 32.6 G/DL (ref 31.4–37.4)
MCV RBC AUTO: 99 FL (ref 82–98)
MONOCYTES # BLD AUTO: 0.71 THOUSAND/ΜL (ref 0.17–1.22)
MONOCYTES NFR BLD AUTO: 11 % (ref 4–12)
NEUTROPHILS # BLD AUTO: 3.24 THOUSANDS/ΜL (ref 1.85–7.62)
NEUTS SEG NFR BLD AUTO: 51 % (ref 43–75)
NRBC BLD AUTO-RTO: 0 /100 WBCS
PLATELET # BLD AUTO: 231 THOUSANDS/UL (ref 149–390)
PMV BLD AUTO: 10.3 FL (ref 8.9–12.7)
POTASSIUM SERPL-SCNC: 4.1 MMOL/L (ref 3.5–5.3)
PROT SERPL-MCNC: 7.3 G/DL (ref 6.4–8.2)
RBC # BLD AUTO: 4.27 MILLION/UL (ref 3.88–5.62)
SODIUM SERPL-SCNC: 139 MMOL/L (ref 136–145)
TRIGL SERPL-MCNC: 88 MG/DL
TSH SERPL DL<=0.05 MIU/L-ACNC: 1.27 UIU/ML (ref 0.36–3.74)
WBC # BLD AUTO: 6.47 THOUSAND/UL (ref 4.31–10.16)

## 2021-09-17 PROCEDURE — 85025 COMPLETE CBC W/AUTO DIFF WBC: CPT

## 2021-09-17 PROCEDURE — 36415 COLL VENOUS BLD VENIPUNCTURE: CPT

## 2021-09-17 PROCEDURE — 80053 COMPREHEN METABOLIC PANEL: CPT

## 2021-09-17 PROCEDURE — 80061 LIPID PANEL: CPT

## 2021-09-17 PROCEDURE — 84443 ASSAY THYROID STIM HORMONE: CPT

## 2021-10-11 ENCOUNTER — OFFICE VISIT (OUTPATIENT)
Dept: FAMILY MEDICINE CLINIC | Facility: CLINIC | Age: 81
End: 2021-10-11
Payer: COMMERCIAL

## 2021-10-11 VITALS
OXYGEN SATURATION: 99 % | TEMPERATURE: 98.1 F | SYSTOLIC BLOOD PRESSURE: 122 MMHG | HEIGHT: 69 IN | DIASTOLIC BLOOD PRESSURE: 80 MMHG | HEART RATE: 76 BPM | WEIGHT: 187 LBS | BODY MASS INDEX: 27.7 KG/M2

## 2021-10-11 DIAGNOSIS — R06.00 DYSPNEA ON EXERTION: Primary | ICD-10-CM

## 2021-10-11 DIAGNOSIS — K58.9 IRRITABLE BOWEL SYNDROME, UNSPECIFIED TYPE: ICD-10-CM

## 2021-10-11 PROBLEM — R06.09 DYSPNEA ON EXERTION: Status: ACTIVE | Noted: 2021-10-11

## 2021-10-11 PROCEDURE — 99214 OFFICE O/P EST MOD 30 MIN: CPT | Performed by: FAMILY MEDICINE

## 2021-10-21 ENCOUNTER — HOSPITAL ENCOUNTER (OUTPATIENT)
Dept: NON INVASIVE DIAGNOSTICS | Facility: CLINIC | Age: 81
Discharge: HOME | End: 2021-10-21
Payer: COMMERCIAL

## 2021-10-21 VITALS
BODY MASS INDEX: 27.7 KG/M2 | HEIGHT: 69 IN | WEIGHT: 187 LBS | DIASTOLIC BLOOD PRESSURE: 80 MMHG | SYSTOLIC BLOOD PRESSURE: 122 MMHG

## 2021-10-21 DIAGNOSIS — R06.00 DYSPNEA ON EXERTION: ICD-10-CM

## 2021-10-21 LAB
AORTIC ROOT: 3.5 CM
AORTIC VALVE MEAN VELOCITY: 21.1 M/S
APICAL FOUR CHAMBER EJECTION FRACTION: 69 %
AV AREA BY CONTINUOUS VTI: 1.2 CM2
AV AREA PEAK VELOCITY: 1.3 CM2
AV LVOT MEAN GRADIENT: 2 MMHG
AV LVOT PEAK GRADIENT: 3 MMHG
AV MEAN GRADIENT: 23 MMHG
AV PEAK GRADIENT: 36 MMHG
AV VALVE AREA: 1.23 CM2
AV VELOCITY RATIO: 0.32
DOP CALC AO PEAK VEL: 3.1 M/S
DOP CALC AO VTI: 74 CM
DOP CALC LVOT AREA: 4.15 CM2
DOP CALC LVOT DIAMETER: 2.3 CM
DOP CALC LVOT PEAK VEL VTI: 22 CM
DOP CALC LVOT PEAK VEL: 1 M/S
DOP CALC LVOT STROKE INDEX: 40.3 ML/M2
DOP CALC LVOT STROKE VOLUME: 91.36 CM3
E WAVE DECELERATION TIME: 131 MS
FRACTIONAL SHORTENING: 35 % (ref 28–44)
INTERVENTRICULAR SEPTUM IN DIASTOLE (PARASTERNAL SHORT AXIS VIEW): 1.3 CM
LAAS-AP2: 17.7 CM2
LAAS-AP4: 22.7 CM2
LEFT INTERNAL DIMENSION IN SYSTOLE: 2.8 CM (ref 2.1–4)
LEFT VENTRICLE DIASTOLIC VOLUME (MOD BIPLANE): 143 ML
LEFT VENTRICLE SYSTOLIC VOLUME (MOD BIPLANE): 59 ML
LEFT VENTRICULAR INTERNAL DIMENSION IN DIASTOLE: 4.3 CM (ref 5.04–7.51)
LEFT VENTRICULAR POSTERIOR WALL IN END DIASTOLE: 1.2 CM
LEFT VENTRICULAR STROKE VOLUME: 56 ML
LV EF: 59 %
MV E'TISSUE VEL-SEP: 10 CM/S
MV PEAK A VEL: 1.09 M/S
MV PEAK E VEL: 81 CM/S
MV STENOSIS PRESSURE HALF TIME: 0 MS
RIGHT VENTRICLE ID DIMENSION: 3.1 CM
SL CV LV EF: 65
SL CV PED ECHO LEFT VENTRICLE DIASTOLIC VOLUME (MOD BIPLANE) 2D: 84 ML
SL CV PED ECHO LEFT VENTRICLE SYSTOLIC VOLUME (MOD BIPLANE) 2D: 29 ML
TR PEAK VELOCITY: 3.1 M/S
TRICUSPID VALVE PEAK REGURGITATION VELOCITY: 3.12 M/S
TRICUSPID VALVE S': 69 CM/S
TV PEAK GRADIENT: 39 MMHG
Z-SCORE OF LEFT VENTRICULAR DIMENSION IN END SYSTOLE: -3.58

## 2021-10-21 PROCEDURE — 93306 TTE W/DOPPLER COMPLETE: CPT | Performed by: INTERNAL MEDICINE

## 2021-10-21 PROCEDURE — 93306 TTE W/DOPPLER COMPLETE: CPT

## 2021-10-25 ENCOUNTER — OFFICE VISIT (OUTPATIENT)
Dept: FAMILY MEDICINE CLINIC | Facility: CLINIC | Age: 81
End: 2021-10-25
Payer: COMMERCIAL

## 2021-10-25 VITALS
BODY MASS INDEX: 27.11 KG/M2 | HEART RATE: 87 BPM | HEIGHT: 69 IN | DIASTOLIC BLOOD PRESSURE: 64 MMHG | OXYGEN SATURATION: 99 % | WEIGHT: 183 LBS | SYSTOLIC BLOOD PRESSURE: 102 MMHG | TEMPERATURE: 98.2 F

## 2021-10-25 DIAGNOSIS — K21.9 GASTROESOPHAGEAL REFLUX DISEASE WITHOUT ESOPHAGITIS: Primary | ICD-10-CM

## 2021-10-25 DIAGNOSIS — K58.9 IRRITABLE BOWEL SYNDROME, UNSPECIFIED TYPE: ICD-10-CM

## 2021-10-25 PROCEDURE — 1036F TOBACCO NON-USER: CPT | Performed by: FAMILY MEDICINE

## 2021-10-25 PROCEDURE — 1160F RVW MEDS BY RX/DR IN RCRD: CPT | Performed by: FAMILY MEDICINE

## 2021-10-25 PROCEDURE — 99213 OFFICE O/P EST LOW 20 MIN: CPT | Performed by: FAMILY MEDICINE

## 2021-10-28 ENCOUNTER — HOSPITAL ENCOUNTER (OUTPATIENT)
Dept: CT IMAGING | Facility: HOSPITAL | Age: 81
Discharge: HOME | End: 2021-10-28
Payer: COMMERCIAL

## 2021-10-28 DIAGNOSIS — R91.1 LUNG NODULE < 6CM ON CT: ICD-10-CM

## 2021-10-28 PROCEDURE — 71250 CT THORAX DX C-: CPT

## 2021-10-28 PROCEDURE — G1004 CDSM NDSC: HCPCS

## 2021-11-04 ENCOUNTER — OFFICE VISIT (OUTPATIENT)
Dept: FAMILY MEDICINE CLINIC | Facility: CLINIC | Age: 81
End: 2021-11-04
Payer: COMMERCIAL

## 2021-11-04 VITALS
TEMPERATURE: 98.2 F | BODY MASS INDEX: 27.28 KG/M2 | SYSTOLIC BLOOD PRESSURE: 122 MMHG | OXYGEN SATURATION: 99 % | HEIGHT: 69 IN | HEART RATE: 89 BPM | WEIGHT: 184.2 LBS | DIASTOLIC BLOOD PRESSURE: 74 MMHG

## 2021-11-04 DIAGNOSIS — R10.31 RLQ ABDOMINAL PAIN: Primary | ICD-10-CM

## 2021-11-04 PROCEDURE — 1036F TOBACCO NON-USER: CPT | Performed by: FAMILY MEDICINE

## 2021-11-04 PROCEDURE — 99213 OFFICE O/P EST LOW 20 MIN: CPT | Performed by: FAMILY MEDICINE

## 2021-11-04 PROCEDURE — 1160F RVW MEDS BY RX/DR IN RCRD: CPT | Performed by: FAMILY MEDICINE

## 2021-11-05 ENCOUNTER — TELEPHONE (OUTPATIENT)
Dept: FAMILY MEDICINE CLINIC | Facility: CLINIC | Age: 81
End: 2021-11-05

## 2021-12-02 DIAGNOSIS — K58.9 IRRITABLE BOWEL SYNDROME, UNSPECIFIED TYPE: ICD-10-CM

## 2021-12-02 DIAGNOSIS — F41.9 ANXIETY: ICD-10-CM

## 2021-12-03 RX ORDER — CHLORDIAZEPOXIDE HYDROCHLORIDE 10 MG/1
10 CAPSULE, GELATIN COATED ORAL DAILY
Qty: 90 CAPSULE | Refills: 2 | Status: SHIPPED | OUTPATIENT
Start: 2021-12-03 | End: 2022-06-27 | Stop reason: SDUPTHER

## 2021-12-08 ENCOUNTER — OFFICE VISIT (OUTPATIENT)
Dept: FAMILY MEDICINE CLINIC | Facility: CLINIC | Age: 81
End: 2021-12-08
Payer: COMMERCIAL

## 2021-12-08 VITALS
WEIGHT: 187 LBS | OXYGEN SATURATION: 97 % | HEART RATE: 72 BPM | TEMPERATURE: 97.1 F | BODY MASS INDEX: 27.7 KG/M2 | DIASTOLIC BLOOD PRESSURE: 70 MMHG | HEIGHT: 69 IN | SYSTOLIC BLOOD PRESSURE: 100 MMHG

## 2021-12-08 DIAGNOSIS — I35.0 NONRHEUMATIC AORTIC VALVE STENOSIS: Primary | ICD-10-CM

## 2021-12-08 PROCEDURE — 1036F TOBACCO NON-USER: CPT | Performed by: FAMILY MEDICINE

## 2021-12-08 PROCEDURE — 1160F RVW MEDS BY RX/DR IN RCRD: CPT | Performed by: FAMILY MEDICINE

## 2021-12-08 PROCEDURE — 99213 OFFICE O/P EST LOW 20 MIN: CPT | Performed by: FAMILY MEDICINE

## 2022-01-07 ENCOUNTER — TELEPHONE (OUTPATIENT)
Dept: OTHER | Facility: OTHER | Age: 82
End: 2022-01-07

## 2022-01-07 NOTE — TELEPHONE ENCOUNTER
Patient's son may have COVID, so he cancelled appointment for Monday  He will call back later to reschedule

## 2022-01-18 ENCOUNTER — TELEMEDICINE (OUTPATIENT)
Dept: FAMILY MEDICINE CLINIC | Facility: CLINIC | Age: 82
End: 2022-01-18
Payer: COMMERCIAL

## 2022-01-18 DIAGNOSIS — J06.9 UPPER RESPIRATORY TRACT INFECTION, UNSPECIFIED TYPE: Primary | ICD-10-CM

## 2022-01-18 PROCEDURE — 1160F RVW MEDS BY RX/DR IN RCRD: CPT | Performed by: FAMILY MEDICINE

## 2022-01-18 PROCEDURE — 1036F TOBACCO NON-USER: CPT | Performed by: FAMILY MEDICINE

## 2022-01-18 PROCEDURE — 99213 OFFICE O/P EST LOW 20 MIN: CPT | Performed by: FAMILY MEDICINE

## 2022-01-18 NOTE — PROGRESS NOTES
Virtual Brief Visit    Patient is located in the following state in which I hold an active license [place]    It was my intent to perform this visit via video technology but the patient was not able to do a video connection so the visit was completed via audio telephone only  Assessment/Plan:    Problem List Items Addressed This Visit     None      Visit Diagnoses     Upper respiratory tract infection, unspecified type    -  Primary      Patient was evaluated by phone visit  Had mild upper respiratory infections over this last week though gradually improving with Mucinex fluids and rest   No fever chills loss of taste or smell or significant cough shortness of breath  Also had some mild intermittent right lower quadrant abdominal pain which has disappeared  Recommended that he continue with conservative measures and observation  He is to call if symptoms worsen  Recent Visits  No visits were found meeting these conditions  Showing recent visits within past 7 days and meeting all other requirements  Today's Visits  Date Type Provider Dept   01/18/22 Telemedicine DO [facility]   Showing today's visits and meeting all other requirements  Future Appointments  No visits were found meeting these conditions    Showing future appointments within next 150 days and meeting all other requirements         I spent 15 minutes directly with the patient during this visit

## 2022-02-09 ENCOUNTER — OFFICE VISIT (OUTPATIENT)
Dept: FAMILY MEDICINE CLINIC | Facility: CLINIC | Age: 82
End: 2022-02-09
Payer: COMMERCIAL

## 2022-02-09 VITALS
WEIGHT: 185 LBS | HEIGHT: 69 IN | DIASTOLIC BLOOD PRESSURE: 60 MMHG | TEMPERATURE: 97.6 F | SYSTOLIC BLOOD PRESSURE: 104 MMHG | OXYGEN SATURATION: 97 % | HEART RATE: 59 BPM | BODY MASS INDEX: 27.4 KG/M2

## 2022-02-09 DIAGNOSIS — R19.4 CHANGE IN BOWEL HABIT: Primary | ICD-10-CM

## 2022-02-09 PROCEDURE — 1036F TOBACCO NON-USER: CPT | Performed by: FAMILY MEDICINE

## 2022-02-09 PROCEDURE — 1160F RVW MEDS BY RX/DR IN RCRD: CPT | Performed by: FAMILY MEDICINE

## 2022-02-09 PROCEDURE — 99213 OFFICE O/P EST LOW 20 MIN: CPT | Performed by: FAMILY MEDICINE

## 2022-02-10 NOTE — PROGRESS NOTES
50 [facility]      NAME: Arpita Jaramillo  AGE: 79 y o  SEX: male  : 1940   MRN: [id number]    DATE: 2022  TIME: 8:13 PM    Assessment and Plan     Problem List Items Addressed This Visit     None      Visit Diagnoses     Change in bowel habit    -  Primary      Patient experiencing change in bowel habit with loose stools likely due to increase in fiber supplement dosage  Patient is taking 8 fiber tablets per day  I recommended he decrease to 6 and possibly for dependent on his diet on a given day  He can use Pepto-Bismol or Imodium for loose stools as needed  Return to office in:  P r n  Chief Complaint     Chief Complaint   Patient presents with    GI Problem       History of Present Illness     Patient presents with a primary complaint of loose stools  He had 1 episode approximately 6 days ago of very loose stools and anal leakage  He took Pepto-Bismol and symptoms improved  He was relatively well over the last several days only to have loose stools again today  He notes that he has been taking fiber tablets and has gradually increased to the point where he is up to 8 fiber tablets per day  He also has been increasing the fiber in his diet  The following portions of the patient's history were reviewed and updated as appropriate: allergies, current medications, past family history, past medical history, past social history, past surgical history and problem list     Review of Systems   Review of Systems   Gastrointestinal: Positive for diarrhea         Active Problem List     Patient Active Problem List   Diagnosis    Renal lithiasis    Persistent cough for 3 weeks or longer    Mild cognitive impairment    Mixed hyperlipidemia    Oropharyngeal dysphagia    Abnormal diffusion capacity determined by pulmonary function test    Sjogren's syndrome (HCC)    Irritable bowel syndrome    Gastroesophageal reflux disease without esophagitis    Enlarged prostate without lower urinary tract symptoms (luts)    Dysplastic nevus of face    Arthritis    Anxiety    Antithrombinemia (HCC)    Allergic rhinitis    Neck pain    Glaucoma    Eczema    Neck mass    Tick bite of left lower leg    Dyspnea on exertion       Objective   /60 (BP Location: Left arm, Patient Position: Sitting, Cuff Size: Standard)   Pulse 59   Temp 97 6 °F (36 4 °C) (Tympanic)   Ht 5' 9" (1 753 m)   Wt 83 9 kg (185 lb)   SpO2 97%   BMI 27 32 kg/m²     Physical Exam  Constitutional:       General: He is not in acute distress  Appearance: He is well-developed  HENT:      Head: Normocephalic and atraumatic  Eyes:      Pupils: Pupils are equal, round, and reactive to light  Pulmonary:      Effort: Pulmonary effort is normal  No respiratory distress  Breath sounds: Normal breath sounds  Musculoskeletal:      Cervical back: Normal range of motion  Skin:     Coloration: Skin is not pale  Neurological:      Mental Status: He is alert and oriented to person, place, and time  Psychiatric:         Behavior: Behavior normal          Thought Content:  Thought content normal          Judgment: Judgment normal            Current Medications     Current Outpatient Medications:     aspirin 81 mg chewable tablet, Chew 81 mg daily, Disp: , Rfl:     Azopt 1 % ophthalmic suspension, , Disp: , Rfl:     b complex vitamins tablet, Take 1 tablet by mouth daily, Disp: , Rfl:     chlordiazePOXIDE (LIBRIUM) 10 mg capsule, Take 1 capsule (10 mg total) by mouth daily, Disp: 90 capsule, Rfl: 2    docusate sodium (COLACE) 100 mg capsule, Take 100 mg by mouth 2 (two) times a day, Disp: , Rfl:     dutasteride (AVODART) 0 5 mg capsule, Take 0 5 mg by mouth daily, Disp: , Rfl:     glucosamine-chondroitin 500-400 MG tablet, Take 1 tablet by mouth Three times a day, Disp: , Rfl:     guaiFENesin (MUCINEX) 600 mg 12 hr tablet, Take 1,200 mg by mouth every 12 (twelve) hours, Disp: , Rfl:    hydrocortisone 2 5 % cream, Apply topically 2 (two) times a day, Disp: 30 g, Rfl: 0    levocetirizine (XYZAL) 5 MG tablet, Take 1 tablet by mouth Daily, Disp: , Rfl:     meclizine (ANTIVERT) 25 mg tablet, Take 0 5 tablets (12 5 mg total) by mouth every 8 (eight) hours as needed for dizziness, Disp: 30 tablet, Rfl: 1    Multiple Vitamins-Minerals (CENTRUM SILVER ULTRA MENS) TABS, Take 1 tablet by mouth daily, Disp: , Rfl:     Red Yeast Rice 600 MG CAPS, Take 1 tablet by mouth 2 (two) times a day, Disp: , Rfl:     Silodosin 8 MG CAPS, Take 1 capsule by mouth daily, Disp: 90 capsule, Rfl: 3    erythromycin (ILOTYCIN) ophthalmic ointment, APPLY (1CM) BY OPHTHALMIC ROUTE RIBBON INTO THE LOWER CONJUNCTIVAL SAC IN THE RIGHT EYE AS NEEDED (Patient not taking: Reported on 10/11/2021), Disp: , Rfl: 5    Rocklatan 0 02-0 005 % SOLN, INSTILL 1 DROP INTO BOTH EYES EVERY DAY IN THE EVENING (Patient not taking: Reported on 10/11/2021), Disp: , Rfl:     Tafluprost, PF, (Zioptan) 0 0015 % SOLN, Apply to eye (Patient not taking: Reported on 2/9/2022 ), Disp: , Rfl:     TRAVATAN Z 0 004 % ophthalmic solution, INSTILL 1 DROP INTO BOTH EYES IN THE EVENING (Patient not taking: Reported on 10/11/2021), Disp: , Rfl: 2    Health Maintenance     Health Maintenance   Topic Date Due    Annual Physical  08/27/2019    COVID-19 Vaccine (3 - Booster for Moderna series) 07/17/2021    Fall Risk  09/14/2022    Depression Screening  09/14/2022    BMI: Followup Plan  09/14/2022    BMI: Adult  02/09/2023    DTaP,Tdap,and Td Vaccines (2 - Td or Tdap) 04/16/2030    Pneumococcal Vaccine: 65+ Years  Completed    Influenza Vaccine  Completed    HIB Vaccine  Aged Out    Hepatitis B Vaccine  Aged Out    IPV Vaccine  Aged Out    Hepatitis A Vaccine  Aged Out    Meningococcal ACWY Vaccine  Aged Out    HPV Vaccine  Aged Out     Immunization History   Administered Date(s) Administered    COVID-19 MODERNA VACC 0 5 ML IM 01/19/2021, 02/17/2021    INFLUENZA 09/18/2015, 10/18/2016, 09/01/2017, 08/27/2018    Influenza Quadrivalent, 6-35 Months IM 09/01/2017    Influenza Split High Dose Preservative Free IM 09/25/2014, 09/18/2015, 10/18/2016    Influenza, high dose seasonal 0 7 mL 08/27/2018, 09/24/2019, 09/01/2020, 09/14/2021    Influenza, seasonal, injectable 10/17/2012, 10/09/2013    Pneumococcal Conjugate 13-Valent 06/08/2015    Pneumococcal Polysaccharide PPV23 08/30/2019    Td (adult), adsorbed 10/22/2014    Tdap 04/16/2020    Zoster Vaccine Recombinant 03/30/2018, 05/29/2018       Tracee Reed DO  [facility]

## 2022-03-07 ENCOUNTER — OFFICE VISIT (OUTPATIENT)
Dept: FAMILY MEDICINE CLINIC | Facility: CLINIC | Age: 82
End: 2022-03-07
Payer: COMMERCIAL

## 2022-03-07 DIAGNOSIS — M54.6 RIGHT-SIDED THORACIC BACK PAIN, UNSPECIFIED CHRONICITY: Primary | ICD-10-CM

## 2022-03-07 PROCEDURE — 1036F TOBACCO NON-USER: CPT | Performed by: FAMILY MEDICINE

## 2022-03-07 PROCEDURE — 1160F RVW MEDS BY RX/DR IN RCRD: CPT | Performed by: FAMILY MEDICINE

## 2022-03-07 PROCEDURE — 99213 OFFICE O/P EST LOW 20 MIN: CPT | Performed by: FAMILY MEDICINE

## 2022-03-07 RX ORDER — MOMETASONE FUROATE 1 MG/G
CREAM TOPICAL
Status: ON HOLD | COMMUNITY
Start: 2022-03-04

## 2022-03-07 NOTE — PROGRESS NOTES
50 [facility]      NAME: Manuel Powell  AGE: 79 y o  SEX: male  : 1940   MRN: [id number]    DATE: 3/7/2022  TIME: 6:07 PM    Assessment and Plan     Problem List Items Addressed This Visit     None      Visit Diagnoses     Right-sided thoracic back pain, unspecified chronicity    -  Primary      Patient reassured  Conservative treatment indicated with moist heat gentle stretches, over-the-counter pain reliever  Return to office in:  P r n  Chief Complaint     Chief Complaint   Patient presents with    Back Pain       History of Present Illness     Patient presents with chief complaint pain in his right upper back in the parascapular region  Pain pain has been present on and off for the last several weeks  He does not have any recollection of any trauma or unusual activity to explain his pain  Pain is sometimes worse with movement or palpation  No shortness of breath  The following portions of the patient's history were reviewed and updated as appropriate: allergies, current medications, past family history, past medical history, past social history, past surgical history and problem list     Review of Systems   Review of Systems   Constitutional: Negative  Respiratory: Negative  Cardiovascular: Negative  Gastrointestinal: Negative  Genitourinary: Negative  Musculoskeletal: Positive for back pain  Psychiatric/Behavioral: Negative          Active Problem List     Patient Active Problem List   Diagnosis    Renal lithiasis    Persistent cough for 3 weeks or longer    Mild cognitive impairment    Mixed hyperlipidemia    Oropharyngeal dysphagia    Abnormal diffusion capacity determined by pulmonary function test    Sjogren's syndrome (HCC)    Irritable bowel syndrome    Gastroesophageal reflux disease without esophagitis    Enlarged prostate without lower urinary tract symptoms (luts)    Dysplastic nevus of face    Arthritis    Anxiety    [address]emia ([facility] [address]    Allergic rhinitis    Neck pain    Glaucoma    Eczema    Neck mass    Tick bite of left lower leg    Dyspnea on exertion       Objective   There were no vitals taken for this visit      Physical Exam  Musculoskeletal:      Comments: Mild paravertebral spasm in tenderness right parascapular region           Current Medications     Current Outpatient Medications:     aspirin 81 mg chewable tablet, Chew 81 mg daily, Disp: , Rfl:     Azopt 1 % ophthalmic suspension, , Disp: , Rfl:     b complex vitamins tablet, Take 1 tablet by mouth daily, Disp: , Rfl:     chlordiazePOXIDE (LIBRIUM) 10 mg capsule, Take 1 capsule (10 mg total) by mouth daily, Disp: 90 capsule, Rfl: 2    docusate sodium (COLACE) 100 mg capsule, Take 100 mg by mouth 2 (two) times a day, Disp: , Rfl:     dutasteride (AVODART) 0 5 mg capsule, Take 0 5 mg by mouth daily, Disp: , Rfl:     glucosamine-chondroitin 500-400 MG tablet, Take 1 tablet by mouth Three times a day, Disp: , Rfl:     guaiFENesin (MUCINEX) 600 mg 12 hr tablet, Take 1,200 mg by mouth every 12 (twelve) hours, Disp: , Rfl:     hydrocortisone 2 5 % cream, Apply topically 2 (two) times a day, Disp: 30 g, Rfl: 0    levocetirizine (XYZAL) 5 MG tablet, Take 1 tablet by mouth Daily, Disp: , Rfl:     meclizine (ANTIVERT) 25 mg tablet, Take 0 5 tablets (12 5 mg total) by mouth every 8 (eight) hours as needed for dizziness, Disp: 30 tablet, Rfl: 1    Multiple Vitamins-Minerals (CENTRUM SILVER ULTRA MENS) TABS, Take 1 tablet by mouth daily, Disp: , Rfl:     Red Yeast Rice 600 MG CAPS, Take 1 tablet by mouth 2 (two) times a day, Disp: , Rfl:     Silodosin 8 MG CAPS, Take 1 capsule by mouth daily, Disp: 90 capsule, Rfl: 3    erythromycin (ILOTYCIN) ophthalmic ointment, APPLY (1CM) BY OPHTHALMIC ROUTE RIBBON INTO THE LOWER CONJUNCTIVAL SAC IN THE RIGHT EYE AS NEEDED (Patient not taking: Reported on 10/11/2021), Disp: , Rfl: 5    mometasone (ELOCON) 0 1 % cream, , Disp: , Rfl:     Rocklatan 0 02-0 005 % SOLN, INSTILL 1 DROP INTO BOTH EYES EVERY DAY IN THE EVENING (Patient not taking: Reported on 10/11/2021), Disp: , Rfl:     Tafluprost, PF, (Zioptan) 0 0015 % SOLN, Apply to eye (Patient not taking: Reported on 2/9/2022 ), Disp: , Rfl:     TRAVATAN Z 0 004 % ophthalmic solution, INSTILL 1 DROP INTO BOTH EYES IN THE EVENING (Patient not taking: Reported on 10/11/2021), Disp: , Rfl: 2    Health Maintenance     Health Maintenance   Topic Date Due    Annual Physical  08/27/2019    COVID-19 Vaccine (3 - Booster for Moderna series) 07/17/2021    Fall Risk  09/14/2022    Depression Screening  09/14/2022    BMI: Followup Plan  09/14/2022    BMI: Adult  02/09/2023    DTaP,Tdap,and Td Vaccines (2 - Td or Tdap) 04/16/2030    Pneumococcal Vaccine: 65+ Years  Completed    Influenza Vaccine  Completed    HIB Vaccine  Aged Out    Hepatitis B Vaccine  Aged Out    IPV Vaccine  Aged Out    Hepatitis A Vaccine  Aged Out    Meningococcal ACWY Vaccine  Aged Out    HPV Vaccine  Aged Out     Immunization History   Administered Date(s) Administered    COVID-19 MODERNA VACC 0 5 ML IM 01/19/2021, 02/17/2021    INFLUENZA 09/18/2015, 10/18/2016, 09/01/2017, 08/27/2018    Influenza Quadrivalent, 6-35 Months IM 09/01/2017    Influenza Split High Dose Preservative Free IM 09/25/2014, 09/18/2015, 10/18/2016    Influenza, high dose seasonal 0 7 mL 08/27/2018, 09/24/2019, 09/01/2020, 09/14/2021    Influenza, seasonal, injectable 10/17/2012, 10/09/2013    Pneumococcal Conjugate 13-Valent 06/08/2015    Pneumococcal Polysaccharide PPV23 08/30/2019    Td (adult), adsorbed 10/22/2014    Tdap 04/16/2020    Zoster Vaccine Recombinant 03/30/2018, 05/29/2018       Salvatore Cyr, DO  [facility]

## 2022-04-01 ENCOUNTER — OFFICE VISIT (OUTPATIENT)
Dept: FAMILY MEDICINE CLINIC | Facility: CLINIC | Age: 82
End: 2022-04-01
Payer: COMMERCIAL

## 2022-04-01 VITALS
RESPIRATION RATE: 20 BRPM | OXYGEN SATURATION: 94 % | SYSTOLIC BLOOD PRESSURE: 122 MMHG | DIASTOLIC BLOOD PRESSURE: 74 MMHG | TEMPERATURE: 98.2 F | WEIGHT: 178.4 LBS | HEIGHT: 69 IN | BODY MASS INDEX: 26.42 KG/M2 | HEART RATE: 73 BPM

## 2022-04-01 DIAGNOSIS — H92.01 RIGHT EAR PAIN: Primary | ICD-10-CM

## 2022-04-01 DIAGNOSIS — K21.9 GASTROESOPHAGEAL REFLUX DISEASE WITHOUT ESOPHAGITIS: ICD-10-CM

## 2022-04-01 PROCEDURE — 99214 OFFICE O/P EST MOD 30 MIN: CPT | Performed by: FAMILY MEDICINE

## 2022-04-01 RX ORDER — BRIMONIDINE TARTRATE 2 MG/ML
1 SOLUTION/ DROPS OPHTHALMIC 2 TIMES DAILY
Status: ON HOLD | COMMUNITY

## 2022-04-01 RX ORDER — CLOBETASOL PROPIONATE 0.5 MG/G
AEROSOL, FOAM TOPICAL
Status: ON HOLD | COMMUNITY
Start: 2022-03-26

## 2022-04-01 NOTE — PATIENT INSTRUCTIONS
Use foam and antibiotic for 1 week  If no improvement follow up with dermatology  Continue with pepcid twice daily and follow up with gastroenterology

## 2022-04-01 NOTE — ASSESSMENT & PLAN NOTE
Mild irritation noted but obvious lesion and TM and canal WNL; appeared to start after topical steroid use; advised trial of topical antibiotic; advised f/u with dermatology if no improvement in 1 week

## 2022-04-01 NOTE — ASSESSMENT & PLAN NOTE
Advised Pepcid BID; advised on diet modifications; f/u with gastroenterology for poorly controlled sx

## 2022-04-01 NOTE — PROGRESS NOTES
Assessment/Plan:     1  Right ear pain  Assessment & Plan:  Mild irritation noted but obvious lesion and TM and canal WNL; appeared to start after topical steroid use; advised trial of topical antibiotic; advised f/u with dermatology if no improvement in 1 week    Orders:  -     mupirocin (BACTROBAN) 2 % ointment; Apply topically 3 (three) times a day    2  Gastroesophageal reflux disease without esophagitis  Assessment & Plan:  Advised Pepcid BID; advised on diet modifications; f/u with gastroenterology for poorly controlled sx    Orders:  -     Ambulatory Referral to Gastroenterology; Future        Subjective:      Patient ID: Belen Victoria  is a 79 y o  male  Pt complaining of right ear soreness  Patient states he gets an inner ear itching chronically  Uses topical tx for this  Dermatologist had given him a cream and seemed to be irritating his ear  Seemed to be getting on skin where it wasn't needed and caused itching and burning  He states if he puts finger in ear, he feels pain and soreness there  Feels better right now but has been bothering him for 6-8 weeks  Has been putting neosporin on it  Seems to help  Patient states he is concerned he may have a hiatal hernia  He states he chronically suffers from GERD for years  Friend was recently told he has a hiatal hernia and feels his symptoms are similar  Has to watch what he eats but worsens with certain foods  Right now patient uses Pepcid  States he takes it anywhere from once daily to TID  No vomiting  States can't eat cake or spicy food  The following portions of the patient's history were reviewed and updated as appropriate: allergies, current medications, past family history, past medical history, past social history, past surgical history, and problem list     Review of Systems   Constitutional: Negative for chills and fever  HENT: Positive for ear pain  Negative for congestion, ear discharge and sinus pain      Gastrointestinal: Positive for abdominal pain  Objective:      /74 (BP Location: Right arm, Patient Position: Sitting, Cuff Size: Adult)   Pulse 73   Temp 98 2 °F (36 8 °C) (Temporal)   Resp 20   Ht 5' 9" (1 753 m)   Wt 80 9 kg (178 lb 6 4 oz)   SpO2 94%   BMI 26 35 kg/m²          Physical Exam  Vitals reviewed  Constitutional:       General: He is not in acute distress  Appearance: Normal appearance  He is not ill-appearing, toxic-appearing or diaphoretic  HENT:      Head: Normocephalic and atraumatic  Right Ear: Tympanic membrane and ear canal normal       Left Ear: Tympanic membrane and ear canal normal    Eyes:      General: No scleral icterus  Right eye: No discharge  Left eye: No discharge  Conjunctiva/sclera: Conjunctivae normal    Cardiovascular:      Rate and Rhythm: Normal rate and regular rhythm  Pulses: Normal pulses  Heart sounds: Normal heart sounds  No murmur heard  No gallop  Pulmonary:      Effort: Pulmonary effort is normal  No respiratory distress  Breath sounds: Normal breath sounds  No stridor  No wheezing, rhonchi or rales  Abdominal:      Palpations: Abdomen is soft  Tenderness: There is no guarding or rebound  Comments: +mild epigastric TTP   Musculoskeletal:      Right lower leg: No edema  Left lower leg: No edema  Neurological:      General: No focal deficit present  Mental Status: He is alert and oriented to person, place, and time  Psychiatric:         Mood and Affect: Mood normal          Behavior: Behavior normal          Thought Content:  Thought content normal          Judgment: Judgment normal

## 2022-04-06 ENCOUNTER — OFFICE VISIT (OUTPATIENT)
Dept: FAMILY MEDICINE CLINIC | Facility: CLINIC | Age: 82
End: 2022-04-06
Payer: COMMERCIAL

## 2022-04-06 VITALS
HEART RATE: 57 BPM | DIASTOLIC BLOOD PRESSURE: 80 MMHG | HEIGHT: 69 IN | SYSTOLIC BLOOD PRESSURE: 128 MMHG | WEIGHT: 179 LBS | BODY MASS INDEX: 26.51 KG/M2 | OXYGEN SATURATION: 98 % | TEMPERATURE: 98 F

## 2022-04-06 DIAGNOSIS — K21.9 GASTROESOPHAGEAL REFLUX DISEASE WITHOUT ESOPHAGITIS: Primary | ICD-10-CM

## 2022-04-06 PROCEDURE — 99213 OFFICE O/P EST LOW 20 MIN: CPT | Performed by: FAMILY MEDICINE

## 2022-04-06 RX ORDER — FAMOTIDINE 40 MG/1
40 TABLET, FILM COATED ORAL 2 TIMES DAILY
Qty: 60 TABLET | Refills: 2 | Status: SHIPPED | OUTPATIENT
Start: 2022-04-06 | End: 2022-07-05

## 2022-04-06 NOTE — PROGRESS NOTES
[facility]      NAME: Damir Araujo  AGE: 79 y o  SEX: male  : 1940   MRN: [id number]    DATE: 2022  TIME: 4:45 PM    Assessment and Plan     Problem List Items Addressed This Visit     Gastroesophageal reflux disease without esophagitis - Primary    Relevant Medications    famotidine (PEPCID) 40 MG tablet      Discussed the fact that based on his prior imaging studies and scopes that hiatal hernia is unlikely unless that is a small sliding hernia in which case treatment would be conservative  I recommended he take Pepcid 40 mg twice daily and if his symptoms do not improve on that or he is unable to tolerate the medication we would then move forward with his gastroenterology referral         Return to office in:  P r n  Chief Complaint     Chief Complaint   Patient presents with    Hernia       History of Present Illness     Patient was seen to follow-up on some ongoing concerns  He is concerned about the possibility of a hiatal hernia  He does have some dyspepsia with burping and some food intolerance is  He has taken proton pump inhibitors in the past but they did not agree with him  CT scan of chest and abdomen done in the last several years showed no evidence of hiatal hernia  GI workup in 2016 including EGD did not show anything other than some esophageal irritation  He was seen in the office last week and advised to take Pepcid 20 mg twice daily  He say says that that does help but does cause some breakthrough symptoms      The following portions of the patient's history were reviewed and updated as appropriate: allergies, current medications, past family history, past medical history, past social history, past surgical history and problem list     Review of Systems   Review of Systems   Constitutional: Negative  Respiratory: Negative  Cardiovascular: Negative  Gastrointestinal: Positive for abdominal pain and nausea  Genitourinary: Negative  Musculoskeletal: Negative  Psychiatric/Behavioral: Negative  Active Problem List     Patient Active Problem List   Diagnosis    Renal lithiasis    Persistent cough for 3 weeks or longer    Mild cognitive impairment    Mixed hyperlipidemia    Oropharyngeal dysphagia    Abnormal diffusion capacity determined by pulmonary function test    Sjogren's syndrome (HCC)    Irritable bowel syndrome    Gastroesophageal reflux disease without esophagitis    Enlarged prostate without lower urinary tract symptoms (luts)    Dysplastic nevus of face    Arthritis    Anxiety    Antithrombinemia ([address] )    Allergic rhinitis    Neck pain    Glaucoma    Eczema    Neck mass    Tick bite of left lower leg    Dyspnea on exertion    Right ear pain       Objective   /80 (BP Location: Left arm, Patient Position: Sitting, Cuff Size: Standard)   Pulse 57   Temp 98 °F (36 7 °C) (Tympanic)   Ht 5' 9" (1 753 m)   Wt 81 2 kg (179 lb)   SpO2 98%   BMI 26 43 kg/m²     Physical Exam  Constitutional:       General: He is not in acute distress  Appearance: He is well-developed  HENT:      Head: Normocephalic and atraumatic  Eyes:      Pupils: Pupils are equal, round, and reactive to light  Pulmonary:      Effort: Pulmonary effort is normal  No respiratory distress  Breath sounds: Normal breath sounds  Musculoskeletal:      Cervical back: Normal range of motion  Skin:     Coloration: Skin is not pale  Neurological:      Mental Status: He is alert and oriented to person, place, and time  Psychiatric:         Behavior: Behavior normal          Thought Content:  Thought content normal          Judgment: Judgment normal            Current Medications     Current Outpatient Medications:     aspirin 81 mg chewable tablet, Chew 81 mg as needed  , Disp: , Rfl:     b complex vitamins tablet, Take 1 tablet by mouth daily, Disp: , Rfl:     brimonidine tartrate 0 2 % ophthalmic solution, Administer 1 drop to both eyes 2 (two) times a day, Disp: , Rfl:     chlordiazePOXIDE (LIBRIUM) 10 mg capsule, Take 1 capsule (10 mg total) by mouth daily, Disp: 90 capsule, Rfl: 2    clobetasol (OLUX) 0 05 % topical foam, APPLY TO AFFECTED AREA ON EARS DAILY FOR UP TO 2 WEEKS AT A TIME, Disp: , Rfl:     dutasteride (AVODART) 0 5 mg capsule, Take 0 5 mg by mouth daily, Disp: , Rfl:     glucosamine-chondroitin 500-400 MG tablet, Take 1 tablet by mouth Three times a day, Disp: , Rfl:     levocetirizine (XYZAL) 5 MG tablet, Take 1 tablet by mouth Daily, Disp: , Rfl:     Multiple Vitamins-Minerals (CENTRUM SILVER ULTRA MENS) TABS, Take 1 tablet by mouth daily, Disp: , Rfl:     mupirocin (BACTROBAN) 2 % ointment, Apply topically 3 (three) times a day, Disp: 22 g, Rfl: 0    Red Yeast Rice 600 MG CAPS, Take 1 tablet by mouth 2 (two) times a day, Disp: , Rfl:     Rocklatan 0 02-0 005 % SOLN, INSTILL 1 DROP INTO BOTH EYES EVERY DAY IN THE EVENING, Disp: , Rfl:     Silodosin 8 MG CAPS, Take 1 capsule by mouth daily, Disp: 90 capsule, Rfl: 3    Azopt 1 % ophthalmic suspension, , Disp: , Rfl:     docusate sodium (COLACE) 100 mg capsule, Take 100 mg by mouth 2 (two) times a day (Patient not taking: Reported on 4/1/2022 ), Disp: , Rfl:     erythromycin (ILOTYCIN) ophthalmic ointment, APPLY (1CM) BY OPHTHALMIC ROUTE RIBBON INTO THE LOWER CONJUNCTIVAL SAC IN THE RIGHT EYE AS NEEDED (Patient not taking: Reported on 10/11/2021), Disp: , Rfl: 5    famotidine (PEPCID) 40 MG tablet, Take 1 tablet (40 mg total) by mouth 2 (two) times a day, Disp: 60 tablet, Rfl: 2    guaiFENesin (MUCINEX) 600 mg 12 hr tablet, Take 1,200 mg by mouth every 12 (twelve) hours (Patient not taking: Reported on 4/1/2022 ), Disp: , Rfl:     hydrocortisone 2 5 % cream, Apply topically 2 (two) times a day (Patient not taking: Reported on 4/1/2022 ), Disp: 30 g, Rfl: 0    meclizine (ANTIVERT) 25 mg tablet, Take 0 5 tablets (12 5 mg total) by mouth every 8 (eight) hours as needed for dizziness (Patient not taking: Reported on 4/1/2022 ), Disp: 30 tablet, Rfl: 1    mometasone (ELOCON) 0 1 % cream, , Disp: , Rfl:     Tafluprost, PF, (Zioptan) 0 0015 % SOLN, Apply to eye (Patient not taking: Reported on 2/9/2022 ), Disp: , Rfl:     TRAVATAN Z 0 004 % ophthalmic solution, INSTILL 1 DROP INTO BOTH EYES IN THE EVENING (Patient not taking: Reported on 10/11/2021), Disp: , Rfl: 2    Health Maintenance     Health Maintenance   Topic Date Due    Annual Physical  08/27/2019    COVID-19 Vaccine (3 - Booster for Moderna series) 07/17/2021    Depression Screening  09/14/2022    BMI: Followup Plan  09/14/2022    Fall Risk  09/14/2022    BMI: Adult  04/06/2023    DTaP,Tdap,and Td Vaccines (2 - Td or Tdap) 04/16/2030    Pneumococcal Vaccine: 65+ Years  Completed    Influenza Vaccine  Completed    HIB Vaccine  Aged Out    Hepatitis B Vaccine  Aged Out    IPV Vaccine  Aged Out    Hepatitis A Vaccine  Aged Out    Meningococcal ACWY Vaccine  Aged Out    HPV Vaccine  Aged Out     Immunization History   Administered Date(s) Administered    COVID-19 MODERNA VACC 0 5 ML IM 01/19/2021, 02/17/2021    INFLUENZA 09/18/2015, 10/18/2016, 09/01/2017, 08/27/2018    Influenza Quadrivalent, 6-35 Months IM 09/01/2017    Influenza Split High Dose Preservative Free IM 09/25/2014, 09/18/2015, 10/18/2016    Influenza, high dose seasonal 0 7 mL 08/27/2018, 09/24/2019, 09/01/2020, 09/14/2021    Influenza, seasonal, injectable 10/17/2012, 10/09/2013    Pneumococcal Conjugate 13-Valent 06/08/2015    Pneumococcal Polysaccharide PPV23 08/30/2019    Td (adult), adsorbed 10/22/2014    Tdap 04/16/2020    Zoster Vaccine Recombinant 03/30/2018, 05/29/2018       Waldemar Mckeon DO  [facility]

## 2022-04-21 ENCOUNTER — CONSULT (OUTPATIENT)
Dept: CARDIOLOGY CLINIC | Facility: CLINIC | Age: 82
End: 2022-04-21
Payer: COMMERCIAL

## 2022-04-21 VITALS
DIASTOLIC BLOOD PRESSURE: 70 MMHG | BODY MASS INDEX: 25.92 KG/M2 | HEIGHT: 69 IN | WEIGHT: 175 LBS | SYSTOLIC BLOOD PRESSURE: 142 MMHG | HEART RATE: 54 BPM

## 2022-04-21 DIAGNOSIS — R53.83 OTHER FATIGUE: ICD-10-CM

## 2022-04-21 DIAGNOSIS — I35.0 NONRHEUMATIC AORTIC VALVE STENOSIS: Primary | ICD-10-CM

## 2022-04-21 DIAGNOSIS — R00.1 BRADYCARDIA: ICD-10-CM

## 2022-04-21 DIAGNOSIS — I44.1 AV BLOCK, MOBITZ 1: ICD-10-CM

## 2022-04-21 PROCEDURE — 1036F TOBACCO NON-USER: CPT | Performed by: INTERNAL MEDICINE

## 2022-04-21 PROCEDURE — 93000 ELECTROCARDIOGRAM COMPLETE: CPT | Performed by: INTERNAL MEDICINE

## 2022-04-21 PROCEDURE — 99204 OFFICE O/P NEW MOD 45 MIN: CPT | Performed by: INTERNAL MEDICINE

## 2022-04-21 PROCEDURE — 1160F RVW MEDS BY RX/DR IN RCRD: CPT | Performed by: INTERNAL MEDICINE

## 2022-04-21 NOTE — PROGRESS NOTES
Cardiology             [facility]   1940  [id number]              Assessment/Plan:    1  Exertional fatigue  2  Moderate aortic valve stenosis      · ECG today reveals sinus bradycardia 54 beats per minute with Mobitz 1 second-degree AV block  I suspect his exertional fatigue/severe tiredness may be related to his bradycardia, and we need to rule out advanced forms of heart block as well as chronotropic incompetence  I will schedule him for 2 week Zio monitor  If unremarkable, I will recommend a modified Bj stress test to evaluate for chronotropic incompetence  If any forms of advanced heart block or chronotropic incompetence are found, pacemaker placement will be recommended  Follow-up in 1 month      Interval History: This is a very pleasant 54-year-old male with no prior cardiac history  He states the past 3 years he has feeling significant exertional fatigue  He states he climbs flight of stairs and feels extremely tired, almost as if somebody was sitting on his shoulder  He denies exertional chest discomfort, shoulder pain, arm pain, but will feel very tired with the least amount of exertion activity  He also feels tired occasionally when he simply sitting and resting, but most of his fatigue since to happen with physical exertion  He does admit to exertional shortness of breath, but does differentiate that his exertional tiredness is different than his shortness of breath  He underwent an echocardiogram 10/21/2021 demonstrating ejection fraction 65% with moderate aortic valve stenosis                           Vitals:  Vitals:    04/21/22 1005   BP: 142/70   Pulse: (!) 54   Weight: 79 4 kg (175 lb)   Height: 5' 9" (1 753 m)         Past Medical History:   Diagnosis Date    Abnormal diffusion capacity determined by pulmonary function test 4/25/2017    Actinic keratosis     Acute right-sided low back pain without sciatica 9/1/2017    Adverse effect of correct medicinal substance properly administered     Arthralgia     Candidiasis, mouth     Chronic allergic rhinitis     Chronic bronchitis (HCC)     Chronic sinusitis     Cough     Dermatitis     Diverticulosis     LUCIANO (dyspnea on exertion)     Former smoker     GERD (gastroesophageal reflux disease)     Glaucoma     Hyperlipidemia     Inguinal hernia, left     Lump of skin     Male erectile dysfunction     Open comedone     Palpitations     Renal calculi     Sebaceous cyst     Seborrheic keratosis     Skin disorder     Unspecified chronic bronchitis (HCC)     Visual disturbances      Social History     Socioeconomic History    Marital status:       Spouse name: Not on file    Number of children: Not on file    Years of education: Not on file    Highest education level: Not on file   Occupational History    Not on file   Tobacco Use    Smoking status: Former Smoker    Smokeless tobacco: Never Used   Vaping Use    Vaping Use: Never used   Substance and Sexual Activity    Alcohol use: Yes     Comment: social    Drug use: No    Sexual activity: Not Currently   Other Topics Concern    Not on file   Social History Narrative    Not on file     Social Determinants of Health     Financial Resource Strain: Not on file   Food Insecurity: Not on file   Transportation Needs: Not on file   Physical Activity: Not on file   Stress: Not on file   Social Connections: Not on file   Intimate Partner Violence: Not on file   Housing Stability: Not on file      Family History   Problem Relation Age of Onset    Brain cancer Mother     Heart failure Mother     Prostate cancer Mother      Past Surgical History:   Procedure Laterality Date    COLONOSCOPY      INGUINAL HERNIA REPAIR Bilateral     left- last assessed: 11/25/14, Resolved: 5/18/15, onset 12/11/13    MOUTH SURGERY         Current Outpatient Medications:     aspirin 81 mg chewable tablet, Chew 81 mg as needed  , Disp: , Rfl:     Azopt 1 % ophthalmic suspension,  , Disp: , Rfl:     b complex vitamins tablet, Take 1 tablet by mouth daily, Disp: , Rfl:     brimonidine tartrate 0 2 % ophthalmic solution, Administer 1 drop to both eyes 2 (two) times a day, Disp: , Rfl:     chlordiazePOXIDE (LIBRIUM) 10 mg capsule, Take 1 capsule (10 mg total) by mouth daily, Disp: 90 capsule, Rfl: 2    clobetasol (OLUX) 0 05 % topical foam, APPLY TO AFFECTED AREA ON EARS DAILY FOR UP TO 2 WEEKS AT A TIME, Disp: , Rfl:     docusate sodium (COLACE) 100 mg capsule, Take 100 mg by mouth 2 (two) times a day  , Disp: , Rfl:     erythromycin (ILOTYCIN) ophthalmic ointment, APPLY (1CM) BY OPHTHALMIC ROUTE RIBBON INTO THE LOWER CONJUNCTIVAL SAC IN THE RIGHT EYE AS NEEDED, Disp: , Rfl: 5    famotidine (PEPCID) 40 MG tablet, Take 1 tablet (40 mg total) by mouth 2 (two) times a day, Disp: 60 tablet, Rfl: 2    glucosamine-chondroitin 500-400 MG tablet, Take 1 tablet by mouth Three times a day, Disp: , Rfl:     hydrocortisone 2 5 % cream, Apply topically 2 (two) times a day, Disp: 30 g, Rfl: 0    levocetirizine (XYZAL) 5 MG tablet, Take 1 tablet by mouth Daily, Disp: , Rfl:     meclizine (ANTIVERT) 25 mg tablet, Take 0 5 tablets (12 5 mg total) by mouth every 8 (eight) hours as needed for dizziness, Disp: 30 tablet, Rfl: 1    Multiple Vitamins-Minerals (CENTRUM SILVER ULTRA MENS) TABS, Take 1 tablet by mouth daily, Disp: , Rfl:     Red Yeast Rice 600 MG CAPS, Take 1 tablet by mouth 2 (two) times a day, Disp: , Rfl:     Rocklatan 0 02-0 005 % SOLN, INSTILL 1 DROP INTO BOTH EYES EVERY DAY IN THE EVENING, Disp: , Rfl:     Silodosin 8 MG CAPS, Take 1 capsule by mouth daily, Disp: 90 capsule, Rfl: 3    dutasteride (AVODART) 0 5 mg capsule, Take 0 5 mg by mouth daily (Patient not taking: Reported on 4/21/2022 ), Disp: , Rfl:     guaiFENesin (MUCINEX) 600 mg 12 hr tablet, Take 1,200 mg by mouth every 12 (twelve) hours (Patient not taking: Reported on 4/1/2022 ), Disp: , Rfl:   mometasone (ELOCON) 0 1 % cream, , Disp: , Rfl:     mupirocin (BACTROBAN) 2 % ointment, Apply topically 3 (three) times a day, Disp: 22 g, Rfl: 0    Tafluprost, PF, (Zioptan) 0 0015 % SOLN, Apply to eye (Patient not taking: Reported on 2/9/2022 ), Disp: , Rfl:     TRAVATAN Z 0 004 % ophthalmic solution, INSTILL 1 DROP INTO BOTH EYES IN THE EVENING (Patient not taking: Reported on 10/11/2021), Disp: , Rfl: 2        Review of Systems:  Review of Systems   Constitutional: Positive for fatigue  Respiratory: Positive for shortness of breath  Cardiovascular: Negative  All other systems reviewed and are negative  Physical Exam:  Physical Exam  Constitutional:       General: He is not in acute distress  Appearance: He is well-developed  He is not diaphoretic  HENT:      Head: Normocephalic and atraumatic  Eyes:      General: No scleral icterus  Right eye: No discharge  Pupils: Pupils are equal, round, and reactive to light  Neck:      Thyroid: No thyromegaly  Cardiovascular:      Rate and Rhythm: Regular rhythm  Bradycardia present  Heart sounds: Murmur heard  No friction rub  No gallop  Pulmonary:      Effort: Pulmonary effort is normal       Breath sounds: Normal breath sounds  Abdominal:      General: There is no distension  Tenderness: There is no abdominal tenderness  There is no guarding or rebound  Musculoskeletal:         General: Normal range of motion  Cervical back: Normal range of motion and neck supple  Skin:     General: Skin is warm and dry  Coloration: Skin is not pale  Findings: No erythema or rash  Neurological:      Mental Status: He is alert and oriented to person, place, and time  Coordination: Coordination normal    Psychiatric:         Behavior: Behavior normal          Thought Content:  Thought content normal          Judgment: Judgment normal          This note was completed in part utilizing M-Modal Fluency Direct Software  Grammatical errors, random word insertions, spelling mistakes, and incomplete sentences can be an occasional consequence of this system secondary to software limitations, ambient noise, and hardware issues  If you have any questions or concerns about the content, text, or information contained within the body of this dictation, please contact the provider for clarification

## 2022-05-06 DIAGNOSIS — N40.0 BENIGN PROSTATIC HYPERPLASIA, UNSPECIFIED WHETHER LOWER URINARY TRACT SYMPTOMS PRESENT: ICD-10-CM

## 2022-05-06 RX ORDER — SILODOSIN 8 MG/1
CAPSULE ORAL
Qty: 90 CAPSULE | Refills: 3 | Status: ON HOLD | OUTPATIENT
Start: 2022-05-06

## 2022-05-17 ENCOUNTER — CLINICAL SUPPORT (OUTPATIENT)
Dept: CARDIOLOGY CLINIC | Facility: CLINIC | Age: 82
End: 2022-05-17
Payer: COMMERCIAL

## 2022-05-17 DIAGNOSIS — R00.1 BRADYCARDIA: ICD-10-CM

## 2022-05-17 DIAGNOSIS — I44.1 AV BLOCK, MOBITZ 1: ICD-10-CM

## 2022-05-17 PROCEDURE — 93248 EXT ECG>7D<15D REV&INTERPJ: CPT | Performed by: INTERNAL MEDICINE

## 2022-05-18 ENCOUNTER — APPOINTMENT (OUTPATIENT)
Dept: LAB | Facility: CLINIC | Age: 82
End: 2022-05-18
Payer: COMMERCIAL

## 2022-05-18 ENCOUNTER — OFFICE VISIT (OUTPATIENT)
Dept: CARDIOLOGY CLINIC | Facility: CLINIC | Age: 82
End: 2022-05-18
Payer: COMMERCIAL

## 2022-05-18 VITALS
BODY MASS INDEX: 26.4 KG/M2 | DIASTOLIC BLOOD PRESSURE: 60 MMHG | WEIGHT: 178.8 LBS | SYSTOLIC BLOOD PRESSURE: 120 MMHG | HEART RATE: 56 BPM

## 2022-05-18 DIAGNOSIS — I45.89 CHRONOTROPIC INCOMPETENCE: ICD-10-CM

## 2022-05-18 DIAGNOSIS — R53.83 OTHER FATIGUE: Primary | ICD-10-CM

## 2022-05-18 LAB — TSH SERPL DL<=0.05 MIU/L-ACNC: 1.07 UIU/ML (ref 0.45–4.5)

## 2022-05-18 PROCEDURE — 1160F RVW MEDS BY RX/DR IN RCRD: CPT | Performed by: INTERNAL MEDICINE

## 2022-05-18 PROCEDURE — 84443 ASSAY THYROID STIM HORMONE: CPT | Performed by: INTERNAL MEDICINE

## 2022-05-18 PROCEDURE — 99214 OFFICE O/P EST MOD 30 MIN: CPT | Performed by: INTERNAL MEDICINE

## 2022-05-18 PROCEDURE — 36415 COLL VENOUS BLD VENIPUNCTURE: CPT | Performed by: INTERNAL MEDICINE

## 2022-05-18 RX ORDER — ACETAMINOPHEN 325 MG/1
650 TABLET ORAL EVERY 6 HOURS PRN
Status: ON HOLD | COMMUNITY

## 2022-05-18 NOTE — PROGRESS NOTES
Cardiology             [facility]   1940  [id number]              Assessment/Plan:    1  Exertional fatigue:  High suspicion of chronotropic incompetence  I believe the patient in the hallway today with resting heart rate 68 beats per minute and heart rate with ambulation about 71 beats per minute  He did feel somewhat fatigued and short of breath  I do not think he will be able to tolerate a treadmill exercise stress test to confirm chronotropic incompetence  I recommended electrophysiology evaluation for evaluation of permanent pacemaker placement  He has been educated to call 911 right away if he has any severe episodes of lightheadedness or dizziness, and certainly if any presyncope or syncope  Will check TSH  Prior level in September unremarkable  2  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, 2 asymptomatic nocturnal pauses on Zio monitor:  Keep off AV blockers  Follow-up with electrophysiology for permanent pacemaker evaluation  ====================  From scheduling:  Pt called back stating again that he does not want to come to [facility] for appt  He only wants [place] or [place] [place] - nothing available  I offered to arrange [place] for his consult with EP - pt refused  Pt also stated he would need transportation to and from any hospital procedure  Pt has 2 sons but not dependable or cannot help due to their work  Pt is going to think about it and possibly call back  No EP appt scheduled at this time  Pat Gonzalez this note for his information  Interval History:     Is a very pleasant 79-year-old male with no prior cardiac history, she has seen for initial consultation 04/2022 for symptoms of significant fatigue mostly with exertion  Had undergone echocardiogram 10/21/2021 demonstrating ejection fraction 65% with moderate aortic stenosis  ECG had revealed sinus bradycardia with Mobitz 1 second-degree AV block    Subsequently underwent Zio monitoring  Subsequent, Zio monitor revealed minimum heart rate 26 beats per minute with average heart rate 61 beats per minute  There was 1 run of ventricular tachycardia up to 7 beats at 169 beats per minute  There are 2 distinct pauses of about 3 seconds, occurring at 12:44 a m  and 5:45 a m  with no patient triggers  Wenckebach pattern AV block was noted with no other forms of advanced AV block  He presents today for follow-up  He continues to complain of fatigue mostly with physical exertion walking  He tells me climbs up his basement steps and feels extraordinarily tired and mildly short of breath with walking that much  Additionally, he will use a self-propelled mower for about 5 minutes with subsequent severe fatigue  Vitals:  Vitals:    05/18/22 1034   BP: 120/60   BP Location: Left arm   Patient Position: Sitting   Cuff Size: Adult   Pulse: 56   Weight: 81 1 kg (178 lb 12 8 oz)         Past Medical History:   Diagnosis Date    Abnormal diffusion capacity determined by pulmonary function test 4/25/2017    Actinic keratosis     Acute right-sided low back pain without sciatica 9/1/2017    Adverse effect of correct medicinal substance properly administered     Arthralgia     Candidiasis, mouth     Chronic allergic rhinitis     Chronic bronchitis (HCC)     Chronic sinusitis     Cough     Dermatitis     Diverticulosis     LUCIANO (dyspnea on exertion)     Former smoker     GERD (gastroesophageal reflux disease)     Glaucoma     Hyperlipidemia     Inguinal hernia, left     Lump of skin     Male erectile dysfunction     Open comedone     Palpitations     Renal calculi     Sebaceous cyst     Seborrheic keratosis     Skin disorder     Unspecified chronic bronchitis (HCC)     Visual disturbances      Social History     Socioeconomic History    Marital status:       Spouse name: Not on file    Number of children: Not on file    Years of education: Not on file    Highest education level: Not on file   Occupational History    Not on file   Tobacco Use    Smoking status: Former Smoker    Smokeless tobacco: Never Used   Vaping Use    Vaping Use: Never used   Substance and Sexual Activity    Alcohol use: Yes     Comment: social    Drug use: No    Sexual activity: Not Currently   Other Topics Concern    Not on file   Social History Narrative    Not on file     Social Determinants of Health     Financial Resource Strain: Not on file   Food Insecurity: Not on file   Transportation Needs: Not on file   Physical Activity: Not on file   Stress: Not on file   Social Connections: Not on file   Intimate Partner Violence: Not on file   Housing Stability: Not on file      Family History   Problem Relation Age of Onset    Brain cancer Mother     Heart failure Mother     Prostate cancer Mother      Past Surgical History:   Procedure Laterality Date    COLONOSCOPY      INGUINAL HERNIA REPAIR Bilateral     left- last assessed: 11/25/14, Resolved: 5/18/15, onset 12/11/13    MOUTH SURGERY         Current Outpatient Medications:     acetaminophen (TYLENOL) 325 mg tablet, Take 650 mg by mouth every 6 (six) hours as needed for mild pain, Disp: , Rfl:     aspirin 81 mg chewable tablet, Chew 81 mg as needed  , Disp: , Rfl:     Azopt 1 % ophthalmic suspension,  , Disp: , Rfl:     b complex vitamins tablet, Take 1 tablet by mouth daily, Disp: , Rfl:     brimonidine tartrate 0 2 % ophthalmic solution, Administer 1 drop to both eyes 2 (two) times a day, Disp: , Rfl:     chlordiazePOXIDE (LIBRIUM) 10 mg capsule, Take 1 capsule (10 mg total) by mouth daily, Disp: 90 capsule, Rfl: 2    clobetasol (OLUX) 0 05 % topical foam, APPLY TO AFFECTED AREA ON EARS DAILY FOR UP TO 2 WEEKS AT A TIME, Disp: , Rfl:     docusate sodium (COLACE) 100 mg capsule, Take 100 mg by mouth 2 (two) times a day  , Disp: , Rfl:     dutasteride (AVODART) 0 5 mg capsule, Take 0 5 mg by mouth in the morning , Disp: , Rfl:     famotidine (PEPCID) 40 MG tablet, Take 1 tablet (40 mg total) by mouth 2 (two) times a day, Disp: 60 tablet, Rfl: 2    glucosamine-chondroitin 500-400 MG tablet, Take 1 tablet by mouth Three times a day, Disp: , Rfl:     hydrocortisone 2 5 % cream, Apply topically 2 (two) times a day, Disp: 30 g, Rfl: 0    levocetirizine (XYZAL) 5 MG tablet, Take 1 tablet by mouth Daily, Disp: , Rfl:     meclizine (ANTIVERT) 25 mg tablet, Take 0 5 tablets (12 5 mg total) by mouth every 8 (eight) hours as needed for dizziness, Disp: 30 tablet, Rfl: 1    Multiple Vitamins-Minerals (CENTRUM SILVER ULTRA MENS) TABS, Take 1 tablet by mouth daily, Disp: , Rfl:     mupirocin (BACTROBAN) 2 % ointment, Apply topically 3 (three) times a day, Disp: 22 g, Rfl: 0    Red Yeast Rice 600 MG CAPS, Take 1 tablet by mouth 2 (two) times a day, Disp: , Rfl:     Rocklatan 0 02-0 005 % SOLN, INSTILL 1 DROP INTO BOTH EYES EVERY DAY IN THE EVENING, Disp: , Rfl:     Silodosin 8 MG CAPS, TAKE 1 CAPSULE BY MOUTH EVERY DAY, Disp: 90 capsule, Rfl: 3    erythromycin (ILOTYCIN) ophthalmic ointment, APPLY (1CM) BY OPHTHALMIC ROUTE RIBBON INTO THE LOWER CONJUNCTIVAL SAC IN THE RIGHT EYE AS NEEDED (Patient not taking: Reported on 5/18/2022), Disp: , Rfl: 5    guaiFENesin (MUCINEX) 600 mg 12 hr tablet, Take 1,200 mg by mouth every 12 (twelve) hours (Patient not taking: No sig reported), Disp: , Rfl:     mometasone (ELOCON) 0 1 % cream, , Disp: , Rfl:     Tafluprost, PF, (Zioptan) 0 0015 % SOLN, Apply to eye (Patient not taking: No sig reported), Disp: , Rfl:     TRAVATAN Z 0 004 % ophthalmic solution, INSTILL 1 DROP INTO BOTH EYES IN THE EVENING (Patient not taking: No sig reported), Disp: , Rfl: 2        Review of Systems:  Review of Systems   Respiratory: Negative  Cardiovascular: Negative  All other systems reviewed and are negative          Physical Exam:  Physical Exam  Constitutional:       General: He is not in acute distress  Appearance: He is well-developed  He is not diaphoretic  HENT:      Head: Normocephalic and atraumatic  Eyes:      General: No scleral icterus  Right eye: No discharge  Pupils: Pupils are equal, round, and reactive to light  Neck:      Thyroid: No thyromegaly  Cardiovascular:      Rate and Rhythm: Normal rate and regular rhythm  Heart sounds: Normal heart sounds  No murmur heard  No friction rub  No gallop  Pulmonary:      Effort: Pulmonary effort is normal       Breath sounds: Normal breath sounds  Abdominal:      General: There is no distension  Tenderness: There is no abdominal tenderness  There is no guarding or rebound  Musculoskeletal:         General: Normal range of motion  Cervical back: Normal range of motion and neck supple  Skin:     General: Skin is warm and dry  Coloration: Skin is not pale  Findings: No erythema or rash  Neurological:      Mental Status: He is alert and oriented to person, place, and time  Coordination: Coordination normal    Psychiatric:         Behavior: Behavior normal          Thought Content: Thought content normal          Judgment: Judgment normal          This note was completed in part utilizing [employer]  Grammatical errors, random word insertions, spelling mistakes, and incomplete sentences can be an occasional consequence of this system secondary to software limitations, ambient noise, and hardware issues  If you have any questions or concerns about the content, text, or information contained within the body of this dictation, please contact the provider for clarification

## 2022-06-02 ENCOUNTER — TELEPHONE (OUTPATIENT)
Dept: CARDIOLOGY CLINIC | Facility: CLINIC | Age: 82
End: 2022-06-02

## 2022-06-02 NOTE — TELEPHONE ENCOUNTER
Pt called looking for thyroid test results and states is waiting for call  /appt in reference to speaking to EP MD can see referral in for future but patient feels needs to be addressed  Please advise

## 2022-06-14 DIAGNOSIS — R42 VERTIGO: ICD-10-CM

## 2022-06-14 RX ORDER — MECLIZINE HYDROCHLORIDE 25 MG/1
TABLET ORAL
Qty: 30 TABLET | Refills: 1 | Status: ON HOLD | OUTPATIENT
Start: 2022-06-14

## 2022-06-27 DIAGNOSIS — K58.9 IRRITABLE BOWEL SYNDROME, UNSPECIFIED TYPE: ICD-10-CM

## 2022-06-27 DIAGNOSIS — F41.9 ANXIETY: ICD-10-CM

## 2022-06-27 RX ORDER — CHLORDIAZEPOXIDE HYDROCHLORIDE 10 MG/1
10 CAPSULE, GELATIN COATED ORAL DAILY
Qty: 90 CAPSULE | Refills: 2 | Status: SHIPPED | OUTPATIENT
Start: 2022-06-27 | End: 2022-06-30 | Stop reason: SDUPTHER

## 2022-06-30 ENCOUNTER — TELEPHONE (OUTPATIENT)
Dept: FAMILY MEDICINE CLINIC | Facility: CLINIC | Age: 82
End: 2022-06-30

## 2022-06-30 DIAGNOSIS — F41.9 ANXIETY: ICD-10-CM

## 2022-06-30 DIAGNOSIS — K58.9 IRRITABLE BOWEL SYNDROME, UNSPECIFIED TYPE: ICD-10-CM

## 2022-06-30 RX ORDER — CHLORDIAZEPOXIDE HYDROCHLORIDE 10 MG/1
10 CAPSULE, GELATIN COATED ORAL DAILY
Qty: 90 CAPSULE | Refills: 2 | Status: ON HOLD | OUTPATIENT
Start: 2022-06-30

## 2022-06-30 NOTE — TELEPHONE ENCOUNTER
Pt called, his refill of chlordiazepoxide 10mg, 90 day supply was sent to [facility] in [place] instead of [facility]  He said this is the only medication he gets through mail order  He was able to  the script from [facility] but they only gave him 60 days instead of 90  He wanted to call and have it straightened out before he forgot and it became an issue   Thank you

## 2022-07-03 DIAGNOSIS — K21.9 GASTROESOPHAGEAL REFLUX DISEASE WITHOUT ESOPHAGITIS: ICD-10-CM

## 2022-07-05 RX ORDER — FAMOTIDINE 40 MG/1
TABLET, FILM COATED ORAL
Qty: 180 TABLET | Refills: 1 | Status: SHIPPED | OUTPATIENT
Start: 2022-07-05 | End: 2022-10-05 | Stop reason: ALTCHOICE

## 2022-07-12 ENCOUNTER — CONSULT (OUTPATIENT)
Dept: CARDIOLOGY CLINIC | Facility: CLINIC | Age: 82
End: 2022-07-12
Payer: COMMERCIAL

## 2022-07-12 VITALS
HEART RATE: 72 BPM | DIASTOLIC BLOOD PRESSURE: 60 MMHG | WEIGHT: 177.4 LBS | BODY MASS INDEX: 26.2 KG/M2 | SYSTOLIC BLOOD PRESSURE: 120 MMHG

## 2022-07-12 DIAGNOSIS — I49.5 SICK SINUS SYNDROME (HCC): ICD-10-CM

## 2022-07-12 DIAGNOSIS — G31.84 MILD COGNITIVE IMPAIRMENT: ICD-10-CM

## 2022-07-12 DIAGNOSIS — I35.0 AORTIC STENOSIS WITH TRILEAFLET VALVE: ICD-10-CM

## 2022-07-12 DIAGNOSIS — R53.83 OTHER FATIGUE: ICD-10-CM

## 2022-07-12 DIAGNOSIS — I44.1 MOBITZ (TYPE) I (WENCKEBACH'S) ATRIOVENTRICULAR BLOCK: ICD-10-CM

## 2022-07-12 DIAGNOSIS — I44.0 1ST DEGREE AV BLOCK: Primary | ICD-10-CM

## 2022-07-12 DIAGNOSIS — I45.89 CHRONOTROPIC INCOMPETENCE: ICD-10-CM

## 2022-07-12 PROCEDURE — 99205 OFFICE O/P NEW HI 60 MIN: CPT | Performed by: INTERNAL MEDICINE

## 2022-07-12 NOTE — PROGRESS NOTES
EPS Consultation/New Patient Evaluation - Landy Mohamud  79 y o  male MRN: [id number]           ASSESSMENT:  1  1st degree AV block     2  Other fatigue  Ambulatory referral to Cardiac Electrophysiology   3  Chronotropic incompetence  Ambulatory referral to Cardiac Electrophysiology   4  Mobitz (type) I (Wenckebach's) atrioventricular block     5  Sick sinus syndrome ([address] )     6  Mild cognitive impairment     7  Aortic stenosis with trileaflet valve         Echocardiogram and heart monitor 14 days personally reviewed    PLAN:    Symptomatic bradycardia sinus bradycardia and Mobitz one av block I personally reviewed his echocardiographic images he does have moderate but not severe aortic stenosis  He was bradycardic with significant ME prolongation during his echocardiogram   I I do believe he will benefit from pacemaker placement  I explained to him in detail how the procedure is performed and that would be acceptable to perform it as an outpatient  I explained there is proximally 1% risk of complications including but not limited to bleeding bruising damage to blood vessels serious complications such as heart lung perforation or infection are rare but can happen and occasionally leads will need to be replaced a revised  I did tell him that there are many reasons for fatigue include poor sleeping quality, depression, and certainly bradycardia  Explained that I believe he will feel better with permanent pacemaker placement but I also feel that from a safety perspective he will be more safe with placement of a pacemaker in terms of prevention of syncope or fall or bodily harm  Patient is agreeable to move forward with pacemaker and we will arrange this in the near future      CC/HPI:       Patient presents to the office today from a referral from Dr Farshad Nava for permanent pacemaker evaluation  He has had fatigue for 3-4 years  Sleeps 8-10 hours a day  Never feel well rested    Tired with any activity such a even walking up basement steps  Any degree of walking makes him short of breath and like he has a weight on his shoulders  His father lived till 80 and was killed in a car accident his mother  younger of heart disease    He is a retired       ROS: ROS   Positive for fatigue shortness of breath significant lack of energy some depression  He worries about his family a bit  He does have difficulty remembering things at times  Objective:     Vitals: Blood pressure 120/60, pulse 72, weight 80 5 kg (177 lb 6 4 oz)  , Body mass index is 26 2 kg/m²  ,        Physical Exam:    GEN: Tri Hess   appears well, alert and oriented x 3, pleasant and cooperative   HEENT: pupils equal, round, and reactive to light; extraocular muscles intact  NECK: supple, no carotid bruits   HEART: regular rhythm, 3/6 systolic ejection murmur, no murmurs, clicks, gallops or rubs   LUNGS: clear to auscultation bilaterally; no wheezes, rales, or rhonchi   ABDOMEN: normal bowel sounds, soft, no tenderness, no distention  EXTREMITIES: peripheral pulses normal; no clubbing, cyanosis, or edema  NEURO: no focal findings   SKIN: normal without suspicious lesions on exposed skin    Medications:      Current Outpatient Medications:     acetaminophen (TYLENOL) 325 mg tablet, Take 650 mg by mouth every 6 (six) hours as needed for mild pain, Disp: , Rfl:     aspirin 81 mg chewable tablet, Chew 81 mg as needed  , Disp: , Rfl:     Azopt 1 % ophthalmic suspension,  , Disp: , Rfl:     b complex vitamins tablet, Take 1 tablet by mouth daily, Disp: , Rfl:     brimonidine tartrate 0 2 % ophthalmic solution, Administer 1 drop to both eyes 2 (two) times a day, Disp: , Rfl:     chlordiazePOXIDE (LIBRIUM) 10 mg capsule, Take 1 capsule (10 mg total) by mouth daily, Disp: 90 capsule, Rfl: 2    clobetasol (OLUX) 0 05 % topical foam, APPLY TO AFFECTED AREA ON EARS DAILY FOR UP TO 2 WEEKS AT A TIME, Disp: , Rfl:     docusate sodium (COLACE) 100 mg capsule, Take 100 mg by mouth 2 (two) times a day  , Disp: , Rfl:     dutasteride (AVODART) 0 5 mg capsule, Take 0 5 mg by mouth in the morning , Disp: , Rfl:     famotidine (PEPCID) 40 MG tablet, TAKE 1 TABLET BY MOUTH TWICE A DAY, Disp: 180 tablet, Rfl: 1    glucosamine-chondroitin 500-400 MG tablet, Take 1 tablet by mouth Three times a day, Disp: , Rfl:     hydrocortisone 2 5 % cream, Apply topically 2 (two) times a day, Disp: 30 g, Rfl: 0    levocetirizine (XYZAL) 5 MG tablet, Take 1 tablet by mouth Daily, Disp: , Rfl:     meclizine (ANTIVERT) 25 mg tablet, TAKE ONE-HALF TABLET BY MOUTH EVERY 8 HOURS AS NEEDED FOR DIZZINESS, Disp: 30 tablet, Rfl: 1    Multiple Vitamins-Minerals (CENTRUM SILVER ULTRA MENS) TABS, Take 1 tablet by mouth daily, Disp: , Rfl:     mupirocin (BACTROBAN) 2 % ointment, Apply topically 3 (three) times a day, Disp: 22 g, Rfl: 0    Red Yeast Rice 600 MG CAPS, Take 1 tablet by mouth 2 (two) times a day, Disp: , Rfl:     Rocklatan 0 02-0 005 % SOLN, INSTILL 1 DROP INTO BOTH EYES EVERY DAY IN THE EVENING, Disp: , Rfl:     Silodosin 8 MG CAPS, TAKE 1 CAPSULE BY MOUTH EVERY DAY, Disp: 90 capsule, Rfl: 3    erythromycin (ILOTYCIN) ophthalmic ointment, APPLY (1CM) BY OPHTHALMIC ROUTE RIBBON INTO THE LOWER CONJUNCTIVAL SAC IN THE RIGHT EYE AS NEEDED (Patient not taking: No sig reported), Disp: , Rfl: 5    guaiFENesin (MUCINEX) 600 mg 12 hr tablet, Take 1,200 mg by mouth every 12 (twelve) hours (Patient not taking: No sig reported), Disp: , Rfl:     mometasone (ELOCON) 0 1 % cream, , Disp: , Rfl:     Tafluprost, PF, (Zioptan) 0 0015 % SOLN, Apply to eye (Patient not taking: No sig reported), Disp: , Rfl:     TRAVATAN Z 0 004 % ophthalmic solution, INSTILL 1 DROP INTO BOTH EYES IN THE EVENING (Patient not taking: No sig reported), Disp: , Rfl: 2     Family History   Problem Relation Age of Onset    Brain cancer Mother     Heart failure Mother     Prostate cancer Mother      Social History     Socioeconomic History    Marital status:      Spouse name: Not on file    Number of children: Not on file    Years of education: Not on file    Highest education level: Not on file   Occupational History    Not on file   Tobacco Use    Smoking status: Former Smoker    Smokeless tobacco: Never Used   Vaping Use    Vaping Use: Never used   Substance and Sexual Activity    Alcohol use: Yes     Comment: social    Drug use: No    Sexual activity: Not Currently   Other Topics Concern    Not on file   Social History Narrative    Not on file     Social Determinants of Health     Financial Resource Strain: Not on file   Food Insecurity: Not on file   Transportation Needs: Not on file   Physical Activity: Not on file   Stress: Not on file   Social Connections: Not on file   Intimate Partner Violence: Not on file   Housing Stability: Not on file     Social History     Tobacco Use   Smoking Status Former Smoker   Smokeless Tobacco Never Used     Social History     Substance and Sexual Activity   Alcohol Use Yes    Comment: social       Labs & Results:  Below is the patient's most recent value for Albumin, ALT, AST, BUN, Calcium, Chloride, Cholesterol, CO2, Creatinine, GFR, Glucose, HDL, Hematocrit, Hemoglobin, Hemoglobin A1C, LDL, Magnesium, Phosphorus, Platelets, Potassium, PSA, Sodium, Triglycerides, and WBC  Lab Results   Component Value Date    ALT 19 09/17/2021    AST 17 09/17/2021    BUN 21 09/17/2021    CALCIUM 9 1 09/17/2021     09/17/2021    CHOL 211 (H) 04/07/2014    CO2 29 09/17/2021    CREATININE 0 95 09/17/2021    HDL 57 09/17/2021    HCT 42 3 09/17/2021    HGB 13 8 09/17/2021     09/17/2021    K 4 1 09/17/2021    PSA 0 2 07/24/2017    TRIG 88 09/17/2021    WBC 6 47 09/17/2021     Note: for a comprehensive list of the patient's lab results, access the Results Review activity              Cardiac testing:   No results found for this or any previous visit  No results found for this or any previous visit  No results found for this or any previous visit  No results found for this or any previous visit

## 2022-07-12 NOTE — H&P (VIEW-ONLY)
EPS Consultation/New Patient Evaluation - Tino Reyes  79 y o  male MRN: [id number]           ASSESSMENT:  1  1st degree AV block     2  Other fatigue  Ambulatory referral to Cardiac Electrophysiology   3  Chronotropic incompetence  Ambulatory referral to Cardiac Electrophysiology   4  Mobitz (type) I (Wenckebach's) atrioventricular block     5  Sick sinus syndrome ([address] )     6  Mild cognitive impairment     7  Aortic stenosis with trileaflet valve         Echocardiogram and heart monitor 14 days personally reviewed    PLAN:    Symptomatic bradycardia sinus bradycardia and Mobitz one av block I personally reviewed his echocardiographic images he does have moderate but not severe aortic stenosis  He was bradycardic with significant ME prolongation during his echocardiogram   I I do believe he will benefit from pacemaker placement  I explained to him in detail how the procedure is performed and that would be acceptable to perform it as an outpatient  I explained there is proximally 1% risk of complications including but not limited to bleeding bruising damage to blood vessels serious complications such as heart lung perforation or infection are rare but can happen and occasionally leads will need to be replaced a revised  I did tell him that there are many reasons for fatigue include poor sleeping quality, depression, and certainly bradycardia  Explained that I believe he will feel better with permanent pacemaker placement but I also feel that from a safety perspective he will be more safe with placement of a pacemaker in terms of prevention of syncope or fall or bodily harm  Patient is agreeable to move forward with pacemaker and we will arrange this in the near future      CC/HPI:       Patient presents to the office today from a referral from Dr Floresita Olmos for permanent pacemaker evaluation  He has had fatigue for 3-4 years  Sleeps 8-10 hours a day  Never feel well rested    Tired with any activity such a even walking up basement steps  Any degree of walking makes him short of breath and like he has a weight on his shoulders  His father lived till 80 and was killed in a car accident his mother  younger of heart disease    He is a retired       ROS: ROS   Positive for fatigue shortness of breath significant lack of energy some depression  He worries about his family a bit  He does have difficulty remembering things at times  Objective:     Vitals: Blood pressure 120/60, pulse 72, weight 80 5 kg (177 lb 6 4 oz)  , Body mass index is 26 2 kg/m²  ,        Physical Exam:    GEN: Dash Graff   appears well, alert and oriented x 3, pleasant and cooperative   HEENT: pupils equal, round, and reactive to light; extraocular muscles intact  NECK: supple, no carotid bruits   HEART: regular rhythm, 3/6 systolic ejection murmur, no murmurs, clicks, gallops or rubs   LUNGS: clear to auscultation bilaterally; no wheezes, rales, or rhonchi   ABDOMEN: normal bowel sounds, soft, no tenderness, no distention  EXTREMITIES: peripheral pulses normal; no clubbing, cyanosis, or edema  NEURO: no focal findings   SKIN: normal without suspicious lesions on exposed skin    Medications:      Current Outpatient Medications:     acetaminophen (TYLENOL) 325 mg tablet, Take 650 mg by mouth every 6 (six) hours as needed for mild pain, Disp: , Rfl:     aspirin 81 mg chewable tablet, Chew 81 mg as needed  , Disp: , Rfl:     Azopt 1 % ophthalmic suspension,  , Disp: , Rfl:     b complex vitamins tablet, Take 1 tablet by mouth daily, Disp: , Rfl:     brimonidine tartrate 0 2 % ophthalmic solution, Administer 1 drop to both eyes 2 (two) times a day, Disp: , Rfl:     chlordiazePOXIDE (LIBRIUM) 10 mg capsule, Take 1 capsule (10 mg total) by mouth daily, Disp: 90 capsule, Rfl: 2    clobetasol (OLUX) 0 05 % topical foam, APPLY TO AFFECTED AREA ON EARS DAILY FOR UP TO 2 WEEKS AT A TIME, Disp: , Rfl:     docusate sodium (COLACE) 100 mg capsule, Take 100 mg by mouth 2 (two) times a day  , Disp: , Rfl:     dutasteride (AVODART) 0 5 mg capsule, Take 0 5 mg by mouth in the morning , Disp: , Rfl:     famotidine (PEPCID) 40 MG tablet, TAKE 1 TABLET BY MOUTH TWICE A DAY, Disp: 180 tablet, Rfl: 1    glucosamine-chondroitin 500-400 MG tablet, Take 1 tablet by mouth Three times a day, Disp: , Rfl:     hydrocortisone 2 5 % cream, Apply topically 2 (two) times a day, Disp: 30 g, Rfl: 0    levocetirizine (XYZAL) 5 MG tablet, Take 1 tablet by mouth Daily, Disp: , Rfl:     meclizine (ANTIVERT) 25 mg tablet, TAKE ONE-HALF TABLET BY MOUTH EVERY 8 HOURS AS NEEDED FOR DIZZINESS, Disp: 30 tablet, Rfl: 1    Multiple Vitamins-Minerals (CENTRUM SILVER ULTRA MENS) TABS, Take 1 tablet by mouth daily, Disp: , Rfl:     mupirocin (BACTROBAN) 2 % ointment, Apply topically 3 (three) times a day, Disp: 22 g, Rfl: 0    Red Yeast Rice 600 MG CAPS, Take 1 tablet by mouth 2 (two) times a day, Disp: , Rfl:     Rocklatan 0 02-0 005 % SOLN, INSTILL 1 DROP INTO BOTH EYES EVERY DAY IN THE EVENING, Disp: , Rfl:     Silodosin 8 MG CAPS, TAKE 1 CAPSULE BY MOUTH EVERY DAY, Disp: 90 capsule, Rfl: 3    erythromycin (ILOTYCIN) ophthalmic ointment, APPLY (1CM) BY OPHTHALMIC ROUTE RIBBON INTO THE LOWER CONJUNCTIVAL SAC IN THE RIGHT EYE AS NEEDED (Patient not taking: No sig reported), Disp: , Rfl: 5    guaiFENesin (MUCINEX) 600 mg 12 hr tablet, Take 1,200 mg by mouth every 12 (twelve) hours (Patient not taking: No sig reported), Disp: , Rfl:     mometasone (ELOCON) 0 1 % cream, , Disp: , Rfl:     Tafluprost, PF, (Zioptan) 0 0015 % SOLN, Apply to eye (Patient not taking: No sig reported), Disp: , Rfl:     TRAVATAN Z 0 004 % ophthalmic solution, INSTILL 1 DROP INTO BOTH EYES IN THE EVENING (Patient not taking: No sig reported), Disp: , Rfl: 2     Family History   Problem Relation Age of Onset    Brain cancer Mother     Heart failure Mother     Prostate cancer Mother      Social History     Socioeconomic History    Marital status:      Spouse name: Not on file    Number of children: Not on file    Years of education: Not on file    Highest education level: Not on file   Occupational History    Not on file   Tobacco Use    Smoking status: Former Smoker    Smokeless tobacco: Never Used   Vaping Use    Vaping Use: Never used   Substance and Sexual Activity    Alcohol use: Yes     Comment: social    Drug use: No    Sexual activity: Not Currently   Other Topics Concern    Not on file   Social History Narrative    Not on file     Social Determinants of Health     Financial Resource Strain: Not on file   Food Insecurity: Not on file   Transportation Needs: Not on file   Physical Activity: Not on file   Stress: Not on file   Social Connections: Not on file   Intimate Partner Violence: Not on file   Housing Stability: Not on file     Social History     Tobacco Use   Smoking Status Former Smoker   Smokeless Tobacco Never Used     Social History     Substance and Sexual Activity   Alcohol Use Yes    Comment: social       Labs & Results:  Below is the patient's most recent value for Albumin, ALT, AST, BUN, Calcium, Chloride, Cholesterol, CO2, Creatinine, GFR, Glucose, HDL, Hematocrit, Hemoglobin, Hemoglobin A1C, LDL, Magnesium, Phosphorus, Platelets, Potassium, PSA, Sodium, Triglycerides, and WBC  Lab Results   Component Value Date    ALT 19 09/17/2021    AST 17 09/17/2021    BUN 21 09/17/2021    CALCIUM 9 1 09/17/2021     09/17/2021    CHOL 211 (H) 04/07/2014    CO2 29 09/17/2021    CREATININE 0 95 09/17/2021    HDL 57 09/17/2021    HCT 42 3 09/17/2021    HGB 13 8 09/17/2021     09/17/2021    K 4 1 09/17/2021    PSA 0 2 07/24/2017    TRIG 88 09/17/2021    WBC 6 47 09/17/2021     Note: for a comprehensive list of the patient's lab results, access the Results Review activity              Cardiac testing:   No results found for this or any previous visit  No results found for this or any previous visit  No results found for this or any previous visit  No results found for this or any previous visit

## 2022-07-14 ENCOUNTER — TELEPHONE (OUTPATIENT)
Dept: CARDIOLOGY CLINIC | Facility: CLINIC | Age: 82
End: 2022-07-14

## 2022-07-14 DIAGNOSIS — I49.5 SICK SINUS SYNDROME (HCC): Primary | ICD-10-CM

## 2022-07-14 NOTE — TELEPHONE ENCOUNTER
Patient scheduled for Dual chamber Pacer at [facility] on 8/9/22 with Dr Marium Patel  Patient aware of general instructions and labs required  Can we please have insurance check for service    Can we please have the case

## 2022-07-14 NOTE — TELEPHONE ENCOUNTER
----- Message from Lauren Heart, DO sent at 7/13/2022  4:32 PM EDT -----  Yes and yes  Thanks  marla  ----- Message -----  From: Carmela Washington MA  Sent: 7/13/2022   4:04 PM EDT  To: Lauren Heart,     Good afternoon Dr Mark Ovalle,    On 08/09/22 you are going to [facility] to do a gen change for Veronica Mueller, that day you are on Lab 1 from 7;30am to 1pm at [facility], Do you think I can scheduled this patient at [facility]  Also do you think you will have time to perform any short procedure at [facility] when you arrive? Thank you     ----- Message -----  From: Lauren Heart,   Sent: 7/12/2022   4:34 PM EDT  To: Cardiology Surgery Coordinator    Please arrange dual chamber pacemaker with me in [place]  Patient will go home same day  Will have a       thanks

## 2022-07-20 ENCOUNTER — OFFICE VISIT (OUTPATIENT)
Dept: FAMILY MEDICINE CLINIC | Facility: CLINIC | Age: 82
End: 2022-07-20
Payer: COMMERCIAL

## 2022-07-20 VITALS
DIASTOLIC BLOOD PRESSURE: 82 MMHG | HEIGHT: 69 IN | WEIGHT: 178.2 LBS | OXYGEN SATURATION: 98 % | HEART RATE: 66 BPM | SYSTOLIC BLOOD PRESSURE: 132 MMHG | BODY MASS INDEX: 26.39 KG/M2 | TEMPERATURE: 98.8 F | RESPIRATION RATE: 22 BRPM

## 2022-07-20 DIAGNOSIS — R06.09 DYSPNEA ON EXERTION: ICD-10-CM

## 2022-07-20 DIAGNOSIS — I44.1 MOBITZ (TYPE) I (WENCKEBACH'S) ATRIOVENTRICULAR BLOCK: ICD-10-CM

## 2022-07-20 DIAGNOSIS — I49.5 SICK SINUS SYNDROME (HCC): Primary | ICD-10-CM

## 2022-07-20 PROCEDURE — 1160F RVW MEDS BY RX/DR IN RCRD: CPT | Performed by: FAMILY MEDICINE

## 2022-07-20 PROCEDURE — 99213 OFFICE O/P EST LOW 20 MIN: CPT | Performed by: FAMILY MEDICINE

## 2022-07-20 NOTE — PROGRESS NOTES
[facility]      NAME: Denzel Hicks  AGE: 79 y o  SEX: male  : 1940   MRN: [id number]    DATE: 2022  TIME: 10:13 AM    Assessment and Plan     Problem List Items Addressed This Visit     Sick sinus syndrome ([address] ) - Primary    Mobitz (type) I (Wenckebach's) atrioventricular block    Dyspnea on exertion        Reviewed with patient his upcoming scheduled pacemaker placement  He discussed other concerns including insect bite on right foot which does not appear to be infected and does not require any treatment at this time  Return to office in: prn      Chief Complaint     Chief Complaint   Patient presents with    Follow-up     Pacemaker placement 2022, "bug bite" Top of left foot  Depressed-denies suicidal ideations or harming others, tired- hand shakes from fatigue  Cognitive ability decreasing   Depression       History of Present Illness     Patient was seen to review several medical concerns  First he has and placement for pacemaker scheduled for the near future  He has a lesion on his right foot which she believes may be an insect bite  He has noticed this over the last 1-2 minutes  He is having no symptoms at this time her E in      The following portions of the patient's history were reviewed and updated as appropriate: allergies, current medications, past family history, past medical history, past social history, past surgical history and problem list     Review of Systems   Review of Systems   Constitutional: Positive for fatigue  Respiratory: Negative  Cardiovascular: Negative  Gastrointestinal: Negative  Genitourinary: Negative  Musculoskeletal: Negative  Skin:        Skin lesion/insulin by right foot   Psychiatric/Behavioral: Negative          Active Problem List     Patient Active Problem List   Diagnosis    Renal lithiasis    Persistent cough for 3 weeks or longer    Mild cognitive impairment    Mixed hyperlipidemia    Oropharyngeal dysphagia    Abnormal diffusion capacity determined by pulmonary function test    Sjogren's syndrome (HCC)    Irritable bowel syndrome    Gastroesophageal reflux disease without esophagitis    Enlarged prostate without lower urinary tract symptoms (luts)    Dysplastic nevus of face    Arthritis    Anxiety    Antithrombinemia (HCC)    Allergic rhinitis    Neck pain    Glaucoma    Eczema    Neck mass    Tick bite of left lower leg    Dyspnea on exertion    Right ear pain    1st degree AV block    Sick sinus syndrome (HCC)    Mobitz (type) I (Wenckebach's) atrioventricular block    Aortic stenosis with trileaflet valve       Objective   /82 (BP Location: Right arm, Patient Position: Sitting, Cuff Size: Adult)   Pulse 66   Temp 98 8 °F (37 1 °C)   Resp 22   Ht 5' 9" (1 753 m)   Wt 80 8 kg (178 lb 3 2 oz)   SpO2 98%   BMI 26 32 kg/m²     Physical Exam  Vitals and nursing note reviewed  Constitutional:       General: He is not in acute distress  Appearance: He is well-developed  He is not diaphoretic  HENT:      Head: Normocephalic and atraumatic  Eyes:      General:         Right eye: No discharge  Conjunctiva/sclera: Conjunctivae normal       Pupils: Pupils are equal, round, and reactive to light  Neck:      Thyroid: No thyromegaly  Cardiovascular:      Rate and Rhythm: Normal rate and regular rhythm  Pulmonary:      Effort: Pulmonary effort is normal  No respiratory distress  Breath sounds: Normal breath sounds  Musculoskeletal:      Cervical back: Normal range of motion  Lymphadenopathy:      Cervical: No cervical adenopathy  Skin:     General: Skin is warm and dry  Neurological:      Mental Status: He is alert and oriented to person, place, and time  Psychiatric:         Behavior: Behavior normal          Thought Content:  Thought content normal          Judgment: Judgment normal            Current Medications     Current Outpatient Medications:     acetaminophen (TYLENOL) 325 mg tablet, Take 650 mg by mouth every 6 (six) hours as needed for mild pain, Disp: , Rfl:     aspirin 81 mg chewable tablet, Chew 81 mg as needed  , Disp: , Rfl:     Azopt 1 % ophthalmic suspension,  , Disp: , Rfl:     b complex vitamins tablet, Take 1 tablet by mouth daily, Disp: , Rfl:     brimonidine tartrate 0 2 % ophthalmic solution, Administer 1 drop to both eyes 2 (two) times a day, Disp: , Rfl:     chlordiazePOXIDE (LIBRIUM) 10 mg capsule, Take 1 capsule (10 mg total) by mouth daily, Disp: 90 capsule, Rfl: 2    clobetasol (OLUX) 0 05 % topical foam, APPLY TO AFFECTED AREA ON EARS DAILY FOR UP TO 2 WEEKS AT A TIME, Disp: , Rfl:     docusate sodium (COLACE) 100 mg capsule, Take 100 mg by mouth 2 (two) times a day  , Disp: , Rfl:     dutasteride (AVODART) 0 5 mg capsule, Take 0 5 mg by mouth in the morning , Disp: , Rfl:     famotidine (PEPCID) 40 MG tablet, TAKE 1 TABLET BY MOUTH TWICE A DAY, Disp: 180 tablet, Rfl: 1    glucosamine-chondroitin 500-400 MG tablet, Take 1 tablet by mouth Three times a day, Disp: , Rfl:     hydrocortisone 2 5 % cream, Apply topically 2 (two) times a day, Disp: 30 g, Rfl: 0    levocetirizine (XYZAL) 5 MG tablet, Take 1 tablet by mouth Daily, Disp: , Rfl:     meclizine (ANTIVERT) 25 mg tablet, TAKE ONE-HALF TABLET BY MOUTH EVERY 8 HOURS AS NEEDED FOR DIZZINESS, Disp: 30 tablet, Rfl: 1    Multiple Vitamins-Minerals (CENTRUM SILVER ULTRA MENS) TABS, Take 1 tablet by mouth daily, Disp: , Rfl:     mupirocin (BACTROBAN) 2 % ointment, Apply topically 3 (three) times a day, Disp: 22 g, Rfl: 0    Red Yeast Rice 600 MG CAPS, Take 1 tablet by mouth 2 (two) times a day, Disp: , Rfl:     Rocklatan 0 02-0 005 % SOLN, INSTILL 1 DROP INTO BOTH EYES EVERY DAY IN THE EVENING, Disp: , Rfl:     Silodosin 8 MG CAPS, TAKE 1 CAPSULE BY MOUTH EVERY DAY, Disp: 90 capsule, Rfl: 3    erythromycin (ILOTYCIN) ophthalmic ointment, APPLY (1CM) BY OPHTHALMIC ROUTE RIBBON INTO THE LOWER CONJUNCTIVAL [address] IN THE RIGHT EYE AS NEEDED (Patient not taking: Reported on 7/20/2022), Disp: , Rfl: 5    guaiFENesin (MUCINEX) 600 mg 12 hr tablet, Take 1,200 mg by mouth every 12 (twelve) hours (Patient not taking: No sig reported), Disp: , Rfl:     mometasone (ELOCON) 0 1 % cream, , Disp: , Rfl:     Tafluprost, PF, (Zioptan) 0 0015 % SOLN, Apply to eye (Patient not taking: No sig reported), Disp: , Rfl:     TRAVATAN Z 0 004 % ophthalmic solution, INSTILL 1 DROP INTO BOTH EYES IN THE EVENING (Patient not taking: No sig reported), Disp: , Rfl: 2    Health Maintenance     Health Maintenance   Topic Date Due    Annual Physical  08/27/2019    COVID-19 Vaccine (3 - Booster for Moderna series) 07/17/2021    Influenza Vaccine (1) 09/01/2022    Fall Risk  09/14/2022    Depression Screening  09/14/2022    BMI: Followup Plan  09/14/2022    BMI: Adult  07/20/2023    DTaP,Tdap,and Td Vaccines (2 - Td or Tdap) 04/16/2030    Pneumococcal Vaccine: 65+ Years  Completed    HIB Vaccine  Aged Out    Hepatitis B Vaccine  Aged Out    IPV Vaccine  Aged Out    Hepatitis A Vaccine  Aged Out    Meningococcal ACWY Vaccine  Aged Out    HPV Vaccine  Aged [employer] History   Administered Date(s) Administered    COVID-19 MODERNA VACC 0 5 ML IM 01/19/2021, 02/17/2021    INFLUENZA 09/18/2015, 10/18/2016, 09/01/2017, 08/27/2018    Influenza Quadrivalent, 6-35 Months IM 09/01/2017    Influenza Split High Dose Preservative Free IM 09/25/2014, 09/18/2015, 10/18/2016    Influenza, high dose seasonal 0 7 mL 08/27/2018, 09/24/2019, 09/01/2020, 09/14/2021    Influenza, seasonal, injectable 10/17/2012, 10/09/2013    Pneumococcal Conjugate 13-Valent 06/08/2015    Pneumococcal Polysaccharide PPV23 08/30/2019    Td (adult), adsorbed 10/22/2014    Tdap 04/16/2020    Zoster Vaccine Recombinant 03/30/2018, 05/29/2018       Andreina Hughes DO  [facility]

## 2022-07-26 ENCOUNTER — APPOINTMENT (OUTPATIENT)
Dept: LAB | Facility: CLINIC | Age: 82
End: 2022-07-26
Payer: COMMERCIAL

## 2022-07-26 ENCOUNTER — OFFICE VISIT (OUTPATIENT)
Dept: URGENT CARE | Facility: CLINIC | Age: 82
End: 2022-07-26
Payer: COMMERCIAL

## 2022-07-26 VITALS
HEART RATE: 88 BPM | TEMPERATURE: 97.4 F | OXYGEN SATURATION: 97 % | SYSTOLIC BLOOD PRESSURE: 140 MMHG | DIASTOLIC BLOOD PRESSURE: 73 MMHG | RESPIRATION RATE: 18 BRPM
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
## 2022-07-26 DIAGNOSIS — S20.469A TICK BITE OF BACK WALL OF THORAX, UNSPECIFIED LOCATION, INITIAL ENCOUNTER: ICD-10-CM

## 2022-07-26 DIAGNOSIS — S21.209A WOUND OF BACK, UNSPECIFIED LATERALITY, INITIAL ENCOUNTER: Primary | ICD-10-CM

## 2022-07-26 LAB
ALBUMIN SERPL BCP-MCNC: 3.4 G/DL (ref 3.5–5)
ALP SERPL-CCNC: 71 U/L (ref 46–116)
ALT SERPL W P-5'-P-CCNC: 19 U/L (ref 12–78)
ANION GAP SERPL CALCULATED.3IONS-SCNC: 1 MMOL/L (ref 4–13)
AST SERPL W P-5'-P-CCNC: 16 U/L (ref 5–45)
BASOPHILS # BLD AUTO: 0.03 THOUSANDS/ΜL (ref 0–0.1)
BASOPHILS NFR BLD AUTO: 1 % (ref 0–1)
BILIRUB SERPL-MCNC: 0.68 MG/DL (ref 0.2–1)
BUN SERPL-MCNC: 24 MG/DL (ref 5–25)
CALCIUM ALBUM COR SERPL-MCNC: 9.3 MG/DL (ref 8.3–10.1)
CALCIUM SERPL-MCNC: 8.8 MG/DL (ref 8.3–10.1)
CHLORIDE SERPL-SCNC: 108 MMOL/L (ref 96–108)
CO2 SERPL-SCNC: 30 MMOL/L (ref 21–32)
CREAT SERPL-MCNC: 1.11 MG/DL (ref 0.6–1.3)
EOSINOPHIL # BLD AUTO: 0.14 THOUSAND/ΜL (ref 0–0.61)
EOSINOPHIL NFR BLD AUTO: 2 % (ref 0–6)
ERYTHROCYTE [DISTWIDTH] IN BLOOD BY AUTOMATED COUNT: 14.6 % (ref 11.6–15.1)
GFR SERPL CREATININE-BSD FRML MDRD: 61 ML/MIN/1.73SQ M
GLUCOSE P FAST SERPL-MCNC: 94 MG/DL (ref 65–99)
HCT VFR BLD AUTO: 41.5 % (ref 36.5–49.3)
HGB BLD-MCNC: 13.4 G/DL (ref 12–17)
IMM GRANULOCYTES # BLD AUTO: 0.01 THOUSAND/UL (ref 0–0.2)
IMM GRANULOCYTES NFR BLD AUTO: 0 % (ref 0–2)
LYMPHOCYTES # BLD AUTO: 1.78 THOUSANDS/ΜL (ref 0.6–4.47)
LYMPHOCYTES NFR BLD AUTO: 30 % (ref 14–44)
MCH RBC QN AUTO: 31.9 PG (ref 26.8–34.3)
MCHC RBC AUTO-ENTMCNC: 32.3 G/DL (ref 31.4–37.4)
MCV RBC AUTO: 99 FL (ref 82–98)
MONOCYTES # BLD AUTO: 0.56 THOUSAND/ΜL (ref 0.17–1.22)
MONOCYTES NFR BLD AUTO: 9 % (ref 4–12)
NEUTROPHILS # BLD AUTO: 3.46 THOUSANDS/ΜL (ref 1.85–7.62)
NEUTS SEG NFR BLD AUTO: 58 % (ref 43–75)
NRBC BLD AUTO-RTO: 0 /100 WBCS
PLATELET # BLD AUTO: 217 THOUSANDS/UL (ref 149–390)
PMV BLD AUTO: 10.5 FL (ref 8.9–12.7)
POTASSIUM SERPL-SCNC: 4.1 MMOL/L (ref 3.5–5.3)
PROT SERPL-MCNC: 7.3 G/DL (ref 6.4–8.4)
RBC # BLD AUTO: 4.2 MILLION/UL (ref 3.88–5.62)
SODIUM SERPL-SCNC: 139 MMOL/L (ref 135–147)
WBC # BLD AUTO: 5.98 THOUSAND/UL (ref 4.31–10.16)

## 2022-07-26 PROCEDURE — 80053 COMPREHEN METABOLIC PANEL: CPT

## 2022-07-26 PROCEDURE — 36415 COLL VENOUS BLD VENIPUNCTURE: CPT

## 2022-07-26 PROCEDURE — 85025 COMPLETE CBC W/AUTO DIFF WBC: CPT

## 2022-07-26 PROCEDURE — 99213 OFFICE O/P EST LOW 20 MIN: CPT | Performed by: PHYSICIAN ASSISTANT

## 2022-07-26 RX ORDER — DOXYCYCLINE 100 MG/1
TABLET ORAL
Qty: 2 TABLET | Refills: 0 | Status: SHIPPED | OUTPATIENT
Start: 2022-07-26 | End: 2022-07-26

## 2022-07-26 NOTE — PROGRESS NOTES
[facility]    NAME: Asif Rodriguez  is a 79 y o  male  : 1940    MRN: [id number]  DATE: 2022  TIME: 11:45 AM    Assessment and Plan   Wound of back, unspecified laterality, initial encounter [S21 209A]  1  Wound of back, unspecified laterality, initial encounter  doxycycline (ADOXA) 100 MG tablet   2  Tick bite of back wall of thorax, unspecified location, initial encounter  doxycycline (ADOXA) 100 MG tablet     Although no tick found during visit, patient concerned  Could not rule out possible tick that he removed while scratching last night  Prophylactic dose of doxycycline given  Patient Instructions     Patient Instructions   Take the antibiotic as instructed  Follow up with PCP as needed  Chief Complaint     Chief Complaint   Patient presents with    Patient thinks he has a tick on back     In the middle of the night that patient started to scratch his back and thinks he has a tick in back       History of Present Illness   Asif Rodriguez  presents to the clinic c/o  79-year-old male comes in with a spot on his back that he thought was a tick last night  Found it while bathing  Could not see it  Says at least once a year he gets tics  Has been treated with doxycycline in the past   He itch that the area and did become bloody  He says yesterday he had been out mowing the lawn  He says he knows that there are lots of little deer ticks around where he lives  Review of Systems   Review of Systems   Constitutional: Negative  Skin: Positive for wound         Current Medications     Long-Term Medications   Medication Sig Dispense Refill    aspirin 81 mg chewable tablet Chew 81 mg as needed        Azopt 1 % ophthalmic suspension        b complex vitamins tablet Take 1 tablet by mouth daily      brimonidine tartrate 0 2 % ophthalmic solution Administer 1 drop to both eyes 2 (two) times a day      chlordiazePOXIDE (LIBRIUM) 10 mg capsule Take 1 capsule (10 mg total) by mouth daily 90 capsule 2    clobetasol (OLUX) 0 05 % topical foam APPLY TO AFFECTED AREA ON EARS DAILY FOR UP TO 2 WEEKS AT A TIME      docusate sodium (COLACE) 100 mg capsule Take 100 mg by mouth 2 (two) times a day        dutasteride (AVODART) 0 5 mg capsule Take 0 5 mg by mouth in the morning        famotidine (PEPCID) 40 MG tablet TAKE 1 TABLET BY MOUTH TWICE A  tablet 1    hydrocortisone 2 5 % cream Apply topically 2 (two) times a day 30 g 0    levocetirizine (XYZAL) 5 MG tablet Take 1 tablet by mouth Daily      meclizine (ANTIVERT) 25 mg tablet TAKE ONE-HALF TABLET BY MOUTH EVERY 8 HOURS AS NEEDED FOR DIZZINESS 30 tablet 1    mometasone (ELOCON) 0 1 % cream       Multiple Vitamins-Minerals (CENTRUM SILVER ULTRA MENS) TABS Take 1 tablet by mouth daily      mupirocin (BACTROBAN) 2 % ointment Apply topically 3 (three) times a day 22 g 0    Silodosin 8 MG CAPS TAKE 1 CAPSULE BY MOUTH EVERY DAY 90 capsule 3    TRAVATAN Z 0 004 % ophthalmic solution INSTILL 1 DROP INTO BOTH EYES IN THE EVENING  2       Current Allergies     Allergies as of 07/26/2022 - Reviewed 07/26/2022   Allergen Reaction Noted    Levofloxacin Anaphylaxis 05/21/2012    Azithromycin GI Intolerance 05/07/2013    Ketorolac  10/09/2015    Penicillins Hives 12/07/2012          The following portions of the patient's history were reviewed and updated as appropriate: allergies, current medications, past family history, past medical history, past social history, past surgical history and problem list   Past Medical History:   Diagnosis Date    Abnormal diffusion capacity determined by pulmonary function test 4/25/2017    Actinic keratosis     Acute right-sided low back pain without sciatica 9/1/2017    Adverse effect of correct medicinal substance properly administered     Arthralgia     Candidiasis, mouth     Chronic allergic rhinitis     Chronic bronchitis (HCC)     Chronic sinusitis     Cough     Dermatitis  Diverticulosis     LUCIANO (dyspnea on exertion)     Former smoker     GERD (gastroesophageal reflux disease)     Glaucoma     Hyperlipidemia     Inguinal hernia, left     Lump of skin     Male erectile dysfunction     Open comedone     Palpitations     Renal calculi     Sebaceous cyst     Seborrheic keratosis     Skin disorder     Unspecified chronic bronchitis (HCC)     Visual disturbances      Past Surgical History:   Procedure Laterality Date    COLONOSCOPY      INGUINAL HERNIA REPAIR Bilateral     left- last assessed: 11/25/14, Resolved: 5/18/15, onset 12/11/13    MOUTH SURGERY       Family History   Problem Relation Age of Onset    Brain cancer Mother     Heart failure Mother     Prostate cancer Mother        Objective   /73   Pulse 88   Temp (!) 97 4 °F (36 3 °C) (Tympanic)   Resp 18   SpO2 97%   No LMP for male patient  Physical Exam     Physical Exam  Vitals and nursing note reviewed  Constitutional:       General: He is not in acute distress  Appearance: Normal appearance  He is well-developed  He is not ill-appearing, toxic-appearing or diaphoretic  Comments: Elderly male no acute distress   Cardiovascular:      Rate and Rhythm: Normal rate and regular rhythm  Pulmonary:      Effort: Pulmonary effort is normal    Skin:     General: Skin is warm and dry  Findings: Lesion present  Comments: Right mid upper back with 1 mm wound with scab  No evidence tick at this point  Excoriated  Neurological:      Mental Status: He is alert and oriented to person, place, and time

## 2022-07-26 NOTE — PROGRESS NOTES
[facility]    NAME: Anselmo Zhang  is a 79 y o  male  : 1940    MRN: [id number]  DATE: 2022  TIME: 11:50 AM    Assessment and Plan   Wound of back, unspecified laterality, initial encounter [S21 A]  1  Wound of back, unspecified laterality, initial encounter  doxycycline (ADOXA) 100 MG tablet   2  Tick bite of back wall of thorax, unspecified location, initial encounter  doxycycline (ADOXA) 100 MG tablet     In light of patient's history of tick exposures in the past, will do 1 day treatment of doxycycline  No tick identified however patient may have scratched off last night  Patient Instructions     Patient Instructions   Take the antibiotic as instructed  Follow up with PCP as needed  Chief Complaint     Chief Complaint   Patient presents with    Patient thinks he has a tick on back     In the middle of the night that patient started to scratch his back and thinks he has a tick in back       History of Present Illness   Anselmo Zhang  presents to the clinic c/o  65-year-old male comes in concerned of tick bite upper back  Felt a small area on his back and scratched add it to the point of bleeding  He had been out mowing the lawn and says he has takes on him every summer and Lyme disease is prevalent in the area  He has been on doxycycline in the past   He says his back is a hard area to see  Review of Systems   Review of Systems   Constitutional: Negative  Skin: Positive for wound         Current Medications     Long-Term Medications   Medication Sig Dispense Refill    aspirin 81 mg chewable tablet Chew 81 mg as needed        Azopt 1 % ophthalmic suspension        b complex vitamins tablet Take 1 tablet by mouth daily      brimonidine tartrate 0 2 % ophthalmic solution Administer 1 drop to both eyes 2 (two) times a day      chlordiazePOXIDE (LIBRIUM) 10 mg capsule Take 1 capsule (10 mg total) by mouth daily 90 capsule 2    clobetasol (OLUX) 0 05 % topical foam APPLY TO AFFECTED AREA ON EARS DAILY FOR UP TO 2 WEEKS AT A TIME      docusate sodium (COLACE) 100 mg capsule Take 100 mg by mouth 2 (two) times a day        dutasteride (AVODART) 0 5 mg capsule Take 0 5 mg by mouth in the morning        famotidine (PEPCID) 40 MG tablet TAKE 1 TABLET BY MOUTH TWICE A  tablet 1    hydrocortisone 2 5 % cream Apply topically 2 (two) times a day 30 g 0    levocetirizine (XYZAL) 5 MG tablet Take 1 tablet by mouth Daily      meclizine (ANTIVERT) 25 mg tablet TAKE ONE-HALF TABLET BY MOUTH EVERY 8 HOURS AS NEEDED FOR DIZZINESS 30 tablet 1    mometasone (ELOCON) 0 1 % cream       Multiple Vitamins-Minerals (CENTRUM SILVER ULTRA MENS) TABS Take 1 tablet by mouth daily      mupirocin (BACTROBAN) 2 % ointment Apply topically 3 (three) times a day 22 g 0    Silodosin 8 MG CAPS TAKE 1 CAPSULE BY MOUTH EVERY DAY 90 capsule 3    TRAVATAN Z 0 004 % ophthalmic solution INSTILL 1 DROP INTO BOTH EYES IN THE EVENING  2       Current Allergies     Allergies as of 07/26/2022 - Reviewed 07/26/2022   Allergen Reaction Noted    Levofloxacin Anaphylaxis 05/21/2012    Azithromycin GI Intolerance 05/07/2013    Ketorolac  10/09/2015    Penicillins Hives 12/07/2012          The following portions of the patient's history were reviewed and updated as appropriate: allergies, current medications, past family history, past medical history, past social history, past surgical history and problem list   Past Medical History:   Diagnosis Date    Abnormal diffusion capacity determined by pulmonary function test 4/25/2017    Actinic keratosis     Acute right-sided low back pain without sciatica 9/1/2017    Adverse effect of correct medicinal substance properly administered     Arthralgia     Candidiasis, mouth     Chronic allergic rhinitis     Chronic bronchitis (HCC)     Chronic sinusitis     Cough     Dermatitis     Diverticulosis     LUCIANO (dyspnea on exertion)     Former smoker     GERD (gastroesophageal reflux disease)     Glaucoma     Hyperlipidemia     Inguinal hernia, left     Lump of skin     Male erectile dysfunction     Open comedone     Palpitations     Renal calculi     Sebaceous cyst     Seborrheic keratosis     Skin disorder     Unspecified chronic bronchitis (HCC)     Visual disturbances      Past Surgical History:   Procedure Laterality Date    COLONOSCOPY      INGUINAL HERNIA REPAIR Bilateral     left- last assessed: 11/25/14, Resolved: 5/18/15, onset 12/11/13    MOUTH SURGERY       Family History   Problem Relation Age of Onset    Brain cancer Mother     Heart failure Mother     Prostate cancer Mother        Objective   /73   Pulse 88   Temp (!) 97 4 °F (36 3 °C) (Tympanic)   Resp 18   SpO2 97%   No LMP for male patient  Physical Exam     Physical Exam  Vitals and nursing note reviewed  Constitutional:       General: He is not in acute distress  Appearance: Normal appearance  He is well-developed  He is not ill-appearing, toxic-appearing or diaphoretic  Cardiovascular:      Rate and Rhythm: Normal rate and regular rhythm  Pulmonary:      Effort: Pulmonary effort is normal    Skin:     General: Skin is warm and dry  Findings: Lesion present  Comments: Left mid upper back with excoriated lesion with dried blood  No evidence of tick bite at this time  No gross surrounding redness  No induration or TTP  Neurological:      Mental Status: He is alert and oriented to person, place, and time     Psychiatric:         Mood and Affect: Mood normal          Behavior: Behavior normal

## 2022-08-04 ENCOUNTER — TELEPHONE (OUTPATIENT)
Dept: CARDIOLOGY CLINIC | Facility: CLINIC | Age: 82
End: 2022-08-04

## 2022-08-04 NOTE — TELEPHONE ENCOUNTER
Jose  and instructions for patient available for  in [facility] office for pacemaker surgery  Patient notified to  in [facility]

## 2022-08-08 ENCOUNTER — ANESTHESIA EVENT (OUTPATIENT)
Dept: NON INVASIVE DIAGNOSTICS | Facility: HOSPITAL | Age: 82
End: 2022-08-08
Payer: COMMERCIAL

## 2022-08-08 NOTE — DISCHARGE INSTRUCTIONS
Please refer to post pacemaker implantation discharge instructions and restrictions and your pacemaker booklet/temporary card  Keep incision dry for one week  Do not use lotions/powders/creams on incision  Leave outer bandage in place for 1 week - it is water proof, and as long as it is fully adhered to your skin you may shower with it  If it appears as though the bandage is coming off and/or there is any communication to the area of device incision, please then keep the whole area dry for the remaining week  After 1 week, please remove by pulling all edges away from the center of the bandage  No overhead reaching/pushing/pulling/lifting greater than 5-10lbs with left arm for six weeks  Please call the office if you notice redness, swelling, bleeding, or drainage from incision or if you develop fevers  AFTER PACEMAKER CARE:    If you have any questions, please call [phone] to speak with a nurse (8:30am-4pm, or [phone] after hours)  For appointments, please call [phone]  WHAT YOU SHOULD KNOW:   A pacemaker is a small, battery-powered device that is placed under your skin in your upper chest area with wires placed through a vein that lead directly into the heart  It helps regulate your heart rate and prevent your heart from beating too slowly  AFTER YOU LEAVE:     Medicines:     Pain medicine: You may need medicine to take away or decrease pain  Learn how to take your medicine  Ask what medicine and how much you should take  Be sure you know how, when, and how often to take it  Usually Over the counter pain medicine is sufficient to control pain (Acetominophen or Ibuprofen) Ask your doctor if you may take these  If this does not control your pain, narcotic pain killers may be prescribed, please call if you need prescription  Do not wait until the pain is severe before you take your medicine  Tell caregivers if your pain does not decrease      Pain medicine can make you dizzy or sleepy  Prevent falls by calling someone when you get out of bed or if you need help  Take your medicine as directed  Call your healthcare provider if you think your medicine is not helping or if you have side effects  Tell him if you are allergic to any medicine  Follow up with your cardiologist after your procedure: You will need a follow-up visit approximately 2 weeks after you leave the hospital  Your cardiologist will check your wound and make sure that your pacemaker is working correctly  Follow the instructions to check your pacemaker: Your cardiologist or primary healthcare provider will check your pacemaker and the battery regularly  He will use a computer to check your pacemaker over the telephone or wireless device which will be given to you  Pacemaker batteries usually last 8 to 10 years  The pacemaker unit will be replaced when the battery gets low  This is a simpler procedure than the original one to implant your pacemaker  Wound care:  Keep your incision dry for one week  Do not use lotions/powders/creams on incision  Leave outer bandage in place for 1 week - it is water proof, and as long as it is fully adhered to your skin you may shower with it  If it appears as though the bandage is coming off and/or there is any communication to the area of device incision, please then keep the whole area dry for the remaining week  After 1 week, please remove by pulling all edges away from the center of the bandage  Please call the office if you notice redness, swelling, bleeding, or drainage from incision or if you develop fevers  Activity:   Arm movement and lifting:  Be careful using the arm on the side of your pacemaker  Do not move your arm for the first 24 hours after your procedure  Do not  lift your arm above your shoulder or lift more than 10 pounds for one month after your procedure   Avoid pushing, pulling, or repetitive arm movements for one month  This helps the leads stay in place and helps your wound heal  Ask your caregiver when you can drive after your procedure  You may move your arm side to side without lifting above your shoulder, and do not need to wear a sling at home  Driving: you are ok to drive 48 hours after pacemaker is implanted   Sports:  Ask your caregiver when it is okay to play tennis, golf, basketball, or any sport that requires you to lift your arms  Do not play full contact sports, such as football, that could damage your pacemaker  Ask your cardiologist or primary healthcare provider how much and what kinds of physical activity are safe for you  Living with a pacemaker:   Tell all caregivers you have a pacemaker: This includes surgeons, radiologists, and medical technicians  You may want to wear a medical alert ID bracelet or necklace that states that you have a pacemaker  Carry your pacemaker ID card: Make sure you receive a pacemaker ID card  Carry it with you at all times  It lists important information about your pacemaker  Show it to airport security if you travel  Avoid electrical interference:  Avoid welding equipment and other equipment with large magnets or electric fields  These things could interfere with how your pacemaker works  Use your cell phone on the ear opposite from your pacemaker  Do not carry your cell phone in your shirt pocket over your chest      Some Pacemakers are MRI safe  Ask you doctor if it is safe to proceed with MRI and let the radiologist and staff know you have a pacemaker  Do not touch the skin around your pacemaker: This can cause damage to the lead wires or move the pacemaker unit from where it should be  Contact your cardiologist or primary healthcare provider if:   The area around your pacemaker has increasing amount of pain after surgery  The pain should improve over first few days after implantation       The skin around your stitches has increasing redness, swelling, or has drainage  This may mean that you have an infection  You have a fever  You have chills, a cough, and feel weak or achy  These are also signs of infection  Your feet or ankles are more swollen than your baseline  Your Heart rate is less than 50 beats per minute     Seek care immediately if:   Your bandage becomes soaked with blood  Your pacemaker is swelling rapidly    Your stitches open up  You feel your heart suddenly beating very slowly or quickly  You become too weak or dizzy to stand, or you pass out  Your arm or leg feels warm, tender, and painful  It may look swollen and red  You have chest pain that does not go away with rest or medicine  You feel lightheaded, short of breath, and have chest pain  You cough up blood  © 2014 [address] is for End User's use only and may not be sold, redistributed or otherwise used for commercial purposes  All illustrations and images included in CareNotes® are the copyrighted property of [employer]  or Sukumar Snider  The above information is an  only  It is not intended as medical advice for individual conditions or treatments  Talk to your doctor, nurse or pharmacist before following any medical regimen to see if it is safe and effective for you

## 2022-08-09 ENCOUNTER — APPOINTMENT (OUTPATIENT)
Dept: RADIOLOGY | Facility: HOSPITAL | Age: 82
End: 2022-08-09
Payer: COMMERCIAL

## 2022-08-09 ENCOUNTER — ANESTHESIA (OUTPATIENT)
Dept: NON INVASIVE DIAGNOSTICS | Facility: HOSPITAL | Age: 82
End: 2022-08-09
Payer: COMMERCIAL

## 2022-08-09 ENCOUNTER — HOSPITAL ENCOUNTER (OUTPATIENT)
Facility: HOSPITAL | Age: 82
Setting detail: OUTPATIENT SURGERY
Discharge: HOME | End: 2022-08-09
Attending: INTERNAL MEDICINE | Admitting: INTERNAL MEDICINE
Payer: COMMERCIAL

## 2022-08-09 VITALS
WEIGHT: 176.37 LBS | BODY MASS INDEX: 26.12 KG/M2 | HEART RATE: 72 BPM | TEMPERATURE: 97.2 F | OXYGEN SATURATION: 97 % | SYSTOLIC BLOOD PRESSURE: 132 MMHG | HEIGHT: 69 IN | RESPIRATION RATE: 16 BRPM | DIASTOLIC BLOOD PRESSURE: 71 MMHG

## 2022-08-09 DIAGNOSIS — I49.5 SICK SINUS SYNDROME (HCC): Primary | ICD-10-CM

## 2022-08-09 DIAGNOSIS — R00.1 BRADYCARDIA: ICD-10-CM

## 2022-08-09 LAB
ANION GAP SERPL CALCULATED.3IONS-SCNC: 9 MMOL/L (ref 4–13)
ATRIAL RATE: 84 BPM
BASOPHILS # BLD AUTO: 0.03 THOUSANDS/ΜL (ref 0–0.1)
BASOPHILS NFR BLD AUTO: 0 % (ref 0–1)
BUN SERPL-MCNC: 25 MG/DL (ref 5–25)
CALCIUM SERPL-MCNC: 9 MG/DL (ref 8.3–10.1)
CHLORIDE SERPL-SCNC: 106 MMOL/L (ref 96–108)
CO2 SERPL-SCNC: 26 MMOL/L (ref 21–32)
CREAT SERPL-MCNC: 1.16 MG/DL (ref 0.6–1.3)
EOSINOPHIL # BLD AUTO: 0.4 THOUSAND/ΜL (ref 0–0.61)
EOSINOPHIL NFR BLD AUTO: 6 % (ref 0–6)
ERYTHROCYTE [DISTWIDTH] IN BLOOD BY AUTOMATED COUNT: 14.3 % (ref 11.6–15.1)
GFR SERPL CREATININE-BSD FRML MDRD: 57 ML/MIN/1.73SQ M
GLUCOSE P FAST SERPL-MCNC: 93 MG/DL (ref 65–99)
GLUCOSE SERPL-MCNC: 93 MG/DL (ref 65–140)
HCT VFR BLD AUTO: 40.3 % (ref 36.5–49.3)
HGB BLD-MCNC: 13.6 G/DL (ref 12–17)
IMM GRANULOCYTES # BLD AUTO: 0.01 THOUSAND/UL (ref 0–0.2)
IMM GRANULOCYTES NFR BLD AUTO: 0 % (ref 0–2)
INR PPP: 1.03 (ref 0.84–1.19)
LYMPHOCYTES # BLD AUTO: 2.93 THOUSANDS/ΜL (ref 0.6–4.47)
LYMPHOCYTES NFR BLD AUTO: 44 % (ref 14–44)
MCH RBC QN AUTO: 32.5 PG (ref 26.8–34.3)
MCHC RBC AUTO-ENTMCNC: 33.7 G/DL (ref 31.4–37.4)
MCV RBC AUTO: 96 FL (ref 82–98)
MONOCYTES # BLD AUTO: 0.69 THOUSAND/ΜL (ref 0.17–1.22)
MONOCYTES NFR BLD AUTO: 10 % (ref 4–12)
NEUTROPHILS # BLD AUTO: 2.69 THOUSANDS/ΜL (ref 1.85–7.62)
NEUTS SEG NFR BLD AUTO: 40 % (ref 43–75)
NRBC BLD AUTO-RTO: 0 /100 WBCS
P AXIS: 37 DEGREES
PLATELET # BLD AUTO: 194 THOUSANDS/UL (ref 149–390)
PMV BLD AUTO: 9.5 FL (ref 8.9–12.7)
POTASSIUM SERPL-SCNC: 3.9 MMOL/L (ref 3.5–5.3)
PROTHROMBIN TIME: 13.5 SECONDS (ref 11.6–14.5)
QRS AXIS: -12 DEGREES
QRSD INTERVAL: 92 MS
QT INTERVAL: 426 MS
QTC INTERVAL: 407 MS
RBC # BLD AUTO: 4.18 MILLION/UL (ref 3.88–5.62)
SODIUM SERPL-SCNC: 141 MMOL/L (ref 135–147)
T WAVE AXIS: 40 DEGREES
VENTRICULAR RATE: 55 BPM
WBC # BLD AUTO: 6.75 THOUSAND/UL (ref 4.31–10.16)

## 2022-08-09 PROCEDURE — 33208 INSRT HEART PM ATRIAL & VENT: CPT | Performed by: INTERNAL MEDICINE

## 2022-08-09 PROCEDURE — 93005 ELECTROCARDIOGRAM TRACING: CPT

## 2022-08-09 PROCEDURE — 85610 PROTHROMBIN TIME: CPT | Performed by: PHYSICIAN ASSISTANT

## 2022-08-09 PROCEDURE — 93010 ELECTROCARDIOGRAM REPORT: CPT | Performed by: INTERNAL MEDICINE

## 2022-08-09 PROCEDURE — 71045 X-RAY EXAM CHEST 1 VIEW: CPT

## 2022-08-09 PROCEDURE — C1769 GUIDE WIRE: HCPCS | Performed by: INTERNAL MEDICINE

## 2022-08-09 PROCEDURE — C1892 INTRO/SHEATH,FIXED,PEEL-AWAY: HCPCS | Performed by: INTERNAL MEDICINE

## 2022-08-09 PROCEDURE — C1898 LEAD, PMKR, OTHER THAN TRANS: HCPCS | Performed by: INTERNAL MEDICINE

## 2022-08-09 PROCEDURE — 80048 BASIC METABOLIC PNL TOTAL CA: CPT | Performed by: PHYSICIAN ASSISTANT

## 2022-08-09 PROCEDURE — 85025 COMPLETE CBC W/AUTO DIFF WBC: CPT | Performed by: PHYSICIAN ASSISTANT

## 2022-08-09 PROCEDURE — C1785 PMKR, DUAL, RATE-RESP: HCPCS | Performed by: INTERNAL MEDICINE

## 2022-08-09 DEVICE — ENVELOPE CMRM6122 ABSORB MED MR
Type: IMPLANTABLE DEVICE | Site: CHEST | Status: FUNCTIONAL
Brand: TYRX™

## 2022-08-09 DEVICE — LEAD 383069 MRI US
Type: IMPLANTABLE DEVICE | Site: HEART | Status: FUNCTIONAL
Brand: SELECTSECURE™ MRI SURESCAN™

## 2022-08-09 DEVICE — LEAD 457453 MRI US BI RCMCRD MVC
Type: IMPLANTABLE DEVICE | Site: HEART | Status: FUNCTIONAL
Brand: CAPSURE SENSE MRI™ SURESCAN™

## 2022-08-09 DEVICE — IPG W1DR01 AZURE XT DR MRI USA
Type: IMPLANTABLE DEVICE | Site: CHEST | Status: FUNCTIONAL
Brand: AZURE™ XT DR MRI SURESCAN™

## 2022-08-09 RX ORDER — PROPOFOL 10 MG/ML
INJECTION, EMULSION INTRAVENOUS CONTINUOUS PRN
Status: DISCONTINUED | OUTPATIENT
Start: 2022-08-09 | End: 2022-08-09

## 2022-08-09 RX ORDER — VANCOMYCIN HYDROCHLORIDE 1 G/200ML
1000 INJECTION, SOLUTION INTRAVENOUS ONCE
Status: COMPLETED | OUTPATIENT
Start: 2022-08-09 | End: 2022-08-09

## 2022-08-09 RX ORDER — HYDROMORPHONE HCL/PF 1 MG/ML
0.5 SYRINGE (ML) INJECTION
Status: DISCONTINUED | OUTPATIENT
Start: 2022-08-09 | End: 2022-08-12 | Stop reason: HOSPADM

## 2022-08-09 RX ORDER — LIDOCAINE HYDROCHLORIDE 10 MG/ML
INJECTION, SOLUTION EPIDURAL; INFILTRATION; INTRACAUDAL; PERINEURAL AS NEEDED
Status: DISCONTINUED | OUTPATIENT
Start: 2022-08-09 | End: 2022-08-09 | Stop reason: HOSPADM

## 2022-08-09 RX ORDER — MEPERIDINE HYDROCHLORIDE 25 MG/ML
12.5 INJECTION INTRAMUSCULAR; INTRAVENOUS; SUBCUTANEOUS ONCE AS NEEDED
Status: DISCONTINUED | OUTPATIENT
Start: 2022-08-09 | End: 2022-08-12 | Stop reason: HOSPADM

## 2022-08-09 RX ORDER — EPHEDRINE SULFATE 50 MG/ML
INJECTION INTRAVENOUS AS NEEDED
Status: DISCONTINUED | OUTPATIENT
Start: 2022-08-09 | End: 2022-08-09

## 2022-08-09 RX ORDER — TRAVOPROST OPHTHALMIC SOLUTION 0.04 MG/ML
1 SOLUTION OPHTHALMIC
Status: DISCONTINUED | OUTPATIENT
Start: 2022-08-09 | End: 2022-08-12 | Stop reason: HOSPADM

## 2022-08-09 RX ORDER — ACETAMINOPHEN 325 MG/1
650 TABLET ORAL EVERY 6 HOURS PRN
Status: DISCONTINUED | OUTPATIENT
Start: 2022-08-09 | End: 2022-08-10 | Stop reason: SDUPTHER

## 2022-08-09 RX ORDER — GENTAMICIN SULFATE 40 MG/ML
INJECTION, SOLUTION INTRAMUSCULAR; INTRAVENOUS AS NEEDED
Status: DISCONTINUED | OUTPATIENT
Start: 2022-08-09 | End: 2022-08-09 | Stop reason: HOSPADM

## 2022-08-09 RX ORDER — CHLORDIAZEPOXIDE HYDROCHLORIDE 10 MG/1
10 CAPSULE, GELATIN COATED ORAL DAILY
Status: DISCONTINUED | OUTPATIENT
Start: 2022-08-11 | End: 2022-08-12 | Stop reason: HOSPADM

## 2022-08-09 RX ORDER — DOCUSATE SODIUM 100 MG/1
100 CAPSULE, LIQUID FILLED ORAL 2 TIMES DAILY
Status: DISCONTINUED | OUTPATIENT
Start: 2022-08-11 | End: 2022-08-12 | Stop reason: HOSPADM

## 2022-08-09 RX ORDER — LIDOCAINE HYDROCHLORIDE 20 MG/ML
INJECTION, SOLUTION EPIDURAL; INFILTRATION; INTRACAUDAL; PERINEURAL AS NEEDED
Status: DISCONTINUED | OUTPATIENT
Start: 2022-08-09 | End: 2022-08-09

## 2022-08-09 RX ORDER — FINASTERIDE 5 MG/1
5 TABLET, FILM COATED ORAL DAILY
Status: DISCONTINUED | OUTPATIENT
Start: 2022-08-11 | End: 2022-08-12 | Stop reason: HOSPADM

## 2022-08-09 RX ORDER — VANCOMYCIN HYDROCHLORIDE 1 G/200ML
12.5 INJECTION, SOLUTION INTRAVENOUS ONCE
Status: DISCONTINUED | OUTPATIENT
Start: 2022-08-09 | End: 2022-08-09

## 2022-08-09 RX ORDER — PROMETHAZINE HYDROCHLORIDE 25 MG/ML
6.25 INJECTION, SOLUTION INTRAMUSCULAR; INTRAVENOUS ONCE AS NEEDED
Status: DISCONTINUED | OUTPATIENT
Start: 2022-08-09 | End: 2022-08-12 | Stop reason: HOSPADM

## 2022-08-09 RX ORDER — TAMSULOSIN HYDROCHLORIDE 0.4 MG/1
0.4 CAPSULE ORAL
Refills: 3 | Status: DISCONTINUED | OUTPATIENT
Start: 2022-08-11 | End: 2022-08-12 | Stop reason: HOSPADM

## 2022-08-09 RX ORDER — FENTANYL CITRATE/PF 50 MCG/ML
50 SYRINGE (ML) INJECTION
Status: DISCONTINUED | OUTPATIENT
Start: 2022-08-09 | End: 2022-08-12 | Stop reason: HOSPADM

## 2022-08-09 RX ORDER — SODIUM CHLORIDE 9 MG/ML
125 INJECTION, SOLUTION INTRAVENOUS CONTINUOUS
Status: DISCONTINUED | OUTPATIENT
Start: 2022-08-09 | End: 2022-08-12 | Stop reason: HOSPADM

## 2022-08-09 RX ORDER — PROPOFOL 10 MG/ML
INJECTION, EMULSION INTRAVENOUS AS NEEDED
Status: DISCONTINUED | OUTPATIENT
Start: 2022-08-09 | End: 2022-08-09

## 2022-08-09 RX ORDER — BRIMONIDINE TARTRATE 2 MG/ML
1 SOLUTION/ DROPS OPHTHALMIC 2 TIMES DAILY
Status: DISCONTINUED | OUTPATIENT
Start: 2022-08-11 | End: 2022-08-12 | Stop reason: HOSPADM

## 2022-08-09 RX ORDER — ASPIRIN 81 MG/1
81 TABLET, CHEWABLE ORAL DAILY
Status: DISCONTINUED | OUTPATIENT
Start: 2022-08-10 | End: 2022-08-12 | Stop reason: HOSPADM

## 2022-08-09 RX ORDER — FAMOTIDINE 20 MG/1
40 TABLET, FILM COATED ORAL 2 TIMES DAILY
Status: DISCONTINUED | OUTPATIENT
Start: 2022-08-11 | End: 2022-08-12 | Stop reason: HOSPADM

## 2022-08-09 RX ORDER — ONDANSETRON 2 MG/ML
4 INJECTION INTRAMUSCULAR; INTRAVENOUS ONCE AS NEEDED
Status: DISCONTINUED | OUTPATIENT
Start: 2022-08-09 | End: 2022-08-12 | Stop reason: HOSPADM

## 2022-08-09 RX ORDER — ACETAMINOPHEN 325 MG/1
650 TABLET ORAL EVERY 4 HOURS PRN
Status: DISCONTINUED | OUTPATIENT
Start: 2022-08-09 | End: 2022-08-12 | Stop reason: HOSPADM

## 2022-08-09 RX ADMIN — EPHEDRINE SULFATE 15 MG: 50 INJECTION, SOLUTION INTRAVENOUS at 10:13

## 2022-08-09 RX ADMIN — VANCOMYCIN HYDROCHLORIDE 1000 MG: 1 INJECTION, SOLUTION INTRAVENOUS at 09:02

## 2022-08-09 RX ADMIN — SODIUM CHLORIDE 125 ML/HR: 9 INJECTION, SOLUTION INTRAVENOUS at 07:49

## 2022-08-09 RX ADMIN — PROPOFOL 65 MCG/KG/MIN: 10 INJECTION, EMULSION INTRAVENOUS at 09:23

## 2022-08-09 RX ADMIN — PROPOFOL 50 MG: 10 INJECTION, EMULSION INTRAVENOUS at 09:23

## 2022-08-09 RX ADMIN — LIDOCAINE HYDROCHLORIDE 100 MG: 20 INJECTION, SOLUTION EPIDURAL; INFILTRATION; INTRACAUDAL; PERINEURAL at 09:23

## 2022-08-09 NOTE — ANESTHESIA POSTPROCEDURE EVALUATION
Post-Op Assessment Note    CV Status:  Stable    Pain management: adequate     Mental Status:  Alert and awake   Hydration Status:  Euvolemic   PONV Controlled:  Controlled   Airway Patency:  Patent      Post Op Vitals Reviewed: Yes      Staff: Anesthesiologist         No complications documented      BP      Temp     Pulse     Resp      SpO2      /65   Pulse 68   Temp (!) 97 3 °F (36 3 °C)   Resp 13   Ht 5' 9" (1 753 m)   Wt 80 kg (176 lb 5 9 oz)   SpO2 99%   BMI 26 05 kg/m²

## 2022-08-09 NOTE — ANESTHESIA PREPROCEDURE EVALUATION
Procedure:  Cardiac pacer implant/ DUAL CHAMBER PACER @ [place] (N/A Chest)    Relevant Problems   CARDIO   (+) 1st degree AV block   (+) Aortic stenosis with trileaflet valve   (+) Dyspnea on exertion   (+) Mixed hyperlipidemia   (+) Mobitz (type) I (Wenckebach's) atrioventricular block   (+) Sick sinus syndrome (HCC)      GI/HEPATIC   (+) Gastroesophageal reflux disease without esophagitis   (+) Oropharyngeal dysphagia      /RENAL   (+) Renal lithiasis      HEMATOLOGY   (+) Antithrombinemia (HCC)      MUSCULOSKELETAL   (+) Arthritis      NEURO/PSYCH   (+) Anxiety      PULMONARY   (+) Dyspnea on exertion        Physical Exam    Airway    Mallampati score: II  TM Distance: >3 FB  Neck ROM: full     Dental       Cardiovascular  Rhythm: regular, Rate: normal, Cardiovascular exam normal    Pulmonary  Pulmonary exam normal Breath sounds clear to auscultation,     Other Findings        Anesthesia Plan  ASA Score- 3     Anesthesia Type- general with ASA Monitors  Additional Monitors:   Airway Plan:           Plan Factors-Exercise tolerance (METS): <4 METS  Chart reviewed  Existing labs reviewed  Patient is not a current smoker  Obstructive sleep apnea risk education given perioperatively  Induction- intravenous  Postoperative Plan-     Informed Consent- Anesthetic plan and risks discussed with patient

## 2022-08-09 NOTE — Clinical Note
The Milind Awad device was inserted  The leads were placed into the connector and visually verified to be in correct position

## 2022-08-09 NOTE — INTERVAL H&P NOTE
Update: (This section must be completed if the H&P was completed greater than 24 hrs to procedure or admission)    H&P reviewed  After examining the patient, I find no changed to the H&P since it had been written  No changes from evaluation on 7/12/22    Patient re-evaluated   Accept as history and physical     Robbie Person, DO/August 9, 2022/8:56 AM

## 2022-08-10 LAB
ATRIAL RATE: 74 BPM
P AXIS: 65 DEGREES
PR INTERVAL: 200 MS
QRS AXIS: -41 DEGREES
QRSD INTERVAL: 150 MS
QT INTERVAL: 422 MS
QTC INTERVAL: 468 MS
T WAVE AXIS: 80 DEGREES
VENTRICULAR RATE: 74 BPM

## 2022-08-10 PROCEDURE — 93010 ELECTROCARDIOGRAM REPORT: CPT | Performed by: INTERNAL MEDICINE

## 2022-08-23 ENCOUNTER — IN-CLINIC DEVICE VISIT (OUTPATIENT)
Dept: CARDIOLOGY CLINIC | Facility: CLINIC | Age: 82
End: 2022-08-23

## 2022-08-23 DIAGNOSIS — Z95.0 PRESENCE OF CARDIAC PACEMAKER: Primary | ICD-10-CM

## 2022-08-23 PROCEDURE — 99024 POSTOP FOLLOW-UP VISIT: CPT | Performed by: INTERNAL MEDICINE

## 2022-08-23 NOTE — PROGRESS NOTES
Results for orders placed or performed in visit on 08/23/22   Cardiac EP device report    Narrative    MDT DUAL CHAMBER PPM (MVP OFF/DDDR 60/HIS LEAD) - ACTIVE SYSTEM IS MRI CONDITIONAL  DEVICE INTERROGATED IN THE [place] OFFICE  WOUND CHECK: INCISION CLEAN AND DRY WITH EDGES APPROXIMATED; STAPLES REMOVED; WOUND CARE AND RESTRICTIONS REVIEWED WITH PATIENT  BATTERY VOLTAGE ADEQUATE (10 1 YRS)  AP 25 3%  99 6% (>40%/AVB/DDDR 60/HIS); ALL LEAD PARAMETERS WITHIN NORMAL LIMITS  NO SIGNIFICANT HIGH RATE EPISODES  NO PROGRAMMING CHANGES MADE TO DEVICE PARAMETERS  PACEMAKER FUNCTIONING APPROPRIATELY

## 2022-10-05 ENCOUNTER — OFFICE VISIT (OUTPATIENT)
Dept: FAMILY MEDICINE CLINIC | Facility: CLINIC | Age: 82
End: 2022-10-05
Payer: COMMERCIAL

## 2022-10-05 VITALS
SYSTOLIC BLOOD PRESSURE: 118 MMHG | DIASTOLIC BLOOD PRESSURE: 70 MMHG | WEIGHT: 180 LBS | HEART RATE: 84 BPM | BODY MASS INDEX: 26.66 KG/M2 | HEIGHT: 69 IN | TEMPERATURE: 99 F | OXYGEN SATURATION: 99 %

## 2022-10-05 DIAGNOSIS — Z23 NEED FOR VACCINATION: ICD-10-CM

## 2022-10-05 DIAGNOSIS — I49.5 SICK SINUS SYNDROME (HCC): ICD-10-CM

## 2022-10-05 DIAGNOSIS — N40.0 ENLARGED PROSTATE WITHOUT LOWER URINARY TRACT SYMPTOMS (LUTS): ICD-10-CM

## 2022-10-05 DIAGNOSIS — D68.318: ICD-10-CM

## 2022-10-05 DIAGNOSIS — I35.0 AORTIC STENOSIS WITH TRILEAFLET VALVE: ICD-10-CM

## 2022-10-05 DIAGNOSIS — M35.00 SJOGREN'S SYNDROME, WITH UNSPECIFIED ORGAN INVOLVEMENT (HCC): ICD-10-CM

## 2022-10-05 DIAGNOSIS — K21.9 GASTROESOPHAGEAL REFLUX DISEASE WITHOUT ESOPHAGITIS: ICD-10-CM

## 2022-10-05 DIAGNOSIS — R53.83 FATIGUE, UNSPECIFIED TYPE: ICD-10-CM

## 2022-10-05 DIAGNOSIS — K58.9 IRRITABLE BOWEL SYNDROME, UNSPECIFIED TYPE: ICD-10-CM

## 2022-10-05 DIAGNOSIS — E78.2 MIXED HYPERLIPIDEMIA: Primary | ICD-10-CM

## 2022-10-05 DIAGNOSIS — Z00.00 MEDICARE ANNUAL WELLNESS VISIT, SUBSEQUENT: ICD-10-CM

## 2022-10-05 PROCEDURE — 90471 IMMUNIZATION ADMIN: CPT | Performed by: FAMILY MEDICINE

## 2022-10-05 PROCEDURE — G0439 PPPS, SUBSEQ VISIT: HCPCS | Performed by: FAMILY MEDICINE

## 2022-10-05 PROCEDURE — 90662 IIV NO PRSV INCREASED AG IM: CPT | Performed by: FAMILY MEDICINE

## 2022-10-05 PROCEDURE — 99214 OFFICE O/P EST MOD 30 MIN: CPT | Performed by: FAMILY MEDICINE

## 2022-10-05 NOTE — PROGRESS NOTES
Assessment and Plan:     Problem List Items Addressed This Visit     Sjogren's syndrome ([facility] [address] [facility] )    [facility]    Mixed hyperlipidemia - Primary    Irritable bowel syndrome    Gastroesophageal reflux disease without esophagitis    Enlarged prostate without lower urinary tract symptoms (luts)      Other Visit Diagnoses     Need for vaccination        Medicare annual wellness visit, subsequent        Questionnaire reviewed  Immunization health screening history updated  Advance directive in place  BMI Counseling: Body mass index is 26 58 kg/m²  The BMI is above normal  Nutrition recommendations include encouraging healthy choices of fruits and vegetables, consuming healthier snacks, moderation in carbohydrate intake and increasing intake of lean protein  Exercise recommendations include exercising 3-5 times per week  No pharmacotherapy was ordered  Rationale for BMI follow-up plan is due to patient being overweight or obese  Depression Screening and Follow-up Plan: Patient was screened for depression during today's encounter  They screened negative with a PHQ-2 score of 2  Preventive health issues were discussed with patient, and age appropriate screening tests were ordered as noted in patient's After Visit Summary  Personalized health advice and appropriate referrals for health education or preventive services given if needed, as noted in patient's After Visit Summary       History of Present Illness:     Patient presents for a Medicare Wellness Visit    HPI   Patient Care Team:  Phylicia Farooq DO as PCP - Marlo Albright MD     Review of Systems:     Review of Systems     Problem List:     Patient Active Problem List   Diagnosis    Renal lithiasis    Persistent cough for 3 weeks or longer    Mild cognitive impairment    Mixed hyperlipidemia    Oropharyngeal dysphagia    Abnormal diffusion capacity determined by pulmonary function test    [facility]  Irritable bowel syndrome    Gastroesophageal reflux disease without esophagitis    Enlarged prostate without lower urinary tract symptoms (luts)    Dysplastic nevus of face    Arthritis    Anxiety    Antithrombinemia (HCC)    Allergic rhinitis    Neck pain    Glaucoma    Eczema    Neck mass    Tick bite of left lower leg    Dyspnea on exertion    Right ear pain    1st degree AV block    Sick sinus syndrome (HCC)    Mobitz (type) I (Wenckebach's) atrioventricular block    Aortic stenosis with trileaflet valve      Past Medical and Surgical History:     Past Medical History:   Diagnosis Date    Abnormal diffusion capacity determined by pulmonary function test 4/25/2017    Actinic keratosis     Acute right-sided low back pain without sciatica 9/1/2017    Adverse effect of correct medicinal substance properly administered     Arthralgia     Candidiasis, mouth     Chronic allergic rhinitis     Chronic bronchitis (HCC)     Chronic sinusitis     Cough     Dermatitis     Diverticulosis     LUCIANO (dyspnea on exertion)     Former smoker     GERD (gastroesophageal reflux disease)     Glaucoma     Hyperlipidemia     Inguinal hernia, left     Lump of skin     Male erectile dysfunction     Open comedone     Palpitations     Renal calculi     Sebaceous cyst     Seborrheic keratosis     Skin disorder     Unspecified chronic bronchitis (HCC)     Visual disturbances      Past Surgical History:   Procedure Laterality Date    CARDIAC ELECTROPHYSIOLOGY PROCEDURE N/A 8/9/2022    Pro[facility]: [facility] [place] [place];  Surgeon: Abdon Gaston DO;  Location: [facility];   Service: Cardiology    COLONOSCOPY      INGUINAL HERNIA REPAIR Bilateral     left- last assessed: 11/25/14, Resolved: 5/18/15, onset 12/11/13    MOUTH SURGERY        Family History:     Family History   Problem Relation Age of Onset   Gay Walsh Brain cancer Mother     Heart failure Mother     Prostate cancer Mother       Social History:     Social History     Socioeconomic History    Marital status:      Spouse name: Not on file    Number of children: Not on file    Years of education: Not on file    Highest education level: Not on file   Occupational History    Not on file   Tobacco Use    Smoking status: Former Smoker    Smokeless tobacco: Never Used    Tobacco comment: quit 10 years ago   Vaping Use    Vaping Use: Never used   Substance and Sexual Activity    Alcohol use: Yes     Alcohol/week: 1 0 standard drink     Types: 1 Cans of beer per week     Comment: 1 beer daily    Drug use: No    Sexual activity: Not Currently   Other Topics Concern    Not on file   Social History Narrative    Not on file     Social Determinants of Health     Financial Resource Strain: Not on file   Food Insecurity: Not on file   Transportation Needs: Not on file   Physical Activity: Not on file   Stress: Not on file   Social Connections: Not on file   Intimate Partner Violence: Not on file   Housing Stability: Not on file      Medications and Allergies:     Current Outpatient Medications   Medication Sig Dispense Refill    acetaminophen (TYLENOL) 325 mg tablet Take 650 mg by mouth every 6 (six) hours as needed for mild pain      aspirin 81 mg chewable tablet Chew 81 mg as needed        Azopt 1 % ophthalmic suspension        b complex vitamins tablet Take 1 tablet by mouth daily      brimonidine tartrate 0 2 % ophthalmic solution Administer 1 drop to both eyes 2 (two) times a day      chlordiazePOXIDE (LIBRIUM) 10 mg capsule Take 1 capsule (10 mg total) by mouth daily 90 capsule 2    clobetasol (OLUX) 0 05 % topical foam APPLY TO AFFECTED AREA ON EARS DAILY FOR UP TO 2 WEEKS AT A TIME      docusate sodium (COLACE) 100 mg capsule Take 100 mg by mouth 2 (two) times a day        dutasteride (AVODART) 0 5 mg capsule Take 0 5 mg by mouth in the morning        erythromycin (ILOTYCIN) ophthalmic ointment   5    famotidine (PEPCID) 40 MG tablet TAKE 1 TABLET BY MOUTH TWICE A  tablet 1    glucosamine-chondroitin 500-400 MG tablet Take 1 tablet by mouth Three times a day      guaiFENesin (MUCINEX) 600 mg 12 hr tablet Take 1,200 mg by mouth every 12 (twelve) hours      hydrocortisone 2 5 % cream Apply topically 2 (two) times a day 30 g 0    levocetirizine (XYZAL) 5 MG tablet Take 1 tablet by mouth Daily      meclizine (ANTIVERT) 25 mg tablet TAKE ONE-HALF TABLET BY MOUTH EVERY 8 HOURS AS NEEDED FOR DIZZINESS 30 tablet 1    mometasone (ELOCON) 0 1 % cream       Multiple Vitamins-Minerals (CENTRUM SILVER ULTRA MENS) TABS Take 1 tablet by mouth daily      mupirocin (BACTROBAN) 2 % ointment Apply topically 3 (three) times a day 22 g 0    Red Yeast Rice 600 MG CAPS Take 1 tablet by mouth 2 (two) times a day      Rocklatan 0 02-0 005 % SOLN INSTILL 1 DROP INTO BOTH EYES EVERY DAY IN THE EVENING      Silodosin 8 MG CAPS TAKE 1 CAPSULE BY MOUTH EVERY DAY 90 capsule 3    Tafluprost, PF, (Zioptan) 0 0015 % SOLN Apply to eye      TRAVATAN Z 0 004 % ophthalmic solution INSTILL 1 DROP INTO BOTH EYES IN THE EVENING  2     No current facility-administered medications for this visit       Allergies   Allergen Reactions    Levofloxacin Anaphylaxis    Azithromycin GI Intolerance    Ketorolac Other (See Comments)     Swelling of eyes with drops    Penicillins Hives      Immunizations:     Immunization History   Administered Date(s) Administered    COVID-19 MODERNA VACC 0 5 ML IM 01/19/2021, 02/17/2021    INFLUENZA 09/18/2015, 10/18/2016, 09/01/2017, 08/27/2018    Influenza Quadrivalent, 6-35 Months IM 09/01/2017    Influenza Split High Dose Preservative Free IM 09/25/2014, 09/18/2015, 10/18/2016    Influenza, high dose seasonal 0 7 mL 08/27/2018, 09/24/2019, 09/01/2020, 09/14/2021    Influenza, seasonal, injectable 10/17/2012, 10/09/2013    Pneumococcal Conjugate 13-Valent 06/08/2015    Pneumococcal Polysaccharide PPV23 08/30/2019    Td (adult), adsorbed 10/22/2014    Tdap 04/16/2020    Zoster Vaccine Recombinant 03/30/2018, 05/29/2018      Health Maintenance: There are no preventive care reminders to display for this patient  Topic Date Due    COVID-19 Vaccine (3 - Booster for Moderna series) 07/17/2021    Influenza Vaccine (1) 09/01/2022      Medicare Screening Tests and Risk Assessments:     Soniya is here for his Subsequent Wellness visit  Health Risk Assessment:   Patient rates overall health as fair  Patient feels that their physical health rating is same  Patient is satisfied with their life  Eyesight was rated as slightly worse  Hearing was rated as slightly worse  Patient feels that their emotional and mental health rating is same  Patients states they are sometimes angry  Patient states they are always unusually tired/fatigued  Pain experienced in the last 7 days has been some  Patient's pain rating has been 3/10  Patient states that he has experienced weight loss or gain in last 6 months  Depression Screening:   PHQ-2 Score: 2      Fall Risk Screening: In the past year, patient has experienced: no history of falling in past year      Home Safety:  Patient does not have trouble with stairs inside or outside of their home  Patient has working smoke alarms and has no working carbon monoxide detector  Home safety hazards include: none  Nutrition:   Current diet is Regular  Medications:   Patient is currently taking over-the-counter supplements  OTC medications include: see medication list  Patient is able to manage medications  Activities of Daily Living (ADLs)/Instrumental Activities of Daily Living (IADLs):   Walk and transfer into and out of bed and chair?: Yes  Dress and groom yourself?: Yes    Bathe or shower yourself?: Yes    Feed yourself?  Yes  Do your laundry/housekeeping?: Yes  Manage your money, pay your bills and track your expenses?: Yes  Make your own meals?: Yes    Do your own shopping?: Yes    Previous Hospitalizations:   Any hospitalizations or ED visits within the last 12 months?: Yes    How many hospitalizations have you had in the last year?: 1-2    Advance Care Planning:   Living will: No    Durable POA for healthcare: Yes    Advanced directive: Yes    Five wishes given: Yes      Cognitive Screening:   Provider or family/friend/caregiver concerned regarding cognition?: No    PREVENTIVE SCREENINGS      Cardiovascular Screening:    General: Screening Not Indicated and History Lipid Disorder      Diabetes Screening:     General: Screening Current      Colorectal Cancer Screening:     General: Screening Not Indicated      Prostate Cancer Screening:    General: Screening Not Indicated      Osteoporosis Screening:    General: Screening Not Indicated      Abdominal Aortic Aneurysm (AAA) Screening:    Risk factors include: tobacco use        General: Screening Not Indicated      Lung Cancer Screening:     General: Screening Not Indicated      Hepatitis C Screening:    General: Screening Not Indicated    Screening, Brief Intervention, and Referral to Treatment (SBIRT)    Screening  Typical number of drinks in a day: 1  Typical number of drinks in a week: 3  Interpretation: Low risk drinking behavior  Single Item Drug Screening:  How often have you used an illegal drug (including marijuana) or a prescription medication for non-medical reasons in the past year? never    Single Item Drug Screen Score: 0  Interpretation: Negative screen for possible drug use disorder    Brief Intervention  Alcohol & drug use screenings were reviewed  No concerns regarding substance use disorder identified       No exam data present     Physical Exam:     /70 (BP Location: Left arm, Patient Position: Sitting, Cuff Size: Standard)   Pulse 84   Temp 99 °F (37 2 °C) (Temporal)   Ht 5' 9" (1 753 m)   Wt 81 6 kg (180 lb)   SpO2 99%   BMI 26 58 kg/m²     Physical Exam Cici Brizuela, DO

## 2022-10-05 NOTE — ASSESSMENT & PLAN NOTE
Continue follow-up with Cardiology    Possible etiology for patient's persistent shortness of breath peer

## 2022-10-05 NOTE — PATIENT INSTRUCTIONS
Medicare Preventive Visit Patient Instructions  Thank you for completing your Welcome to Medicare Visit or Medicare Annual Wellness Visit today  Your next wellness visit will be due in one year (10/6/2023)  The screening/preventive services that you may require over the next 5-10 years are detailed below  Some tests may not apply to you based off risk factors and/or age  Screening tests ordered at today's visit but not completed yet may show as past due  Also, please note that scanned in results may not display below  Preventive Screenings:  Service Recommendations Previous Testing/Comments   Colorectal Cancer Screening  · Colonoscopy    · Fecal Occult Blood Test (FOBT)/Fecal Immunochemical Test (FIT)  · Fecal DNA/Cologuard Test  · Flexible Sigmoidoscopy Age: 39-70 years old   Colonoscopy: every 10 years (May be performed more frequently if at higher risk)  OR  FOBT/FIT: every 1 year  OR  Cologuard: every 3 years  OR  Sigmoidoscopy: every 5 years  Screening may be recommended earlier than age 39 if at higher risk for colorectal cancer  Also, an individualized decision between you and your healthcare provider will decide whether screening between the ages of 74-80 would be appropriate   Colonoscopy: Not on file  FOBT/FIT: Not on file  Cologuard: Not on file  Sigmoidoscopy: Not on file          Prostate Cancer Screening Individualized decision between patient and health care provider in men between ages of 53-78   Medicare will cover every 12 months beginning on the day after your 50th birthday PSA: No results in last 5 years     Screening Not Indicated     Hepatitis C Screening Once for adults born between [facility]  More frequently in patients at high risk for Hepatitis C Hep C Antibody: Not on file        Diabetes Screening 1-2 times per year if you're at risk for diabetes or have pre-diabetes Fasting glucose: 93 mg/dL (8/9/2022)  A1C: No results in last 5 years (No results in last 5 years)  Screening Current   Cholesterol Screening Once every 5 years if you don't have a lipid disorder  May order more often based on risk factors  Lipid panel: 09/17/2021  Screening Not Indicated  History Lipid Disorder      Other Preventive Screenings Covered by Medicare:  1  Abdominal Aortic Aneurysm (AAA) Screening: covered once if your at risk  You're considered to be at risk if you have a family history of AAA or a male between the age of 73-68 who smoking at least 100 cigarettes in your lifetime  2  Lung Cancer Screening: covers low dose CT scan once per year if you meet all of the following conditions: (1) Age 50-69; (2) No signs or symptoms of lung cancer; (3) Current smoker or have quit smoking within the last 15 years; (4) You have a tobacco smoking history of at least 20 pack years (packs per day x number of years you smoked); (5) You get a written order from a healthcare provider  3  Glaucoma Screening: covered annually if you're considered high risk: (1) You have diabetes OR (2) Family history of glaucoma OR (3)  aged 48 and older OR (3)  American aged 72 and older  3  Osteoporosis Screening: covered every 2 years if you meet one of the following conditions: (1) Have a vertebral abnormality; (2) On glucocorticoid therapy for more than 3 months; (3) Have primary hyperparathyroidism; (4) On osteoporosis medications and need to assess response to drug therapy  5  HIV Screening: covered annually if you're between the age of 12-76  Also covered annually if you are younger than 13 and older than 72 with risk factors for HIV infection  For pregnant patients, it is covered up to 3 times per pregnancy      Immunizations:  Immunization Recommendations   Influenza Vaccine Annual influenza vaccination during flu season is recommended for all persons aged >= 6 months who do not have contraindications   Pneumococcal Vaccine   * Pneumococcal conjugate vaccine = PCV13 (Prevnar 13), PCV15 (Vaxneuvance), PCV20 (Prevnar 20)  * Pneumococcal polysaccharide vaccine = PPSV23 (Pneumovax) Adults 2364 years old: 1-3 doses may be recommended based on certain risk factors  Adults 72 years old: 1-2 doses may be recommended based off what pneumonia vaccine you previously received   Hepatitis B Vaccine 3 dose series if at intermediate or high risk (ex: diabetes, end stage renal disease, liver disease)   Tetanus (Td) Vaccine - COST NOT COVERED BY MEDICARE PART B Following completion of primary series, a booster dose should be given every 10 years to maintain immunity against tetanus  Td may also be given as tetanus wound prophylaxis  Tdap Vaccine - COST NOT COVERED BY MEDICARE PART B Recommended at least once for all adults  For pregnant patients, recommended with each pregnancy  Shingles Vaccine (Shingrix) - COST NOT COVERED BY MEDICARE PART B  2 shot series recommended in those aged 48 and above     Health Maintenance Due:  There are no preventive care reminders to display for this patient  Immunizations Due:      Topic Date Due    COVID-19 Vaccine (3 - Booster for Moderna series) 07/17/2021    Influenza Vaccine (1) 09/01/2022     Advance Directives   What are advance directives? Advance directives are legal documents that state your wishes and plans for medical care  These plans are made ahead of time in case you lose your ability to make decisions for yourself  Advance directives can apply to any medical decision, such as the treatments you want, and if you want to donate organs  What are the types of advance directives? There are many types of advance directives, and each state has rules about how to use them  You may choose a combination of any of the following:  · Living will: This is a written record of the treatment you want  You can also choose which treatments you do not want, which to limit, and which to stop at a certain time  This includes surgery, medicine, IV fluid, and tube feedings     · Durable power of  for [facility]): This is a written record that states who you want to make healthcare choices for you when you are unable to make them for yourself  This person, called a proxy, is usually a family member or a friend  You may choose more than 1 proxy  · Do not resuscitate (DNR) order:  A DNR order is used in case your heart stops beating or you stop breathing  It is a request not to have certain forms of treatment, such as CPR  A DNR order may be included in other types of advance directives  · Medical directive: This covers the care that you want if you are in a coma, near death, or unable to make decisions for yourself  You can list the treatments you want for each condition  Treatment may include pain medicine, surgery, blood transfusions, dialysis, IV or tube feedings, and a ventilator (breathing machine)  · Values history: This document has questions about your views, beliefs, and how you feel and think about life  This information can help others choose the care that you would choose  Why are advance directives important? An advance directive helps you control your care  Although spoken wishes may be used, it is better to have your wishes written down  Spoken wishes can be misunderstood, or not followed  Treatments may be given even if you do not want them  An advance directive may make it easier for your family to make difficult choices about your care  Weight Management   Why it is important to manage your weight:  Being overweight increases your risk of health conditions such as heart disease, high blood pressure, type 2 diabetes, and certain types of cancer  It can also increase your risk for osteoarthritis, sleep apnea, and other respiratory problems  Aim for a slow, steady weight loss  Even a small amount of weight loss can lower your risk of health problems  How to lose weight safely:  A safe and healthy way to lose weight is to eat fewer calories and get regular exercise   You can lose up about 1 pound a week by decreasing the number of calories you eat by 500 calories each day  Healthy meal plan for weight management:  A healthy meal plan includes a variety of foods, contains fewer calories, and helps you stay healthy  A healthy meal plan includes the following:  · Eat whole-grain foods more often  A healthy meal plan should contain fiber  Fiber is the part of grains, fruits, and vegetables that is not broken down by your body  Whole-grain foods are healthy and provide extra fiber in your diet  Some examples of whole-grain foods are whole-wheat breads and pastas, oatmeal, brown rice, and bulgur  · Eat a variety of vegetables every day  Include dark, leafy greens such as spinach, kale, jostin greens, and mustard greens  Eat yellow and orange vegetables such as carrots, sweet potatoes, and winter squash  · Eat a variety of fruits every day  Choose fresh or canned fruit (canned in its own juice or light syrup) instead of juice  Fruit juice has very little or no fiber  · Eat low-fat dairy foods  Drink fat-free (skim) milk or 1% milk  Eat fat-free yogurt and low-fat cottage cheese  Try low-fat cheeses such as mozzarella and other reduced-fat cheeses  · Choose meat and other protein foods that are low in fat  Choose beans or other legumes such as split peas or lentils  Choose fish, skinless poultry (chicken or turkey), or lean cuts of red meat (beef or pork)  Before you cook meat or poultry, cut off any visible fat  · Use less fat and oil  Try baking foods instead of frying them  Add less fat, such as margarine, sour cream, regular salad dressing and mayonnaise to foods  Eat fewer high-fat foods  Some examples of high-fat foods include french fries, doughnuts, ice cream, and cakes  · Eat fewer sweets  Limit foods and drinks that are high in sugar  This includes candy, cookies, regular soda, and sweetened drinks    Exercise:  Exercise at least 30 minutes per day on most days of the week  Some examples of exercise include walking, biking, dancing, and swimming  You can also fit in more physical activity by taking the stairs instead of the elevator or parking farther away from stores  Ask your healthcare provider about the best exercise plan for you  © Copyright [employer] 2018 Information is for End User's use only and may not be sold, redistributed or otherwise used for commercial purposes   All illustrations and images included in CareNotes® are the copyrighted property of [employer] , [employer]  or [address] [place] [address] [employer]

## 2022-10-05 NOTE — PROGRESS NOTES
Name: Kamilah Dyer  : 1940      MRN: [id number]  Encounter Provider: Alejandra Lewis DO  Encounter Date: 10/5/2022   Encounter department: [address] [place] [address]    Assessment & Plan     1  Mixed hyperlipidemia  Assessment & Plan:  Recheck lipid panel near future  Orders:  -     Comprehensive metabolic panel; Future; Expected date: 10/05/2022  -     Lipid Panel with Direct LDL reflex; Future; Expected date: 10/05/2022  -     TSH, 3rd generation; Future; Expected date: 10/05/2022    2  Need for vaccination  -     influenza vaccine, high-dose, PF 0 7 mL (FLUZONE HIGH-DOSE)    3  Sjogren's syndrome, with unspecified organ involvement ([facility] [address] )  Assessment & Plan:  Stable with mild symptoms, primarily dry mouth  4  Irritable bowel syndrome, unspecified type    5  Gastroesophageal reflux disease without esophagitis    6  Enlarged prostate without lower urinary tract symptoms (luts)  Assessment & Plan:  Continue Silodosin        7  Sick sinus syndrome [facility])  Assessment & Plan:  Stable with presence pacemaker  Continue follow-up with Cardiology  8  Medicare annual wellness visit, subsequent  Comments:  Questionnaire reviewed  Immunization health screening history updated  Advance directive in place  9  Fatigue, unspecified type  -     Vitamin B12; Future; Expected date: 10/05/2022    10  Antithrombinemia ([facility] [address] )    11  Aortic stenosis with trileaflet valve  Assessment & Plan:  Continue follow-up with Cardiology  Possible etiology for patient's persistent shortness of breath peer             Subjective      Patient was seen for routine follow-up of chronic medical problems  He is being treated for sick sinus syndrome  He had a pacemaker placed proximally 8 weeks ago  He has hyperlipidemia, chronic GI symptoms including IBS and reflux  He has BPH symptoms for which he takes medication  He does complain of fatigue    He has not noticed any significant improvement in his exercise tolerance since pacemaker placement    Review of Systems   Constitutional: Positive for fatigue  Respiratory: Negative  Cardiovascular: Negative  Gastrointestinal: Negative  Genitourinary: Negative  Musculoskeletal: Negative  Psychiatric/Behavioral: Negative  Current Outpatient Medications on File Prior to Visit   Medication Sig    acetaminophen (TYLENOL) 325 mg tablet Take 650 mg by mouth every 6 (six) hours as needed for mild pain    aspirin 81 mg chewable tablet Chew 81 mg as needed      Azopt 1 % ophthalmic suspension      chlordiazePOXIDE (LIBRIUM) 10 mg capsule Take 1 capsule (10 mg total) by mouth daily    docusate sodium (COLACE) 100 mg capsule Take 100 mg by mouth 2 (two) times a day      glucosamine-chondroitin 500-400 MG tablet Take 1 tablet by mouth Three times a day    guaiFENesin (MUCINEX) 600 mg 12 hr tablet Take 1,200 mg by mouth every 12 (twelve) hours    levocetirizine (XYZAL) 5 MG tablet Take 1 tablet by mouth Daily    meclizine (ANTIVERT) 25 mg tablet TAKE ONE-HALF TABLET BY MOUTH EVERY 8 HOURS AS NEEDED FOR DIZZINESS    mometasone (ELOCON) 0 1 % cream     Multiple Vitamins-Minerals (CENTRUM SILVER ULTRA MENS) TABS Take 1 tablet by mouth daily    Red Yeast Rice 600 MG CAPS Take 1 tablet by mouth 2 (two) times a day    Rocklatan 0 02-0 005 % SOLN INSTILL 1 DROP INTO BOTH EYES EVERY DAY IN THE EVENING    Silodosin 8 MG CAPS TAKE 1 CAPSULE BY MOUTH EVERY DAY    [DISCONTINUED] b complex vitamins tablet Take 1 tablet by mouth daily    [DISCONTINUED] brimonidine tartrate 0 2 % ophthalmic solution Administer 1 drop to both eyes 2 (two) times a day    [DISCONTINUED] clobetasol (OLUX) 0 05 % topical foam APPLY TO AFFECTED AREA ON EARS DAILY FOR UP TO 2 WEEKS AT A TIME    [DISCONTINUED] dutasteride (AVODART) 0 5 mg capsule Take 0 5 mg by mouth in the morning      [DISCONTINUED] erythromycin (ILOTYCIN) ophthalmic ointment     [DISCONTINUED] famotidine (PEPCID) 40 MG tablet TAKE 1 TABLET BY MOUTH TWICE A DAY    [DISCONTINUED] hydrocortisone 2 5 % cream Apply topically 2 (two) times a day    [DISCONTINUED] mupirocin (BACTROBAN) 2 % ointment Apply topically 3 (three) times a day    [DISCONTINUED] Tafluprost, PF, (Zioptan) 0 0015 % SOLN Apply to eye    [DISCONTINUED] TRAVATAN Z 0 004 % ophthalmic solution INSTILL 1 DROP INTO BOTH EYES IN THE EVENING       Objective     /70 (BP Location: Left arm, Patient Position: Sitting, Cuff Size: Standard)   Pulse 84   Temp 99 °F (37 2 °C) (Temporal)   Ht 5' 9" (1 753 m)   Wt 81 6 kg (180 lb)   SpO2 99%   BMI 26 58 kg/m²     Physical Exam  Vitals and nursing note reviewed  Constitutional:       General: He is not in acute distress  Appearance: He is well-developed  He is not diaphoretic  HENT:      Head: Normocephalic and atraumatic  Eyes:      General:         Right eye: No discharge  Conjunctiva/sclera: Conjunctivae normal       Pupils: Pupils are equal, round, and reactive to light  Neck:      Thyroid: No thyromegaly  Cardiovascular:      Rate and Rhythm: Normal rate and regular rhythm  Pulmonary:      Effort: Pulmonary effort is normal  No respiratory distress  Breath sounds: Normal breath sounds  Musculoskeletal:      Cervical back: Normal range of motion  Lymphadenopathy:      Cervical: No cervical adenopathy  Skin:     General: Skin is warm and dry  Neurological:      Mental Status: He is alert and oriented to person, place, and time  Psychiatric:         Behavior: Behavior normal          Thought Content:  Thought content normal          Judgment: Judgment normal        Kasiae Giovanna, DO

## 2022-10-10 ENCOUNTER — APPOINTMENT (OUTPATIENT)
Dept: LAB | Facility: CLINIC | Age: 82
End: 2022-10-10
Payer: COMMERCIAL

## 2022-10-10 DIAGNOSIS — E78.2 MIXED HYPERLIPIDEMIA: ICD-10-CM

## 2022-10-10 DIAGNOSIS — R53.83 FATIGUE, UNSPECIFIED TYPE: ICD-10-CM

## 2022-10-10 LAB
ALBUMIN SERPL BCP-MCNC: 3.1 G/DL (ref 3.5–5)
ALP SERPL-CCNC: 63 U/L (ref 46–116)
ALT SERPL W P-5'-P-CCNC: 18 U/L (ref 12–78)
ANION GAP SERPL CALCULATED.3IONS-SCNC: 4 MMOL/L (ref 4–13)
AST SERPL W P-5'-P-CCNC: 17 U/L (ref 5–45)
BILIRUB SERPL-MCNC: 0.63 MG/DL (ref 0.2–1)
BUN SERPL-MCNC: 20 MG/DL (ref 5–25)
CALCIUM ALBUM COR SERPL-MCNC: 9.8 MG/DL (ref 8.3–10.1)
CALCIUM SERPL-MCNC: 9.1 MG/DL (ref 8.3–10.1)
CHLORIDE SERPL-SCNC: 110 MMOL/L (ref 96–108)
CHOLEST SERPL-MCNC: 174 MG/DL
CO2 SERPL-SCNC: 27 MMOL/L (ref 21–32)
CREAT SERPL-MCNC: 0.98 MG/DL (ref 0.6–1.3)
GFR SERPL CREATININE-BSD FRML MDRD: 71 ML/MIN/1.73SQ M
GLUCOSE P FAST SERPL-MCNC: 94 MG/DL (ref 65–99)
HDLC SERPL-MCNC: 51 MG/DL
LDLC SERPL CALC-MCNC: 95 MG/DL (ref 0–100)
POTASSIUM SERPL-SCNC: 4.2 MMOL/L (ref 3.5–5.3)
PROT SERPL-MCNC: 7 G/DL (ref 6.4–8.4)
SODIUM SERPL-SCNC: 141 MMOL/L (ref 135–147)
TRIGL SERPL-MCNC: 142 MG/DL
TSH SERPL DL<=0.05 MIU/L-ACNC: 1.28 UIU/ML (ref 0.45–4.5)
VIT B12 SERPL-MCNC: 569 PG/ML (ref 100–900)

## 2022-10-10 PROCEDURE — 82607 VITAMIN B-12: CPT

## 2022-10-10 PROCEDURE — 80061 LIPID PANEL: CPT

## 2022-10-10 PROCEDURE — 84443 ASSAY THYROID STIM HORMONE: CPT

## 2022-10-10 PROCEDURE — 80053 COMPREHEN METABOLIC PANEL: CPT

## 2022-10-10 PROCEDURE — 36415 COLL VENOUS BLD VENIPUNCTURE: CPT

## 2022-11-30 NOTE — PROGRESS NOTES
Cardiology Office Follow Up  Lenna Cooks   1940  [id number]      ASSESSMENT:  AV block, sick sinus syndrome   Suspected chronotropic incompetence  Fatigue, weakness    - status post [employer] dual-chamber permanent pacemaker implant, 8/9/22    - most recent device report 8/23/22: AP 25 3%,  99 6%, no significant high rate episodes  Hyperlipidemia   Sjogren's syndrome   GERD    PLAN:  · Will repeat echocardiogram to assess cardiac structure and function  · Will repeat lab studies to ensure renal and electrolyte stability  · Will repeat CBC to ensure stability  · Recommended patient to also follow-up with his PCP regarding his weakness and fatigue  · Patient will follow-up with Dr Thierno Clarke in approximately 3 months  · Device checked while patient was in the office; adjusted upper tracking from 110- 120  Interval History/ HPI:   62-year-old male presents for follow-up post recent pacemaker implantation  Patient underwent pacemaker implantation on 8/9/22  Upon office visit, patient notes that he remains weak and tired  Patient states that he sleeps up to 10 hours at a time and when he wakes up, he is still tired  He denies shortness of breath, chest pain, dizziness, lightheadedness, syncope, pre-syncope, palpitations        Vitals:  /70   Pulse 74   Ht 5' 9" (1 753 m)   Wt 81 6 kg (179 lb 12 8 oz)   SpO2 97%   BMI 26 55 kg/m²     Past Medical History:   Diagnosis Date   • Abnormal diffusion capacity determined by pulmonary function test 4/25/2017   • Actinic keratosis    • Acute right-sided low back pain without sciatica 9/1/2017   • Adverse effect of correct medicinal substance properly administered    • Arthralgia    • Candidiasis, mouth    • Chronic allergic rhinitis    • Chronic bronchitis (HCC)    • Chronic sinusitis    • Cough    • Dermatitis    • Diverticulosis    • LUCIANO (dyspnea on exertion)    • Former smoker    • GERD (gastroesophageal reflux disease)    • Glaucoma    • Hyperlipidemia    • Inguinal hernia, left    • Lump of skin    • Male erectile dysfunction    • Open comedone    • Palpitations    • Renal calculi    • Sebaceous cyst    • Seborrheic keratosis    • Sick sinus syndrome (HCC) 7/12/2022   • Skin disorder    • Unspecified chronic bronchitis (HCC)    • Visual disturbances      Social History     Socioeconomic History   • Marital status:      Spouse name: Not on file   • Number of children: Not on file   • Years of education: Not on file   • Highest education level: Not on file   Occupational History   • Not on file   Tobacco Use   • Smoking status: Former   • Smokeless tobacco: Never   • Tobacco comments:     quit 10 years ago   Vaping Use   • Vaping Use: Never used   Substance and Sexual Activity   • Alcohol use: Yes     Alcohol/week: 1 0 standard drink     Types: 1 Cans of beer per week     Comment: 1 beer daily   • Drug use: No   • Sexual activity: Not Currently   Other Topics Concern   • Not on file   Social History Narrative   • Not on file     Social Determinants of Health     Financial Resource Strain: Not on file   Food Insecurity: Not on file   Transportation Needs: Not on file   Physical Activity: Not on file   Stress: Not on file   Social Connections: Not on file   Intimate Partner Violence: Not on file   Housing Stability: Not on file      Family History   Problem Relation Age of Onset   • Brain cancer Mother    • Heart failure Mother    • Prostate cancer Mother      Past Surgical History:   Procedure Laterality Date   • CARDIAC ELECTROPHYSIOLOGY PROCEDURE N/A 8/9/2022    Procedure: Cardiac pacer implant/ DUAL CHAMBER PACER @ [facility];  Surgeon: Migue Johnson DO;  Location: [facility];   Service: Cardiology   • COLONOSCOPY     • INGUINAL HERNIA REPAIR Bilateral     left- last assessed: 11/25/14, Resolved: 5/18/15, onset 12/11/13   • MOUTH SURGERY         Current Outpatient Medications:   •  acetaminophen (TYLENOL) 325 mg tablet, Take 650 mg by mouth every 6 (six) hours as needed for mild pain, Disp: , Rfl:   •  aspirin 81 mg chewable tablet, Chew 81 mg as needed  , Disp: , Rfl:   •  Azopt 1 % ophthalmic suspension,  , Disp: , Rfl:   •  chlordiazePOXIDE (LIBRIUM) 10 mg capsule, Take 1 capsule (10 mg total) by mouth daily, Disp: 90 capsule, Rfl: 2  •  docusate sodium (COLACE) 100 mg capsule, Take 100 mg by mouth 2 (two) times a day  , Disp: , Rfl:   •  econazole nitrate 1 % cream, APPLY TWICE A DAY TO RASH ON LEG AND GROIN AREA UNTIL CLEAR, Disp: , Rfl:   •  glucosamine-chondroitin 500-400 MG tablet, Take 1 tablet by mouth Three times a day, Disp: , Rfl:   •  guaiFENesin (MUCINEX) 600 mg 12 hr tablet, Take 1,200 mg by mouth every 12 (twelve) hours, Disp: , Rfl:   •  levocetirizine (XYZAL) 5 MG tablet, Take 1 tablet by mouth Daily, Disp: , Rfl:   •  meclizine (ANTIVERT) 25 mg tablet, TAKE ONE-HALF TABLET BY MOUTH EVERY 8 HOURS AS NEEDED FOR DIZZINESS, Disp: 30 tablet, Rfl: 1  •  mometasone (ELOCON) 0 1 % cream, , Disp: , Rfl:   •  Multiple Vitamins-Minerals (CENTRUM SILVER ULTRA MENS) TABS, Take 1 tablet by mouth daily, Disp: , Rfl:   •  Red Yeast Rice 600 MG CAPS, Take 1 tablet by mouth 2 (two) times a day, Disp: , Rfl:   •  Rocklatan 0 02-0 005 % SOLN, INSTILL 1 DROP INTO BOTH EYES EVERY DAY IN THE EVENING, Disp: , Rfl:   •  Silodosin 8 MG CAPS, TAKE 1 CAPSULE BY MOUTH EVERY DAY, Disp: 90 capsule, Rfl: 3      Review of Systems:  Review of Systems   Constitutional:        Continued fatigue, weakness, lethargy   Respiratory: Negative for chest tightness, shortness of breath and wheezing  Cardiovascular: Negative for chest pain, palpitations and leg swelling  Gastrointestinal: Negative for abdominal distention and abdominal pain  Neurological: Negative for dizziness, syncope, weakness and light-headedness  Psychiatric/Behavioral: Negative for confusion  Physical Exam:  Physical Exam  Constitutional:       Appearance: Normal appearance  Cardiovascular:      Rate and Rhythm: Normal rate and regular rhythm  Pulses: Normal pulses  Heart sounds: Normal heart sounds  Pulmonary:      Effort: Pulmonary effort is normal  No respiratory distress  Abdominal:      General: Abdomen is flat  There is no distension  Musculoskeletal:         General: No swelling  Right lower leg: No edema  Left lower leg: No edema  Skin:     General: Skin is warm and dry  Neurological:      General: No focal deficit present  Mental Status: He is alert  Mental status is at baseline  Psychiatric:         Mood and Affect: Mood normal          Thought Content: Thought content normal          This note was completed in part utilizing [employer] Direct Software  Grammatical errors, random word insertions, spelling mistakes, and incomplete sentences can be an occasional consequence of this system secondary to software limitations, ambient noise, and hardware issues  If you have any questions or concerns about the content, text, or information contained within the body of this dictation, please contact the provider for clarification

## 2022-12-01 ENCOUNTER — IN-CLINIC DEVICE VISIT (OUTPATIENT)
Dept: CARDIOLOGY CLINIC | Facility: CLINIC | Age: 82
End: 2022-12-01

## 2022-12-01 ENCOUNTER — OFFICE VISIT (OUTPATIENT)
Dept: CARDIOLOGY CLINIC | Facility: CLINIC | Age: 82
End: 2022-12-01

## 2022-12-01 VITALS
BODY MASS INDEX: 26.63 KG/M2 | HEART RATE: 74 BPM | SYSTOLIC BLOOD PRESSURE: 124 MMHG | WEIGHT: 179.8 LBS | HEIGHT: 69 IN | DIASTOLIC BLOOD PRESSURE: 70 MMHG | OXYGEN SATURATION: 97 %

## 2022-12-01 DIAGNOSIS — I44.0 1ST DEGREE AV BLOCK: Primary | ICD-10-CM

## 2022-12-01 DIAGNOSIS — Z95.0 PRESENCE OF CARDIAC PACEMAKER: Primary | ICD-10-CM

## 2022-12-01 DIAGNOSIS — I49.5 SICK SINUS SYNDROME (HCC): ICD-10-CM

## 2022-12-01 DIAGNOSIS — Z95.0 PRESENCE OF CARDIAC PACEMAKER: ICD-10-CM

## 2022-12-01 DIAGNOSIS — R53.83 OTHER FATIGUE: ICD-10-CM

## 2022-12-01 NOTE — PROGRESS NOTES
Results for orders placed or performed in visit on 12/01/22   Cardiac EP device report    Narrative    MDT DUAL CHAMBER PPM (MVP OFF/DDDR 60/HIS LEAD) - ACTIVE SYSTEM IS MRI CONDITIONAL  DEVICE INTERROGATED IN THE [facility]ICE  BATTERY VOLTAGE ADEQUATE (10 6 YRS)  AP: 34 3%  : 99 6% (>40%~MVP-OFF~DDDR/60)  ALL LEAD PARAMETERS WITHIN NORMAL LIMITS  2 FAST A&V EPISODES W/ EGMS SHOWING SVT-ST W/- 167 BPM, MAX DURATION 1 MIN 4 SECS  3 AT/AF EPISODES W/ EGMS SHOWING PAT, MAX DURATION 3 MINS 13 SECS  AF BURDEN: <0 1%  PT TAKES ASA 81MG  EF: 65% (ECHO 10/21/21)  PT HAD C/O INCREASED FATIGUE AND DYSPNEA W/ SL EXERTION, PROGRAMMED UPPER TRACKING RATE FROM 110 BPM  BPM  PACEMAKER FUNCTIONING APPROPRIATELY    [address] [place] [address]

## 2022-12-02 ENCOUNTER — APPOINTMENT (OUTPATIENT)
Dept: LAB | Facility: HOSPITAL | Age: 82
End: 2022-12-02

## 2022-12-02 ENCOUNTER — HOSPITAL ENCOUNTER (OUTPATIENT)
Dept: NON INVASIVE DIAGNOSTICS | Facility: HOSPITAL | Age: 82
Discharge: HOME | End: 2022-12-02

## 2022-12-02 VITALS — HEIGHT: 69 IN | WEIGHT: 179 LBS | BODY MASS INDEX: 26.51 KG/M2

## 2022-12-02 DIAGNOSIS — Z95.0 PRESENCE OF CARDIAC PACEMAKER: ICD-10-CM

## 2022-12-02 DIAGNOSIS — R53.83 OTHER FATIGUE: ICD-10-CM

## 2022-12-02 LAB
25(OH)D3 SERPL-MCNC: 21.1 NG/ML (ref 30–100)
ALBUMIN SERPL BCP-MCNC: 3.4 G/DL (ref 3.5–5)
ALP SERPL-CCNC: 75 U/L (ref 46–116)
ALT SERPL W P-5'-P-CCNC: 18 U/L (ref 12–78)
ANION GAP SERPL CALCULATED.3IONS-SCNC: 4 MMOL/L (ref 4–13)
AORTIC ROOT: 3.5 CM
AORTIC VALVE MEAN VELOCITY: 17.7 M/S
APICAL FOUR CHAMBER EJECTION FRACTION: 63 %
ASCENDING AORTA: 4.8 CM
AST SERPL W P-5'-P-CCNC: 17 U/L (ref 5–45)
AV AREA BY CONTINUOUS VTI: 1.2 CM2
AV AREA PEAK VELOCITY: 1.1 CM2
AV LVOT MEAN GRADIENT: 2 MMHG
AV LVOT PEAK GRADIENT: 4 MMHG
AV MEAN GRADIENT: 14 MMHG
AV PEAK GRADIENT: 24 MMHG
AV REGURGITATION PRESSURE HALF TIME: 430 MS
AV VALVE AREA: 1.23 CM2
AV VELOCITY RATIO: 0.39
AVA (PLAN): 2.2 CM2
BASOPHILS # BLD AUTO: 0.03 THOUSANDS/ÂΜL (ref 0–0.1)
BASOPHILS NFR BLD AUTO: 0 % (ref 0–1)
BILIRUB SERPL-MCNC: 0.67 MG/DL (ref 0.2–1)
BUN SERPL-MCNC: 16 MG/DL (ref 5–25)
CALCIUM ALBUM COR SERPL-MCNC: 9.7 MG/DL (ref 8.3–10.1)
CALCIUM SERPL-MCNC: 9.2 MG/DL (ref 8.3–10.1)
CHLORIDE SERPL-SCNC: 108 MMOL/L (ref 96–108)
CO2 SERPL-SCNC: 26 MMOL/L (ref 21–32)
CREAT SERPL-MCNC: 1.02 MG/DL (ref 0.6–1.3)
DOP CALC AO PEAK VEL: 2.43 M/S
DOP CALC AO VTI: 59.29 CM
DOP CALC LVOT AREA: 3.14 CM2
DOP CALC LVOT DIAMETER: 2 CM
DOP CALC LVOT PEAK VEL VTI: 23.25 CM
DOP CALC LVOT PEAK VEL: 0.94 M/S
DOP CALC LVOT STROKE INDEX: 37.6 ML/M2
DOP CALC LVOT STROKE VOLUME: 73.01
E WAVE DECELERATION TIME: 199 MS
EOSINOPHIL # BLD AUTO: 0.39 THOUSAND/ÂΜL (ref 0–0.61)
EOSINOPHIL NFR BLD AUTO: 4 % (ref 0–6)
ERYTHROCYTE [DISTWIDTH] IN BLOOD BY AUTOMATED COUNT: 14.5 % (ref 11.6–15.1)
FRACTIONAL SHORTENING: 26 (ref 28–44)
GFR SERPL CREATININE-BSD FRML MDRD: 67 ML/MIN/1.73SQ M
GLUCOSE SERPL-MCNC: 82 MG/DL (ref 65–140)
HCT VFR BLD AUTO: 42.3 % (ref 36.5–49.3)
HGB BLD-MCNC: 13.8 G/DL (ref 12–17)
IMM GRANULOCYTES # BLD AUTO: 0.04 THOUSAND/UL (ref 0–0.2)
IMM GRANULOCYTES NFR BLD AUTO: 0 % (ref 0–2)
INTERVENTRICULAR SEPTUM IN DIASTOLE (PARASTERNAL SHORT AXIS VIEW): 1.5 CM
INTERVENTRICULAR SEPTUM: 1.5 CM (ref 0.6–1.1)
LAAS-AP4: 18.6 CM2
LEFT ATRIUM SIZE: 3.5 CM
LEFT INTERNAL DIMENSION IN SYSTOLE: 2.6 CM (ref 2.1–4)
LEFT VENTRICULAR INTERNAL DIMENSION IN DIASTOLE: 3.5 CM (ref 3.5–6)
LEFT VENTRICULAR POSTERIOR WALL IN END DIASTOLE: 1.2 CM
LEFT VENTRICULAR STROKE VOLUME: 26 ML
LVSV (TEICH): 26 ML
LYMPHOCYTES # BLD AUTO: 2.09 THOUSANDS/ÂΜL (ref 0.6–4.47)
LYMPHOCYTES NFR BLD AUTO: 22 % (ref 14–44)
MAGNESIUM SERPL-MCNC: 2.5 MG/DL (ref 1.6–2.6)
MCH RBC QN AUTO: 32.3 PG (ref 26.8–34.3)
MCHC RBC AUTO-ENTMCNC: 32.6 G/DL (ref 31.4–37.4)
MCV RBC AUTO: 99 FL (ref 82–98)
MONOCYTES # BLD AUTO: 1.05 THOUSAND/ÂΜL (ref 0.17–1.22)
MONOCYTES NFR BLD AUTO: 11 % (ref 4–12)
MV E'TISSUE VEL-SEP: 7 CM/S
MV PEAK A VEL: 0.91 M/S
MV PEAK E VEL: 59 CM/S
MV STENOSIS PRESSURE HALF TIME: 58 MS
MV VALVE AREA P 1/2 METHOD: 3.79
NEUTROPHILS # BLD AUTO: 5.99 THOUSANDS/ÂΜL (ref 1.85–7.62)
NEUTS SEG NFR BLD AUTO: 63 % (ref 43–75)
NRBC BLD AUTO-RTO: 0 /100 WBCS
PLATELET # BLD AUTO: 205 THOUSANDS/UL (ref 149–390)
PMV BLD AUTO: 9.6 FL (ref 8.9–12.7)
POTASSIUM SERPL-SCNC: 3.7 MMOL/L (ref 3.5–5.3)
PROT SERPL-MCNC: 7.7 G/DL (ref 6.4–8.4)
RBC # BLD AUTO: 4.27 MILLION/UL (ref 3.88–5.62)
RIGHT ATRIAL 2D VOLUME: 56 ML
RIGHT ATRIUM AREA SYSTOLE A4C: 19.8 CM2
RIGHT VENTRICLE ID DIMENSION: 3.6 CM
SL CV AV DECELERATION TIME RETROGRADE: 1483 MS
SL CV AV PEAK GRADIENT RETROGRADE: 76 MMHG
SL CV PED ECHO LEFT VENTRICLE DIASTOLIC VOLUME (MOD BIPLANE) 2D: 50 ML
SL CV PED ECHO LEFT VENTRICLE SYSTOLIC VOLUME (MOD BIPLANE) 2D: 24 ML
SODIUM SERPL-SCNC: 138 MMOL/L (ref 135–147)
TR MAX PG: 32 MMHG
TR PEAK VELOCITY: 2.8 M/S
TRICUSPID VALVE PEAK REGURGITATION VELOCITY: 2.82 M/S
WBC # BLD AUTO: 9.59 THOUSAND/UL (ref 4.31–10.16)

## 2022-12-03 ENCOUNTER — OFFICE VISIT (OUTPATIENT)
Dept: FAMILY MEDICINE CLINIC | Facility: CLINIC | Age: 82
End: 2022-12-03

## 2022-12-03 VITALS
HEIGHT: 69 IN | HEART RATE: 60 BPM | BODY MASS INDEX: 26.51 KG/M2 | TEMPERATURE: 98.3 F | SYSTOLIC BLOOD PRESSURE: 120 MMHG | WEIGHT: 179 LBS | OXYGEN SATURATION: 91 % | DIASTOLIC BLOOD PRESSURE: 78 MMHG

## 2022-12-03 DIAGNOSIS — E55.9 VITAMIN D DEFICIENCY: ICD-10-CM

## 2022-12-03 DIAGNOSIS — J06.9 UPPER RESPIRATORY TRACT INFECTION, UNSPECIFIED TYPE: Primary | ICD-10-CM

## 2022-12-03 RX ORDER — ERGOCALCIFEROL 1.25 MG/1
50000 CAPSULE ORAL WEEKLY
Qty: 4 CAPSULE | Refills: 5 | Status: SHIPPED | OUTPATIENT
Start: 2022-12-03

## 2022-12-03 RX ORDER — DOXYCYCLINE HYCLATE 100 MG
100 TABLET ORAL 2 TIMES DAILY
Qty: 14 TABLET | Refills: 0 | Status: SHIPPED | OUTPATIENT
Start: 2022-12-03 | End: 2022-12-10

## 2022-12-03 NOTE — PROGRESS NOTES
Name: Megha Quinteros  : 1940      MRN: [id number]  Encounter Provider: Rudy Dejesus DO  Encounter Date: 12/3/2022   Encounter department: [address] [place] [address]    Assessment & Plan     1  Upper respiratory tract infection, unspecified type  -     doxycycline hyclate (VIBRA-TABS) 100 mg tablet; Take 1 tablet (100 mg total) by mouth 2 (two) times a day for 7 days    2  Vitamin D deficiency  -     ergocalciferol (VITAMIN D2) 50,000 units; Take 1 capsule (50,000 Units total) by mouth once a week         Subjective      Three day history of progressive nasal and sinus symptoms with postnasal drip scratchy throat and sinus pressure  No fever chills  Mildly productive cough  Review of Systems   HENT: Positive for congestion, postnasal drip and sinus pressure          Current Outpatient Medications on File Prior to Visit   Medication Sig   • acetaminophen (TYLENOL) 325 mg tablet Take 650 mg by mouth every 6 (six) hours as needed for mild pain   • aspirin 81 mg chewable tablet Chew 81 mg as needed     • Azopt 1 % ophthalmic suspension     • chlordiazePOXIDE (LIBRIUM) 10 mg capsule Take 1 capsule (10 mg total) by mouth daily   • docusate sodium (COLACE) 100 mg capsule Take 100 mg by mouth 2 (two) times a day     • econazole nitrate 1 % cream APPLY TWICE A DAY TO RASH ON LEG AND GROIN AREA UNTIL CLEAR   • glucosamine-chondroitin 500-400 MG tablet Take 1 tablet by mouth Three times a day   • guaiFENesin (MUCINEX) 600 mg 12 hr tablet Take 1,200 mg by mouth every 12 (twelve) hours   • levocetirizine (XYZAL) 5 MG tablet Take 1 tablet by mouth Daily   • meclizine (ANTIVERT) 25 mg tablet TAKE ONE-HALF TABLET BY MOUTH EVERY 8 HOURS AS NEEDED FOR DIZZINESS   • mometasone (ELOCON) 0 1 % cream    • Multiple Vitamins-Minerals (CENTRUM SILVER ULTRA MENS) TABS Take 1 tablet by mouth daily   • Red Yeast Rice 600 MG CAPS Take 1 tablet by mouth 2 (two) times a day   • Rocklatan 0 02-0 005 % SOLN INSTILL 1 DROP INTO BOTH EYES EVERY DAY IN THE EVENING   • Silodosin 8 MG CAPS TAKE 1 CAPSULE BY MOUTH EVERY DAY       Objective     /78 (BP Location: Right arm, Patient Position: Sitting, Cuff Size: Standard)   Pulse 60   Temp 98 3 °F (36 8 °C) (Temporal)   Ht 5' 9" (1 753 m)   Wt 81 2 kg (179 lb)   SpO2 91%   BMI 26 43 kg/m²     Physical Exam  Vitals and nursing note reviewed  Constitutional:       General: He is not in acute distress  Appearance: He is well-developed and well-nourished  He is not diaphoretic  HENT:      Head: Normocephalic and atraumatic  Nose: Congestion and rhinorrhea present  Mouth/Throat:      Pharynx: Posterior oropharyngeal erythema present  Eyes:      General:         Right eye: No discharge  Conjunctiva/sclera: Conjunctivae normal       Pupils: Pupils are equal, round, and reactive to light  Neck:      Thyroid: No thyromegaly  Cardiovascular:      Rate and Rhythm: Normal rate and regular rhythm  Pulmonary:      Effort: Pulmonary effort is normal  No respiratory distress  Breath sounds: Normal breath sounds  Musculoskeletal:      Cervical back: Normal range of motion  Lymphadenopathy:      Cervical: No cervical adenopathy  Skin:     General: Skin is warm and dry  Neurological:      Mental Status: He is alert and oriented to person, place, and time  Psychiatric:         Mood and Affect: Mood and affect normal          Behavior: Behavior normal          Thought Content:  Thought content normal          Judgment: Judgment normal        Marta Bowles DO

## 2022-12-23 DIAGNOSIS — E55.9 VITAMIN D DEFICIENCY: ICD-10-CM

## 2022-12-23 RX ORDER — ERGOCALCIFEROL 1.25 MG/1
CAPSULE ORAL
Qty: 12 CAPSULE | Refills: 2 | Status: SHIPPED | OUTPATIENT
Start: 2022-12-23

## 2023-01-06 ENCOUNTER — TELEPHONE (OUTPATIENT)
Dept: FAMILY MEDICINE CLINIC | Facility: CLINIC | Age: 83
End: 2023-01-06

## 2023-01-06 NOTE — TELEPHONE ENCOUNTER
Patient stopped into office today requesting COVID swab  Patient states his symptoms started 12/28/22 and have been improving with exception of cough  He is mostly concerned about his sons that live with him  I explained that since his symptoms started 10 days ago, it would not make sense to do a COVID swab and that even a positive result would not change the outcome  Advised him to let us know if his symptoms don't improve or worsen

## 2023-01-10 DIAGNOSIS — K58.9 IRRITABLE BOWEL SYNDROME, UNSPECIFIED TYPE: ICD-10-CM

## 2023-01-10 DIAGNOSIS — F41.9 ANXIETY: ICD-10-CM

## 2023-01-10 RX ORDER — CHLORDIAZEPOXIDE HYDROCHLORIDE 10 MG/1
10 CAPSULE, GELATIN COATED ORAL DAILY
Qty: 90 CAPSULE | Refills: 2 | Status: SHIPPED | OUTPATIENT
Start: 2023-01-10

## 2023-01-31 NOTE — TELEPHONE ENCOUNTER
Chief Complaint   Patient presents with    Hypertension    Anxiety    COPD       Have you seen any other physician or provider since your last visit yes - [facility]    Have you had any other diagnostic tests since your last visit? yes - labs,CXR    Have you changed or stopped any medications since your last visit? yes - stopped Xanax      I have recommended that this patient have a immunization for pneumonia but he due to refusal reason: not comfortable with test   I have discussed the risks and benefits of this examination with him. The patient verbalizes understanding. Provider will be informed of refusal.      Diabetic retinal exam completed this year? No                       * If yes please have patient sign a records release to obtain record to update Health Maintenance                       * If no, please order referral for patient to be scheduled   SUBJECTIVE:    Patient ID:Yan Stout is a 45 y.o. male. Chief Complaint   Patient presents with    Hypertension    Anxiety    COPD     HPI:  Patient has had a nerve problem for long time. His sx have recently gotten worse. He easily get agitated and nervous. He has some difficulties falling and maintaining sleep at time. He denies any suicidal ideation. He has supportive family. He stopped taking his Xanax 2 weeks ago after he had an episode of chest pain that sent him to the ER. He has had 2 episodes of chest pain since being in the ER and took Nitroglycerine with relief. He is high risk for cardiac disease. His mother  from a mi in her 52's. He does smoke with copd, age and male. Patient has had hypertension for few years. He has been compliant with taking medications, without side effects from it. He has been following a low-sodium, is active and rarely exercises. Weight is increasing steadily, compared to last visit. His blood pressure is stable 106/66 at this time. Patient has had COPD for a long time.   He has not been using nebulizer Is coming in today, but does not want to wait until then to have meds called in  He has called many pharmacies and none of them have chlordiazepoxide (Librium)  He finally found that "[facility] [address] in [place] has it  Please call it to [facility] asap      thanks inhalation treatments as ordered. He is not on oxygen. He does not have some dyspnea with exertion. With on and off cough, SOB and wheezing that does respond to treatment. He has been using the Albuterol inhaler every morning. Last Pneumovax was never. He was smoking 3-4 packs a day but has recently been smoking 1 pack a day. He had a skin cancer removed from his left arm. Patient's medications, allergies, past medical, surgical, social and family histories were reviewed and updated as appropriate. Review of Systems   Constitutional: Negative. HENT: Negative. Eyes: Negative. Respiratory:  Positive for shortness of breath. Cardiovascular: Negative. Gastrointestinal: Negative. Endocrine: Negative. Genitourinary: Negative. Musculoskeletal: Negative. Skin: Negative. Allergic/Immunologic: Negative. Neurological: Negative. Hematological: Negative. Psychiatric/Behavioral:  The patient is nervous/anxious. OBJECTIVE:  /66 (Site: Left Upper Arm, Position: Sitting, Cuff Size: Medium Adult)   Pulse 79   Temp 97.9 °F (36.6 °C) (Temporal)   Resp 16   Ht 2' 6\" (0.762 m)   Wt 167 lb 3.2 oz (75.8 kg)   SpO2 94% Comment: room air  .62 kg/m²    Physical Exam  Vitals and nursing note reviewed. Constitutional:       Appearance: He is well-developed. HENT:      Head: Normocephalic and atraumatic. Eyes:      Conjunctiva/sclera: Conjunctivae normal.      Pupils: Pupils are equal, round, and reactive to light. Neck:      Thyroid: No thyromegaly. Vascular: No JVD. Cardiovascular:      Rate and Rhythm: Normal rate and regular rhythm. Heart sounds: No murmur heard. No friction rub. No gallop. Pulmonary:      Effort: Pulmonary effort is normal. No respiratory distress. Breath sounds: Normal breath sounds. No wheezing or rales. Abdominal:      General: Bowel sounds are normal. There is no distension.       Palpations: Abdomen is soft.      Tenderness: There is no abdominal tenderness. There is no guarding. Musculoskeletal:         General: No tenderness. Cervical back: Normal range of motion and neck supple. Skin:     General: Skin is warm and dry. Findings: No rash. Neurological:      Mental Status: He is alert and oriented to person, place, and time. Psychiatric:         Judgment: Judgment normal.       No results found for requested labs within last 30 days. Microscopic Examination (no units)   Date Value   05/21/2016 Not Indicated     LDL Calculated (mg/dL)   Date Value   02/01/2022 119         Lab Results   Component Value Date/Time    WBC 9.9 12/31/2022 02:30 PM    NEUTROABS 6.0 12/31/2022 02:30 PM    HGB 17.6 12/31/2022 02:30 PM    HCT 49.2 12/31/2022 02:30 PM    MCV 91.3 12/31/2022 02:30 PM     12/31/2022 02:30 PM       Lab Results   Component Value Date    TSH 1.35 02/01/2022         ASSESSMENT/PLAN:     1. Chest pain, unspecified type  He has nitoglycerine. Advised to go to the ER if chest pain returns and persists. - External Referral To Cardiology    2. Gastroesophageal reflux disease, unspecified whether esophagitis present  Protonix 40 mg     3. Essential hypertension  Lisinopril 10 mg     4. Anxiety    - ALPRAZolam (XANAX) 0.5 MG tablet; One po bid prn anxiety  Dispense: 60 tablet;  Refill: 2     Written by Bea NIETO, acting as a scribe for Alan Hanley on 1/31/2023 at 9:52 PM.

## 2023-02-10 ENCOUNTER — OFFICE VISIT (OUTPATIENT)
Dept: FAMILY MEDICINE CLINIC | Facility: CLINIC | Age: 83
End: 2023-02-10

## 2023-02-10 VITALS
WEIGHT: 182 LBS | OXYGEN SATURATION: 97 % | HEART RATE: 90 BPM | BODY MASS INDEX: 26.96 KG/M2 | TEMPERATURE: 97.7 F | DIASTOLIC BLOOD PRESSURE: 78 MMHG | SYSTOLIC BLOOD PRESSURE: 120 MMHG | HEIGHT: 69 IN | RESPIRATION RATE: 16 BRPM

## 2023-02-10 DIAGNOSIS — K21.9 GASTROESOPHAGEAL REFLUX DISEASE WITHOUT ESOPHAGITIS: ICD-10-CM

## 2023-02-10 DIAGNOSIS — K29.70 GASTRITIS WITHOUT BLEEDING, UNSPECIFIED CHRONICITY, UNSPECIFIED GASTRITIS TYPE: Primary | ICD-10-CM

## 2023-02-10 DIAGNOSIS — K58.0 IRRITABLE BOWEL SYNDROME WITH DIARRHEA: ICD-10-CM

## 2023-02-10 NOTE — PROGRESS NOTES
Name: Nancy Vazquez  : 1940      MRN: [id number]  Encounter Provider: Lesia Segundo DO  Encounter Date: 2/10/2023   Encounter department: [address] [place] [address]     1  Gastritis without bleeding, unspecified chronicity, unspecified gastritis type  -     Ambulatory referral to Gastroenterology; Future    2  Irritable bowel syndrome with diarrhea  -     Ambulatory referral to Gastroenterology; Future    3  Gastroesophageal reflux disease without esophagitis  Patient has multiple GI symptoms which correspond with both upper GI and lower GI conditions  He feels he is doing slightly better today than he was when he made this appointment approximately 2 weeks ago  He has modified his diet slightly  We will refer him to GI but I also recommended that if he is able to modify his diet and control his symptoms he may not need any further evaluation  Subjective      Patient complains of upper and lower GI symptoms including stomach pain after certain foods or certain medications  He also has episodes of loose stools on and off  Has a long history of GI symptoms  Takes Librax as a maintenance drug  Review of Systems   Constitutional: Negative  Respiratory: Negative  Cardiovascular: Negative  Gastrointestinal: Positive for abdominal pain and diarrhea  Genitourinary: Negative  Musculoskeletal: Negative  Psychiatric/Behavioral: Negative          Current Outpatient Medications on File Prior to Visit   Medication Sig   • acetaminophen (TYLENOL) 325 mg tablet Take 650 mg by mouth every 6 (six) hours as needed for mild pain   • aspirin 81 mg chewable tablet Chew 81 mg as needed     • Azopt 1 % ophthalmic suspension     • chlordiazePOXIDE (LIBRIUM) 10 mg capsule Take 1 capsule (10 mg total) by mouth daily   • docusate sodium (COLACE) 100 mg capsule Take 100 mg by mouth 2 (two) times a day     • econazole nitrate 1 % cream APPLY TWICE A DAY TO RASH ON LEG AND GROIN AREA UNTIL CLEAR   • ergocalciferol (VITAMIN D2) 50,000 units TAKE 1 CAPSULE BY MOUTH ONE TIME PER WEEK   • glucosamine-chondroitin 500-400 MG tablet Take 1 tablet by mouth Three times a day   • guaiFENesin (MUCINEX) 600 mg 12 hr tablet Take 1,200 mg by mouth every 12 (twelve) hours   • levocetirizine (XYZAL) 5 MG tablet Take 1 tablet by mouth Daily   • meclizine (ANTIVERT) 25 mg tablet TAKE ONE-HALF TABLET BY MOUTH EVERY 8 HOURS AS NEEDED FOR DIZZINESS   • mometasone (ELOCON) 0 1 % cream    • Multiple Vitamins-Minerals (CENTRUM SILVER ULTRA MENS) TABS Take 1 tablet by mouth daily   • Rocklatan 0 02-0 005 % SOLN INSTILL 1 DROP INTO BOTH EYES EVERY DAY IN THE EVENING   • Silodosin 8 MG CAPS TAKE 1 CAPSULE BY MOUTH EVERY DAY   • [DISCONTINUED] Red Yeast Rice 600 MG CAPS Take 1 tablet by mouth 2 (two) times a day       Objective     /78 (BP Location: Left arm, Patient Position: Sitting, Cuff Size: Adult)   Pulse 90   Temp 97 7 °F (36 5 °C) (Temporal)   Resp 16   Ht 5' 8 9" (1 75 m)   Wt 82 6 kg (182 lb)   SpO2 97%   BMI 26 96 kg/m²     Physical Exam  Vitals and nursing note reviewed  Constitutional:       General: He is not in acute distress  Appearance: He is well-developed  He is not diaphoretic  HENT:      Head: Normocephalic and atraumatic  Eyes:      General:         Right eye: No discharge  Conjunctiva/sclera: Conjunctivae normal       Pupils: Pupils are equal, round, and reactive to light  Neck:      Thyroid: No thyromegaly  Cardiovascular:      Rate and Rhythm: Normal rate and regular rhythm  Pulmonary:      Effort: Pulmonary effort is normal  No respiratory distress  Breath sounds: Normal breath sounds  Musculoskeletal:      Cervical back: Normal range of motion  Lymphadenopathy:      Cervical: No cervical adenopathy  Skin:     General: Skin is warm and dry  Neurological:      Mental Status: He is alert and oriented to person, place, and time  Psychiatric:         Behavior: Behavior normal          Thought Content:  Thought content normal          Judgment: Judgment normal        Sujit Jolly DO

## 2023-02-20 ENCOUNTER — APPOINTMENT (OUTPATIENT)
Dept: RADIOLOGY | Facility: CLINIC | Age: 83
End: 2023-02-20

## 2023-02-20 ENCOUNTER — OFFICE VISIT (OUTPATIENT)
Dept: FAMILY MEDICINE CLINIC | Facility: CLINIC | Age: 83
End: 2023-02-20

## 2023-02-20 VITALS
OXYGEN SATURATION: 97 % | BODY MASS INDEX: 26.66 KG/M2 | TEMPERATURE: 98.1 F | DIASTOLIC BLOOD PRESSURE: 70 MMHG | SYSTOLIC BLOOD PRESSURE: 110 MMHG | HEIGHT: 69 IN | WEIGHT: 180 LBS | RESPIRATION RATE: 16 BRPM | HEART RATE: 80 BPM

## 2023-02-20 DIAGNOSIS — Q67.8 CHEST WALL ASYMMETRY: ICD-10-CM

## 2023-02-20 DIAGNOSIS — Q67.8 CHEST WALL ASYMMETRY: Primary | ICD-10-CM

## 2023-02-20 NOTE — PROGRESS NOTES
Name: Asif Rodriguez  : 1940      MRN: [id number]  Encounter Provider: Regine Gates DO  Encounter Date: 2023   Encounter department: [address] [place] [address]    Assessment & Plan     1  Chest wall asymmetry  -     XR chest pa & lateral; Future; Expected date: 2023         Subjective      Patient presents primarily for evaluation of chest wall asymmetry  He noted a retraction in his right supraclavicular region as it compares to the left side  He has no pain no shortness of breath  Review of Systems   Constitutional: Negative  Respiratory: Negative  Cardiovascular: Negative  Gastrointestinal: Negative  Genitourinary: Negative  Musculoskeletal: Negative  Psychiatric/Behavioral: Negative          Current Outpatient Medications on File Prior to Visit   Medication Sig   • acetaminophen (TYLENOL) 325 mg tablet Take 650 mg by mouth every 6 (six) hours as needed for mild pain   • aspirin 81 mg chewable tablet Chew 81 mg as needed     • Azopt 1 % ophthalmic suspension     • chlordiazePOXIDE (LIBRIUM) 10 mg capsule Take 1 capsule (10 mg total) by mouth daily   • docusate sodium (COLACE) 100 mg capsule Take 100 mg by mouth 2 (two) times a day     • econazole nitrate 1 % cream APPLY TWICE A DAY TO RASH ON LEG AND GROIN AREA UNTIL CLEAR   • ergocalciferol (VITAMIN D2) 50,000 units TAKE 1 CAPSULE BY MOUTH ONE TIME PER WEEK   • glucosamine-chondroitin 500-400 MG tablet Take 1 tablet by mouth Three times a day   • guaiFENesin (MUCINEX) 600 mg 12 hr tablet Take 1,200 mg by mouth every 12 (twelve) hours   • levocetirizine (XYZAL) 5 MG tablet Take 1 tablet by mouth Daily   • meclizine (ANTIVERT) 25 mg tablet TAKE ONE-HALF TABLET BY MOUTH EVERY 8 HOURS AS NEEDED FOR DIZZINESS   • mometasone (ELOCON) 0 1 % cream    • Multiple Vitamins-Minerals (CENTRUM SILVER ULTRA MENS) TABS Take 1 tablet by mouth daily   • Rocklatan 0 02-0 005 % SOLN INSTILL 1 DROP INTO BOTH EYES EVERY DAY IN THE EVENING   • Silodosin 8 MG CAPS TAKE 1 CAPSULE BY MOUTH EVERY DAY       Objective     /70 (BP Location: Right arm, Patient Position: Sitting, Cuff Size: Adult)   Pulse 80   Temp 98 1 °F (36 7 °C) (Temporal)   Resp 16   Ht 5' 8 9" (1 75 m)   Wt 81 6 kg (180 lb)   SpO2 97%   BMI 26 66 kg/m²     Physical Exam  Musculoskeletal:      Comments: Notable retraction right supraclavicular area compared to left  Chest exam and lung exam unremarkable         Anibal Mann DO

## 2023-02-24 ENCOUNTER — TELEPHONE (OUTPATIENT)
Dept: FAMILY MEDICINE CLINIC | Facility: CLINIC | Age: 83
End: 2023-02-24

## 2023-02-24 NOTE — TELEPHONE ENCOUNTER
----- Message from Sadie Mukherjee DO sent at 2/23/2023  3:37 PM EST -----  Please notify patient    Chest x-ray was normal

## 2023-02-27 PROBLEM — R19.4 RECENT CHANGE IN FREQUENCY OF BOWEL MOVEMENTS: Status: ACTIVE | Noted: 2023-02-27

## 2023-03-02 ENCOUNTER — REMOTE DEVICE CLINIC VISIT (OUTPATIENT)
Dept: CARDIOLOGY CLINIC | Facility: CLINIC | Age: 83
End: 2023-03-02

## 2023-03-02 ENCOUNTER — APPOINTMENT (OUTPATIENT)
Dept: LAB | Facility: CLINIC | Age: 83
End: 2023-03-02

## 2023-03-02 DIAGNOSIS — Z95.0 PRESENCE OF CARDIAC PACEMAKER: Primary | ICD-10-CM

## 2023-03-02 DIAGNOSIS — R19.4 RECENT CHANGE IN FREQUENCY OF BOWEL MOVEMENTS: ICD-10-CM

## 2023-03-02 NOTE — PROGRESS NOTES
Results for orders placed or performed in visit on 03/02/23   Cardiac EP device report    Narrative    MDT DUAL CHAMBER PPM (MVP OFF/DDDR 60/HIS LEAD) - ACTIVE SYSTEM IS MRI CONDITIONAL  CARELINK TRANSMISSION: BATTERY VOLTAGE ADEQUATE (11 1 YRS)  AP: 30 9%  : 100% (>40%~MVP-OFF~DDDR/60)  ALL AVAILABLE LEAD PARAMETERS WITHIN NORMAL LIMITS  NO SIGNIFICANT HIGH RATE EPISODES  PACEMAKER FUNCTIONING APPROPRIATELY    [address] [place] [address] Street

## 2023-03-03 ENCOUNTER — OFFICE VISIT (OUTPATIENT)
Dept: CARDIOLOGY CLINIC | Facility: CLINIC | Age: 83
End: 2023-03-03

## 2023-03-03 VITALS
HEIGHT: 69 IN | BODY MASS INDEX: 26.19 KG/M2 | SYSTOLIC BLOOD PRESSURE: 144 MMHG | HEART RATE: 78 BPM | WEIGHT: 176.8 LBS | DIASTOLIC BLOOD PRESSURE: 90 MMHG

## 2023-03-03 DIAGNOSIS — I35.0 NONRHEUMATIC AORTIC VALVE STENOSIS: Primary | ICD-10-CM

## 2023-03-03 NOTE — PROGRESS NOTES
Cardiology             Cliff Dues  1940  [id number]            Assessment/Plan:    Sick sinus syndrome and chronotropic incompetence, status post dual-chamber Medtronic permanent pacemaker placed 8/9/2022  Moderate aortic stenosis      Continued symptoms of fatigue, noncardiac in etiology  Pacemaker functioning normally  We will repeat echocardiogram in 1 year before next visit reevaluate aortic valve stenosis      Follow-up in 1 year after echocardiogram            Total visit time 25 minutes      Interval History:      This is a very pleasant 79-year-old male with no prior cardiac history, she has seen for initial consultation 04/2022 for symptoms of significant fatigue mostly with exertion  Had undergone echocardiogram 10/21/2021 demonstrating ejection fraction 65% with moderate aortic stenosis  ECG had revealed sinus bradycardia with Mobitz 1 second-degree AV block  Subsequently underwent Zio monitoring  Subsequent, Zio monitor revealed minimum heart rate 26 beats per minute with average heart rate 61 beats per minute  There was 1 run of ventricular tachycardia up to 7 beats at 169 beats per minute  There are 2 distinct pauses of about 3 seconds, occurring at 12:44 a m  and 5:45 a m  with no patient triggers  Wenckebach pattern AV block was noted with no other forms of advanced AV block  He underwent placement of a dual-chamber Medtronic permanent pacemaker on 8/9/2022  For tropic competence  Repeat echocardiogram 12/2/2022 demonstrated normal left ejection fraction at 63% with mild biatrial dilatation, moderate aortic valve stenosis, moderate aortic valve regurgitation, mild mitral regurgitation, and 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  He presents today for follow-up  Prior device interrogation 3/2/2023 revealed a normally functioning device without high rate episodes  He continues to feel fatigued              Vitals:  Vitals:    03/03/23 1117   BP: 144/90   BP Location: Right arm   Patient Position: Sitting   Cuff Size: Adult   Pulse: 78   Weight: 80 2 kg (176 lb 12 8 oz)   Height: 5' 9" (1 753 m)         Past Medical History:   Diagnosis Date   • Abnormal diffusion capacity determined by pulmonary function test 4/25/2017   • Actinic keratosis    • Acute right-sided low back pain without sciatica 9/1/2017   • Adverse effect of correct medicinal substance properly administered    • Arthralgia    • Candidiasis, mouth    • Chronic allergic rhinitis    • Chronic bronchitis (HCC)    • Chronic sinusitis    • Cough    • Dermatitis    • Diverticulosis    • LUCIANO (dyspnea on exertion)    • Former smoker    • GERD (gastroesophageal reflux disease)    • Glaucoma    • Hyperlipidemia    • Inguinal hernia, left    • Lump of skin    • Male erectile dysfunction    • Open comedone    • Palpitations    • Renal calculi    • Sebaceous cyst    • Seborrheic keratosis    • Sick sinus syndrome ([facility] ) 7/12/2022   • Skin disorder    • Unspecified chronic bronchitis (HCC)    • Visual disturbances      Social History     Socioeconomic History   • Marital status:      Spouse name: Not on file   • Number of children: Not on file   • Years of education: Not on file   • Highest education level: Not on file   Occupational History   • Occupation: retired   Tobacco Use   • Smoking status: Former   • Smokeless tobacco: Never   • Tobacco comments:     quit 10 years ago   Vaping Use   • Vaping Use: Never used   Substance and Sexual Activity   • Alcohol use:  Yes     Alcohol/week: 1 0 standard drink     Types: 1 Cans of beer per week     Comment: 1 beer daily   • Drug use: No   • Sexual activity: Not Currently   Other Topics Concern   • Not on file   Social History Narrative   • Not on file     Social Determinants of Health     Financial Resource Strain: Not on file   Food Insecurity: Not on file   Transportation Needs: Not on file   Physical Activity: Not on file   Stress: Not on file   Social Connections: Not on file   Intimate Partner Violence: Not on file   Housing Stability: Not on file      Family History   Problem Relation Age of Onset   • Brain cancer Mother    • Heart failure Mother    • Prostate cancer Mother      Past Surgical History:   Procedure Laterality Date   • CARDIAC ELECTROPHYSIOLOGY PROCEDURE N/A 08/09/2022    Procedure: Cardiac pacer implant/ DUAL CHAMBER PACER @ [place];  Surgeon: Juan Alberto Alvarez DO;  Location: [facility]; Service: Cardiology   • COLONOSCOPY  12/2014     [facility]  Joshua Regalado MD -Diverticulosis   • COLONOSCOPY  10/2011    Sherman Edwards MD -Diverticulosis   • EGD  03/2016    [facility] Quiros MD -LA Grade A reflux esophagitis, normal stomach, normal jejunum, benign-appearing esophageal stenosis  • EGD  08/2015    [facility]AVAN Quiros MD -Normal upper endoscopy   • INGUINAL HERNIA REPAIR Bilateral     left- last assessed: 11/25/14, Resolved: 5/18/15, onset 12/11/13   • MOUTH SURGERY         Current Outpatient Medications:   •  acetaminophen (TYLENOL) 325 mg tablet, Take 650 mg by mouth every 6 (six) hours as needed for mild pain, Disp: , Rfl:   •  Azopt 1 % ophthalmic suspension,  , Disp: , Rfl:   •  B Complex Vitamins (B COMPLEX PO), Take by mouth, Disp: , Rfl:   •  chlordiazePOXIDE (LIBRIUM) 10 mg capsule, Take 1 capsule (10 mg total) by mouth daily, Disp: 90 capsule, Rfl: 2  •  clobetasol (OLUX) 0 05 % topical foam, Apply topically daily as needed For ears  , Disp: , Rfl:   •  docusate sodium (COLACE) 100 mg capsule, Take 100 mg by mouth if needed, Disp: , Rfl:   •  econazole nitrate 1 % cream, if needed, Disp: , Rfl:   •  ergocalciferol (VITAMIN D2) 50,000 units, TAKE 1 CAPSULE BY MOUTH ONE TIME PER WEEK, Disp: 12 capsule, Rfl: 2  •  Erythromycin (ILOTYCIN OP), Apply to eye if needed, Disp: , Rfl:   •  famotidine (PEPCID) 40 MG tablet, Take 40 mg by mouth 2 (two) times a day, Disp: , Rfl:   •  glucosamine-chondroitin 500-400 MG tablet, Take 1 tablet by mouth Three times a day, Disp: , Rfl:   •  guaiFENesin (MUCINEX) 600 mg 12 hr tablet, Take 1,200 mg by mouth if needed, Disp: , Rfl:   •  hydrocortisone 2 5 % cream, Apply topically if needed, Disp: , Rfl:   •  levocetirizine (XYZAL) 5 MG tablet, Take 1 tablet by mouth Daily, Disp: , Rfl:   •  meclizine (ANTIVERT) 25 mg tablet, TAKE ONE-HALF TABLET BY MOUTH EVERY 8 HOURS AS NEEDED FOR DIZZINESS, Disp: 30 tablet, Rfl: 1  •  Multiple Vitamins-Minerals (CENTRUM SILVER ULTRA MENS) TABS, Take 1 tablet by mouth daily, Disp: , Rfl:   •  Red Yeast Rice 600 MG CAPS, Take by mouth, Disp: , Rfl:   •  Rocklatan 0 02-0 005 % SOLN, INSTILL 1 DROP INTO BOTH EYES EVERY DAY IN THE EVENING, Disp: , Rfl:   •  Silodosin 8 MG CAPS, TAKE 1 CAPSULE BY MOUTH EVERY DAY, Disp: 90 capsule, Rfl: 3  •  aspirin 81 mg chewable tablet, Chew 81 mg as needed   (Patient not taking: Reported on 2/27/2023), Disp: , Rfl:   •  mometasone (ELOCON) 0 1 % cream, , Disp: , Rfl:         Review of Systems:  Review of Systems   Constitutional: Positive for fatigue  Respiratory: Negative  Cardiovascular: Negative  All other systems reviewed and are negative  Physical Exam:  Physical Exam  Constitutional:       General: He is not in acute distress  Appearance: He is well-developed  He is not diaphoretic  HENT:      Head: Normocephalic and atraumatic  Eyes:      General: No scleral icterus  Right eye: No discharge  Pupils: Pupils are equal, round, and reactive to light  Neck:      Thyroid: No thyromegaly  Cardiovascular:      Rate and Rhythm: Normal rate and regular rhythm  Heart sounds: Normal heart sounds  No murmur heard  No friction rub  No gallop  Pulmonary:      Effort: Pulmonary effort is normal       Breath sounds: Normal breath sounds  Abdominal:      General: There is no distension  Tenderness: There is no abdominal tenderness  There is no guarding or rebound  Musculoskeletal:         General: Normal range of motion  Cervical back: Normal range of motion and neck supple  Skin:     General: Skin is warm and dry  Coloration: Skin is not pale  Findings: No erythema or rash  Neurological:      Mental Status: He is alert and oriented to person, place, and time  Coordination: Coordination normal    Psychiatric:         Behavior: Behavior normal          Thought Content: Thought content normal          Judgment: Judgment normal          This note was completed in part utilizing [email or web address] Direct Software  Grammatical errors, random word insertions, spelling mistakes, and incomplete sentences can be an occasional consequence of this system secondary to software limitations, ambient noise, and hardware issues  If you have any questions or concerns about the content, text, or information contained within the body of this dictation, please contact the provider for clarification

## 2023-03-07 ENCOUNTER — APPOINTMENT (OUTPATIENT)
Dept: LAB | Facility: CLINIC | Age: 83
End: 2023-03-07

## 2023-03-07 DIAGNOSIS — R19.4 RECENT CHANGE IN FREQUENCY OF BOWEL MOVEMENTS: ICD-10-CM

## 2023-03-07 LAB
CALPROTECTIN STL-MCNT: 66 UG/G (ref 0–120)
HEMOCCULT STL QL IA: NEGATIVE

## 2023-03-09 LAB — ELASTASE PANC STL-MCNT: 88 UG ELAST./G

## 2023-04-07 ENCOUNTER — OFFICE VISIT (OUTPATIENT)
Dept: FAMILY MEDICINE CLINIC | Facility: CLINIC | Age: 83
End: 2023-04-07

## 2023-04-07 VITALS
HEART RATE: 78 BPM | BODY MASS INDEX: 26.22 KG/M2 | WEIGHT: 177 LBS | RESPIRATION RATE: 16 BRPM | OXYGEN SATURATION: 98 % | SYSTOLIC BLOOD PRESSURE: 130 MMHG | HEIGHT: 69 IN | DIASTOLIC BLOOD PRESSURE: 70 MMHG | TEMPERATURE: 97.8 F

## 2023-04-07 DIAGNOSIS — L24.0 IRRITANT CONTACT DERMATITIS DUE TO DETERGENT: Primary | ICD-10-CM

## 2023-04-07 DIAGNOSIS — E55.9 VITAMIN D DEFICIENCY: ICD-10-CM

## 2023-04-07 NOTE — PROGRESS NOTES
Name: Rigoberto Clay  : 1940      MRN: [id number]  Encounter Provider: Alden Peck DO  Encounter Date: 2023   Encounter department: [address] [place] [address]     1  Irritant contact dermatitis due to detergent  Comments:  Symptoms almost completely resolved  No further treatment necessary    2  Vitamin D deficiency  -     Vitamin D 25 hydroxy; Future; Expected date: 2023         Subjective      Patient presents primarily with a complaint of skin irritation on his lower extremity and his genital area for several days  He attributes it to a malfunction of his close washer which did not rinse his close properly of the soap  He used an antifungal cream that he was given by the dermatologist for a prior problem over the last 2 days and he feels that the symptoms have almost completely resolved at this time  Review of Systems   Constitutional: Negative  Respiratory: Negative  Cardiovascular: Negative  Gastrointestinal: Negative  Genitourinary: Negative  Musculoskeletal: Negative  Skin: Positive for rash  Psychiatric/Behavioral: Negative  Current Outpatient Medications on File Prior to Visit   Medication Sig   • acetaminophen (TYLENOL) 325 mg tablet Take 650 mg by mouth every 6 (six) hours as needed for mild pain   • Azopt 1 % ophthalmic suspension     • B Complex Vitamins (B COMPLEX PO) Take by mouth   • chlordiazePOXIDE (LIBRIUM) 10 mg capsule Take 1 capsule (10 mg total) by mouth daily   • clobetasol (OLUX) 0 05 % topical foam Apply topically daily as needed For ears     • docusate sodium (COLACE) 100 mg capsule Take 100 mg by mouth if needed   • econazole nitrate 1 % cream if needed   • ergocalciferol (VITAMIN D2) 50,000 units TAKE 1 CAPSULE BY MOUTH ONE TIME PER WEEK   • Erythromycin (ILOTYCIN OP) Apply to eye if needed   • famotidine (PEPCID) 40 MG tablet Take 40 mg by mouth 2 (two) times a day   • glucosamine-chondroitin "500-400 MG tablet Take 1 tablet by mouth Three times a day   • guaiFENesin (MUCINEX) 600 mg 12 hr tablet Take 1,200 mg by mouth if needed   • hydrocortisone 2 5 % cream Apply topically if needed   • levocetirizine (XYZAL) 5 MG tablet Take 1 tablet by mouth Daily   • meclizine (ANTIVERT) 25 mg tablet TAKE ONE-HALF TABLET BY MOUTH EVERY 8 HOURS AS NEEDED FOR DIZZINESS   • Multiple Vitamins-Minerals (CENTRUM SILVER ULTRA MENS) TABS Take 1 tablet by mouth daily   • Red Yeast Rice 600 MG CAPS Take by mouth   • Rocklatan 0 02-0 005 % SOLN INSTILL 1 DROP INTO BOTH EYES EVERY DAY IN THE EVENING   • Silodosin 8 MG CAPS TAKE 1 CAPSULE BY MOUTH EVERY DAY   • aspirin 81 mg chewable tablet Chew 81 mg as needed   (Patient not taking: Reported on 2/27/2023)   • mometasone (ELOCON) 0 1 % cream  (Patient not taking: Reported on 2/27/2023)       Objective     /70 (BP Location: Right arm, Patient Position: Sitting, Cuff Size: Adult)   Pulse 78   Temp 97 8 °F (36 6 °C) (Temporal)   Resp 16   Ht 5' 8 9\" (1 75 m)   Wt 80 3 kg (177 lb)   SpO2 98%   BMI 26 22 kg/m²     Physical Exam  Vitals and nursing note reviewed  Constitutional:       General: He is not in acute distress  Appearance: He is well-developed  He is not diaphoretic  HENT:      Head: Normocephalic and atraumatic  Eyes:      General:         Right eye: No discharge  Conjunctiva/sclera: Conjunctivae normal       Pupils: Pupils are equal, round, and reactive to light  Neck:      Thyroid: No thyromegaly  Cardiovascular:      Rate and Rhythm: Normal rate and regular rhythm  Pulmonary:      Effort: Pulmonary effort is normal  No respiratory distress  Breath sounds: Normal breath sounds  Musculoskeletal:      Cervical back: Normal range of motion  Lymphadenopathy:      Cervical: No cervical adenopathy  Skin:     General: Skin is warm and dry  Neurological:      Mental Status: He is alert and oriented to person, place, and time   " Psychiatric:         Behavior: Behavior normal          Thought Content:  Thought content normal          Judgment: Judgment normal        Samantha Lauren,

## 2023-05-30 DIAGNOSIS — N40.0 BENIGN PROSTATIC HYPERPLASIA, UNSPECIFIED WHETHER LOWER URINARY TRACT SYMPTOMS PRESENT: ICD-10-CM

## 2023-05-30 RX ORDER — SILODOSIN 8 MG/1
CAPSULE ORAL
Qty: 90 CAPSULE | Refills: 3 | Status: SHIPPED | OUTPATIENT
Start: 2023-05-30

## 2023-06-06 ENCOUNTER — REMOTE DEVICE CLINIC VISIT (OUTPATIENT)
Dept: CARDIOLOGY CLINIC | Facility: CLINIC | Age: 83
End: 2023-06-06
Payer: COMMERCIAL

## 2023-06-06 DIAGNOSIS — Z95.0 PRESENCE OF CARDIAC PACEMAKER: Primary | ICD-10-CM

## 2023-06-06 PROCEDURE — 93296 REM INTERROG EVL PM/IDS: CPT | Performed by: INTERNAL MEDICINE

## 2023-06-06 PROCEDURE — 93294 REM INTERROG EVL PM/LDLS PM: CPT | Performed by: INTERNAL MEDICINE

## 2023-06-06 NOTE — PROGRESS NOTES
Results for orders placed or performed in visit on 06/06/23   Cardiac EP device report    Narrative    MDT DUAL CHAMBER PPM (MVP OFF/DDDR 60/HIS LEAD) - ACTIVE SYSTEM IS MRI CONDITIONAL  CARELINK TRANSMISSION: BATTERY VOLTAGE ADEQUATE (10 9 YRS); AP 36%  99 9% (>40%/DDDR 60PPM- HIS LEAD); ALL AVAILABLE LEAD PARAMETERS WITHIN NORMAL LIMITS  1 AT/AF EPISODE WITH EGM SHOWING PAT, OCCAS  FF O/S OF R WAVE ON  RA CHANNEL (NO AF); DURATION 3MINS; AT/AF BURDEN <0 1%; PATIENT TAKES ASA 81, NO AC OR BBLOCKER; EF 63% (12/2022 ECHO); NORMAL DEVICE FUNCTION    ES

## 2023-06-20 ENCOUNTER — TELEPHONE (OUTPATIENT)
Dept: FAMILY MEDICINE CLINIC | Facility: CLINIC | Age: 83
End: 2023-06-20

## 2023-06-20 NOTE — TELEPHONE ENCOUNTER
Pt called stating that for the last 11 wks he had not taking his vitamin D, he took one today when he found them and is asking if you still want him to take the blood test or do you want him to postpone doing it? He has seen the gastro doctor and he is still having issues with his bowels  Made an appt on 7/5 with Dr Rosa Boston but would like advise about the Vitamin D      Please advise [phone]

## 2023-06-20 NOTE — TELEPHONE ENCOUNTER
I would still recommend he get the blood test done    I would like to see his levels with him off of the medicine

## 2023-06-21 ENCOUNTER — APPOINTMENT (OUTPATIENT)
Dept: LAB | Facility: CLINIC | Age: 83
End: 2023-06-21
Payer: COMMERCIAL

## 2023-06-21 DIAGNOSIS — E55.9 VITAMIN D DEFICIENCY: ICD-10-CM

## 2023-06-21 LAB — 25(OH)D3 SERPL-MCNC: 38.7 NG/ML (ref 30–100)

## 2023-06-21 PROCEDURE — 36415 COLL VENOUS BLD VENIPUNCTURE: CPT

## 2023-06-21 PROCEDURE — 82306 VITAMIN D 25 HYDROXY: CPT

## 2023-07-05 ENCOUNTER — OFFICE VISIT (OUTPATIENT)
Dept: FAMILY MEDICINE CLINIC | Facility: CLINIC | Age: 83
End: 2023-07-05
Payer: COMMERCIAL

## 2023-07-05 VITALS
BODY MASS INDEX: 25.34 KG/M2 | WEIGHT: 177 LBS | TEMPERATURE: 98.5 F | HEIGHT: 70 IN | HEART RATE: 83 BPM | SYSTOLIC BLOOD PRESSURE: 126 MMHG | OXYGEN SATURATION: 97 % | DIASTOLIC BLOOD PRESSURE: 86 MMHG

## 2023-07-05 DIAGNOSIS — I49.5 SICK SINUS SYNDROME (HCC): ICD-10-CM

## 2023-07-05 DIAGNOSIS — E78.2 MIXED HYPERLIPIDEMIA: ICD-10-CM

## 2023-07-05 DIAGNOSIS — R53.83 FATIGUE, UNSPECIFIED TYPE: Primary | ICD-10-CM

## 2023-07-05 DIAGNOSIS — M35.00 SJOGREN'S SYNDROME, WITH UNSPECIFIED ORGAN INVOLVEMENT (HCC): ICD-10-CM

## 2023-07-05 DIAGNOSIS — D68.318: ICD-10-CM

## 2023-07-05 PROCEDURE — 1159F MED LIST DOCD IN RCRD: CPT | Performed by: FAMILY MEDICINE

## 2023-07-05 PROCEDURE — 99214 OFFICE O/P EST MOD 30 MIN: CPT | Performed by: FAMILY MEDICINE

## 2023-07-05 PROCEDURE — 1160F RVW MEDS BY RX/DR IN RCRD: CPT | Performed by: FAMILY MEDICINE

## 2023-07-05 PROCEDURE — 3725F SCREEN DEPRESSION PERFORMED: CPT | Performed by: FAMILY MEDICINE

## 2023-07-05 NOTE — PROGRESS NOTES
Name: Kinza Gonzales. : 1940      MRN: [id number]  Encounter Provider: Marta Maravilla DO  Encounter Date: 2023   Encounter department: [address] [place] [address]    Assessment & Plan     1. Fatigue, unspecified type  -     CBC and differential; Future; Expected date: 10/11/2023    2. Mixed hyperlipidemia  -     Comprehensive metabolic panel; Future; Expected date: 10/11/2023  -     Lipid Panel with Direct LDL reflex; Future; Expected date: 10/11/2023  -     TSH, 3rd generation; Future; Expected date: 10/11/2023    3. Sjogren's syndrome, with unspecified organ involvement ([address])    4. Antithrombinemia ([address])    5. Sick sinus syndrome [facility])       Patient has ongoing complaints which primarily revolve around chronic fatigue and gastrointestinal issues. He has seen GI and there are no ongoing significant medical issues to be treated other than restriction of lactose in his diet which does seem to provide some level of benefit. Fatigue has been a more difficult problem to assess. He has not improved with insertion of pacemaker and cardiology feels that his cardiac function is fine. He has seen pulmonary medicine but no significant lung disease exists at this time. Repeated labs have been generally normal.  Patient expressed an interest in obtaining a second opinion from a different physician within this practice which is certainly reasonable. Subjective      Patient presents with complaints of chronic fatigue. This has been an ongoing issue for several years. Work-ups including blood work cardiac evaluations including pacemaker placement and GI evaluations have not led to any definitive diagnosis. Patient does have bowel issues for which she follows with GI and is believed to be lactose intolerant. Review of Systems   Constitutional: Negative. Respiratory: Negative. Cardiovascular: Negative. Gastrointestinal: Negative. Genitourinary: Negative.     Musculoskeletal: Negative. Psychiatric/Behavioral: Negative. Current Outpatient Medications on File Prior to Visit   Medication Sig   • acetaminophen (TYLENOL) 325 mg tablet Take 650 mg by mouth every 6 (six) hours as needed for mild pain   • Azopt 1 % ophthalmic suspension AKA: Brinzolomide   • B Complex Vitamins (B COMPLEX PO) Take by mouth once a week   • chlordiazePOXIDE (LIBRIUM) 10 mg capsule Take 1 capsule (10 mg total) by mouth daily   • clobetasol (OLUX) 0.05 % topical foam Apply topically daily as needed For ears.    • docusate sodium (COLACE) 100 mg capsule Take 100 mg by mouth if needed   • econazole nitrate 1 % cream if needed   • ergocalciferol (VITAMIN D2) 50,000 units TAKE 1 CAPSULE BY MOUTH ONE TIME PER WEEK   • famotidine (PEPCID) 40 MG tablet Take 40 mg by mouth 2 (two) times a day   • glucosamine-chondroitin 500-400 MG tablet Take 1 tablet by mouth 2 (two) times a day   • guaiFENesin (MUCINEX) 600 mg 12 hr tablet Take 1,200 mg by mouth if needed   • hydrocortisone 2.5 % cream Apply topically if needed   • levocetirizine (XYZAL) 5 MG tablet Take 1 tablet by mouth Daily   • meclizine (ANTIVERT) 25 mg tablet TAKE ONE-HALF TABLET BY MOUTH EVERY 8 HOURS AS NEEDED FOR DIZZINESS   • Multiple Vitamins-Minerals (CENTRUM SILVER ULTRA MENS) TABS Take 1 tablet by mouth daily   • Red Yeast Rice 600 MG CAPS Take by mouth in the morning   • Rocklatan 0.02-0.005 % SOLN INSTILL 1 DROP INTO BOTH EYES EVERY DAY IN THE EVENING   • Silodosin 8 MG CAPS TAKE 1 CAPSULE BY MOUTH EVERY DAY   • aspirin 81 mg chewable tablet Chew 81 mg as needed   (Patient not taking: Reported on 2/27/2023)   • Erythromycin (ILOTYCIN OP) Apply to eye if needed (Patient not taking: Reported on 7/5/2023)   • mometasone (ELOCON) 0.1 % cream  (Patient not taking: Reported on 4/20/2023)       Objective     /86 (BP Location: Left arm, Patient Position: Sitting, Cuff Size: Adult)   Pulse 83   Temp 98.5 °F (36.9 °C)   Ht 5' 9.75" (1.772 m)   Wt 80.3 kg (177 lb)   SpO2 97%   BMI 25.58 kg/m²     Physical Exam  Vitals and nursing note reviewed. Constitutional:       General: He is not in acute distress. Appearance: He is well-developed. He is not diaphoretic. HENT:      Head: Normocephalic and atraumatic. Eyes:      General:         Right eye: No discharge. Conjunctiva/sclera: Conjunctivae normal.      Pupils: Pupils are equal, round, and reactive to light. Neck:      Thyroid: No thyromegaly. Cardiovascular:      Rate and Rhythm: Normal rate and regular rhythm. Pulmonary:      Effort: Pulmonary effort is normal. No respiratory distress. Breath sounds: Normal breath sounds. Musculoskeletal:      Cervical back: Normal range of motion. Lymphadenopathy:      Cervical: No cervical adenopathy. Skin:     General: Skin is warm and dry. Neurological:      Mental Status: He is alert and oriented to person, place, and time. Psychiatric:         Behavior: Behavior normal.         Thought Content:  Thought content normal.         Judgment: Judgment normal.       Fred Okeefe DO

## 2023-08-08 ENCOUNTER — OFFICE VISIT (OUTPATIENT)
Dept: FAMILY MEDICINE CLINIC | Facility: CLINIC | Age: 83
End: 2023-08-08
Payer: COMMERCIAL

## 2023-08-08 VITALS
BODY MASS INDEX: 25.43 KG/M2 | OXYGEN SATURATION: 98 % | SYSTOLIC BLOOD PRESSURE: 108 MMHG | RESPIRATION RATE: 20 BRPM | TEMPERATURE: 97.6 F | HEART RATE: 82 BPM | WEIGHT: 176 LBS | DIASTOLIC BLOOD PRESSURE: 62 MMHG

## 2023-08-08 DIAGNOSIS — R53.82 CHRONIC FATIGUE: ICD-10-CM

## 2023-08-08 DIAGNOSIS — R10.31 RIGHT LOWER QUADRANT ABDOMINAL PAIN: ICD-10-CM

## 2023-08-08 DIAGNOSIS — N64.4 BREAST PAIN, LEFT: Primary | ICD-10-CM

## 2023-08-08 PROCEDURE — 99214 OFFICE O/P EST MOD 30 MIN: CPT | Performed by: FAMILY MEDICINE

## 2023-08-08 NOTE — PROGRESS NOTES
Assessment/Plan:    1. Breast pain, left  Assessment & Plan:  Patient with left breast tenderness in lateral breast and in axilla. He does have gynecomastia of bilateral breasts. We will obtain breast US to rule out any concerns. Orders:  -     US breast left limited (diagnostic); Future; Expected date: 08/08/2023    2. Right lower quadrant abdominal pain  Assessment & Plan:  Symptoms are intermittent and currently not present. Physical exam with mild tenderness in lower abdomen. I discussed with patient signs and symptoms to look out for that would require emergent care, such as fever, severe worsening of pain, blood in stool and vomiting. He will follow up with his GI for recommendations. 3. Chronic fatigue  Assessment & Plan:  Patient with chronic fatigue that has been worked up and addressed in the past by PCP and cardiology. I reviewed his most recent lab work. We discussed that he likely has low testosterone which can contribute to his chronic fatigue. Gynecomastia noticeable on physical exam.  Patient wishes to check his testosterone level. He understands that he likely will not be a candidate for HRT if his levels are low. Orders:  -     Testosterone, free, total; Future      Subjective:      Patient ID: Ramos Whitney. is a 79 y.o. male. HPI    Patient with complaint of pain today. Patient states that he has on and off pain in his left axilla. It is tender to touch. He states the pain is rated 4/10 when present. He sleeps on his left side. Pain started several weeks ago. He also is complaining of R lower abdominal pain that is sharp and burning. He had it a few days ago and it lasted about 3 days. He had it prior to this another time as well. He has a history of loose stools. He denies any recent changes to his bowel movements. Denies weight loss or blood in stool. No fever or nausea. Denies vomiting.      Patient is also complaining of chronic fatigue for the last 4 years. He states that he always feels lethargic and does not know why. The following portions of the patient's history were reviewed and updated as appropriate: allergies, current medications, past family history, past medical history, past social history, past surgical history, and problem list.      Current Outpatient Medications:   •  acetaminophen (TYLENOL) 325 mg tablet, Take 650 mg by mouth every 6 (six) hours as needed for mild pain, Disp: , Rfl:   •  Azopt 1 % ophthalmic suspension, AKA: Brinzolomide, Disp: , Rfl:   •  B Complex Vitamins (B COMPLEX PO), Take by mouth once a week, Disp: , Rfl:   •  chlordiazePOXIDE (LIBRIUM) 10 mg capsule, Take 1 capsule (10 mg total) by mouth daily, Disp: 90 capsule, Rfl: 2  •  clobetasol (OLUX) 0.05 % topical foam, Apply topically daily as needed For ears. , Disp: , Rfl:   •  docusate sodium (COLACE) 100 mg capsule, Take 100 mg by mouth if needed, Disp: , Rfl:   •  econazole nitrate 1 % cream, if needed, Disp: , Rfl:   •  ergocalciferol (VITAMIN D2) 50,000 units, TAKE 1 CAPSULE BY MOUTH ONE TIME PER WEEK, Disp: 12 capsule, Rfl: 2  •  famotidine (PEPCID) 40 MG tablet, Take 40 mg by mouth 2 (two) times a day, Disp: , Rfl:   •  glucosamine-chondroitin 500-400 MG tablet, Take 1 tablet by mouth 2 (two) times a day, Disp: , Rfl:   •  hydrocortisone 2.5 % cream, Apply topically if needed, Disp: , Rfl:   •  levocetirizine (XYZAL) 5 MG tablet, Take 1 tablet by mouth Daily, Disp: , Rfl:   •  meclizine (ANTIVERT) 25 mg tablet, TAKE ONE-HALF TABLET BY MOUTH EVERY 8 HOURS AS NEEDED FOR DIZZINESS, Disp: 30 tablet, Rfl: 1  •  Multiple Vitamins-Minerals (CENTRUM SILVER ULTRA MENS) TABS, Take 1 tablet by mouth daily, Disp: , Rfl:   •  Red Yeast Rice 600 MG CAPS, Take by mouth in the morning, Disp: , Rfl:   •  Rocklatan 0.02-0.005 % SOLN, INSTILL 1 DROP INTO BOTH EYES EVERY DAY IN THE EVENING, Disp: , Rfl:   •  Silodosin 8 MG CAPS, TAKE 1 CAPSULE BY MOUTH EVERY DAY, Disp: 90 capsule, Rfl: 3  •  aspirin 81 mg chewable tablet, Chew 81 mg as needed   (Patient not taking: Reported on 2/27/2023), Disp: , Rfl:   •  Erythromycin (ILOTYCIN OP), Apply to eye if needed (Patient not taking: Reported on 8/8/2023), Disp: , Rfl:   •  guaiFENesin (MUCINEX) 600 mg 12 hr tablet, Take 1,200 mg by mouth if needed (Patient not taking: Reported on 8/8/2023), Disp: , Rfl:   •  mometasone (ELOCON) 0.1 % cream, , Disp: , Rfl:       Review of Systems   Constitutional: Positive for fatigue. Negative for chills and fever. HENT: Negative for ear pain and sore throat. Eyes: Negative for pain and visual disturbance. Respiratory: Negative for cough and shortness of breath. Cardiovascular: Negative for chest pain and palpitations. Gastrointestinal: Positive for abdominal pain. Negative for blood in stool, constipation, diarrhea, nausea and vomiting. Genitourinary: Negative for dysuria and hematuria. Musculoskeletal: Positive for myalgias. Negative for arthralgias and back pain. Skin: Negative for color change and rash. Neurological: Negative for seizures and syncope. All other systems reviewed and are negative. Objective:      /62 (BP Location: Left arm, Patient Position: Sitting, Cuff Size: Standard)   Pulse 82   Temp 97.6 °F (36.4 °C) (Temporal)   Resp 20   Wt 79.8 kg (176 lb)   SpO2 98%   BMI 25.43 kg/m²          Physical Exam  Vitals and nursing note reviewed. Constitutional:       General: He is not in acute distress. Appearance: He is well-developed. He is not toxic-appearing. HENT:      Head: Normocephalic and atraumatic. Right Ear: External ear normal.      Left Ear: External ear normal.      Nose: Nose normal.      Mouth/Throat:      Mouth: Mucous membranes are moist.      Pharynx: Oropharynx is clear. No oropharyngeal exudate. Eyes:      Extraocular Movements: Extraocular movements intact.       Conjunctiva/sclera: Conjunctivae normal.      Pupils: Pupils are equal, round, and reactive to light. Cardiovascular:      Rate and Rhythm: Normal rate and regular rhythm. Heart sounds: Normal heart sounds. Pulmonary:      Effort: Pulmonary effort is normal. No respiratory distress. Breath sounds: Normal breath sounds. No wheezing. Chest:   Breasts:     Left: Tenderness present. No mass or skin change. Comments: Bilateral gynecomastia   Abdominal:      General: Abdomen is flat. Bowel sounds are normal. There is no distension. Palpations: Abdomen is soft. There is no mass. Tenderness: There is generalized abdominal tenderness. There is no right CVA tenderness, left CVA tenderness or guarding. Hernia: No hernia is present. Musculoskeletal:         General: Normal range of motion. Cervical back: Normal range of motion. Skin:     General: Skin is warm. Neurological:      General: No focal deficit present. Mental Status: He is alert and oriented to person, place, and time. Mental status is at baseline. Psychiatric:         Mood and Affect: Mood normal.         Behavior: Behavior normal.         Thought Content:  Thought content normal.         Judgment: Judgment normal.

## 2023-08-08 NOTE — ASSESSMENT & PLAN NOTE
Patient with chronic fatigue that has been worked up and addressed in the past by PCP and cardiology. I reviewed his most recent lab work. We discussed that he likely has low testosterone which can contribute to his chronic fatigue. Gynecomastia noticeable on physical exam.  Patient wishes to check his testosterone level. He understands that he likely will not be a candidate for HRT if his levels are low.

## 2023-08-08 NOTE — ASSESSMENT & PLAN NOTE
Symptoms are intermittent and currently not present. Physical exam with mild tenderness in lower abdomen. I discussed with patient signs and symptoms to look out for that would require emergent care, such as fever, severe worsening of pain, blood in stool and vomiting. He will follow up with his GI for recommendations.

## 2023-08-23 ENCOUNTER — TELEPHONE (OUTPATIENT)
Dept: FAMILY MEDICINE CLINIC | Facility: CLINIC | Age: 83
End: 2023-08-23

## 2023-08-23 NOTE — TELEPHONE ENCOUNTER
Patient would like to go to [facility] in [facility] to have his Ultrasound of left breast done. He was told by [facility] Scheduling that he couldn't go to [facility] for this test (his first choice). He would like to let Dr. Helio Mc know he would like to go to [facility] on [address]. He see's his Urologist there for kidney stones.

## 2023-08-23 NOTE — TELEPHONE ENCOUNTER
That is fine! He just has to call them and schedule the ultrasound there. He needs to take the order slip that we gave him. Or we can fax it there if he prefers. Please contact patient and let him know.  Thank you

## 2023-08-24 DIAGNOSIS — K21.9 GASTROESOPHAGEAL REFLUX DISEASE WITHOUT ESOPHAGITIS: Primary | ICD-10-CM

## 2023-08-24 RX ORDER — FAMOTIDINE 40 MG/1
40 TABLET, FILM COATED ORAL 2 TIMES DAILY
Qty: 60 TABLET | Refills: 3 | Status: SHIPPED | OUTPATIENT
Start: 2023-08-24 | End: 2023-08-29 | Stop reason: SDUPTHER

## 2023-08-29 RX ORDER — FAMOTIDINE 40 MG/1
40 TABLET, FILM COATED ORAL 2 TIMES DAILY
Qty: 180 TABLET | Refills: 0 | Status: SHIPPED | OUTPATIENT
Start: 2023-08-29

## 2023-08-29 NOTE — TELEPHONE ENCOUNTER
Pt called again stating that he didn't  the famotidine 40 mg because his insurance requires 90 day supply. Only 30 day was sent. Please resend to [facility] [place].

## 2023-09-26 DIAGNOSIS — F41.9 ANXIETY: ICD-10-CM

## 2023-09-26 DIAGNOSIS — K58.9 IRRITABLE BOWEL SYNDROME, UNSPECIFIED TYPE: ICD-10-CM

## 2023-09-26 RX ORDER — CHLORDIAZEPOXIDE HYDROCHLORIDE 10 MG/1
10 CAPSULE, GELATIN COATED ORAL DAILY
Qty: 90 CAPSULE | Refills: 2 | Status: SHIPPED | OUTPATIENT
Start: 2023-09-26

## 2023-10-02 ENCOUNTER — APPOINTMENT (OUTPATIENT)
Dept: LAB | Facility: CLINIC | Age: 83
End: 2023-10-02
Payer: COMMERCIAL

## 2023-10-02 DIAGNOSIS — R53.82 CHRONIC FATIGUE: ICD-10-CM

## 2023-10-02 DIAGNOSIS — R53.83 FATIGUE, UNSPECIFIED TYPE: ICD-10-CM

## 2023-10-02 DIAGNOSIS — E78.2 MIXED HYPERLIPIDEMIA: ICD-10-CM

## 2023-10-02 LAB
ALBUMIN SERPL BCP-MCNC: 3.8 G/DL (ref 3.5–5)
ALP SERPL-CCNC: 59 U/L (ref 34–104)
ALT SERPL W P-5'-P-CCNC: 10 U/L (ref 7–52)
ANION GAP SERPL CALCULATED.3IONS-SCNC: 4 MMOL/L
AST SERPL W P-5'-P-CCNC: 18 U/L (ref 13–39)
BASOPHILS # BLD AUTO: 0.02 THOUSANDS/ÂΜL (ref 0–0.1)
BASOPHILS NFR BLD AUTO: 0 % (ref 0–1)
BILIRUB SERPL-MCNC: 0.78 MG/DL (ref 0.2–1)
BUN SERPL-MCNC: 20 MG/DL (ref 5–25)
CALCIUM SERPL-MCNC: 8.9 MG/DL (ref 8.4–10.2)
CHLORIDE SERPL-SCNC: 106 MMOL/L (ref 96–108)
CHOLEST SERPL-MCNC: 171 MG/DL
CO2 SERPL-SCNC: 29 MMOL/L (ref 21–32)
CREAT SERPL-MCNC: 0.95 MG/DL (ref 0.6–1.3)
EOSINOPHIL # BLD AUTO: 0.37 THOUSAND/ÂΜL (ref 0–0.61)
EOSINOPHIL NFR BLD AUTO: 6 % (ref 0–6)
ERYTHROCYTE [DISTWIDTH] IN BLOOD BY AUTOMATED COUNT: 14.6 % (ref 11.6–15.1)
GFR SERPL CREATININE-BSD FRML MDRD: 73 ML/MIN/1.73SQ M
GLUCOSE P FAST SERPL-MCNC: 91 MG/DL (ref 65–99)
HCT VFR BLD AUTO: 39.3 % (ref 36.5–49.3)
HDLC SERPL-MCNC: 53 MG/DL
HGB BLD-MCNC: 13.1 G/DL (ref 12–17)
IMM GRANULOCYTES # BLD AUTO: 0.01 THOUSAND/UL (ref 0–0.2)
IMM GRANULOCYTES NFR BLD AUTO: 0 % (ref 0–2)
LDLC SERPL CALC-MCNC: 96 MG/DL (ref 0–100)
LYMPHOCYTES # BLD AUTO: 2.54 THOUSANDS/ÂΜL (ref 0.6–4.47)
LYMPHOCYTES NFR BLD AUTO: 42 % (ref 14–44)
MCH RBC QN AUTO: 32.7 PG (ref 26.8–34.3)
MCHC RBC AUTO-ENTMCNC: 33.3 G/DL (ref 31.4–37.4)
MCV RBC AUTO: 98 FL (ref 82–98)
MONOCYTES # BLD AUTO: 0.66 THOUSAND/ÂΜL (ref 0.17–1.22)
MONOCYTES NFR BLD AUTO: 11 % (ref 4–12)
NEUTROPHILS # BLD AUTO: 2.5 THOUSANDS/ÂΜL (ref 1.85–7.62)
NEUTS SEG NFR BLD AUTO: 41 % (ref 43–75)
NRBC BLD AUTO-RTO: 0 /100 WBCS
PLATELET # BLD AUTO: 206 THOUSANDS/UL (ref 149–390)
PMV BLD AUTO: 10.2 FL (ref 8.9–12.7)
POTASSIUM SERPL-SCNC: 3.8 MMOL/L (ref 3.5–5.3)
PROT SERPL-MCNC: 6.9 G/DL (ref 6.4–8.4)
RBC # BLD AUTO: 4.01 MILLION/UL (ref 3.88–5.62)
SODIUM SERPL-SCNC: 139 MMOL/L (ref 135–147)
TRIGL SERPL-MCNC: 108 MG/DL
TSH SERPL DL<=0.05 MIU/L-ACNC: 1.76 UIU/ML (ref 0.45–4.5)
WBC # BLD AUTO: 6.1 THOUSAND/UL (ref 4.31–10.16)

## 2023-10-02 PROCEDURE — 84403 ASSAY OF TOTAL TESTOSTERONE: CPT

## 2023-10-02 PROCEDURE — 80053 COMPREHEN METABOLIC PANEL: CPT

## 2023-10-02 PROCEDURE — 85025 COMPLETE CBC W/AUTO DIFF WBC: CPT

## 2023-10-02 PROCEDURE — 84443 ASSAY THYROID STIM HORMONE: CPT

## 2023-10-02 PROCEDURE — 84402 ASSAY OF FREE TESTOSTERONE: CPT

## 2023-10-02 PROCEDURE — 36415 COLL VENOUS BLD VENIPUNCTURE: CPT

## 2023-10-02 PROCEDURE — 80061 LIPID PANEL: CPT

## 2023-10-03 LAB
TESTOST FREE SERPL-MCNC: 6 PG/ML (ref 6.6–18.1)
TESTOST SERPL-MCNC: 594 NG/DL (ref 264–916)

## 2023-10-09 ENCOUNTER — OFFICE VISIT (OUTPATIENT)
Dept: FAMILY MEDICINE CLINIC | Facility: CLINIC | Age: 83
End: 2023-10-09
Payer: COMMERCIAL

## 2023-10-09 VITALS
HEIGHT: 70 IN | WEIGHT: 176 LBS | BODY MASS INDEX: 25.2 KG/M2 | OXYGEN SATURATION: 99 % | SYSTOLIC BLOOD PRESSURE: 138 MMHG | TEMPERATURE: 98.9 F | DIASTOLIC BLOOD PRESSURE: 80 MMHG | HEART RATE: 68 BPM

## 2023-10-09 DIAGNOSIS — Z00.00 MEDICARE ANNUAL WELLNESS VISIT, SUBSEQUENT: Primary | ICD-10-CM

## 2023-10-09 DIAGNOSIS — F33.9 DEPRESSION, RECURRENT (HCC): ICD-10-CM

## 2023-10-09 DIAGNOSIS — K58.9 IRRITABLE BOWEL SYNDROME, UNSPECIFIED TYPE: ICD-10-CM

## 2023-10-09 DIAGNOSIS — K21.9 GASTROESOPHAGEAL REFLUX DISEASE WITHOUT ESOPHAGITIS: ICD-10-CM

## 2023-10-09 DIAGNOSIS — R53.82 CHRONIC FATIGUE: ICD-10-CM

## 2023-10-09 DIAGNOSIS — J44.9 CHRONIC OBSTRUCTIVE PULMONARY DISEASE, UNSPECIFIED COPD TYPE (HCC): ICD-10-CM

## 2023-10-09 DIAGNOSIS — K12.0 APHTHOUS ULCER: ICD-10-CM

## 2023-10-09 PROCEDURE — G0444 DEPRESSION SCREEN ANNUAL: HCPCS | Performed by: FAMILY MEDICINE

## 2023-10-09 PROCEDURE — 99214 OFFICE O/P EST MOD 30 MIN: CPT | Performed by: FAMILY MEDICINE

## 2023-10-09 PROCEDURE — G0439 PPPS, SUBSEQ VISIT: HCPCS | Performed by: FAMILY MEDICINE

## 2023-10-09 RX ORDER — TRIAMCINOLONE ACETONIDE 0.1 %
1 PASTE (GRAM) DENTAL 2 TIMES DAILY
Qty: 5 G | Refills: 5 | Status: SHIPPED | OUTPATIENT
Start: 2023-10-09

## 2023-10-09 NOTE — PROGRESS NOTES
Assessment and Plan:     Problem List Items Addressed This Visit     Irritable bowel syndrome    Gastroesophageal reflux disease without esophagitis    Chronic fatigue   Other Visit Diagnoses     Medicare annual wellness visit, subsequent    -  Primary    Questionnaire reviewed. Immunization health screening history updated. Advance directive in place. BMI Counseling: Body mass index is 25.25 kg/m². The BMI is above normal. Nutrition recommendations include encouraging healthy choices of fruits and vegetables, decreasing fast food intake, moderation in carbohydrate intake and increasing intake of lean protein. Exercise recommendations include vigorous physical activity 75 minutes/week and exercising 3-5 times per week. No pharmacotherapy was ordered. Rationale for BMI follow-up plan is due to patient being overweight or obese. Depression Screening and Follow-up Plan: Patient's depression screening was positive with a PHQ-2 score of 4. Their PHQ-9 score was 7. Patient assessed for underlying major depression. Brief counseling provided and recommend additional follow-up/re-evaluation next office visit. Preventive health issues were discussed with patient, and age appropriate screening tests were ordered as noted in patient's After Visit Summary. Personalized health advice and appropriate referrals for health education or preventive services given if needed, as noted in patient's After Visit Summary.      History of Present Illness:     Patient presents for a Medicare Wellness Visit    HPI   Patient Care Team:  Pooja Monroy DO as PCP - Tedi Paget, MD     Review of Systems:     Review of Systems     Problem List:     Patient Active Problem List   Diagnosis   • Renal lithiasis   • Persistent cough for 3 weeks or longer   • Mild cognitive impairment   • Mixed hyperlipidemia   • Oropharyngeal dysphagia   • Abnormal diffusion capacity determined by pulmonary function test   • Sjogren's syndrome ([address])   • Irritable bowel syndrome   • Gastroesophageal reflux disease without esophagitis   • Enlarged prostate without lower urinary tract symptoms (luts)   • Dysplastic nevus of face   • Arthritis   • Anxiety   • Antithrombinemia ([address])   • Allergic rhinitis   • Neck pain   • Glaucoma   • Eczema   • Neck mass   • Tick bite of left lower leg   • Dyspnea on exertion   • Right ear pain   • 1st degree AV block   • Sick sinus syndrome (HCC)   • Mobitz (type) I (Wenckebach's) atrioventricular block   • Aortic stenosis with trileaflet valve   • Recent change in frequency of bowel movements   • Right lower quadrant abdominal pain   • Breast pain, left   • Chronic fatigue      Past Medical and Surgical History:     Past Medical History:   Diagnosis Date   • Abnormal diffusion capacity determined by pulmonary function test 4/25/2017   • Actinic keratosis    • Acute right-sided low back pain without sciatica 9/1/2017   • Adverse effect of correct medicinal substance properly administered    • Arthralgia    • Candidiasis, mouth    • Chronic allergic rhinitis    • Chronic bronchitis (HCC)    • Chronic sinusitis    • Cough    • Dermatitis    • Diverticulosis    • LUCIANO (dyspnea on exertion)    • Former smoker    • GERD (gastroesophageal reflux disease)    • Glaucoma    • Hyperlipidemia    • Inguinal hernia, left    • Lump of skin    • Male erectile dysfunction    • Open comedone    • Palpitations    • Renal calculi    • Sebaceous cyst    • Seborrheic keratosis    • Sick sinus syndrome (HCC) 7/12/2022   • Skin disorder    • Unspecified chronic bronchitis (HCC)    • Visual disturbances      Past Surgical History:   Procedure Laterality Date   • CARDIAC ELECTROPHYSIOLOGY PROCEDURE N/A 08/09/2022    Procedure: Cardiac pacer implant/ DUAL CHAMBER PACER @ [facility];  Surgeon: Josef Toledo DO;  Location: [facility]; Service: Cardiology   • [facility]   • COLONOSCOPY  10/2011    Rosa Junior MD.-Diverticulosis   • EGD  2016    KEENAN Randall MD.-LA Grade A reflux esophagitis, normal stomach, normal jejunum, benign-appearing esophageal stenosis. • EGD  2015    KEENAN Randall MD.-Normal upper endoscopy   • INGUINAL HERNIA REPAIR Bilateral     left- last assessed: 13, Resolved: 5/18/15, onset 12   • MOUTH SURGERY        Family History:     Family History   Problem Relation Age of Onset   • Brain cancer Mother    • Heart failure Mother    • Prostate cancer Mother       Social History:     Social History     Socioeconomic History   • Marital status:      Spouse name: None   • Number of children: None   • Years of education: None   • Highest education level: None   Occupational History   • Occupation: retired   Tobacco Use   • Smoking status: Former     Types: Cigarettes     Quit date:      Years since quittin.7   • Smokeless tobacco: Never   Vaping Use   • Vaping Use: Never used   Substance and Sexual Activity   • Alcohol use: Yes     Alcohol/week: 2.0 standard drinks of alcohol     Types: 2 Cans of beer per week   • Drug use: No   • Sexual activity: Not Currently   Other Topics Concern   • None   Social History Narrative   • None     Social Determinants of Health     Financial Resource Strain: Low Risk  (10/9/2023)    Overall Financial Resource Strain (CARDIA)    • Difficulty of Paying Living Expenses: Not hard at all   Food Insecurity: Not on file   Transportation Needs: No Transportation Needs (10/9/2023)    PRAPARE - Transportation    • Lack of Transportation (Medical): No    • Lack of Transportation (Non-Medical):  No   Physical Activity: Not on file   Stress: Not on file   Social Connections: Not on file   Intimate Partner Violence: Not on file   Housing Stability: Not on file      Medications and Allergies:     Current Outpatient Medications   Medication Sig Dispense Refill   • acetaminophen (TYLENOL) 325 mg tablet Take 650 mg by mouth every 6 (six) hours as needed for mild pain     • Azopt 1 % ophthalmic suspension AKA: Brinzolomide     • chlordiazePOXIDE (LIBRIUM) 10 mg capsule Take 1 capsule (10 mg total) by mouth daily 90 capsule 2   • clobetasol (OLUX) 0.05 % topical foam Apply topically daily as needed For ears. • docusate sodium (COLACE) 100 mg capsule Take 100 mg by mouth if needed     • econazole nitrate 1 % cream if needed     • ergocalciferol (VITAMIN D2) 50,000 units TAKE 1 CAPSULE BY MOUTH ONE TIME PER WEEK 12 capsule 2   • Erythromycin (ILOTYCIN OP) Apply to eye if needed     • famotidine (PEPCID) 40 MG tablet Take 1 tablet (40 mg total) by mouth 2 (two) times a day 180 tablet 0   • glucosamine-chondroitin 500-400 MG tablet Take 1 tablet by mouth 2 (two) times a day     • hydrocortisone 2.5 % cream Apply topically if needed     • levocetirizine (XYZAL) 5 MG tablet Take 1 tablet by mouth Daily     • meclizine (ANTIVERT) 25 mg tablet TAKE ONE-HALF TABLET BY MOUTH EVERY 8 HOURS AS NEEDED FOR DIZZINESS 30 tablet 1   • Multiple Vitamins-Minerals (CENTRUM SILVER ULTRA MENS) TABS Take 1 tablet by mouth daily     • Red Yeast Rice 600 MG CAPS Take by mouth in the morning     • Rocklatan 0.02-0.005 % SOLN INSTILL 1 DROP INTO BOTH EYES EVERY DAY IN THE EVENING     • Silodosin 8 MG CAPS TAKE 1 CAPSULE BY MOUTH EVERY DAY 90 capsule 3   • aspirin 81 mg chewable tablet Chew 81 mg as needed   (Patient not taking: Reported on 2/27/2023)     • B Complex Vitamins (B COMPLEX PO) Take by mouth once a week (Patient not taking: Reported on 10/9/2023)     • guaiFENesin (MUCINEX) 600 mg 12 hr tablet Take 1,200 mg by mouth if needed (Patient not taking: Reported on 8/8/2023)     • mometasone (ELOCON) 0.1 % cream  (Patient not taking: Reported on 10/9/2023)       No current facility-administered medications for this visit.      Allergies   Allergen Reactions   • Levofloxacin Anaphylaxis   • Azithromycin GI Intolerance   • Ketorolac Other (See Comments)     Swelling of eyes with drops   • Penicillins Hives      Immunizations:     Immunization History   Administered Date(s) Administered   • COVID-19 MODERNA VACC 0.5 ML IM 01/19/2021, 02/17/2021, 04/27/2022, 09/30/2022   • COVID-19 Moderna Vac BIVALENT 12 Yr+ IM 0.5 ML 08/17/2023   • INFLUENZA 09/18/2015, 10/18/2016, 09/01/2017, 08/27/2018   • Influenza Quadrivalent, 6-35 Months IM 09/01/2017   • Influenza Split High Dose Preservative Free IM 09/25/2014, 09/18/2015, 10/18/2016   • Influenza, high dose seasonal 0.7 mL 08/27/2018, 09/24/2019, 09/01/2020, 09/14/2021, 10/05/2022   • Influenza, seasonal, injectable 10/17/2012, 10/09/2013   • Pneumococcal Conjugate 13-Valent 06/08/2015   • Pneumococcal Polysaccharide PPV23 08/30/2019   • Td (adult), adsorbed 10/22/2014   • Tdap 04/16/2020   • Zoster Vaccine Recombinant 03/30/2018, 05/29/2018      Health Maintenance: There are no preventive care reminders to display for this patient. Topic Date Due   • Influenza Vaccine (1) 09/01/2023      Medicare Screening Tests and Risk Assessments:     Melvin Ackerman is here for his Subsequent Wellness visit. Health Risk Assessment:   Patient rates overall health as fair. Patient feels that their physical health rating is slightly worse. Patient is dissatisfied with their life. Eyesight was rated as same. Hearing was rated as same. Patient feels that their emotional and mental health rating is same. Patients states they are never, rarely angry. Patient states they are often unusually tired/fatigued. Pain experienced in the last 7 days has been some. Patient's pain rating has been 6/10. Patient states that he has experienced no weight loss or gain in last 6 months. Depression Screening:   PHQ-2 Score: 4  PHQ-9 Score: 7      Fall Risk Screening:    In the past year, patient has experienced: history of falling in past year    Injured during fall?: No    Feels unsteady when standing or walking?: Yes    Worried about falling?: Yes      Home Safety:  Patient has trouble with stairs inside or outside of their home. Patient has working smoke alarms and has no working carbon monoxide detector. Home safety hazards include: none. Nutrition:   Current diet is Regular. Medications:   Patient is currently taking over-the-counter supplements. OTC medications include: see medication list. Patient is able to manage medications. Activities of Daily Living (ADLs)/Instrumental Activities of Daily Living (IADLs):   Walk and transfer into and out of bed and chair?: Yes  Dress and groom yourself?: Yes    Bathe or shower yourself?: Yes    Feed yourself? Yes  Do your laundry/housekeeping?: Yes  Manage your money, pay your bills and track your expenses?: Yes  Make your own meals?: Yes    Do your own shopping?: Yes    Previous Hospitalizations:   Any hospitalizations or ED visits within the last 12 months?: No      Advance Care Planning:   Living will: Yes    Durable POA for healthcare:  Yes    Advanced directive: Yes    Five wishes given: No      Cognitive Screening:   Provider or family/friend/caregiver concerned regarding cognition?: No    PREVENTIVE SCREENINGS      Cardiovascular Screening:    General: Screening Not Indicated and History Lipid Disorder      Diabetes Screening:     General: Screening Current      Colorectal Cancer Screening:     General: Screening Not Indicated      Prostate Cancer Screening:    General: Screening Not Indicated      Osteoporosis Screening:    General: Screening Not Indicated      Abdominal Aortic Aneurysm (AAA) Screening:    Risk factors include: tobacco use        General: Screening Not Indicated      Lung Cancer Screening:     General: Screening Not Indicated      Hepatitis C Screening:    General: Screening Not Indicated    Screening, Brief Intervention, and Referral to Treatment (SBIRT)    Screening    Typical number of drinks in a week: 2    Single Item Drug Screening:  How often have you used an illegal drug (including marijuana) or a prescription medication for non-medical reasons in the past year? never    Single Item Drug Screen Score: 0  Interpretation: Negative screen for possible drug use disorder    Brief Intervention  Alcohol & drug use screenings were reviewed. No concerns regarding substance use disorder identified. Annual Depression Screening  Time spent screening and evaluating the patient for depression during today's encounter was 5 minutes. No results found.      Physical Exam:     /80 (BP Location: Left arm, Patient Position: Sitting, Cuff Size: Adult)   Pulse 68   Temp 98.9 °F (37.2 °C)   Ht 5' 10" (1.778 m)   Wt 79.8 kg (176 lb)   SpO2 99%   BMI 25.25 kg/m²     Physical Exam     Aura Schirmer, DO

## 2023-10-09 NOTE — ASSESSMENT & PLAN NOTE
Stable. Brief counseling offered. No medication required at this time.   We will continue to evaluate

## 2023-10-09 NOTE — PROGRESS NOTES
Name: Rosy Bell. : 1940      MRN: [id number]  Encounter Provider: Ezequiel Chavez DO  Encounter Date: 10/9/2023   Encounter department: [address] [place] [address]    Assessment & Plan     1. Medicare annual wellness visit, subsequent  Comments:  Questionnaire reviewed. Immunization health screening history updated. Advance directive in place. 2. Chronic fatigue    3. Gastroesophageal reflux disease without esophagitis    4. Irritable bowel syndrome, unspecified type    5. Aphthous ulcer  -     triamcinolone (KENALOG) 0.1 % oral topical paste; Apply 1 Application topically 2 (two) times a day    6. Chronic obstructive pulmonary disease, unspecified COPD type ([address])  Assessment & Plan:  Stable. On no respiratory medications at this time. 7. Depression, recurrent ([address])  Assessment & Plan:  Stable. Brief counseling offered. No medication required at this time. We will continue to evaluate             Subjective      Patient was seen for follow-up of chronic medical problems. He is being treated for irritable bowel syndrome, hyperlipidemia, BPH and reflux. He is status post pacemaker placement for sick sinus syndrome. Overall doing fairly well today. Review of Systems   Constitutional: Negative. HENT: Positive for dental problem and mouth sores (Aphthous ulcers). Respiratory: Negative. Cardiovascular: Negative. Gastrointestinal: Negative. Genitourinary: Negative. Musculoskeletal: Negative. Psychiatric/Behavioral: Negative. Current Outpatient Medications on File Prior to Visit   Medication Sig   • acetaminophen (TYLENOL) 325 mg tablet Take 650 mg by mouth every 6 (six) hours as needed for mild pain   • Azopt 1 % ophthalmic suspension AKA: Brinzolomide   • chlordiazePOXIDE (LIBRIUM) 10 mg capsule Take 1 capsule (10 mg total) by mouth daily   • clobetasol (OLUX) 0.05 % topical foam Apply topically daily as needed For ears.    • docusate sodium (COLACE) 100 mg capsule Take 100 mg by mouth if needed   • econazole nitrate 1 % cream if needed   • ergocalciferol (VITAMIN D2) 50,000 units TAKE 1 CAPSULE BY MOUTH ONE TIME PER WEEK   • Erythromycin (ILOTYCIN OP) Apply to eye if needed   • famotidine (PEPCID) 40 MG tablet Take 1 tablet (40 mg total) by mouth 2 (two) times a day   • glucosamine-chondroitin 500-400 MG tablet Take 1 tablet by mouth 2 (two) times a day   • hydrocortisone 2.5 % cream Apply topically if needed   • levocetirizine (XYZAL) 5 MG tablet Take 1 tablet by mouth Daily   • meclizine (ANTIVERT) 25 mg tablet TAKE ONE-HALF TABLET BY MOUTH EVERY 8 HOURS AS NEEDED FOR DIZZINESS   • Multiple Vitamins-Minerals (CENTRUM SILVER ULTRA MENS) TABS Take 1 tablet by mouth daily   • Red Yeast Rice 600 MG CAPS Take by mouth in the morning   • Rocklatan 0.02-0.005 % SOLN INSTILL 1 DROP INTO BOTH EYES EVERY DAY IN THE EVENING   • Silodosin 8 MG CAPS TAKE 1 CAPSULE BY MOUTH EVERY DAY   • aspirin 81 mg chewable tablet Chew 81 mg as needed   (Patient not taking: Reported on 2/27/2023)   • B Complex Vitamins (B COMPLEX PO) Take by mouth once a week (Patient not taking: Reported on 10/9/2023)   • guaiFENesin (MUCINEX) 600 mg 12 hr tablet Take 1,200 mg by mouth if needed (Patient not taking: Reported on 8/8/2023)   • mometasone (ELOCON) 0.1 % cream  (Patient not taking: Reported on 10/9/2023)       Objective     /80 (BP Location: Left arm, Patient Position: Sitting, Cuff Size: Adult)   Pulse 68   Temp 98.9 °F (37.2 °C)   Ht 5' 10" (1.778 m)   Wt 79.8 kg (176 lb)   SpO2 99%   BMI 25.25 kg/m²     Physical Exam  Vitals and nursing note reviewed. Constitutional:       General: He is not in acute distress. Appearance: He is well-developed. He is not diaphoretic. HENT:      Head: Normocephalic and atraumatic. Eyes:      General:         Right eye: No discharge.       Conjunctiva/sclera: Conjunctivae normal.      Pupils: Pupils are equal, round, and reactive to light. Neck:      Thyroid: No thyromegaly. Cardiovascular:      Rate and Rhythm: Normal rate and regular rhythm. Pulmonary:      Effort: Pulmonary effort is normal. No respiratory distress. Breath sounds: Normal breath sounds. Musculoskeletal:      Cervical back: Normal range of motion. Lymphadenopathy:      Cervical: No cervical adenopathy. Skin:     General: Skin is warm and dry. Neurological:      Mental Status: He is alert and oriented to person, place, and time. Psychiatric:         Behavior: Behavior normal.         Thought Content:  Thought content normal.         Judgment: Judgment normal.       Fred Ellis DO

## 2023-10-09 NOTE — PATIENT INSTRUCTIONS
Medicare Preventive Visit Patient Instructions  Thank you for completing your Welcome to Medicare Visit or Medicare Annual Wellness Visit today. Your next wellness visit will be due in one year (10/9/2024). The screening/preventive services that you may require over the next 5-10 years are detailed below. Some tests may not apply to you based off risk factors and/or age. Screening tests ordered at today's visit but not completed yet may show as past due. Also, please note that scanned in results may not display below. Preventive Screenings:  Service Recommendations Previous Testing/Comments   Colorectal Cancer Screening  · Colonoscopy    · Fecal Occult Blood Test (FOBT)/Fecal Immunochemical Test (FIT)  · Fecal DNA/Cologuard Test  · Flexible Sigmoidoscopy Age: 43-73 years old   Colonoscopy: every 10 years (May be performed more frequently if at higher risk)  OR  FOBT/FIT: every 1 year  OR  Cologuard: every 3 years  OR  Sigmoidoscopy: every 5 years  Screening may be recommended earlier than age 39 if at higher risk for colorectal cancer. Also, an individualized decision between you and your healthcare provider will decide whether screening between the ages of 77-80 would be appropriate.  Colonoscopy: Not on file  FOBT/FIT: 03/07/2023  Cologuard: Not on file  Sigmoidoscopy: Not on file          Prostate Cancer Screening Individualized decision between patient and health care provider in men between ages of 53-66   Medicare will cover every 12 months beginning on the day after your 50th birthday PSA: No results in last 5 years     Screening Not Indicated     Hepatitis C Screening Once for adults born between 1945 and 1965  More frequently in patients at high risk for Hepatitis C Hep C Antibody: Not on file        Diabetes Screening 1-2 times per year if you're at risk for diabetes or have pre-diabetes Fasting glucose: 91 mg/dL (10/2/2023)  A1C: No results in last 5 years (No results in last 5 years)  Screening Current   Cholesterol Screening Once every 5 years if you don't have a lipid disorder. May order more often based on risk factors. Lipid panel: 10/02/2023  Screening Not Indicated  History Lipid Disorder      Other Preventive Screenings Covered by Medicare:  1. Abdominal Aortic Aneurysm (AAA) Screening: covered once if your at risk. You're considered to be at risk if you have a family history of AAA or a male between the age of 70-76 who smoking at least 100 cigarettes in your lifetime. 2. Lung Cancer Screening: covers low dose CT scan once per year if you meet all of the following conditions: (1) Age 48-67; (2) No signs or symptoms of lung cancer; (3) Current smoker or have quit smoking within the last 15 years; (4) You have a tobacco smoking history of at least 20 pack years (packs per day x number of years you smoked); (5) You get a written order from a healthcare provider. 3. Glaucoma Screening: covered annually if you're considered high risk: (1) You have diabetes OR (2) Family history of glaucoma OR (3)  aged 48 and older OR (3)  American aged 72 and older  3. Osteoporosis Screening: covered every 2 years if you meet one of the following conditions: (1) Have a vertebral abnormality; (2) On glucocorticoid therapy for more than 3 months; (3) Have primary hyperparathyroidism; (4) On osteoporosis medications and need to assess response to drug therapy. 5. HIV Screening: covered annually if you're between the age of 14-79. Also covered annually if you are younger than 13 and older than 72 with risk factors for HIV infection. For pregnant patients, it is covered up to 3 times per pregnancy.     Immunizations:  Immunization Recommendations   Influenza Vaccine Annual influenza vaccination during flu season is recommended for all persons aged >= 6 months who do not have contraindications   Pneumococcal Vaccine   * Pneumococcal conjugate vaccine = PCV13 (Prevnar 13), PCV15 (Vaxneuvance), PCV20 (Prevnar 20)  * Pneumococcal polysaccharide vaccine = PPSV23 (Pneumovax) Adults 2364 years old: 1-3 doses may be recommended based on certain risk factors  Adults 72 years old: 1-2 doses may be recommended based off what pneumonia vaccine you previously received   Hepatitis B Vaccine 3 dose series if at intermediate or high risk (ex: diabetes, end stage renal disease, liver disease)   Tetanus (Td) Vaccine - COST NOT COVERED BY MEDICARE PART B Following completion of primary series, a booster dose should be given every 10 years to maintain immunity against tetanus. Td may also be given as tetanus wound prophylaxis. Tdap Vaccine - COST NOT COVERED BY MEDICARE PART B Recommended at least once for all adults. For pregnant patients, recommended with each pregnancy. Shingles Vaccine (Shingrix) - COST NOT COVERED BY MEDICARE PART B  2 shot series recommended in those aged 48 and above     Health Maintenance Due:  There are no preventive care reminders to display for this patient. Immunizations Due:      Topic Date Due   • Influenza Vaccine (1) 09/01/2023     Advance Directives   What are advance directives? Advance directives are legal documents that state your wishes and plans for medical care. These plans are made ahead of time in case you lose your ability to make decisions for yourself. Advance directives can apply to any medical decision, such as the treatments you want, and if you want to donate organs. What are the types of advance directives? There are many types of advance directives, and each state has rules about how to use them. You may choose a combination of any of the following:  · Living will: This is a written record of the treatment you want. You can also choose which treatments you do not want, which to limit, and which to stop at a certain time. This includes surgery, medicine, IV fluid, and tube feedings. · Durable power of  for healthcare [facility]):   This is a written record that states who you want to make healthcare choices for you when you are unable to make them for yourself. This person, called a proxy, is usually a family member or a friend. You may choose more than 1 proxy. · Do not resuscitate (DNR) order:  A DNR order is used in case your heart stops beating or you stop breathing. It is a request not to have certain forms of treatment, such as CPR. A DNR order may be included in other types of advance directives. · Medical directive: This covers the care that you want if you are in a coma, near death, or unable to make decisions for yourself. You can list the treatments you want for each condition. Treatment may include pain medicine, surgery, blood transfusions, dialysis, IV or tube feedings, and a ventilator (breathing machine). · Values history: This document has questions about your views, beliefs, and how you feel and think about life. This information can help others choose the care that you would choose. Why are advance directives important? An advance directive helps you control your care. Although spoken wishes may be used, it is better to have your wishes written down. Spoken wishes can be misunderstood, or not followed. Treatments may be given even if you do not want them. An advance directive may make it easier for your family to make difficult choices about your care. Depression   Depression  is a medical condition that causes feelings of sadness or hopelessness that do not go away. Depression may cause you to lose interest in things you used to enjoy. These feelings may interfere with your daily life. Call your local emergency number (911 in the [address]) if:   · You think about harming yourself or someone else. · You have done something on purpose to hurt yourself.   The following resources are available at any time to help you, if needed:   · [place]: 2-860-828-773.304.7015 (4-618-409-YAGR)   · Suicide Hotline: 9-727.728.4740 (3-874-DBEGPGZ) · For a list of international numbers: https://save.org/find-help/international-resources/  Treatment for depression may include medicine to relieve depression. Medicine is often used together with therapy. Therapy is a way for you to talk about your feelings and anything that may be causing depression. Therapy can be done alone or in a group. It may also be done with family members or a significant other. · Get regular physical activity. · Create a regular sleep schedule. · Eat a variety of healthy foods. · Do not drink alcohol or use drugs. Fall Prevention    Fall prevention  includes ways to make your home and other areas safer. It also includes ways you can move more carefully to prevent a fall. Health conditions that cause changes in your blood pressure, vision, or muscle strength and coordination may increase your risk for falls. Medicines may also increase your risk for falls if they make you dizzy, weak, or sleepy. Fall prevention tips:   · Stand or sit up slowly. · Use assistive devices as directed. · Wear shoes that fit well and have soles that . · Wear a personal alarm. · Stay active. · Manage your medical conditions. Home Safety Tips:  · Add items to prevent falls in the bathroom. · Keep paths clear. · Install bright lights in your home. · Keep items you use often on shelves within reach. · Paint or place reflective tape on the edges of your stairs. Weight Management   Why it is important to manage your weight:  Being overweight increases your risk of health conditions such as heart disease, high blood pressure, type 2 diabetes, and certain types of cancer. It can also increase your risk for osteoarthritis, sleep apnea, and other respiratory problems. Aim for a slow, steady weight loss. Even a small amount of weight loss can lower your risk of health problems.   How to lose weight safely:  A safe and healthy way to lose weight is to eat fewer calories and get regular exercise. You can lose up about 1 pound a week by decreasing the number of calories you eat by 500 calories each day. Healthy meal plan for weight management:  A healthy meal plan includes a variety of foods, contains fewer calories, and helps you stay healthy. A healthy meal plan includes the following:  · Eat whole-grain foods more often. A healthy meal plan should contain fiber. Fiber is the part of grains, fruits, and vegetables that is not broken down by your body. Whole-grain foods are healthy and provide extra fiber in your diet. Some examples of whole-grain foods are whole-wheat breads and pastas, oatmeal, brown rice, and bulgur. · Eat a variety of vegetables every day. Include dark, leafy greens such as spinach, kale, jostin greens, and mustard greens. Eat yellow and orange vegetables such as carrots, sweet potatoes, and winter squash. · Eat a variety of fruits every day. Choose fresh or canned fruit (canned in its own juice or light syrup) instead of juice. Fruit juice has very little or no fiber. · Eat low-fat dairy foods. Drink fat-free (skim) milk or 1% milk. Eat fat-free yogurt and low-fat cottage cheese. Try low-fat cheeses such as mozzarella and other reduced-fat cheeses. · Choose meat and other protein foods that are low in fat. Choose beans or other legumes such as split peas or lentils. Choose fish, skinless poultry (chicken or turkey), or lean cuts of red meat (beef or pork). Before you cook meat or poultry, cut off any visible fat. · Use less fat and oil. Try baking foods instead of frying them. Add less fat, such as margarine, sour cream, regular salad dressing and mayonnaise to foods. Eat fewer high-fat foods. Some examples of high-fat foods include french fries, doughnuts, ice cream, and cakes. · Eat fewer sweets. Limit foods and drinks that are high in sugar. This includes candy, cookies, regular soda, and sweetened drinks.   Exercise:  Exercise at least 30 minutes per day on most days of the week. Some examples of exercise include walking, biking, dancing, and swimming. You can also fit in more physical activity by taking the stairs instead of the elevator or parking farther away from stores. Ask your healthcare provider about the best exercise plan for you. © Copyright [employer] 2018 Information is for End User's use only and may not be sold, redistributed or otherwise used for commercial purposes.  All illustrations and images included in CareNotes® are the copyrighted property of [employer].D[employer]M., Inc. or  Mg

## 2023-10-17 ENCOUNTER — OFFICE VISIT (OUTPATIENT)
Dept: FAMILY MEDICINE CLINIC | Facility: CLINIC | Age: 83
End: 2023-10-17
Payer: COMMERCIAL

## 2023-10-17 VITALS
SYSTOLIC BLOOD PRESSURE: 122 MMHG | BODY MASS INDEX: 25.05 KG/M2 | WEIGHT: 175 LBS | RESPIRATION RATE: 16 BRPM | HEART RATE: 62 BPM | DIASTOLIC BLOOD PRESSURE: 70 MMHG | OXYGEN SATURATION: 98 % | HEIGHT: 70 IN | TEMPERATURE: 97.6 F
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
## 2023-10-17 DIAGNOSIS — S50.861A INSECT BITE OF RIGHT FOREARM, INITIAL ENCOUNTER: Primary | ICD-10-CM

## 2023-10-17 PROCEDURE — 99213 OFFICE O/P EST LOW 20 MIN: CPT | Performed by: FAMILY MEDICINE

## 2023-10-17 RX ORDER — DOXYCYCLINE 100 MG/1
100 TABLET ORAL 2 TIMES DAILY
Qty: 14 TABLET | Refills: 0 | Status: SHIPPED | OUTPATIENT
Start: 2023-10-17 | End: 2023-10-24

## 2023-10-17 NOTE — PROGRESS NOTES
Name: Ashutosh Hahn. : 1940      MRN: [id number]  Encounter Provider: Basilio Dietrich DO  Encounter Date: 10/17/2023   Encounter department: [address] [place] [address]     1. Insect bite of right forearm, initial encounter  -     doxycycline (ADOXA) 100 MG tablet; Take 1 tablet (100 mg total) by mouth 2 (two) times a day for 7 days         Subjective      Patient notes a bump on the volar surface of his right forearm which appeared within the last 24 to 36 hours. Mildly tender. Review of Systems   Skin:         Bump in skin       Current Outpatient Medications on File Prior to Visit   Medication Sig   • acetaminophen (TYLENOL) 325 mg tablet Take 650 mg by mouth every 6 (six) hours as needed for mild pain   • Azopt 1 % ophthalmic suspension AKA: Brinzolomide   • B Complex Vitamins (B COMPLEX PO) Take by mouth once a week   • chlordiazePOXIDE (LIBRIUM) 10 mg capsule Take 1 capsule (10 mg total) by mouth daily   • clobetasol (OLUX) 0.05 % topical foam Apply topically daily as needed For ears.    • docusate sodium (COLACE) 100 mg capsule Take 100 mg by mouth if needed   • econazole nitrate 1 % cream if needed   • ergocalciferol (VITAMIN D2) 50,000 units TAKE 1 CAPSULE BY MOUTH ONE TIME PER WEEK   • Erythromycin (ILOTYCIN OP) Apply to eye if needed   • famotidine (PEPCID) 40 MG tablet Take 1 tablet (40 mg total) by mouth 2 (two) times a day   • glucosamine-chondroitin 500-400 MG tablet Take 1 tablet by mouth 2 (two) times a day   • hydrocortisone 2.5 % cream Apply topically if needed   • levocetirizine (XYZAL) 5 MG tablet Take 1 tablet by mouth Daily   • meclizine (ANTIVERT) 25 mg tablet TAKE ONE-HALF TABLET BY MOUTH EVERY 8 HOURS AS NEEDED FOR DIZZINESS   • Multiple Vitamins-Minerals (CENTRUM SILVER ULTRA MENS) TABS Take 1 tablet by mouth daily   • Red Yeast Rice 600 MG CAPS Take by mouth in the morning   • Rocklatan 0.02-0.005 % SOLN INSTILL 1 DROP INTO BOTH EYES EVERY DAY IN THE EVENING   • Silodosin 8 MG CAPS TAKE 1 CAPSULE BY MOUTH EVERY DAY   • triamcinolone (KENALOG) 0.1 % oral topical paste Apply 1 Application topically 2 (two) times a day   • aspirin 81 mg chewable tablet Chew 81 mg as needed   (Patient not taking: Reported on 2/27/2023)   • guaiFENesin (MUCINEX) 600 mg 12 hr tablet Take 1,200 mg by mouth if needed (Patient not taking: Reported on 8/8/2023)   • mometasone (ELOCON) 0.1 % cream  (Patient not taking: Reported on 10/9/2023)       Objective     /70 (BP Location: Left arm, Patient Position: Sitting, Cuff Size: Standard)   Pulse 62   Temp 97.6 °F (36.4 °C)   Resp 16   Ht 5' 10" (1.778 m)   Wt 79.4 kg (175 lb)   SpO2 98%   BMI 25.11 kg/m²     Physical Exam  Skin:     Comments: Lump in skin of right forearm. Slight erythema and tenderness.        Karina Matos, DO

## 2023-11-01 DIAGNOSIS — K21.9 GASTROESOPHAGEAL REFLUX DISEASE WITHOUT ESOPHAGITIS: ICD-10-CM

## 2023-11-01 RX ORDER — FAMOTIDINE 40 MG/1
40 TABLET, FILM COATED ORAL 2 TIMES DAILY
Qty: 180 TABLET | Refills: 0 | Status: SHIPPED | OUTPATIENT
Start: 2023-11-01

## 2023-11-06 ENCOUNTER — OFFICE VISIT (OUTPATIENT)
Dept: FAMILY MEDICINE CLINIC | Facility: CLINIC | Age: 83
End: 2023-11-06
Payer: COMMERCIAL

## 2023-11-06 VITALS
HEIGHT: 70 IN | DIASTOLIC BLOOD PRESSURE: 72 MMHG | WEIGHT: 175.2 LBS | TEMPERATURE: 97.2 F | BODY MASS INDEX: 25.08 KG/M2 | HEART RATE: 72 BPM | SYSTOLIC BLOOD PRESSURE: 120 MMHG | OXYGEN SATURATION: 99 %

## 2023-11-06 DIAGNOSIS — R22.30 NODULE OF SKIN OF FOREARM: Primary | ICD-10-CM

## 2023-11-06 PROCEDURE — 99213 OFFICE O/P EST LOW 20 MIN: CPT | Performed by: FAMILY MEDICINE

## 2023-11-06 NOTE — PROGRESS NOTES
Assessment/Plan:   1. Nodule of skin of forearm  Reviewed symptoms today. At this time, is unclear as to exact cause of his forearm nodule. Reviewed the differential possible cause. At this time, he may highly benefit from a ultrasound of this area. He states that he wishes to avoid this at this point. He was advised to continue with routine monitoring. If he notes any increasing size, pain or drainage, he must call immediately and we will check an ultrasound. There are no diagnoses linked to this encounter. Subjective:       Chief Complaint   Patient presents with    Mass     Lump on left wrist      Patient ID: Vivi Sanchez. is a 79 y.o. male presents today for a follow-up to discuss a mass on his right forearm. He has had this for the past few weeks. Symptoms started gradually. He was previously seen 2 weeks ago for this problem. He was started on antibiotics for a presumed bug bite. He still has a nodule in this area. He denies any pain in this area. He denies any redness or increasing in size. He did complete antibiotics however has not been taking anything currently for this. Nodule over right medial wrist      Review of Systems   Constitutional:  Negative for activity change, chills, fatigue and fever. HENT:  Negative for congestion, ear pain, sinus pressure and sore throat. Eyes:  Negative for redness, itching and visual disturbance. Respiratory:  Negative for cough and shortness of breath. Cardiovascular:  Negative for chest pain and palpitations. Gastrointestinal:  Negative for abdominal pain, diarrhea and nausea. Endocrine: Negative for cold intolerance and heat intolerance. Genitourinary:  Negative for dysuria, flank pain and frequency. Musculoskeletal:  Negative for arthralgias, back pain, gait problem and myalgias. Skin:  Negative for color change. Allergic/Immunologic: Negative for environmental allergies.    Neurological:  Negative for dizziness, numbness and headaches. Psychiatric/Behavioral:  Negative for behavioral problems and sleep disturbance. The following portions of the patient's history were reviewed and updated as appropriate : past family history, past medical history, past social history and past surgical history. Current Outpatient Medications:     acetaminophen (TYLENOL) 325 mg tablet, Take 650 mg by mouth every 6 (six) hours as needed for mild pain, Disp: , Rfl:     Azopt 1 % ophthalmic suspension, AKA: Brinzolomide, Disp: , Rfl:     B Complex Vitamins (B COMPLEX PO), Take by mouth once a week, Disp: , Rfl:     chlordiazePOXIDE (LIBRIUM) 10 mg capsule, Take 1 capsule (10 mg total) by mouth daily, Disp: 90 capsule, Rfl: 2    clobetasol (OLUX) 0.05 % topical foam, Apply topically daily as needed For ears. , Disp: , Rfl:     docusate sodium (COLACE) 100 mg capsule, Take 100 mg by mouth if needed, Disp: , Rfl:     econazole nitrate 1 % cream, if needed, Disp: , Rfl:     ergocalciferol (VITAMIN D2) 50,000 units, TAKE 1 CAPSULE BY MOUTH ONE TIME PER WEEK, Disp: 12 capsule, Rfl: 2    Erythromycin (ILOTYCIN OP), Apply to eye if needed, Disp: , Rfl:     famotidine (PEPCID) 40 MG tablet, TAKE 1 TABLET BY MOUTH TWICE A DAY, Disp: 180 tablet, Rfl: 0    glucosamine-chondroitin 500-400 MG tablet, Take 1 tablet by mouth 2 (two) times a day, Disp: , Rfl:     hydrocortisone 2.5 % cream, Apply topically if needed, Disp: , Rfl:     levocetirizine (XYZAL) 5 MG tablet, Take 1 tablet by mouth Daily, Disp: , Rfl:     meclizine (ANTIVERT) 25 mg tablet, TAKE ONE-HALF TABLET BY MOUTH EVERY 8 HOURS AS NEEDED FOR DIZZINESS, Disp: 30 tablet, Rfl: 1    Multiple Vitamins-Minerals (CENTRUM SILVER ULTRA MENS) TABS, Take 1 tablet by mouth daily, Disp: , Rfl:     Red Yeast Rice 600 MG CAPS, Take by mouth in the morning, Disp: , Rfl:     Rocklatan 0.02-0.005 % SOLN, INSTILL 1 DROP INTO BOTH EYES EVERY DAY IN THE EVENING, Disp: , Rfl:     Silodosin 8 MG CAPS, TAKE 1 CAPSULE BY MOUTH EVERY DAY, Disp: 90 capsule, Rfl: 3    triamcinolone (KENALOG) 0.1 % oral topical paste, Apply 1 Application topically 2 (two) times a day, Disp: 5 g, Rfl: 5    aspirin 81 mg chewable tablet, Chew 81 mg as needed   (Patient not taking: Reported on 2/27/2023), Disp: , Rfl:     guaiFENesin (MUCINEX) 600 mg 12 hr tablet, Take 1,200 mg by mouth if needed (Patient not taking: Reported on 8/8/2023), Disp: , Rfl:     mometasone (ELOCON) 0.1 % cream, , Disp: , Rfl:          Objective:         Vitals:    11/06/23 1444   BP: 120/72   BP Location: Left arm   Patient Position: Sitting   Cuff Size: Adult   Pulse: 72   Temp: (!) 97.2 °F (36.2 °C)   TempSrc: Tympanic   SpO2: 99%   Weight: 79.5 kg (175 lb 3.2 oz)   Height: 5' 10" (1.778 m)     Physical Exam  Vitals reviewed. Constitutional:       Appearance: Normal appearance. He is well-developed. HENT:      Head: Normocephalic and atraumatic. Right Ear: Hearing normal.      Left Ear: Hearing normal.      Nose: Nose normal. No septal deviation. Eyes:      General: Lids are normal.      Pupils: Pupils are equal, round, and reactive to light. Neck:      Thyroid: No thyroid mass or thyromegaly. Trachea: Trachea normal.   Cardiovascular:      Rate and Rhythm: Normal rate. Pulmonary:      Effort: Pulmonary effort is normal. No respiratory distress. Breath sounds: No decreased breath sounds. Abdominal:      Tenderness: There is no guarding. Musculoskeletal:         General: Normal range of motion. Cervical back: Normal range of motion. Skin:     General: Skin is warm and dry. Comments: 1 cm nodule over medial right wrist   Neurological:      Mental Status: He is alert and oriented to person, place, and time. Cranial Nerves: No cranial nerve deficit. Sensory: No sensory deficit. Psychiatric:         Speech: Speech normal.         Behavior: Behavior normal.         Thought Content:  Thought content normal.         Judgment: Judgment normal.

## 2023-11-28 ENCOUNTER — OFFICE VISIT (OUTPATIENT)
Dept: FAMILY MEDICINE CLINIC | Facility: CLINIC | Age: 83
End: 2023-11-28
Payer: COMMERCIAL

## 2023-11-28 VITALS
DIASTOLIC BLOOD PRESSURE: 74 MMHG | OXYGEN SATURATION: 97 % | HEART RATE: 89 BPM | TEMPERATURE: 98.3 F | SYSTOLIC BLOOD PRESSURE: 120 MMHG | BODY MASS INDEX: 25.14 KG/M2 | WEIGHT: 175.2 LBS

## 2023-11-28 DIAGNOSIS — R42 VERTIGO: ICD-10-CM

## 2023-11-28 DIAGNOSIS — K21.9 GASTROESOPHAGEAL REFLUX DISEASE WITHOUT ESOPHAGITIS: ICD-10-CM

## 2023-11-28 DIAGNOSIS — R22.31 LUMP OF RIGHT WRIST: Primary | ICD-10-CM

## 2023-11-28 PROCEDURE — 99213 OFFICE O/P EST LOW 20 MIN: CPT | Performed by: FAMILY MEDICINE

## 2023-11-28 RX ORDER — MECLIZINE HYDROCHLORIDE 25 MG/1
25 TABLET ORAL EVERY 8 HOURS PRN
Qty: 30 TABLET | Refills: 1 | Status: SHIPPED | OUTPATIENT
Start: 2023-11-28 | End: 2023-12-07 | Stop reason: SDUPTHER

## 2023-11-28 RX ORDER — FAMOTIDINE 40 MG/1
40 TABLET, FILM COATED ORAL 2 TIMES DAILY
Qty: 180 TABLET | Refills: 0 | Status: SHIPPED | OUTPATIENT
Start: 2023-11-28

## 2023-11-28 NOTE — PROGRESS NOTES
Name: Yoselyn Mejia. : 1940      MRN: [id number]  Encounter Provider: Naseem Fields DO  Encounter Date: 2023   Encounter department: [address] [place] [address]    Assessment & Plan     1. Lump of right wrist  Comments:  Likely lipoma. Patient reassured. No treatment necessary    2. Vertigo  -     meclizine (ANTIVERT) 25 mg tablet; Take 1 tablet (25 mg total) by mouth every 8 (eight) hours as needed for dizziness    3. Gastroesophageal reflux disease without esophagitis  -     famotidine (PEPCID) 40 MG tablet; Take 1 tablet (40 mg total) by mouth 2 (two) times a day           Subjective      Patient presents to follow-up on several concerns. First he has a lump on his right wrist which has been previously evaluated. It has not changed since prior exam.  No tenderness. Review of Systems   Constitutional: Negative. Respiratory: Negative. Cardiovascular: Negative. Gastrointestinal: Negative. Genitourinary: Negative. Musculoskeletal: Negative. Skin:         Lump in skin volar surface of right wrist   Psychiatric/Behavioral: Negative. Current Outpatient Medications on File Prior to Visit   Medication Sig   • acetaminophen (TYLENOL) 325 mg tablet Take 650 mg by mouth every 6 (six) hours as needed for mild pain   • aspirin 81 mg chewable tablet Chew 81 mg as needed   (Patient not taking: Reported on 2023)   • Azopt 1 % ophthalmic suspension AKA: Brinzolomide   • B Complex Vitamins (B COMPLEX PO) Take by mouth once a week   • chlordiazePOXIDE (LIBRIUM) 10 mg capsule Take 1 capsule (10 mg total) by mouth daily   • clobetasol (OLUX) 0.05 % topical foam Apply topically daily as needed For ears.    • docusate sodium (COLACE) 100 mg capsule Take 100 mg by mouth if needed   • econazole nitrate 1 % cream if needed   • ergocalciferol (VITAMIN D2) 50,000 units TAKE 1 CAPSULE BY MOUTH ONE TIME PER WEEK   • Erythromycin (ILOTYCIN OP) Apply to eye if needed   • glucosamine-chondroitin 500-400 MG tablet Take 1 tablet by mouth 2 (two) times a day   • guaiFENesin (MUCINEX) 600 mg 12 hr tablet Take 1,200 mg by mouth if needed (Patient not taking: Reported on 8/8/2023)   • hydrocortisone 2.5 % cream Apply topically if needed   • levocetirizine (XYZAL) 5 MG tablet Take 1 tablet by mouth Daily   • mometasone (ELOCON) 0.1 % cream  (Patient not taking: Reported on 10/9/2023)   • Multiple Vitamins-Minerals (CENTRUM SILVER ULTRA MENS) TABS Take 1 tablet by mouth daily   • Red Yeast Rice 600 MG CAPS Take by mouth in the morning   • Rocklatan 0.02-0.005 % SOLN INSTILL 1 DROP INTO BOTH EYES EVERY DAY IN THE EVENING   • Silodosin 8 MG CAPS TAKE 1 CAPSULE BY MOUTH EVERY DAY   • triamcinolone (KENALOG) 0.1 % oral topical paste Apply 1 Application topically 2 (two) times a day   • [DISCONTINUED] famotidine (PEPCID) 40 MG tablet TAKE 1 TABLET BY MOUTH TWICE A DAY   • [DISCONTINUED] meclizine (ANTIVERT) 25 mg tablet TAKE ONE-HALF TABLET BY MOUTH EVERY 8 HOURS AS NEEDED FOR DIZZINESS       Objective     /74 (BP Location: Left arm, Patient Position: Sitting, Cuff Size: Adult)   Pulse 89   Temp 98.3 °F (36.8 °C)   Wt 79.5 kg (175 lb 3.2 oz)   SpO2 97%   BMI 25.14 kg/m²     Physical Exam  Skin:     Comments: Soft freely movable lump in skin volar surface right wrist       Edgardo Hurtado DO

## 2023-12-07 DIAGNOSIS — R42 VERTIGO: ICD-10-CM

## 2023-12-07 RX ORDER — MECLIZINE HYDROCHLORIDE 25 MG/1
25 TABLET ORAL EVERY 8 HOURS PRN
Qty: 30 TABLET | Refills: 1 | Status: SHIPPED | OUTPATIENT
Start: 2023-12-07

## 2023-12-15 ENCOUNTER — REMOTE DEVICE CLINIC VISIT (OUTPATIENT)
Dept: CARDIOLOGY CLINIC | Facility: CLINIC | Age: 83
End: 2023-12-15
Payer: COMMERCIAL

## 2023-12-15 DIAGNOSIS — Z95.0 PRESENCE OF CARDIAC PACEMAKER: Primary | ICD-10-CM

## 2023-12-15 PROCEDURE — 93294 REM INTERROG EVL PM/LDLS PM: CPT | Performed by: INTERNAL MEDICINE

## 2023-12-15 PROCEDURE — 93296 REM INTERROG EVL PM/IDS: CPT | Performed by: INTERNAL MEDICINE

## 2023-12-15 NOTE — PROGRESS NOTES
Results for orders placed or performed in visit on 12/15/23   Cardiac EP device report    Narrative    MDT DUAL CHAMBER PPM (MVP OFF/DDDR 60/HIS LEAD) - ACTIVE SYSTEM IS MRI CONDITIONAL  CARELINK TRANSMISSION: BATTERY VOLTAGE ADEQUATE (10.4 YRS). AP: 37.8%. : 99.8% (>40%~MVP-OFF~DDDR/60). ALL AVAILABLE LEAD PARAMETERS WITHIN NORMAL LIMITS. 1 VT EPISODE W/ EGM SHOWING NSVT 6 BEATS @ 207 BPM. 9 AT/AF EPISODES W/ AVAIL EGMS/MARKERS SHOWING PAT, MAX DURATION 3 MINS 1 SECS W/ FF OS. AF BURDEN: <0.1%. PT TAKES ASA 81MG. EF: 63% (ECHO 12/2/22). NORMAL DEVICE FUNCTION.  [id number] [address]

## 2023-12-19 ENCOUNTER — TELEPHONE (OUTPATIENT)
Dept: FAMILY MEDICINE CLINIC | Facility: CLINIC | Age: 83
End: 2023-12-19

## 2023-12-19 NOTE — TELEPHONE ENCOUNTER
This is Adonis Schwartz Junior. I just want to clarify, I got an appointment with you yesterday for with Doctor Ross and it said in January at one at 2:30 PM and on and I believe it's only 18th and I probably possibly wrote mistakenly wrote Monday which is not correct. So I wanna clarify that did clarify that date so I have it correct in my day planner. That's all I talked the first available that Doctor Ross and at 2:30 in the afternoon. So I need I need you to confirm my the IT day and date I've I believe. I believe it's probably the 18th at 2:30 PM, but I need to know and there's lots of time, but please call me at [phone]. My birthday is 1940. Goodbye.  You received a voice mail from Anonymous

## 2024-01-08 ENCOUNTER — OFFICE VISIT (OUTPATIENT)
Dept: FAMILY MEDICINE CLINIC | Facility: CLINIC | Age: 84
End: 2024-01-08
Payer: COMMERCIAL

## 2024-01-08 VITALS
BODY MASS INDEX: 25.2 KG/M2 | OXYGEN SATURATION: 99 % | SYSTOLIC BLOOD PRESSURE: 120 MMHG | DIASTOLIC BLOOD PRESSURE: 76 MMHG | HEART RATE: 94 BPM | WEIGHT: 175.6 LBS | TEMPERATURE: 98.9 F

## 2024-01-08 DIAGNOSIS — R47.9 DIFFICULTY WITH SPEECH: Primary | ICD-10-CM

## 2024-01-08 PROCEDURE — 99213 OFFICE O/P EST LOW 20 MIN: CPT | Performed by: FAMILY MEDICINE

## 2024-01-08 NOTE — PROGRESS NOTES
Name: Adonis Mallory Jr.      : 1940      MRN: [id number]  Encounter Provider: Carl Hawkins DO  Encounter Date: 2024   Encounter department: [facility]    Assessment & Plan     1. Difficulty with speech       Reviewed ER visit with patient and discussed all testing including CT and lab work.  Symptoms possibly consistent with TIA.  Patient had been off of aspirin.  Can encouraged him to continue low-dose aspirin 81 mg daily.  He does not wish any additional workup at this time.    Subjective      Patient presents to follow-up on emergency room visit from 3 days ago.  Had an episode at home of difficulty with speech which lasted several hours.  He was seen in the emergency room at [facility].  Blood work and CT scan were normal and he was asymptomatic at that time.  He has had no further symptoms since then.      Review of Systems   Constitutional: Negative.    Respiratory: Negative.     Cardiovascular: Negative.    Gastrointestinal: Negative.    Genitourinary: Negative.    Musculoskeletal: Negative.    Neurological:  Positive for speech difficulty.   Psychiatric/Behavioral: Negative.         Current Outpatient Medications on File Prior to Visit   Medication Sig   • aspirin 81 mg chewable tablet Chew 81 mg as needed     • meclizine (ANTIVERT) 25 mg tablet Take 1 tablet (25 mg total) by mouth every 8 (eight) hours as needed for dizziness   • acetaminophen (TYLENOL) 325 mg tablet Take 650 mg by mouth every 6 (six) hours as needed for mild pain   • Azopt 1 % ophthalmic suspension AKA: Brinzolomide   • B Complex Vitamins (B COMPLEX PO) Take by mouth once a week   • chlordiazePOXIDE (LIBRIUM) 10 mg capsule Take 1 capsule (10 mg total) by mouth daily   • clobetasol (OLUX) 0.05 % topical foam Apply topically daily as needed For ears.   • docusate sodium (COLACE) 100 mg capsule Take 100 mg by mouth if needed   • econazole nitrate 1 % cream if needed   • ergocalciferol  (VITAMIN D2) 50,000 units TAKE 1 CAPSULE BY MOUTH ONE TIME PER WEEK   • Erythromycin (ILOTYCIN OP) Apply to eye if needed   • famotidine (PEPCID) 40 MG tablet Take 1 tablet (40 mg total) by mouth 2 (two) times a day   • glucosamine-chondroitin 500-400 MG tablet Take 1 tablet by mouth 2 (two) times a day   • guaiFENesin (MUCINEX) 600 mg 12 hr tablet Take 1,200 mg by mouth if needed (Patient not taking: Reported on 8/8/2023)   • hydrocortisone 2.5 % cream Apply topically if needed   • levocetirizine (XYZAL) 5 MG tablet Take 1 tablet by mouth Daily   • mometasone (ELOCON) 0.1 % cream  (Patient not taking: Reported on 10/9/2023)   • Multiple Vitamins-Minerals (CENTRUM SILVER ULTRA MENS) TABS Take 1 tablet by mouth daily   • Red Yeast Rice 600 MG CAPS Take by mouth in the morning   • Rocklatan 0.02-0.005 % SOLN INSTILL 1 DROP INTO BOTH EYES EVERY DAY IN THE EVENING   • Silodosin 8 MG CAPS TAKE 1 CAPSULE BY MOUTH EVERY DAY   • triamcinolone (KENALOG) 0.1 % oral topical paste Apply 1 Application topically 2 (two) times a day       Objective     /76 (BP Location: Left arm, Patient Position: Sitting, Cuff Size: Adult)   Pulse 94   Temp 98.9 °F (37.2 °C)   Wt 79.7 kg (175 lb 9.6 oz)   SpO2 99%   BMI 25.20 kg/m²     Physical Exam  Vitals and nursing note reviewed.   Constitutional:       General: He is not in acute distress.     Appearance: Normal appearance. He is well-developed. He is not diaphoretic.   HENT:      Head: Normocephalic and atraumatic.   Eyes:      General:         Right eye: No discharge.      Conjunctiva/sclera: Conjunctivae normal.      Pupils: Pupils are equal, round, and reactive to light.   Neck:      Thyroid: No thyromegaly.   Cardiovascular:      Rate and Rhythm: Normal rate and regular rhythm.   Pulmonary:      Effort: Pulmonary effort is normal. No respiratory distress.      Breath sounds: Normal breath sounds.   Musculoskeletal:      Cervical back: Normal range of motion.    Lymphadenopathy:      Cervical: No cervical adenopathy.   Skin:     General: Skin is warm and dry.   Neurological:      General: No focal deficit present.      Mental Status: He is alert and oriented to person, place, and time.   Psychiatric:         Mood and Affect: Mood normal.         Behavior: Behavior normal.         Thought Content: Thought content normal.         Judgment: Judgment normal.       Carl Hawkins, DO

## 2024-01-16 ENCOUNTER — TELEPHONE (OUTPATIENT)
Age: 84
End: 2024-01-16

## 2024-01-16 NOTE — TELEPHONE ENCOUNTER
Patient called in requesting to speak to Dr. Hawkins did try to warm transfer the call to the practice. Was told to send in telephone encounter. Did check with the patient if there is any msg to  Which I can relay over to him. He mentioned yday he took his vertigo med and would badly wants to discuss regards this with Dr. Hawkins. Please advise and return call the patient. Thanks.

## 2024-02-06 DIAGNOSIS — R42 VERTIGO: ICD-10-CM

## 2024-02-06 RX ORDER — MECLIZINE HYDROCHLORIDE 25 MG/1
25 TABLET ORAL EVERY 8 HOURS PRN
Qty: 30 TABLET | Refills: 1 | Status: SHIPPED | OUTPATIENT
Start: 2024-02-06 | End: 2024-02-07 | Stop reason: SDUPTHER

## 2024-02-07 DIAGNOSIS — R42 VERTIGO: ICD-10-CM

## 2024-02-07 RX ORDER — MECLIZINE HYDROCHLORIDE 25 MG/1
25 TABLET ORAL EVERY 8 HOURS PRN
Qty: 90 TABLET | Refills: 1 | Status: SHIPPED | OUTPATIENT
Start: 2024-02-07 | End: 2024-02-14 | Stop reason: SDUPTHER

## 2024-02-14 ENCOUNTER — OFFICE VISIT (OUTPATIENT)
Dept: FAMILY MEDICINE CLINIC | Facility: CLINIC | Age: 84
End: 2024-02-14
Payer: COMMERCIAL

## 2024-02-14 VITALS
OXYGEN SATURATION: 98 % | WEIGHT: 174 LBS | BODY MASS INDEX: 24.91 KG/M2 | TEMPERATURE: 98.2 F | HEIGHT: 70 IN | SYSTOLIC BLOOD PRESSURE: 118 MMHG | HEART RATE: 73 BPM | DIASTOLIC BLOOD PRESSURE: 72 MMHG

## 2024-02-14 DIAGNOSIS — F33.9 DEPRESSION, RECURRENT (HCC): ICD-10-CM

## 2024-02-14 DIAGNOSIS — J44.9 CHRONIC OBSTRUCTIVE PULMONARY DISEASE, UNSPECIFIED COPD TYPE (HCC): ICD-10-CM

## 2024-02-14 DIAGNOSIS — D68.318: ICD-10-CM

## 2024-02-14 DIAGNOSIS — R42 VERTIGO: ICD-10-CM

## 2024-02-14 DIAGNOSIS — M35.00 SJOGREN'S SYNDROME, WITH UNSPECIFIED ORGAN INVOLVEMENT (HCC): ICD-10-CM

## 2024-02-14 DIAGNOSIS — I49.5 SICK SINUS SYNDROME (HCC): ICD-10-CM

## 2024-02-14 PROCEDURE — 99213 OFFICE O/P EST LOW 20 MIN: CPT | Performed by: FAMILY MEDICINE

## 2024-02-14 RX ORDER — MECLIZINE HYDROCHLORIDE 25 MG/1
25 TABLET ORAL EVERY 8 HOURS PRN
Start: 2024-02-14

## 2024-02-14 RX ORDER — OMEGA-3/DHA/EPA/FISH OIL 300-1000MG
2 CAPSULE ORAL DAILY
COMMUNITY

## 2024-02-14 NOTE — PROGRESS NOTES
Name: Adonis Mallory Jr.      : 1940      MRN: [id number]  Encounter Provider: Carl Hawknis DO  Encounter Date: 2024   Encounter department: [facility]    Assessment & Plan     1. Vertigo  Assessment & Plan:  Vertigo which is treated effectively with meclizine.  Takes up to 3 pills a day and on rare occasions will take a fourth dose.  He gets minimal if any side effects.    Orders:  -     meclizine (ANTIVERT) 25 mg tablet; Take 1 tablet (25 mg total) by mouth every 8 (eight) hours as needed for dizziness    2. Sjogren's syndrome, with unspecified organ involvement (HCC)    3. Chronic obstructive pulmonary disease, unspecified COPD type (HCC)    4. Depression, recurrent (HCC)    5. Sick sinus syndrome (HCC)    6. Antithrombinemia (HCC)           Subjective      Patient's primary concern today is his vertigo.  He does get results and benefit from meclizine.  He was unsure of the dosage he could take.      Review of Systems   Constitutional: Negative.    Respiratory: Negative.     Cardiovascular: Negative.    Gastrointestinal: Negative.    Genitourinary: Negative.    Musculoskeletal: Negative.    Neurological:  Positive for light-headedness.   Psychiatric/Behavioral: Negative.         Current Outpatient Medications on File Prior to Visit   Medication Sig   • acetaminophen (TYLENOL) 325 mg tablet Take 650 mg by mouth every 6 (six) hours as needed for mild pain   • aspirin 81 mg chewable tablet Chew 81 mg as needed     • Azopt 1 % ophthalmic suspension AKA: Brinzolomide   • B Complex Vitamins (B COMPLEX PO) Take by mouth once a week   • chlordiazePOXIDE (LIBRIUM) 10 mg capsule Take 1 capsule (10 mg total) by mouth daily   • clobetasol (OLUX) 0.05 % topical foam Apply topically daily as needed For ears.   • docusate sodium (COLACE) 100 mg capsule Take 100 mg by mouth if needed   • econazole nitrate 1 % cream if needed   • ergocalciferol (VITAMIN D2) 50,000 units TAKE 1 CAPSULE BY  "MOUTH ONE TIME PER WEEK   • Erythromycin (ILOTYCIN OP) Apply to eye if needed   • famotidine (PEPCID) 40 MG tablet Take 1 tablet (40 mg total) by mouth 2 (two) times a day   • fish oil-omega-3 fatty acids 1000 MG capsule Take 2 g by mouth daily   • glucosamine-chondroitin 500-400 MG tablet Take 1 tablet by mouth 2 (two) times a day   • hydrocortisone 2.5 % cream Apply topically if needed   • mometasone (ELOCON) 0.1 % cream    • Multiple Vitamins-Minerals (CENTRUM SILVER ULTRA MENS) TABS Take 1 tablet by mouth daily   • Rocklatan 0.02-0.005 % SOLN INSTILL 1 DROP INTO BOTH EYES EVERY DAY IN THE EVENING   • Silodosin 8 MG CAPS TAKE 1 CAPSULE BY MOUTH EVERY DAY   • triamcinolone (KENALOG) 0.1 % oral topical paste Apply 1 Application topically 2 (two) times a day   • [DISCONTINUED] meclizine (ANTIVERT) 25 mg tablet Take 1 tablet (25 mg total) by mouth every 8 (eight) hours as needed for dizziness   • guaiFENesin (MUCINEX) 600 mg 12 hr tablet Take 1,200 mg by mouth if needed (Patient not taking: Reported on 8/8/2023)   • levocetirizine (XYZAL) 5 MG tablet Take 1 tablet by mouth Daily (Patient not taking: Reported on 2/14/2024)   • Red Yeast Rice 600 MG CAPS Take by mouth in the morning (Patient not taking: Reported on 2/14/2024)       Objective     /72 (BP Location: Left arm, Patient Position: Sitting, Cuff Size: Standard)   Pulse 73   Temp 98.2 °F (36.8 °C) (Temporal)   Ht 5' 10\" (1.778 m)   Wt 78.9 kg (174 lb)   SpO2 98%   BMI 24.97 kg/m²     Physical Exam  Vitals and nursing note reviewed.   Constitutional:       General: He is not in acute distress.     Appearance: Normal appearance. He is well-developed. He is not diaphoretic.   HENT:      Head: Normocephalic and atraumatic.   Eyes:      General:         Right eye: No discharge.      Conjunctiva/sclera: Conjunctivae normal.      Pupils: Pupils are equal, round, and reactive to light.   Neck:      Thyroid: No thyromegaly.   Cardiovascular:      Rate and " Rhythm: Normal rate and regular rhythm.   Pulmonary:      Effort: Pulmonary effort is normal. No respiratory distress.      Breath sounds: Normal breath sounds.   Musculoskeletal:      Cervical back: Normal range of motion.   Lymphadenopathy:      Cervical: No cervical adenopathy.   Skin:     General: Skin is warm and dry.   Neurological:      Mental Status: He is alert and oriented to person, place, and time.   Psychiatric:         Mood and Affect: Mood normal.         Behavior: Behavior normal.         Thought Content: Thought content normal.         Judgment: Judgment normal.       Carl Hawkins, DO

## 2024-02-14 NOTE — ASSESSMENT & PLAN NOTE
Vertigo which is treated effectively with meclizine.  Takes up to 3 pills a day and on rare occasions will take a fourth dose.  He gets minimal if any side effects.

## 2024-02-23 ENCOUNTER — IN-CLINIC DEVICE VISIT (OUTPATIENT)
Dept: CARDIOLOGY CLINIC | Facility: CLINIC | Age: 84
End: 2024-02-23
Payer: COMMERCIAL

## 2024-02-23 DIAGNOSIS — Z95.0 CARDIAC PACEMAKER IN SITU: Primary | ICD-10-CM

## 2024-02-23 PROCEDURE — 93280 PM DEVICE PROGR EVAL DUAL: CPT | Performed by: INTERNAL MEDICINE

## 2024-02-23 NOTE — PROGRESS NOTES
Results for orders placed or performed in visit on 02/23/24   Cardiac EP device report    Narrative    MDT DUAL CHAMBER PPM (MVP OFF/DDDR 60/HIS LEAD) - ACTIVE SYSTEM IS MRI CONDITIONAL  DEVICE INTERROGATED IN THE [place] OFFICE: BATTERY VOLTAGE ADEQUATE-10 YRS. AP 28%  100%. ALL AVAILABLE LEAD PARAMETERS WITHIN NORMAL LIMITS. NO SIGNIFICANT HIGH RATE EPISODES. NO PROGRAMMING CHANGES MADE TO DEVICE PARAMETERS. NORMAL DEVICE FUNCTION. [place]

## 2024-03-04 ENCOUNTER — HOSPITAL ENCOUNTER (OUTPATIENT)
Dept: NON INVASIVE DIAGNOSTICS | Facility: HOSPITAL | Age: 84
Discharge: HOME | End: 2024-03-04
Attending: INTERNAL MEDICINE
Payer: COMMERCIAL

## 2024-03-04 VITALS
DIASTOLIC BLOOD PRESSURE: 65 MMHG | BODY MASS INDEX: 24.91 KG/M2 | SYSTOLIC BLOOD PRESSURE: 124 MMHG | HEIGHT: 70 IN | WEIGHT: 174 LBS

## 2024-03-04 DIAGNOSIS — I35.0 NONRHEUMATIC AORTIC VALVE STENOSIS: ICD-10-CM

## 2024-03-04 LAB
AORTIC ROOT: 3.7 CM
AORTIC VALVE MEAN VELOCITY: 18 M/S
APICAL FOUR CHAMBER EJECTION FRACTION: 69 %
ASCENDING AORTA: 4.7 CM
AV AREA BY CONTINUOUS VTI: 1.7 CM2
AV AREA PEAK VELOCITY: 1.3 CM2
AV LVOT MEAN GRADIENT: 2 MMHG
AV LVOT PEAK GRADIENT: 3 MMHG
AV MEAN GRADIENT: 18 MMHG
AV PEAK GRADIENT: 33 MMHG
AV REGURGITATION PRESSURE HALF TIME: 681 MS
AV VALVE AREA: 1.26 CM2
AV VELOCITY RATIO: 0.28
BSA FOR ECHO PROCEDURE: 1.97 M2
DOP CALC AO PEAK VEL: 2.9 M/S
DOP CALC AO VTI: 59.06 CM
DOP CALC LVOT AREA: 3.46 CM2
DOP CALC LVOT CARDIAC INDEX: 3 L/MIN/M2
DOP CALC LVOT CARDIAC OUTPUT: 5.91 L/MIN
DOP CALC LVOT DIAMETER: 2.1 CM
DOP CALC LVOT PEAK VEL VTI: 21.55 CM
DOP CALC LVOT PEAK VEL: 0.82 M/S
DOP CALC LVOT STROKE INDEX: 50.8 ML/M2
DOP CALC LVOT STROKE VOLUME: 74.6 CM3
E WAVE DECELERATION TIME: 356 MS
E/A RATIO: 0.72
FRACTIONAL SHORTENING: 32 (ref 28–44)
INTERVENTRICULAR SEPTUM IN DIASTOLE (PARASTERNAL SHORT AXIS VIEW): 1.2 CM
INTERVENTRICULAR SEPTUM: 1.2 CM (ref 0.6–1.1)
LAAS-AP2: 25.2 CM2
LAAS-AP4: 23.4 CM2
LEFT ATRIUM SIZE: 3.4 CM
LEFT ATRIUM VOLUME (MOD BIPLANE): 77 ML
LEFT ATRIUM VOLUME INDEX (MOD BIPLANE): 39.1 ML/M2
LEFT INTERNAL DIMENSION IN SYSTOLE: 2.8 CM (ref 2.1–4)
LEFT VENTRICULAR INTERNAL DIMENSION IN DIASTOLE: 4.1 CM (ref 3.5–6)
LEFT VENTRICULAR POSTERIOR WALL IN END DIASTOLE: 1.1 CM
LEFT VENTRICULAR STROKE VOLUME: 44 ML
LVSV (TEICH): 44 ML
MV E'TISSUE VEL-LAT: 7 CM/S
MV E'TISSUE VEL-SEP: 6 CM/S
MV PEAK A VEL: 0.89 M/S
MV PEAK E VEL: 64 CM/S
MV STENOSIS PRESSURE HALF TIME: 103 MS
MV VALVE AREA P 1/2 METHOD: 2.14
RIGHT ATRIAL 2D VOLUME: 54 ML
RIGHT ATRIUM AREA SYSTOLE A4C: 19.5 CM2
RIGHT VENTRICLE ID DIMENSION: 4.1 CM
SL CV AV DECELERATION TIME RETROGRADE: 2347 MS
SL CV AV PEAK GRADIENT RETROGRADE: 70 MMHG
SL CV LEFT ATRIUM LENGTH A2C: 6.6 CM
SL CV LV EF: 55
SL CV PED ECHO LEFT VENTRICLE DIASTOLIC VOLUME (MOD BIPLANE) 2D: 73 ML
SL CV PED ECHO LEFT VENTRICLE SYSTOLIC VOLUME (MOD BIPLANE) 2D: 29 ML
TR MAX PG: 21 MMHG
TR PEAK VELOCITY: 2.3 M/S
TRICUSPID ANNULAR PLANE SYSTOLIC EXCURSION: 2.5 CM
TRICUSPID VALVE PEAK REGURGITATION VELOCITY: 2.31 M/S

## 2024-03-04 PROCEDURE — 93306 TTE W/DOPPLER COMPLETE: CPT

## 2024-03-06 ENCOUNTER — OFFICE VISIT (OUTPATIENT)
Dept: CARDIOLOGY CLINIC | Facility: CLINIC | Age: 84
End: 2024-03-06
Payer: COMMERCIAL

## 2024-03-06 VITALS
SYSTOLIC BLOOD PRESSURE: 110 MMHG | WEIGHT: 172.4 LBS | HEART RATE: 68 BPM | BODY MASS INDEX: 24.74 KG/M2 | DIASTOLIC BLOOD PRESSURE: 70 MMHG

## 2024-03-06 DIAGNOSIS — I35.0 NONRHEUMATIC AORTIC VALVE STENOSIS: Primary | ICD-10-CM

## 2024-03-06 PROCEDURE — 99214 OFFICE O/P EST MOD 30 MIN: CPT | Performed by: INTERNAL MEDICINE

## 2024-03-06 NOTE — PROGRESS NOTES
Cardiology             Adonis Mallory .  1940  [id number]            Assessment/Plan:    Sick sinus syndrome and chronotropic incompetence, status post dual-chamber Medtronic permanent pacemaker placed 8/9/2022  Moderate aortic stenosis        Normally functioning pacemaker, episodes of PAT noted.  Would like to hold off on beta-blocker use in light of marginally low blood pressures chronically.  Patient without cardiac symptoms.  Aortic stenosis stable, moderate, will repeat echocardiogram in 1 year        Follow-up in 1 year after echocardiogram            Interval History:      This is a very pleasant 82-year-old male with no prior cardiac history, she has seen for initial consultation 04/2022 for symptoms of significant fatigue mostly with exertion.  Had undergone echocardiogram 10/21/2021 demonstrating ejection fraction 65% with moderate aortic stenosis.  ECG had revealed sinus bradycardia with Mobitz 1 second-degree AV block.  Subsequently underwent Zio monitoring  Subsequent, Zio monitor revealed minimum heart rate 26 beats per minute with average heart rate 61 beats per minute.  There was 1 run of ventricular tachycardia up to 7 beats at 169 beats per minute.  There are 2 distinct pauses of about 3 seconds, occurring at 12:44 a.m. and 5:45 a.m. with no patient triggers.  Wenckebach pattern AV block was noted with no other forms of advanced AV block.    He underwent placement of a dual-chamber Medtronic permanent pacemaker on 8/9/2022  For tropic competence.  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    He presents today for follow-up.  Prior device interrogation 3/2/2023 revealed a normally functioning device without high rate episodes.  He continues to feel  fatigued.            Vitals:  Vitals:    24 1107   BP: 110/70   BP Location: Left arm   Patient Position: Sitting   Cuff Size: Adult   Pulse: 68   Weight: 78.2 kg (172 lb 6.4 oz)         Past Medical History:   Diagnosis Date   • Abnormal diffusion capacity determined by pulmonary function test 2017   • Actinic keratosis    • Acute right-sided low back pain without sciatica 2017   • Adverse effect of correct medicinal substance properly administered    • Arthralgia    • Candidiasis, mouth    • Chronic allergic rhinitis    • Chronic bronchitis (HCC)    • Chronic sinusitis    • Cough    • Dermatitis    • Diverticulosis    • LUCIANO (dyspnea on exertion)    • Former smoker    • GERD (gastroesophageal reflux disease)    • Glaucoma    • Hyperlipidemia    • Inguinal hernia, left    • Lump of skin    • Male erectile dysfunction    • Open comedone    • Palpitations    • Renal calculi    • Sebaceous cyst    • Seborrheic keratosis    • Sick sinus syndrome (HCC) 2022   • Skin disorder    • Unspecified chronic bronchitis (HCC)    • Visual disturbances      Social History     Socioeconomic History   • Marital status:      Spouse name: Not on file   • Number of children: Not on file   • Years of education: Not on file   • Highest education level: Not on file   Occupational History   • Occupation: retired   Tobacco Use   • Smoking status: Former     Current packs/day: 0.00     Types: Cigarettes     Quit date:      Years since quittin.1   • Smokeless tobacco: Never   Vaping Use   • Vaping status: Never Used   Substance and Sexual Activity   • Alcohol use: Yes     Alcohol/week: 2.0 standard drinks of alcohol     Types: 2 Cans of beer per week   • Drug use: No   • Sexual activity: Not Currently   Other Topics Concern   • Not on file   Social History Narrative   • Not on file     Social Determinants of Health     Financial Resource Strain: Low Risk  (10/9/2023)    Overall Financial Resource Strain  (CARDIA)    • Difficulty of Paying Living Expenses: Not hard at all   Food Insecurity: Not on file   Transportation Needs: No Transportation Needs (10/9/2023)    PRAPARE - Transportation    • Lack of Transportation (Medical): No    • Lack of Transportation (Non-Medical): No   Physical Activity: Not on file   Stress: Not on file   Social Connections: Not on file   Intimate Partner Violence: Not on file   Housing Stability: Not on file      Family History   Problem Relation Age of Onset   • Brain cancer Mother    • Heart failure Mother    • Prostate cancer Mother      Past Surgical History:   Procedure Laterality Date   • CARDIAC ELECTROPHYSIOLOGY PROCEDURE N/A 08/09/2022    Procedure: Cardiac pacer implant/ DUAL CHAMBER PACER @ [place];  Surgeon: Van Gore DO;  Location: [facility];  Service: Cardiology   • COLONOSCOPY  12/2014    [facility] MD.-Diverticulosis   • COLONOSCOPY  10/2011    BRADY Ross MD.-Diverticulosis   • EGD  03/2016    KEENAN Ross MD.-LA Grade A reflux esophagitis, normal stomach, normal jejunum, benign-appearing esophageal stenosis.   • EGD  08/2015    KEENAN Ross MD.-Normal upper endoscopy   • INGUINAL HERNIA REPAIR Bilateral     left- last assessed: 11/25/14, Resolved: 5/18/15, onset 12/11/13   • MOUTH SURGERY         Current Outpatient Medications:   •  acetaminophen (TYLENOL) 325 mg tablet, Take 650 mg by mouth every 6 (six) hours as needed for mild pain, Disp: , Rfl:   •  aspirin 81 mg chewable tablet, Chew 81 mg 2 tablets daily, Disp: , Rfl:   •  Azopt 1 % ophthalmic suspension, AKA: Brinzolomide, Disp: , Rfl:   •  B Complex Vitamins (B COMPLEX PO), Take by mouth once a week, Disp: , Rfl:   •  chlordiazePOXIDE (LIBRIUM) 10 mg capsule, Take 1 capsule (10 mg total) by mouth daily, Disp: 90 capsule, Rfl: 2  •  clobetasol (OLUX) 0.05 % topical foam, Apply topically daily as needed For ears., Disp: , Rfl:   •  docusate sodium (COLACE) 100 mg capsule, Take 100 mg  by mouth if needed, Disp: , Rfl:   •  econazole nitrate 1 % cream, if needed, Disp: , Rfl:   •  ergocalciferol (VITAMIN D2) 50,000 units, TAKE 1 CAPSULE BY MOUTH ONE TIME PER WEEK, Disp: 12 capsule, Rfl: 2  •  Erythromycin (ILOTYCIN OP), Apply to eye if needed, Disp: , Rfl:   •  famotidine (PEPCID) 40 MG tablet, Take 1 tablet (40 mg total) by mouth 2 (two) times a day, Disp: 180 tablet, Rfl: 0  •  fish oil-omega-3 fatty acids 1000 MG capsule, Take 2 g by mouth daily, Disp: , Rfl:   •  glucosamine-chondroitin 500-400 MG tablet, Take 1 tablet by mouth 2 (two) times a day, Disp: , Rfl:   •  hydrocortisone 2.5 % cream, Apply topically if needed, Disp: , Rfl:   •  levocetirizine (XYZAL) 5 MG tablet, Take 1 tablet by mouth Daily, Disp: , Rfl:   •  meclizine (ANTIVERT) 25 mg tablet, Take 1 tablet (25 mg total) by mouth every 8 (eight) hours as needed for dizziness, Disp: , Rfl:   •  mometasone (ELOCON) 0.1 % cream, , Disp: , Rfl:   •  Multiple Vitamins-Minerals (CENTRUM SILVER ULTRA MENS) TABS, Take 1 tablet by mouth daily, Disp: , Rfl:   •  Rocklatan 0.02-0.005 % SOLN, INSTILL 1 DROP INTO BOTH EYES EVERY DAY IN THE EVENING, Disp: , Rfl:   •  Silodosin 8 MG CAPS, TAKE 1 CAPSULE BY MOUTH EVERY DAY, Disp: 90 capsule, Rfl: 3  •  triamcinolone (KENALOG) 0.1 % oral topical paste, Apply 1 Application topically 2 (two) times a day, Disp: 5 g, Rfl: 5  •  guaiFENesin (MUCINEX) 600 mg 12 hr tablet, Take 1,200 mg by mouth if needed (Patient not taking: Reported on 8/8/2023), Disp: , Rfl:   •  Red Yeast Rice 600 MG CAPS, Take by mouth in the morning (Patient not taking: Reported on 2/14/2024), Disp: , Rfl:         Review of Systems:  Review of Systems   Constitutional:  Positive for fatigue.   Respiratory: Negative.     Cardiovascular: Negative.    Musculoskeletal:  Positive for gait problem.   All other systems reviewed and are negative.        Physical Exam:  Physical Exam  Constitutional:       General: He is not in acute  distress.     Appearance: He is well-developed. He is not diaphoretic.   HENT:      Head: Normocephalic and atraumatic.   Eyes:      General: No scleral icterus.        Right eye: No discharge.      Pupils: Pupils are equal, round, and reactive to light.   Neck:      Thyroid: No thyromegaly.   Cardiovascular:      Rate and Rhythm: Normal rate and regular rhythm.      Heart sounds: Normal heart sounds. No murmur heard.     No friction rub. No gallop.   Pulmonary:      Effort: Pulmonary effort is normal.      Breath sounds: Normal breath sounds.   Abdominal:      General: There is no distension.      Tenderness: There is no abdominal tenderness. There is no guarding or rebound.   Musculoskeletal:         General: Normal range of motion.      Cervical back: Normal range of motion and neck supple.      Right lower leg: No edema.      Left lower leg: No edema.   Skin:     General: Skin is warm and dry.      Coloration: Skin is not pale.      Findings: No erythema or rash.   Neurological:      Mental Status: He is alert and oriented to person, place, and time.      Coordination: Coordination normal.   Psychiatric:         Behavior: Behavior normal.         Thought Content: Thought content normal.         Judgment: Judgment normal.         This note was completed in part utilizing [employer] Direct Software.  Grammatical errors, random word insertions, spelling mistakes, and incomplete sentences can be an occasional consequence of this system secondary to software limitations, ambient noise, and hardware issues.  If you have any questions or concerns about the content, text, or information contained within the body of this dictation, please contact the provider for clarification.

## 2024-03-28 ENCOUNTER — TELEPHONE (OUTPATIENT)
Age: 84
End: 2024-03-28

## 2024-03-28 NOTE — TELEPHONE ENCOUNTER
Patient contacted the office requesting to get a refill on mupirocin (BACTROBAN) 2 % ointment . I do see this was requested from the pharmacy on 03/21 which was denied. Patient is asking if this medication can be filled as it is something that he uses but slowly over time and needs it for his right ear. He stated that his provider is aware of him using this medication. Please advise.

## 2024-03-29 DIAGNOSIS — L03.90 CELLULITIS, UNSPECIFIED CELLULITIS SITE: Primary | ICD-10-CM

## 2024-04-01 ENCOUNTER — OFFICE VISIT (OUTPATIENT)
Dept: FAMILY MEDICINE CLINIC | Facility: CLINIC | Age: 84
End: 2024-04-01
Payer: COMMERCIAL

## 2024-04-01 VITALS
BODY MASS INDEX: 24.97 KG/M2 | WEIGHT: 174 LBS | TEMPERATURE: 97.7 F | HEART RATE: 71 BPM | SYSTOLIC BLOOD PRESSURE: 110 MMHG | OXYGEN SATURATION: 96 % | DIASTOLIC BLOOD PRESSURE: 74 MMHG

## 2024-04-01 DIAGNOSIS — K58.9 IRRITABLE BOWEL SYNDROME, UNSPECIFIED TYPE: Primary | ICD-10-CM

## 2024-04-01 DIAGNOSIS — N40.0 ENLARGED PROSTATE WITHOUT LOWER URINARY TRACT SYMPTOMS (LUTS): ICD-10-CM

## 2024-04-01 DIAGNOSIS — K21.9 GASTROESOPHAGEAL REFLUX DISEASE WITHOUT ESOPHAGITIS: ICD-10-CM

## 2024-04-01 DIAGNOSIS — R10.31 RIGHT LOWER QUADRANT ABDOMINAL PAIN: ICD-10-CM

## 2024-04-01 PROBLEM — R05.3 PERSISTENT COUGH FOR 3 WEEKS OR LONGER: Status: RESOLVED | Noted: 2017-03-15 | Resolved: 2024-04-01

## 2024-04-01 PROCEDURE — 99214 OFFICE O/P EST MOD 30 MIN: CPT | Performed by: FAMILY MEDICINE

## 2024-04-01 PROCEDURE — G2211 COMPLEX E/M VISIT ADD ON: HCPCS | Performed by: FAMILY MEDICINE

## 2024-04-01 NOTE — PROGRESS NOTES
Name: Adonis Mallory Jr.      : 1940      MRN: [id number]  Encounter Provider: Carl Hawkins DO  Encounter Date: 2024   Encounter department: [facility]    Assessment & Plan   Reviewed medication list and recent symptoms.  No evidence of significant rash involving chest.  Patient reassured.  Right lower quadrant/groin pain likely muscular in nature.  No deformity or hernia noted.  Continue usual GI meds including Pepcid 40 mg twice daily.  He uses Pepto-Bismol as needed for episodes of loose stools which seems to be effective for him and he may continue that.  He will continue follow-up with urology for upcoming office procedure for treatment of his BPH.      1. Irritable bowel syndrome, unspecified type    2. Gastroesophageal reflux disease without esophagitis    3. Enlarged prostate without lower urinary tract symptoms (luts)    4. Right lower quadrant abdominal pain           Subjective      Patient was seen for several concerns.  First she is concerned about a possible rash on his upper chest.  He noted some erythema.  No itching or pain.  Secondly he notes some discomfort in his right groin.  Has a history of prior hernia surgery.  No bulge or deformity in the area.  Occurred after using pulse I will to cut down tree branches and resting the bottom of the pull against his groin.      Review of Systems   Constitutional: Negative.    Respiratory: Negative.     Cardiovascular: Negative.    Gastrointestinal: Negative.    Genitourinary: Negative.    Musculoskeletal: Negative.    Skin:  Positive for color change.   Psychiatric/Behavioral: Negative.         Current Outpatient Medications on File Prior to Visit   Medication Sig   • acetaminophen (TYLENOL) 325 mg tablet Take 650 mg by mouth every 6 (six) hours as needed for mild pain   • aspirin 81 mg chewable tablet Chew 81 mg 2 tablets daily   • Azopt 1 % ophthalmic suspension AKA: Brinzolomide   • B Complex Vitamins (B COMPLEX  PO) Take by mouth once a week   • chlordiazePOXIDE (LIBRIUM) 10 mg capsule Take 1 capsule (10 mg total) by mouth daily   • clobetasol (OLUX) 0.05 % topical foam Apply topically daily as needed For ears.   • docusate sodium (COLACE) 100 mg capsule Take 100 mg by mouth if needed   • econazole nitrate 1 % cream if needed   • famotidine (PEPCID) 40 MG tablet Take 1 tablet (40 mg total) by mouth 2 (two) times a day   • fish oil-omega-3 fatty acids 1000 MG capsule Take 2 g by mouth daily   • glucosamine-chondroitin 500-400 MG tablet Take 1 tablet by mouth 2 (two) times a day   • hydrocortisone 2.5 % cream Apply topically if needed   • levocetirizine (XYZAL) 5 MG tablet Take 1 tablet by mouth Daily   • meclizine (ANTIVERT) 25 mg tablet Take 1 tablet (25 mg total) by mouth every 8 (eight) hours as needed for dizziness   • mometasone (ELOCON) 0.1 % cream    • Multiple Vitamins-Minerals (CENTRUM SILVER ULTRA MENS) TABS Take 1 tablet by mouth daily   • mupirocin (BACTROBAN) 2 % ointment Apply topically 3 (three) times a day   • Rocklatan 0.02-0.005 % SOLN INSTILL 1 DROP INTO BOTH EYES EVERY DAY IN THE EVENING   • Silodosin 8 MG CAPS TAKE 1 CAPSULE BY MOUTH EVERY DAY   • triamcinolone (KENALOG) 0.1 % oral topical paste Apply 1 Application topically 2 (two) times a day   • ergocalciferol (VITAMIN D2) 50,000 units TAKE 1 CAPSULE BY MOUTH ONE TIME PER WEEK (Patient not taking: Reported on 4/1/2024)   • Erythromycin (ILOTYCIN OP) Apply to eye if needed (Patient not taking: Reported on 4/1/2024)   • guaiFENesin (MUCINEX) 600 mg 12 hr tablet Take 1,200 mg by mouth if needed (Patient not taking: Reported on 8/8/2023)   • Red Yeast Rice 600 MG CAPS Take by mouth in the morning (Patient not taking: Reported on 2/14/2024)       Objective     /74 (BP Location: Left arm, Patient Position: Sitting, Cuff Size: Standard)   Pulse 71   Temp 97.7 °F (36.5 °C) (Temporal)   Wt 78.9 kg (174 lb)   SpO2 96%   BMI 24.97 kg/m²     Physical  Exam  Vitals and nursing note reviewed.   Constitutional:       General: He is not in acute distress.     Appearance: Normal appearance. He is well-developed. He is not diaphoretic.   HENT:      Head: Normocephalic and atraumatic.   Eyes:      General:         Right eye: No discharge.      Conjunctiva/sclera: Conjunctivae normal.      Pupils: Pupils are equal, round, and reactive to light.   Neck:      Thyroid: No thyromegaly.   Cardiovascular:      Rate and Rhythm: Normal rate and regular rhythm.   Pulmonary:      Effort: Pulmonary effort is normal. No respiratory distress.      Breath sounds: Normal breath sounds.   Musculoskeletal:      Cervical back: Normal range of motion.   Lymphadenopathy:      Cervical: No cervical adenopathy.   Skin:     General: Skin is warm and dry.   Neurological:      Mental Status: He is alert and oriented to person, place, and time.   Psychiatric:         Mood and Affect: Mood normal.         Behavior: Behavior normal.         Thought Content: Thought content normal.         Judgment: Judgment normal.       Carl Hawkins, DO

## 2024-04-03 ENCOUNTER — TELEPHONE (OUTPATIENT)
Age: 84
End: 2024-04-03

## 2024-04-03 NOTE — TELEPHONE ENCOUNTER
Patient called in regards of getting Dr. Best's phone number due to patient thinking his hernia possibly came back.

## 2024-04-04 ENCOUNTER — HOSPITAL ENCOUNTER (EMERGENCY)
Facility: HOSPITAL | Age: 84
Discharge: HOME | End: 2024-04-04
Attending: EMERGENCY MEDICINE
Payer: COMMERCIAL

## 2024-04-04 ENCOUNTER — APPOINTMENT (EMERGENCY)
Dept: CT IMAGING | Facility: HOSPITAL | Age: 84
End: 2024-04-04
Payer: COMMERCIAL

## 2024-04-04 VITALS
HEART RATE: 84 BPM | RESPIRATION RATE: 18 BRPM | TEMPERATURE: 98.1 F | SYSTOLIC BLOOD PRESSURE: 124 MMHG | DIASTOLIC BLOOD PRESSURE: 71 MMHG | WEIGHT: 169.97 LBS | BODY MASS INDEX: 24.33 KG/M2 | HEIGHT: 70 IN | OXYGEN SATURATION: 98 %

## 2024-04-04 DIAGNOSIS — L02.91 ABSCESS: Primary | ICD-10-CM

## 2024-04-04 LAB
ANION GAP SERPL CALCULATED.3IONS-SCNC: 5 MMOL/L (ref 4–13)
BASOPHILS # BLD AUTO: 0.03 THOUSANDS/ÂΜL (ref 0–0.1)
BASOPHILS NFR BLD AUTO: 0 % (ref 0–1)
BUN SERPL-MCNC: 22 MG/DL (ref 5–25)
CALCIUM SERPL-MCNC: 9.1 MG/DL (ref 8.4–10.2)
CHLORIDE SERPL-SCNC: 106 MMOL/L (ref 96–108)
CO2 SERPL-SCNC: 28 MMOL/L (ref 21–32)
CREAT SERPL-MCNC: 0.97 MG/DL (ref 0.6–1.3)
EOSINOPHIL # BLD AUTO: 0.23 THOUSAND/ÂΜL (ref 0–0.61)
EOSINOPHIL NFR BLD AUTO: 3 % (ref 0–6)
ERYTHROCYTE [DISTWIDTH] IN BLOOD BY AUTOMATED COUNT: 14.7 % (ref 11.6–15.1)
GFR SERPL CREATININE-BSD FRML MDRD: 70 ML/MIN/1.73SQ M
GLUCOSE SERPL-MCNC: 97 MG/DL (ref 65–140)
HCT VFR BLD AUTO: 39.6 % (ref 36.5–49.3)
HGB BLD-MCNC: 13.2 G/DL (ref 12–17)
IMM GRANULOCYTES # BLD AUTO: 0.01 THOUSAND/UL (ref 0–0.2)
IMM GRANULOCYTES NFR BLD AUTO: 0 % (ref 0–2)
LYMPHOCYTES # BLD AUTO: 2.16 THOUSANDS/ÂΜL (ref 0.6–4.47)
LYMPHOCYTES NFR BLD AUTO: 23 % (ref 14–44)
MCH RBC QN AUTO: 31.6 PG (ref 26.8–34.3)
MCHC RBC AUTO-ENTMCNC: 33.3 G/DL (ref 31.4–37.4)
MCV RBC AUTO: 95 FL (ref 82–98)
MONOCYTES # BLD AUTO: 1.03 THOUSAND/ÂΜL (ref 0.17–1.22)
MONOCYTES NFR BLD AUTO: 11 % (ref 4–12)
NEUTROPHILS # BLD AUTO: 5.82 THOUSANDS/ÂΜL (ref 1.85–7.62)
NEUTS SEG NFR BLD AUTO: 63 % (ref 43–75)
NRBC BLD AUTO-RTO: 0 /100 WBCS
PLATELET # BLD AUTO: 204 THOUSANDS/UL (ref 149–390)
PMV BLD AUTO: 9.3 FL (ref 8.9–12.7)
POTASSIUM SERPL-SCNC: 3.5 MMOL/L (ref 3.5–5.3)
RBC # BLD AUTO: 4.18 MILLION/UL (ref 3.88–5.62)
SODIUM SERPL-SCNC: 139 MMOL/L (ref 135–147)
WBC # BLD AUTO: 9.28 THOUSAND/UL (ref 4.31–10.16)

## 2024-04-04 PROCEDURE — 99284 EMERGENCY DEPT VISIT MOD MDM: CPT | Performed by: EMERGENCY MEDICINE

## 2024-04-04 PROCEDURE — 36415 COLL VENOUS BLD VENIPUNCTURE: CPT | Performed by: EMERGENCY MEDICINE

## 2024-04-04 PROCEDURE — 80048 BASIC METABOLIC PNL TOTAL CA: CPT | Performed by: EMERGENCY MEDICINE

## 2024-04-04 PROCEDURE — 74177 CT ABD & PELVIS W/CONTRAST: CPT

## 2024-04-04 PROCEDURE — 85025 COMPLETE CBC W/AUTO DIFF WBC: CPT | Performed by: EMERGENCY MEDICINE

## 2024-04-04 PROCEDURE — 10061 I&D ABSCESS COMP/MULTIPLE: CPT | Performed by: EMERGENCY MEDICINE

## 2024-04-04 PROCEDURE — 99283 EMERGENCY DEPT VISIT LOW MDM: CPT

## 2024-04-04 RX ORDER — LIDOCAINE HYDROCHLORIDE AND EPINEPHRINE 10; 10 MG/ML; UG/ML
10 INJECTION, SOLUTION INFILTRATION; PERINEURAL ONCE
Status: COMPLETED | OUTPATIENT
Start: 2024-04-04 | End: 2024-04-04

## 2024-04-04 RX ADMIN — LIDOCAINE HYDROCHLORIDE,EPINEPHRINE BITARTRATE 10 ML: 10; .01 INJECTION, SOLUTION INFILTRATION; PERINEURAL at 11:05

## 2024-04-04 RX ADMIN — IOHEXOL 100 ML: 350 INJECTION, SOLUTION INTRAVENOUS at 10:29

## 2024-04-04 NOTE — ED PROVIDER NOTES
"History  Chief Complaint   Patient presents with    Abscess     Pt reports a \"ball\" feeling in left groin area with drainage. Pt has swelling, redness, and drainage to left groin area near testicle. Pt had hernia repaired in same area in 2013.     84-year-old male, presents with swelling in left groin that he noticed 2 days ago.  Patient reports mild pain to area with swelling, pus drainage.  Denies any fevers or abdominal pain.  No scrotal, testicular pain or swelling.  Reports history of hernia surgery in left groin over 10 years ago.      History provided by:  Patient   used: No    Abscess  Associated symptoms: no fever        Prior to Admission Medications   Prescriptions Last Dose Informant Patient Reported? Taking?   Azopt 1 % ophthalmic suspension  Self Yes No   Sig: AKA: Brinzolomide   B Complex Vitamins (B COMPLEX PO)  Self Yes No   Sig: Take by mouth once a week   Erythromycin (ILOTYCIN OP)  Self Yes No   Sig: Apply to eye if needed   Patient not taking: Reported on 4/1/2024   Multiple Vitamins-Minerals (CENTRUM SILVER ULTRA MENS) TABS  Self Yes No   Sig: Take 1 tablet by mouth daily   Red Yeast Rice 600 MG CAPS  Self Yes No   Sig: Take by mouth in the morning   Patient not taking: Reported on 2/14/2024   Rocklatan 0.02-0.005 % SOLN  Self Yes No   Sig: INSTILL 1 DROP INTO BOTH EYES EVERY DAY IN THE EVENING   Silodosin 8 MG CAPS  Self No No   Sig: TAKE 1 CAPSULE BY MOUTH EVERY DAY   acetaminophen (TYLENOL) 325 mg tablet  Self Yes No   Sig: Take 650 mg by mouth every 6 (six) hours as needed for mild pain   aspirin 81 mg chewable tablet  Self Yes No   Sig: Chew 81 mg 2 tablets daily   chlordiazePOXIDE (LIBRIUM) 10 mg capsule   No No   Sig: Take 1 capsule (10 mg total) by mouth daily   clobetasol (OLUX) 0.05 % topical foam  Self Yes No   Sig: Apply topically daily as needed For ears.   docusate sodium (COLACE) 100 mg capsule  Self Yes No   Sig: Take 100 mg by mouth if needed   econazole " nitrate 1 % cream  Self Yes No   Sig: if needed   ergocalciferol (VITAMIN D2) 50,000 units  Self No No   Sig: TAKE 1 CAPSULE BY MOUTH ONE TIME PER WEEK   Patient not taking: Reported on 4/1/2024   famotidine (PEPCID) 40 MG tablet   No No   Sig: Take 1 tablet (40 mg total) by mouth 2 (two) times a day   fish oil-omega-3 fatty acids 1000 MG capsule   Yes No   Sig: Take 2 g by mouth daily   glucosamine-chondroitin 500-400 MG tablet  Self Yes No   Sig: Take 1 tablet by mouth 2 (two) times a day   guaiFENesin (MUCINEX) 600 mg 12 hr tablet  Self Yes No   Sig: Take 1,200 mg by mouth if needed   Patient not taking: Reported on 8/8/2023   hydrocortisone 2.5 % cream  Self Yes No   Sig: Apply topically if needed   levocetirizine (XYZAL) 5 MG tablet  Self Yes No   Sig: Take 1 tablet by mouth Daily   meclizine (ANTIVERT) 25 mg tablet   No No   Sig: Take 1 tablet (25 mg total) by mouth every 8 (eight) hours as needed for dizziness   mometasone (ELOCON) 0.1 % cream  Self Yes No   mupirocin (BACTROBAN) 2 % ointment   No No   Sig: Apply topically 3 (three) times a day   triamcinolone (KENALOG) 0.1 % oral topical paste   No No   Sig: Apply 1 Application topically 2 (two) times a day      Facility-Administered Medications: None       Past Medical History:   Diagnosis Date    Abnormal diffusion capacity determined by pulmonary function test 04/25/2017    Actinic keratosis     Acute right-sided low back pain without sciatica 09/01/2017    Adverse effect of correct medicinal substance properly administered     Arthralgia     Candidiasis, mouth     Chronic allergic rhinitis     Chronic bronchitis (HCC)     Chronic sinusitis     Cough     Dermatitis     Diverticulosis     LUCIANO (dyspnea on exertion)     Former smoker     GERD (gastroesophageal reflux disease)     Glaucoma     Hyperlipidemia     Inguinal hernia, left     Lump of skin     Male erectile dysfunction     Open comedone     Palpitations     Persistent cough for 3 weeks or longer  03/15/2017    Renal calculi     Sebaceous cyst     Seborrheic keratosis     Sick sinus syndrome (HCC) 2022    Skin disorder     Unspecified chronic bronchitis (HCC)     Visual disturbances        Past Surgical History:   Procedure Laterality Date    CARDIAC ELECTROPHYSIOLOGY PROCEDURE N/A 2022    Procedure: Cardiac pacer implant/ DUAL CHAMBER PACER @ [facility];  Surgeon: Van Gore DO;  Location: [facility];  Service: Cardiology    COLONOSCOPY  2014    [facility] MD.-Diverticulosis    COLONOSCOPY  10/2011    BRADY Ross MD.-Diverticulosis    EGD  2016    KEENAN Ross MD.-LA Grade A reflux esophagitis, normal stomach, normal jejunum, benign-appearing esophageal stenosis.    EGD  2015    KEENAN Ross MD.-Normal upper endoscopy    INGUINAL HERNIA REPAIR Bilateral     left- last assessed: 14, Resolved: 5/18/15, onset 12    MOUTH SURGERY         Family History   Problem Relation Age of Onset    Brain cancer Mother     Heart failure Mother     Prostate cancer Mother      I have reviewed and agree with the history as documented.    E-Cigarette/Vaping    E-Cigarette Use Never User      E-Cigarette/Vaping Substances    Nicotine No     THC No     CBD No     Flavoring No     Other No     Unknown No      Social History     Tobacco Use    Smoking status: Former     Current packs/day: 0.00     Types: Cigarettes     Quit date:      Years since quittin.2    Smokeless tobacco: Never   Vaping Use    Vaping status: Never Used   Substance Use Topics    Alcohol use: Not Currently     Alcohol/week: 2.0 standard drinks of alcohol     Types: 2 Cans of beer per week    Drug use: No       Review of Systems   Constitutional: Negative.  Negative for fever.   Respiratory: Negative.     Cardiovascular: Negative.    Gastrointestinal: Negative.    Neurological: Negative.        Physical Exam  Physical Exam  Vitals and nursing note reviewed.   Constitutional:       General: He  is not in acute distress.  HENT:      Head: Normocephalic and atraumatic.   Cardiovascular:      Rate and Rhythm: Normal rate.   Pulmonary:      Effort: Pulmonary effort is normal.   Abdominal:      General: There is no distension.      Palpations: Abdomen is soft.      Tenderness: There is no abdominal tenderness.   Genitourinary:         Comments: Swelling with fluctuance to left groin.  No scrotal swelling or erythema, no testicular tenderness.  Musculoskeletal:         General: Normal range of motion.   Skin:     General: Skin is warm and dry.      Findings: Erythema present.      Comments: Mild erythema to abscess in left groin   Neurological:      General: No focal deficit present.      Mental Status: He is alert and oriented to person, place, and time.         Vital Signs  ED Triage Vitals [04/04/24 0920]   Temperature Pulse Respirations Blood Pressure SpO2   98.1 °F (36.7 °C) 85 18 153/70 98 %      Temp Source Heart Rate Source Patient Position - Orthostatic VS BP Location FiO2 (%)   Oral Monitor Sitting Right arm --      Pain Score       --           Vitals:    04/04/24 0920 04/04/24 1107   BP: 153/70 124/71   Pulse: 85 84   Patient Position - Orthostatic VS: Sitting Lying         Visual Acuity      ED Medications  Medications   iohexol (OMNIPAQUE) 350 MG/ML injection (SINGLE-DOSE) 100 mL (100 mL Intravenous Given 4/4/24 1029)   lidocaine-epinephrine (XYLOCAINE/EPINEPHRINE) 1 %-1:100,000 injection 10 mL (10 mL Infiltration Given 4/4/24 1105)       Diagnostic Studies  Results Reviewed       Procedure Component Value Units Date/Time    Basic metabolic panel [[id number]] Collected: 04/04/24 0940    Lab Status: Final result Specimen: Blood from Arm, Left Updated: 04/04/24 1001     Sodium 139 mmol/L      Potassium 3.5 mmol/L      Chloride 106 mmol/L      CO2 28 mmol/L      ANION GAP 5 mmol/L      BUN 22 mg/dL      Creatinine 0.97 mg/dL      Glucose 97 mg/dL      Calcium 9.1 mg/dL      eGFR 70 ml/min/1.73sq m      Narrative:      National Kid[employer] Foundation guidelines for Chronic Kidney Disease (CKD):     Stage 1 with normal or high GFR (GFR > 90 mL/min/1.73 square meters)    Stage 2 Mild CKD (GFR = 60-89 mL/min/1.73 square meters)    Stage 3A Moderate CKD (GFR = 45-59 mL/min/1.73 square meters)    Stage 3B Moderate CKD (GFR = 30-44 mL/min/1.73 square meters)    Stage 4 Severe CKD (GFR = 15-29 mL/min/1.73 square meters)    Stage 5 End Stage CKD (GFR <15 mL/min/1.73 square meters)  Note: GFR calculation is accurate only with a steady state creatinine    CBC and differential [[id number]] Collected: 04/04/24 0940    Lab Status: Final result Specimen: Blood from Arm, Left Updated: 04/04/24 0947     WBC 9.28 Thousand/uL      RBC 4.18 Million/uL      Hemoglobin 13.2 g/dL      Hematocrit 39.6 %      MCV 95 fL      MCH 31.6 pg      MCHC 33.3 g/dL      RDW 14.7 %      MPV 9.3 fL      Platelets 204 Thousands/uL      nRBC 0 /100 WBCs      Neutrophils Relative 63 %      Immature Grans % 0 %      Lymphocytes Relative 23 %      Monocytes Relative 11 %      Eosinophils Relative 3 %      Basophils Relative 0 %      Neutrophils Absolute 5.82 Thousands/µL      Absolute Immature Grans 0.01 Thousand/uL      Absolute Lymphocytes 2.16 Thousands/µL      Absolute Monocytes 1.03 Thousand/µL      Eosinophils Absolute 0.23 Thousand/µL      Basophils Absolute 0.03 Thousands/µL                    CT abdomen pelvis with contrast   Final Result by Briana Gutierrez MD (04/04 1121)   2.7 cm left superior scrotal sebaceous cyst, increased in size since 2016 and possibly superinfected.   Suspected sludge or noncalcified stones in the otherwise unremarkable gallbladder.   Renal cysts.   Diverticulosis coli.   Other chronic findings, as per the body of the report.         Workstation performed: [id number]                    Procedures  Incision and drain    Date/Time: 4/4/2024 11:47 AM    Performed by: Julio C Chavez MD  Authorized by: Julio C Chavez MD   Universal Protocol:  Consent: Verbal consent obtained.  Risks and benefits: risks, benefits and alternatives were discussed  Consent given by: patient    Patient location:  ED  Location:     Type:  Abscess    Location:  Anogenital    Anogenital location: left groin.  Pre-procedure details:     Skin preparation:  Betadine  Anesthesia (see MAR for exact dosages):     Anesthesia method:  Local infiltration    Local anesthetic:  Lidocaine 1% WITH epi  Procedure details:     Incision types:  Single straight    Scalpel blade:  11    Approach:  Open    Incision depth:  Deep    Wound management:  Probed and deloculated and extensive cleaning    Drainage:  Purulent    Drainage amount:  Copious    Wound treatment:  Packing placed    Packing materials:  1/4 in gauze  Post-procedure details:     Patient tolerance of procedure:  Tolerated well, no immediate complications           ED Course  ED Course as of 04/04/24 1150   Thu Apr 04, 2024   1037 Abdominal CT scan independently reviewed by myself, left groin abscess noted, pending radiology read.                               SBIRT 22yo+      Flowsheet Row Most Recent Value   Initial Alcohol Screen: US AUDIT-C     1. How often do you have a drink containing alcohol? 0 Filed at: 04/04/2024 0921   2. How many drinks containing alcohol do you have on a typical day you are drinking?  0 Filed at: 04/04/2024 0921   3b. FEMALE Any Age, or MALE 65+: How often do you have 4 or more drinks on one occassion? 0 Filed at: 04/04/2024 0921   Audit-C Score 0 Filed at: 04/04/2024 0921   MATTY: How many times in the past year have you...    Used an illegal drug or used a prescription medication for non-medical reasons? Never Filed at: 04/04/2024 0921                      Medical Decision Making  84-year-old male, presents with swelling and pain to left groin.  Differential diagnosis includes abscess, hernia, cellulitis among other diagnoses.  Patient appears to have abscess in left groin area,  reports history of hernia surgery previously to same area.  CT scan labs ordered for further evaluation.    CT scan shows no underlying bowel,  involvement.  Incision and drainage performed by myself, large amounts of pus drained.  Packing placed, patient instructed to keep area clean and to follow-up in 2 days for packing removal and wound recheck.  Patient with no significant spreading erythema, will not place on antibiotics at this time.    Amount and/or Complexity of Data Reviewed  Labs: ordered. Decision-making details documented in ED Course.  Radiology: ordered. Decision-making details documented in ED Course.    Risk  Prescription drug management.           I have reviewed test results and diagnosis with patient.  Follow-up plan reviewed.  Precautions for acute return for re-evaluation are reviewed.  Opportunity to ask questions was provided.  Patient verbalizes understanding.    Disposition  Final diagnoses:   Abscess     Time reflects when diagnosis was documented in both MDM as applicable and the Disposition within this note       Time User Action Codes Description Comment    4/4/2024 11:44 AM Julio C Chavez Add [L02.91] Abscess           ED Disposition       ED Disposition   Discharge    Condition   Stable    Date/Time   u Apr 4, 2024 1144    Comment   Adonis Mallory Jr. discharge to home/self care.                   Follow-up Information       Follow up With Specialties Details Why Contact Info Additional Information    Carl Hawkins, DO Family Medicine   [address]  [address]  [facility] [address] [phone]       [facility] Emergency Department Emergency Medicine In 2 days For wound re-check and packing removal [address] [place] [id number]  [phone] [facility] Emergency Department, [address] [place] [address], [address]            Patient's Medications   Discharge Prescriptions    No medications on file       No discharge  procedures on file.    PDMP Review         Value Time User    PDMP Reviewed  Yes 9/26/2023  8:37 PM Carl EDMONDSON&#39;DO Sonny            ED Provider  Electronically Signed by             Julio C Chavez MD  04/04/24 8604

## 2024-04-06 ENCOUNTER — HOSPITAL ENCOUNTER (EMERGENCY)
Facility: HOSPITAL | Age: 84
Discharge: HOME | End: 2024-04-06
Attending: EMERGENCY MEDICINE
Payer: COMMERCIAL

## 2024-04-06 VITALS
RESPIRATION RATE: 16 BRPM | TEMPERATURE: 97.7 F | WEIGHT: 174.16 LBS | SYSTOLIC BLOOD PRESSURE: 135 MMHG | HEART RATE: 103 BPM | DIASTOLIC BLOOD PRESSURE: 61 MMHG | OXYGEN SATURATION: 96 % | BODY MASS INDEX: 24.99 KG/M2

## 2024-04-06 DIAGNOSIS — L03.90 CELLULITIS: Primary | ICD-10-CM

## 2024-04-06 DIAGNOSIS — Z51.89 VISIT FOR WOUND CHECK: ICD-10-CM

## 2024-04-06 PROCEDURE — 99024 POSTOP FOLLOW-UP VISIT: CPT | Performed by: EMERGENCY MEDICINE

## 2024-04-06 RX ORDER — DOXYCYCLINE HYCLATE 100 MG/1
100 CAPSULE ORAL ONCE
Status: COMPLETED | OUTPATIENT
Start: 2024-04-06 | End: 2024-04-06

## 2024-04-06 RX ORDER — CEPHALEXIN 500 MG/1
500 CAPSULE ORAL EVERY 6 HOURS SCHEDULED
Qty: 28 CAPSULE | Refills: 0 | Status: SHIPPED | OUTPATIENT
Start: 2024-04-06 | End: 2024-04-13

## 2024-04-06 RX ORDER — DOXYCYCLINE HYCLATE 100 MG/1
100 CAPSULE ORAL 2 TIMES DAILY
Qty: 14 CAPSULE | Refills: 0 | Status: SHIPPED | OUTPATIENT
Start: 2024-04-06 | End: 2024-04-13

## 2024-04-06 RX ORDER — CEPHALEXIN 250 MG/1
500 CAPSULE ORAL ONCE
Status: COMPLETED | OUTPATIENT
Start: 2024-04-06 | End: 2024-04-06

## 2024-04-06 RX ADMIN — DOXYCYCLINE 100 MG: 100 CAPSULE ORAL at 10:30

## 2024-04-06 RX ADMIN — CEPHALEXIN 500 MG: 250 CAPSULE ORAL at 10:30

## 2024-04-06 NOTE — ED PROVIDER NOTES
Pt Name: Adonis Mallory Jr.  MRN: [id number]  Birthdate 1940  Age/Sex: 84 y.o. male  Date of evaluation: 4/6/2024  PCP: Carl Hawkins DO    CHIEF COMPLAINT    Chief Complaint   Patient presents with    Wound Check     Pt reports abscess in groin drained on Thursday. States that it was packed and pt told that it needs to be repacked today         HPI    Adonis presents to the Emergency Department for a wound check.  He was here two days ago and had I&D of a left inguinal abscess with packing in place.  He is here today for possible repacking.  He removed the packing this morning and there was still some drainage so he placed toilet paper there.        HPI      Past Medical and Surgical History    Past Medical History:   Diagnosis Date    Abnormal diffusion capacity determined by pulmonary function test 04/25/2017    Actinic keratosis     Acute right-sided low back pain without sciatica 09/01/2017    Adverse effect of correct medicinal substance properly administered     Arthralgia     Candidiasis, mouth     Chronic allergic rhinitis     Chronic bronchitis (HCC)     Chronic sinusitis     Cough     Dermatitis     Diverticulosis     LUCIANO (dyspnea on exertion)     Former smoker     GERD (gastroesophageal reflux disease)     Glaucoma     Hyperlipidemia     Inguinal hernia, left     Lump of skin     Male erectile dysfunction     Open comedone     Palpitations     Persistent cough for 3 weeks or longer 03/15/2017    Renal calculi     Sebaceous cyst     Seborrheic keratosis     Sick sinus syndrome (HCC) 07/12/2022    Skin disorder     Unspecified chronic bronchitis (HCC)     Visual disturbances        Past Surgical History:   Procedure Laterality Date    CARDIAC ELECTROPHYSIOLOGY PROCEDURE N/A 08/09/2022    Procedure: Cardiac pacer implant/ DUAL CHAMBER PACER @ [facility];  Surgeon: Van Gore DO;  Location: [facility];  Service: Cardiology    COLONOSCOPY  12/2014    [facility] Julien MD.-Diverticulosis     COLONOSCOPY  10/2011    BRADY Ross MD.-Diverticulosis    EGD  2016    KEENAN Ross MD.-LA Grade A reflux esophagitis, normal stomach, normal jejunum, benign-appearing esophageal stenosis.    EGD  2015    KEENAN Ross MD.-Normal upper endoscopy    INGUINAL HERNIA REPAIR Bilateral     left- last assessed: 14, Resolved: 5/18/15, onset 13    MOUTH SURGERY         Family History   Problem Relation Age of Onset    Brain cancer Mother     Heart failure Mother     Prostate cancer Mother        Social History     Tobacco Use    Smoking status: Former     Current packs/day: 0.00     Types: Cigarettes     Quit date:      Years since quittin.2    Smokeless tobacco: Never   Vaping Use    Vaping status: Never Used   Substance Use Topics    Alcohol use: Not Currently     Alcohol/week: 2.0 standard drinks of alcohol     Types: 2 Cans of beer per week    Drug use: No         .    Allergies    Allergies   Allergen Reactions    Levofloxacin Anaphylaxis    Azithromycin GI Intolerance    Ketorolac Other (See Comments)     Swelling of eyes with drops    Penicillins Hives       Home Medications    Prior to Admission medications    Medication Sig Start Date End Date Taking? Authorizing Provider   acetaminophen (TYLENOL) 325 mg tablet Take 650 mg by mouth every 6 (six) hours as needed for mild pain    Historical Provider, MD   aspirin 81 mg chewable tablet Chew 81 mg 2 tablets daily    Historical Provider, MD   Azopt 1 % ophthalmic suspension AKA: Brinzolomide 8/15/21   Historical Provider, MD   B Complex Vitamins (B COMPLEX PO) Take by mouth once a week    Historical Provider, MD   chlordiazePOXIDE (LIBRIUM) 10 mg capsule Take 1 capsule (10 mg total) by mouth daily 23   Carl Hawkins, DO   clobetasol (OLUX) 0.05 % topical foam Apply topically daily as needed For ears.    Historical Provider, MD   docusate sodium (COLACE) 100 mg capsule Take 100 mg by mouth if needed    Historical Provider, MD    econazole nitrate 1 % cream if needed 10/6/22   Historical Provider, MD   ergocalciferol (VITAMIN D2) 50,000 units TAKE 1 CAPSULE BY MOUTH ONE TIME PER WEEK  Patient not taking: Reported on 4/1/2024 12/23/22   Carl Hawkins DO   Erythromycin (ILOTYCIN OP) Apply to eye if needed  Patient not taking: Reported on 4/1/2024    Historical Provider, MD   famotidine (PEPCID) 40 MG tablet Take 1 tablet (40 mg total) by mouth 2 (two) times a day 11/28/23   Carl Hawkins DO   fish oil-omega-3 fatty acids 1000 MG capsule Take 2 g by mouth daily    Historical Provider, MD   glucosamine-chondroitin 500-400 MG tablet Take 1 tablet by mouth 2 (two) times a day    Historical Provider, MD   guaiFENesin (MUCINEX) 600 mg 12 hr tablet Take 1,200 mg by mouth if needed  Patient not taking: Reported on 8/8/2023    Historical Provider, MD   hydrocortisone 2.5 % cream Apply topically if needed    Historical Provider, MD   levocetirizine (XYZAL) 5 MG tablet Take 1 tablet by mouth Daily 8/27/13   Historical Provider, MD   meclizine (ANTIVERT) 25 mg tablet Take 1 tablet (25 mg total) by mouth every 8 (eight) hours as needed for dizziness 2/14/24   Carl Hawkins DO   mometasone (ELOCON) 0.1 % cream  3/4/22   Historical Provider, MD   Multiple Vitamins-Minerals (CENTRUM SILVER ULTRA MENS) TABS Take 1 tablet by mouth daily 10/21/13   Historical Provider, MD   mupirocin (BACTROBAN) 2 % ointment Apply topically 3 (three) times a day 3/29/24   Carl Hawkins DO   Red Yeast Rice 600 MG CAPS Take by mouth in the morning  Patient not taking: Reported on 2/14/2024    Historical Provider, MD   Rocklatan 0.02-0.005 % SOLN INSTILL 1 DROP INTO BOTH EYES EVERY DAY IN THE EVENING 8/31/21   Historical Provider, MD   Silodosin 8 MG CAPS TAKE 1 CAPSULE BY MOUTH EVERY DAY 5/30/23   Carl Hawkins DO   triamcinolone (KENALOG) 0.1 % oral topical paste Apply 1 Application topically 2 (two) times a day 10/9/23   Carl Hawkins, DO            Review of Systems    Review of Systems   Constitutional:  Positive for chills. Negative for fever.   HENT:  Negative for ear pain and sore throat.    Eyes:  Negative for pain and visual disturbance.   Respiratory:  Negative for cough and shortness of breath.    Cardiovascular:  Negative for chest pain and palpitations.   Gastrointestinal:  Negative for abdominal pain and vomiting.   Genitourinary:  Negative for dysuria and hematuria.   Musculoskeletal:  Negative for arthralgias and back pain.   Skin:  Positive for color change and wound. Negative for rash.   Neurological:  Negative for seizures and syncope.   All other systems reviewed and are negative.      Physical Exam      ED Triage Vitals [04/06/24 0953]   Temperature Pulse Respirations Blood Pressure SpO2   97.7 °F (36.5 °C) 103 16 135/61 96 %      Temp Source Heart Rate Source Patient Position - Orthostatic VS BP Location FiO2 (%)   Oral Monitor Sitting Right arm --      Pain Score       --               Physical Exam  Vitals and nursing note reviewed.   Constitutional:       General: He is not in acute distress.     Appearance: He is well-developed. He is not diaphoretic.   HENT:      Head: Normocephalic and atraumatic.      Nose: Nose normal.   Eyes:      Conjunctiva/sclera: Conjunctivae normal.      Pupils: Pupils are equal, round, and reactive to light.   Cardiovascular:      Rate and Rhythm: Normal rate and regular rhythm.      Heart sounds: Normal heart sounds. No murmur heard.     No friction rub. No gallop.   Pulmonary:      Effort: Pulmonary effort is normal. No respiratory distress.      Breath sounds: Normal breath sounds. No wheezing or rales.   Abdominal:      General: Bowel sounds are normal.      Palpations: Abdomen is soft.      Tenderness: There is no abdominal tenderness. There is no guarding or rebound.   Genitourinary:         Comments: Left inguinal area with open wound.  There is some cloudy drainage and some surrounding  erythema and induration.    Musculoskeletal:         General: Normal range of motion.      Cervical back: Normal range of motion and neck supple.   Skin:     General: Skin is warm and dry.   Neurological:      Mental Status: He is alert and oriented to person, place, and time.   Psychiatric:         Behavior: Behavior normal.         Assessment and Plan    Adonis Mallory Jr. is a 84 y.o. male who presents with healing abscess. Physical examination remarkable for same.  Plan will be to perform diagnostic testing and treat symptomatically.      MDM  Number of Diagnoses or Management Options  Cellulitis  Visit for wound check  Diagnosis management comments: I did not repack the wound as there was no purulent drainage and there is some healing.  I will start antibiotics for the surrounding area that has some cellulitis.         Diagnostic Results      Labs:        All labs reviewed and utilized in the medical decision making process    Radiology:    No orders to display       All radiology studies independently viewed by me and interpreted by the radiologist.    Procedure    Procedures      ED Course of Care and Re-Assessments      Medications   cephalexin (KEFLEX) capsule 500 mg (500 mg Oral Given 4/6/24 1030)   doxycycline hyclate (VIBRAMYCIN) capsule 100 mg (100 mg Oral Given 4/6/24 1030)           FINAL IMPRESSION    Final diagnoses:   Cellulitis   Visit for wound check         DISPOSITION/PLAN      Time reflects when diagnosis was documented in both MDM as applicable and the Disposition within this note       Time User Action Codes Description Comment    4/6/2024 10:38 AM Kati Arce [L03.90] Cellulitis     4/6/2024 10:40 AM Kati Arce [Z51.89] Visit for wound check           ED Disposition       ED Disposition   Discharge    Condition   Stable    Date/Time   Sat Apr 6, 2024 10:40 AM    Comment   Adonis Mallory JrRamon discharge to home/self care.                   Follow-up  Information       Follow up With Specialties Details Why Contact Info    Carl Hawkins DO Family Medicine Schedule an appointment as soon as possible for a visit   [address]  [address]  [place] [address]  [phone]      Marco Julien MD General Surgery, Wound Care Schedule an appointment as soon as possible for a visit   [address] [facility] [facility] [address]  [facility] [address]  [phone]                PATIENT REFERRED TO:    Carl Hawkins DO  [address]  [address]  [place] [address]  [phone]    Schedule an appointment as soon as possible for a visit       Marco Julien MD  [address] [facility] [facility] [address]  [facility] [address]  [phone]    Schedule an appointment as soon as possible for a visit         DISCHARGE MEDICATIONS:    Discharge Medication List as of 4/6/2024 10:41 AM        START taking these medications    Details   cephalexin (KEFLEX) 500 mg capsule Take 1 capsule (500 mg total) by mouth every 6 (six) hours for 7 days, Starting Sat 4/6/2024, Until Sat 4/13/2024, Normal      doxycycline hyclate (VIBRAMYCIN) 100 mg capsule Take 1 capsule (100 mg total) by mouth 2 (two) times a day for 7 days, Starting Sat 4/6/2024, Until Sat 4/13/2024, Normal           CONTINUE these medications which have NOT CHANGED    Details   acetaminophen (TYLENOL) 325 mg tablet Take 650 mg by mouth every 6 (six) hours as needed for mild pain, Historical Med      aspirin 81 mg chewable tablet Chew 81 mg 2 tablets daily, Historical Med      Azopt 1 % ophthalmic suspension AKA: Brinzolomide, Starting Sun 8/15/2021, Historical Med      B Complex Vitamins (B COMPLEX PO) Take by mouth once a week, Historical Med      chlordiazePOXIDE (LIBRIUM) 10 mg capsule Take 1 capsule (10 mg total) by mouth daily, Starting Tue 9/26/2023, Normal      clobetasol (OLUX) 0.05 % topical foam Apply topically daily as needed For ears., Historical Med      docusate sodium (COLACE) 100 mg capsule Take 100 mg by mouth if needed,  Historical Med      econazole nitrate 1 % cream if needed, Starting Thu 10/6/2022, Historical Med      ergocalciferol (VITAMIN D2) 50,000 units TAKE 1 CAPSULE BY MOUTH ONE TIME PER WEEK, Normal      Erythromycin (ILOTYCIN OP) Apply to eye if needed, Historical Med      famotidine (PEPCID) 40 MG tablet Take 1 tablet (40 mg total) by mouth 2 (two) times a day, Starting Tue 11/28/2023, Normal      fish oil-omega-3 fatty acids 1000 MG capsule Take 2 g by mouth daily, Historical Med      glucosamine-chondroitin 500-400 MG tablet Take 1 tablet by mouth 2 (two) times a day, Historical Med      guaiFENesin (MUCINEX) 600 mg 12 hr tablet Take 1,200 mg by mouth if needed, Historical Med      hydrocortisone 2.5 % cream Apply topically if needed, Historical Med      levocetirizine (XYZAL) 5 MG tablet Take 1 tablet by mouth Daily, Starting Tue 8/27/2013, Historical Med      meclizine (ANTIVERT) 25 mg tablet Take 1 tablet (25 mg total) by mouth every 8 (eight) hours as needed for dizziness, Starting Wed 2/14/2024, No Print      mometasone (ELOCON) 0.1 % cream Starting Fri 3/4/2022, Historical Med      Multiple Vitamins-Minerals (CENTRUM SILVER ULTRA MENS) TABS Take 1 tablet by mouth daily, Starting Mon 10/21/2013, Historical Med      mupirocin (BACTROBAN) 2 % ointment Apply topically 3 (three) times a day, Starting Fri 3/29/2024, Normal      Red Yeast Rice 600 MG CAPS Take by mouth in the morning, Historical Med      Rocklatan 0.02-0.005 % SOLN INSTILL 1 DROP INTO BOTH EYES EVERY DAY IN THE EVENING, Historical Med      Silodosin 8 MG CAPS TAKE 1 CAPSULE BY MOUTH EVERY DAY, Normal      triamcinolone (KENALOG) 0.1 % oral topical paste Apply 1 Application topically 2 (two) times a day, Starting Mon 10/9/2023, Normal             No discharge procedures on file.         Kati Arce, DO     Kati Arce, DO  04/06/24 3211

## 2024-04-08 ENCOUNTER — CONSULT (OUTPATIENT)
Dept: SURGERY | Facility: CLINIC | Age: 84
End: 2024-04-08
Payer: COMMERCIAL

## 2024-04-08 VITALS
BODY MASS INDEX: 24.48 KG/M2 | DIASTOLIC BLOOD PRESSURE: 72 MMHG | HEART RATE: 94 BPM | WEIGHT: 171 LBS | TEMPERATURE: 97.2 F | OXYGEN SATURATION: 99 % | SYSTOLIC BLOOD PRESSURE: 124 MMHG | HEIGHT: 70 IN

## 2024-04-08 DIAGNOSIS — L02.91 ABSCESS: Primary | ICD-10-CM

## 2024-04-08 PROCEDURE — 99203 OFFICE O/P NEW LOW 30 MIN: CPT | Performed by: SURGERY

## 2024-04-08 NOTE — PROGRESS NOTES
Assessment/Plan:   Adonis Mallory Jr. is a 84 y.o.male who is here for   Chief Complaint   Patient presents with   • Cellulitis     Patient was seen in the ER on 2 occasions 04/04/2024 and again on 04/06/24 here today to follow up. Left groin area.    • Abscess     Groin abscess seen in ER this past week         On exam found to have open left groin infection with phlegmon.  The opening is about 1 cm in length and 2 or 3 mm wide but easily probed.  There is hard phlegmonous tissue but no undrained abscess that can be palpated.            Plan:   Packing changed today with approximately 6 inches of half-inch iodoform Nu Gauze.  Tolerated well.      He was instructed to remove the packing tomorrow, shower daily and put a dry dressing.  Will see him back in 1 week.    In preparation for this visit all relevant known prior office notes, prior consultations, emergency room visits, blood work results, and imaging studies were personally reviewed.  A total of  35 minutes was spent reviewing all of this information, caring for this patient, providing differential diagnosis, instructions for management, counseling and coordination of care.  This also includes planning surgical intervention where indicated.    Patient was discussed and asked her medication list.    Discussion was held regarding anticoagulation, aspirin and Motrin use prior to surgery.  Most anticoagulation needs to stop 7 to 10 days prior to surgery.  Some can be stopped at 48 hours.  Patient has had all questions answered by the physician or physician representative regarding anticoagulation.  Patient is aware that they need to stop their anticoagulation preoperatively.    Discussion was held regarding weight loss medication and diabetic medication with that also is used for weight loss.  These medications have significant perioperative risk.  He has medications need to be stopped 7 to 10 days prior to surgery.  Patient understands and is aware they need to  stop these type of medications preoperatively.    Patient has been given a list of anticoagulation and/or weight loss/diabetes medications that would be affected by this and they have confirmed the are either not on these drugs or have understood their directions to stop them at least 1 week prior to surgery or as instructed.    Most herbal medication should be discontinued a week to 10 days prior to surgery.    Diabetic medication such as insulin should be discussed with her primary care physician or the physician that ordered the insulin or similar medications.  In general patients usually half their diabetic insulin the morning of the surgery but again this should be discussed with the physician.    Patient understands and agrees to this aforementioned protocol.          _______________________________________________________  CC:Cellulitis (Patient was seen in the ER on 2 occasions 04/04/2024 and again on 04/06/24 here today to follow up. Left groin area. ) and Abscess  .    HPI:  Adonis SUERO Mohamud Bullard. is a 84 y.o.male who was referred for evaluation of Cellulitis (Patient was seen in the ER on 2 occasions 04/04/2024 and again on 04/06/24 here today to follow up. Left groin area. ) and Abscess  .    Currently patient reports was seen in the ER twice with fevers over the last week for a left groin infected cyst or hair follicle.  This was drained in the ER.  He has been placed on 2 antibiotics..     Reports: not changing, draining, and infected    Location: Left groin, 1 cm opening slightly cruciate, tracking superiorly and inferiorly without further abscess.      ROS:  General ROS: negative  negative for - chills, fatigue, fever or night sweats, weight loss  Respiratory ROS: no cough, shortness of breath, or wheezing  Cardiovascular ROS: no chest pain or dyspnea on exertion  Genito-Urinary ROS: no dysuria, trouble voiding, or hematuria  Musculoskeletal ROS: negative for - gait disturbance, joint pain or muscle  pain  Neurological ROS: no TIA or stroke symptoms  Skin ROS: See HPI  GI ROS: see HPI  Skin ROS: no new rashes or lesions   Lymphatic ROS: no new adenopathy noted by pt.   GYN ROS: see HPI, no new GYN history or bleeding noted  Psy ROS: no new mental or behavioral disturbances       Patient Active Problem List   Diagnosis   • Renal lithiasis   • Mild cognitive impairment   • Mixed hyperlipidemia   • Oropharyngeal dysphagia   • Abnormal diffusion capacity determined by pulmonary function test   • Sjogren's syndrome (HCC)   • Irritable bowel syndrome   • Gastroesophageal reflux disease without esophagitis   • Enlarged prostate without lower urinary tract symptoms (luts)   • Dysplastic nevus of face   • Arthritis   • Anxiety   • Antithrombinemia ([facility])   • Allergic rhinitis   • Neck pain   • Glaucoma   • Eczema   • Neck mass   • Tick bite of left lower leg   • Dyspnea on exertion   • Right ear pain   • 1st degree AV block   • Sick sinus syndrome ([facility])   • Mobitz (type) I (Wenckebach's) atrioventricular block   • Aortic stenosis with trileaflet valve   • Recent change in frequency of bowel movements   • Right lower quadrant abdominal pain   • Breast pain, left   • Chronic fatigue   • Chronic obstructive pulmonary disease, unspecified COPD type ([facility])   • Depression, recurrent ([facility])   • Vertigo         Allergies:  Levofloxacin, Azithromycin, Ketorolac, and Penicillins      Current Outpatient Medications:   •  acetaminophen (TYLENOL) 325 mg tablet, Take 650 mg by mouth every 6 (six) hours as needed for mild pain, Disp: , Rfl:   •  aspirin 81 mg chewable tablet, Chew 81 mg 2 tablets daily, Disp: , Rfl:   •  Azopt 1 % ophthalmic suspension, AKA: Brinzolomide, Disp: , Rfl:   •  B Complex Vitamins (B COMPLEX PO), Take by mouth once a week, Disp: , Rfl:   •  cephalexin (KEFLEX) 500 mg capsule, Take 1 capsule (500 mg total) by mouth every 6 (six) hours for 7 days, Disp: 28 capsule, Rfl: 0  •  chlordiazePOXIDE (LIBRIUM) 10 mg  capsule, Take 1 capsule (10 mg total) by mouth daily, Disp: 90 capsule, Rfl: 2  •  clobetasol (OLUX) 0.05 % topical foam, Apply topically daily as needed For ears., Disp: , Rfl:   •  docusate sodium (COLACE) 100 mg capsule, Take 100 mg by mouth if needed, Disp: , Rfl:   •  doxycycline hyclate (VIBRAMYCIN) 100 mg capsule, Take 1 capsule (100 mg total) by mouth 2 (two) times a day for 7 days, Disp: 14 capsule, Rfl: 0  •  econazole nitrate 1 % cream, if needed, Disp: , Rfl:   •  famotidine (PEPCID) 40 MG tablet, Take 1 tablet (40 mg total) by mouth 2 (two) times a day, Disp: 180 tablet, Rfl: 0  •  fish oil-omega-3 fatty acids 1000 MG capsule, Take 2 g by mouth daily, Disp: , Rfl:   •  glucosamine-chondroitin 500-400 MG tablet, Take 1 tablet by mouth 2 (two) times a day, Disp: , Rfl:   •  hydrocortisone 2.5 % cream, Apply topically if needed, Disp: , Rfl:   •  levocetirizine (XYZAL) 5 MG tablet, Take 1 tablet by mouth Daily, Disp: , Rfl:   •  meclizine (ANTIVERT) 25 mg tablet, Take 1 tablet (25 mg total) by mouth every 8 (eight) hours as needed for dizziness, Disp: , Rfl:   •  mometasone (ELOCON) 0.1 % cream, , Disp: , Rfl:   •  Multiple Vitamins-Minerals (CENTRUM SILVER ULTRA MENS) TABS, Take 1 tablet by mouth daily, Disp: , Rfl:   •  mupirocin (BACTROBAN) 2 % ointment, Apply topically 3 (three) times a day, Disp: 30 g, Rfl: 1  •  Rocklatan 0.02-0.005 % SOLN, INSTILL 1 DROP INTO BOTH EYES EVERY DAY IN THE EVENING, Disp: , Rfl:   •  Silodosin 8 MG CAPS, TAKE 1 CAPSULE BY MOUTH EVERY DAY, Disp: 90 capsule, Rfl: 3  •  triamcinolone (KENALOG) 0.1 % oral topical paste, Apply 1 Application topically 2 (two) times a day, Disp: 5 g, Rfl: 5  •  ergocalciferol (VITAMIN D2) 50,000 units, TAKE 1 CAPSULE BY MOUTH ONE TIME PER WEEK (Patient not taking: Reported on 4/1/2024), Disp: 12 capsule, Rfl: 2  •  Erythromycin (ILOTYCIN OP), Apply to eye if needed (Patient not taking: Reported on 4/1/2024), Disp: , Rfl:   •  guaiFENesin  (MUCINEX) 600 mg 12 hr tablet, Take 1,200 mg by mouth if needed (Patient not taking: Reported on 8/8/2023), Disp: , Rfl:   •  Red Yeast Rice 600 MG CAPS, Take by mouth in the morning (Patient not taking: Reported on 2/14/2024), Disp: , Rfl:     Past Medical History:   Diagnosis Date   • Abnormal diffusion capacity determined by pulmonary function test 04/25/2017   • Actinic keratosis    • Acute right-sided low back pain without sciatica 09/01/2017   • Adverse effect of correct medicinal substance properly administered    • Arthralgia    • Candidiasis, mouth    • Chronic allergic rhinitis    • Chronic bronchitis (HCC)    • Chronic sinusitis    • Cough    • Dermatitis    • Diverticulosis    • LUCIANO (dyspnea on exertion)    • Former smoker    • GERD (gastroesophageal reflux disease)    • Glaucoma    • Hyperlipidemia    • Inguinal hernia, left    • Lump of skin    • Male erectile dysfunction    • Open comedone    • Palpitations    • Persistent cough for 3 weeks or longer 03/15/2017   • Renal calculi    • Sebaceous cyst    • Seborrheic keratosis    • Sick sinus syndrome (HCC) 07/12/2022   • Skin disorder    • Unspecified chronic bronchitis (HCC)    • Visual disturbances        Past Surgical History:   Procedure Laterality Date   • CARDIAC ELECTROPHYSIOLOGY PROCEDURE N/A 08/09/2022    Procedure: Cardiac pacer implant/ DUAL CHAMBER PACER @ [place];  Surgeon: Van Gore DO;  Location: [facility];  Service: Cardiology   • COLONOSCOPY  12/2014    [facility] Julien MD.-Diverticulosis   • COLONOSCOPY  10/2011    BRADY Ross MD.-Diverticulosis   • EGD  03/2016    KEENAN Ross MD.-LA Grade A reflux esophagitis, normal stomach, normal jejunum, benign-appearing esophageal stenosis.   • EGD  08/2015    KEENAN Ross MD.-Normal upper endoscopy   • INGUINAL HERNIA REPAIR Bilateral     left- last assessed: 11/25/14, Resolved: 5/18/15, onset 12/11/13   • MOUTH SURGERY         Family History   Problem Relation Age  "of Onset   • Brain cancer Mother    • Heart failure Mother    • Prostate cancer Mother         reports that he quit smoking about 14 years ago. His smoking use included cigarettes. He has never been exposed to tobacco smoke. He has never used smokeless tobacco. He reports that he does not currently use alcohol after a past usage of about 2.0 standard drinks of alcohol per week. He reports that he does not use drugs.    Vitals:    04/08/24 1332   BP: 124/72   Pulse: 94   Temp: (!) 97.2 °F (36.2 °C)   SpO2: 99%        PHYSICAL EXAM  General Appearance:    Alert, cooperative, no distress,    Head:    Normocephalic without obvious abnormality   Eyes:    PERRL, conjunctiva/corneas clear     Neck:   Supple, no adenopathy, no JVD   Back:     Symmetric, no spinal or CVA tenderness   Lungs:     Clear to auscultation bilaterally, no wheezing or rhonchi   Heart:    Regular rate and rhythm, S1 and S2 normal, no murmur   Abdomen:     Benign, no rebound or guarding.    Extremities:   Extremities normal. No clubbing, cyanosis or edema   Psych:   Normal Affect, AOx3.    Neurologic:  Skin:   CNII-XII intact. Strength symmetric, speech intact    Warm, dry, intact, no visible rashes or lesions except as follows:   Left groin 1 cm opening, with phlegmon but no obvious progression or fluctuance.               Some portions of this record may have been generated with voice recognition software. There may be translation, syntax,  or grammatical errors. Occasional wrong word or \"sound-a-like\" substitutions may have occurred due to the inherent limitations of the voice recognition software. Read the chart carefully and recognize, using context, where substitutions may have occurred. If you have any questions, please contact the dictating provider for clarification or correction, as needed. This encounter has been coded by a non-certified coder.       Yen Best MD    Date: 4/8/2024 Time: 2:03 PM     "

## 2024-04-08 NOTE — PROGRESS NOTES
Assessment/Plan:   Adonis Mallory Jr. is a 84 y.o.male who is here for   Chief Complaint   Patient presents with    Follow-up     Patient is here today for a follow up on his groin abscess      Groin abscess seen in ER this two weeks        On exam found to have open left groin infection with phlegmon.  The opening is about 1 cm in length and 2 or 3 mm wide but easily probed.  There is hard phlegmonous tissue but no undrained abscess that can be palpated.            Plan:   Incision is closed and area has no erythema or fluctuation. There is hard area that is non-tender but needs to follow up with urology in May. He will call if no continued improvement. Follow up PRN.         In preparation for this visit all relevant known prior office notes, prior consultations, emergency room visits, blood work results, and imaging studies were personally reviewed.  A total of  35 minutes was spent reviewing all of this information, caring for this patient, providing differential diagnosis, instructions for management, counseling and coordination of care.  This also includes planning surgical intervention where indicated.    Patient was discussed and asked her medication list.    Discussion was held regarding anticoagulation, aspirin and Motrin use prior to surgery.  Most anticoagulation needs to stop 7 to 10 days prior to surgery.  Some can be stopped at 48 hours.  Patient has had all questions answered by the physician or physician representative regarding anticoagulation.  Patient is aware that they need to stop their anticoagulation preoperatively.    Discussion was held regarding weight loss medication and diabetic medication with that also is used for weight loss.  These medications have significant perioperative risk.  He has medications need to be stopped 7 to 10 days prior to surgery.  Patient understands and is aware they need to stop these type of medications preoperatively.    Patient has been given a list of  anticoagulation and/or weight loss/diabetes medications that would be affected by this and they have confirmed the are either not on these drugs or have understood their directions to stop them at least 1 week prior to surgery or as instructed.    Most herbal medication should be discontinued a week to 10 days prior to surgery.    Diabetic medication such as insulin should be discussed with her primary care physician or the physician that ordered the insulin or similar medications.  In general patients usually half their diabetic insulin the morning of the surgery but again this should be discussed with the physician.    Patient understands and agrees to this aforementioned protocol.          _______________________________________________________  CC:Follow-up (Patient is here today for a follow up on his groin abscess )  .    HPI:  Adonis Mallory Jr. is a 84 y.o.male who was referred for evaluation of Follow-up (Patient is here today for a follow up on his groin abscess )  .    Currently patient reports was seen in the ER twice with fevers over the last week for a left groin infected cyst or hair follicle.  This was drained in the ER.  He has been placed on 2 antibiotics..     Reports: not changing, draining, and infected    Location: Left groin, 1 cm opening slightly cruciate, tracking superiorly and inferiorly without further abscess.      ROS:  General ROS: negative  negative for - chills, fatigue, fever or night sweats, weight loss  Respiratory ROS: no cough, shortness of breath, or wheezing  Cardiovascular ROS: no chest pain or dyspnea on exertion  Genito-Urinary ROS: no dysuria, trouble voiding, or hematuria  Musculoskeletal ROS: negative for - gait disturbance, joint pain or muscle pain  Neurological ROS: no TIA or stroke symptoms  Skin ROS: See HPI  GI ROS: see HPI  Skin ROS: no new rashes or lesions   Lymphatic ROS: no new adenopathy noted by pt.   GYN ROS: see HPI, no new GYN history or bleeding  noted  Psy ROS: no new mental or behavioral disturbances       Patient Active Problem List   Diagnosis    Renal lithiasis    Mild cognitive impairment    Mixed hyperlipidemia    Oropharyngeal dysphagia    Abnormal diffusion capacity determined by pulmonary function test    Sjogren's syndrome (HCC)    Irritable bowel syndrome    Gastroesophageal reflux disease without esophagitis    Enlarged prostate without lower urinary tract symptoms (luts)    Dysplastic nevus of face    Arthritis    Anxiety    Antithrombinemia (HCC)    Allergic rhinitis    Neck pain    Glaucoma    Eczema    Neck mass    Tick bite of left lower leg    Dyspnea on exertion    Right ear pain    1st degree AV block    Sick sinus syndrome (HCC)    Mobitz (type) I (Wenckebach's) atrioventricular block    Aortic stenosis with trileaflet valve    Recent change in frequency of bowel movements    Right lower quadrant abdominal pain    Breast pain, left    Chronic fatigue    Chronic obstructive pulmonary disease, unspecified COPD type (HCC)    Depression, recurrent ([facility])    Vertigo         Allergies:  Levofloxacin, Azithromycin, Ketorolac, and Penicillins      Current Outpatient Medications:     acetaminophen (TYLENOL) 325 mg tablet, Take 650 mg by mouth every 6 (six) hours as needed for mild pain, Disp: , Rfl:     aspirin 81 mg chewable tablet, Chew 81 mg 2 tablets daily, Disp: , Rfl:     Azopt 1 % ophthalmic suspension, AKA: Brinzolomide, Disp: , Rfl:     B Complex Vitamins (B COMPLEX PO), Take by mouth once a week, Disp: , Rfl:     chlordiazePOXIDE (LIBRIUM) 10 mg capsule, Take 1 capsule (10 mg total) by mouth daily, Disp: 90 capsule, Rfl: 2    clobetasol (OLUX) 0.05 % topical foam, Apply topically daily as needed For ears., Disp: , Rfl:     docusate sodium (COLACE) 100 mg capsule, Take 100 mg by mouth if needed, Disp: , Rfl:     econazole nitrate 1 % cream, if needed, Disp: , Rfl:     famotidine (PEPCID) 40 MG tablet, Take 1 tablet (40 mg total) by mouth  2 (two) times a day, Disp: 180 tablet, Rfl: 0    fish oil-omega-3 fatty acids 1000 MG capsule, Take 2 g by mouth daily, Disp: , Rfl:     glucosamine-chondroitin 500-400 MG tablet, Take 1 tablet by mouth 2 (two) times a day, Disp: , Rfl:     guaiFENesin (MUCINEX) 600 mg 12 hr tablet, Take 1,200 mg by mouth if needed, Disp: , Rfl:     hydrocortisone 2.5 % cream, Apply topically if needed, Disp: , Rfl:     levocetirizine (XYZAL) 5 MG tablet, Take 1 tablet by mouth Daily, Disp: , Rfl:     meclizine (ANTIVERT) 25 mg tablet, Take 1 tablet (25 mg total) by mouth every 8 (eight) hours as needed for dizziness, Disp: , Rfl:     mometasone (ELOCON) 0.1 % cream, , Disp: , Rfl:     Multiple Vitamins-Minerals (CENTRUM SILVER ULTRA MENS) TABS, Take 1 tablet by mouth daily, Disp: , Rfl:     mupirocin (BACTROBAN) 2 % ointment, Apply topically 3 (three) times a day, Disp: 30 g, Rfl: 1    Rocklatan 0.02-0.005 % SOLN, INSTILL 1 DROP INTO BOTH EYES EVERY DAY IN THE EVENING, Disp: , Rfl:     Silodosin 8 MG CAPS, TAKE 1 CAPSULE BY MOUTH EVERY DAY, Disp: 90 capsule, Rfl: 3    triamcinolone (KENALOG) 0.1 % oral topical paste, Apply 1 Application topically 2 (two) times a day, Disp: 5 g, Rfl: 5    ergocalciferol (VITAMIN D2) 50,000 units, TAKE 1 CAPSULE BY MOUTH ONE TIME PER WEEK (Patient not taking: Reported on 4/1/2024), Disp: 12 capsule, Rfl: 2    Erythromycin (ILOTYCIN OP), Apply to eye if needed (Patient not taking: Reported on 4/1/2024), Disp: , Rfl:     Red Yeast Rice 600 MG CAPS, Take by mouth in the morning (Patient not taking: Reported on 2/14/2024), Disp: , Rfl:     Past Medical History:   Diagnosis Date    Abnormal diffusion capacity determined by pulmonary function test 04/25/2017    Actinic keratosis     Acute right-sided low back pain without sciatica 09/01/2017    Adverse effect of correct medicinal substance properly administered     Arthralgia     Candidiasis, mouth     Chronic allergic rhinitis     Chronic bronchitis (HCC)      Chronic sinusitis     Cough     Dermatitis     Diverticulosis     LUCIANO (dyspnea on exertion)     Former smoker     GERD (gastroesophageal reflux disease)     Glaucoma     Hyperlipidemia     Inguinal hernia, left     Lump of skin     Male erectile dysfunction     Open comedone     Palpitations     Persistent cough for 3 weeks or longer 03/15/2017    Renal calculi     Sebaceous cyst     Seborrheic keratosis     Sick sinus syndrome (HCC) 07/12/2022    Skin disorder     Unspecified chronic bronchitis (HCC)     Visual disturbances        Past Surgical History:   Procedure Laterality Date    CARDIAC ELECTROPHYSIOLOGY PROCEDURE N/A 08/09/2022    Procedure: Cardiac pacer implant/ DUAL CHAMBER PACER @ [place];  Surgeon: Van Gore DO;  Location: [facility];  Service: Cardiology    COLONOSCOPY  12/2014    [facility] MD.-Diverticulosis    COLONOSCOPY  10/2011    BRADY Ross MD.-Diverticulosis    EGD  03/2016    KEENAN Ross MD.-LA Grade A reflux esophagitis, normal stomach, normal jejunum, benign-appearing esophageal stenosis.    EGD  08/2015    KEENAN Ross MD.-Normal upper endoscopy    INGUINAL HERNIA REPAIR Bilateral     left- last assessed: 11/25/14, Resolved: 5/18/15, onset 12/11/13    MOUTH SURGERY         Family History   Problem Relation Age of Onset    Brain cancer Mother     Heart failure Mother     Prostate cancer Mother         reports that he quit smoking about 14 years ago. His smoking use included cigarettes. He has never been exposed to tobacco smoke. He has never used smokeless tobacco. He reports that he does not currently use alcohol after a past usage of about 2.0 standard drinks of alcohol per week. He reports that he does not use drugs.    Vitals:    04/15/24 1048   BP: 132/68   Pulse: 92   Resp: 16   Temp: 99.7 °F (37.6 °C)   SpO2: 97%          PHYSICAL EXAM  General Appearance:    Alert, cooperative, no distress,    Head:    Normocephalic without obvious abnormality  "  Eyes:    PERRL, conjunctiva/corneas clear     Neck:   Supple, no adenopathy, no JVD   Back:     Symmetric, no spinal or CVA tenderness   Lungs:     Clear to auscultation bilaterally, no wheezing or rhonchi   Heart:    Regular rate and rhythm, S1 and S2 normal, no murmur   Abdomen:     Benign, no rebound or guarding.    Extremities:   Extremities normal. No clubbing, cyanosis or edema   Psych:   Normal Affect, AOx3.    Neurologic:  Skin:   CNII-XII intact. Strength symmetric, speech intact    Warm, dry, intact, no visible rashes or lesions except as follows:   Left groin 1 cm opening, with phlegmon but no obvious progression or fluctuance.               Some portions of this record may have been generated with voice recognition software. There may be translation, syntax,  or grammatical errors. Occasional wrong word or \"sound-a-like\" substitutions may have occurred due to the inherent limitations of the voice recognition software. Read the chart carefully and recognize, using context, where substitutions may have occurred. If you have any questions, please contact the dictating provider for clarification or correction, as needed. This encounter has been coded by a non-certified coder.       Jyoti Valadez PA-C    Date: 4/15/2024 Time: 10:49 AM     "

## 2024-04-10 ENCOUNTER — OFFICE VISIT (OUTPATIENT)
Dept: FAMILY MEDICINE CLINIC | Facility: CLINIC | Age: 84
End: 2024-04-10
Payer: COMMERCIAL

## 2024-04-10 VITALS
SYSTOLIC BLOOD PRESSURE: 126 MMHG | WEIGHT: 170 LBS | TEMPERATURE: 98 F | HEART RATE: 102 BPM | DIASTOLIC BLOOD PRESSURE: 70 MMHG | BODY MASS INDEX: 24.39 KG/M2 | OXYGEN SATURATION: 97 %

## 2024-04-10 DIAGNOSIS — L02.214 ABSCESS, GROIN: ICD-10-CM

## 2024-04-10 DIAGNOSIS — H93.19 TINNITUS, UNSPECIFIED LATERALITY: Primary | ICD-10-CM

## 2024-04-10 DIAGNOSIS — M54.50 CHRONIC RIGHT-SIDED LOW BACK PAIN WITHOUT SCIATICA: ICD-10-CM

## 2024-04-10 DIAGNOSIS — G89.29 CHRONIC RIGHT-SIDED LOW BACK PAIN WITHOUT SCIATICA: ICD-10-CM

## 2024-04-10 PROCEDURE — 99214 OFFICE O/P EST MOD 30 MIN: CPT | Performed by: FAMILY MEDICINE

## 2024-04-10 PROCEDURE — G2211 COMPLEX E/M VISIT ADD ON: HCPCS | Performed by: FAMILY MEDICINE

## 2024-04-10 NOTE — PROGRESS NOTES
Name: Adonis Mallory Jr.      : 1940      MRN: [id number]  Encounter Provider: Carl Hawkins DO  Encounter Date: 4/10/2024   Encounter department: [facility]    Assessment & Plan     1. Tinnitus, unspecified laterality  Comments:  Ear exam normal bilaterally.  Patient counseled regarding tinnitus strategies    2. Abscess, groin  Comments:  Improving clinically.  Continue follow-up with general surgery    3. Chronic right-sided low back pain without sciatica  Comments:  Tylenol, moist heat as needed           Subjective      Tinnitus for 3 weeks noted by patient.  No ear pain or hearing loss.    Right hip discomfort on and off for weeks to months.    Left inguinal abscess.  On keflex/ doxy prescribed by ER.  Had I&D in ER and is following up with general surgery.      Review of Systems   Constitutional: Negative.    Respiratory: Negative.     Cardiovascular: Negative.    Gastrointestinal: Negative.    Genitourinary: Negative.    Musculoskeletal:  Positive for back pain.   Psychiatric/Behavioral: Negative.         Current Outpatient Medications on File Prior to Visit   Medication Sig   • acetaminophen (TYLENOL) 325 mg tablet Take 650 mg by mouth every 6 (six) hours as needed for mild pain   • aspirin 81 mg chewable tablet Chew 81 mg 2 tablets daily   • Azopt 1 % ophthalmic suspension AKA: Brinzolomide   • B Complex Vitamins (B COMPLEX PO) Take by mouth once a week   • cephalexin (KEFLEX) 500 mg capsule Take 1 capsule (500 mg total) by mouth every 6 (six) hours for 7 days   • chlordiazePOXIDE (LIBRIUM) 10 mg capsule Take 1 capsule (10 mg total) by mouth daily   • clobetasol (OLUX) 0.05 % topical foam Apply topically daily as needed For ears.   • docusate sodium (COLACE) 100 mg capsule Take 100 mg by mouth if needed   • doxycycline hyclate (VIBRAMYCIN) 100 mg capsule Take 1 capsule (100 mg total) by mouth 2 (two) times a day for 7 days   • econazole nitrate 1 % cream if needed   •  ergocalciferol (VITAMIN D2) 50,000 units TAKE 1 CAPSULE BY MOUTH ONE TIME PER WEEK (Patient not taking: Reported on 4/1/2024)   • Erythromycin (ILOTYCIN OP) Apply to eye if needed (Patient not taking: Reported on 4/1/2024)   • famotidine (PEPCID) 40 MG tablet Take 1 tablet (40 mg total) by mouth 2 (two) times a day   • fish oil-omega-3 fatty acids 1000 MG capsule Take 2 g by mouth daily   • glucosamine-chondroitin 500-400 MG tablet Take 1 tablet by mouth 2 (two) times a day   • guaiFENesin (MUCINEX) 600 mg 12 hr tablet Take 1,200 mg by mouth if needed (Patient not taking: Reported on 8/8/2023)   • hydrocortisone 2.5 % cream Apply topically if needed   • levocetirizine (XYZAL) 5 MG tablet Take 1 tablet by mouth Daily   • meclizine (ANTIVERT) 25 mg tablet Take 1 tablet (25 mg total) by mouth every 8 (eight) hours as needed for dizziness   • mometasone (ELOCON) 0.1 % cream    • Multiple Vitamins-Minerals (CENTRUM SILVER ULTRA MENS) TABS Take 1 tablet by mouth daily   • mupirocin (BACTROBAN) 2 % ointment Apply topically 3 (three) times a day   • Red Yeast Rice 600 MG CAPS Take by mouth in the morning (Patient not taking: Reported on 2/14/2024)   • Rocklatan 0.02-0.005 % SOLN INSTILL 1 DROP INTO BOTH EYES EVERY DAY IN THE EVENING   • Silodosin 8 MG CAPS TAKE 1 CAPSULE BY MOUTH EVERY DAY   • triamcinolone (KENALOG) 0.1 % oral topical paste Apply 1 Application topically 2 (two) times a day       Objective     /70 (BP Location: Right arm, Patient Position: Sitting, Cuff Size: Adult)   Pulse 102   Temp 98 °F (36.7 °C)   Wt 77.1 kg (170 lb)   SpO2 97%   BMI 24.39 kg/m²     Physical Exam  Vitals and nursing note reviewed.   Constitutional:       General: He is not in acute distress.     Appearance: Normal appearance. He is well-developed. He is not diaphoretic.   HENT:      Head: Normocephalic and atraumatic.   Eyes:      General:         Right eye: No discharge.      Conjunctiva/sclera: Conjunctivae normal.       Pupils: Pupils are equal, round, and reactive to light.   Neck:      Thyroid: No thyromegaly.   Cardiovascular:      Rate and Rhythm: Normal rate and regular rhythm.   Pulmonary:      Effort: Pulmonary effort is normal. No respiratory distress.      Breath sounds: Normal breath sounds.   Musculoskeletal:      Cervical back: Normal range of motion.   Lymphadenopathy:      Cervical: No cervical adenopathy.   Skin:     General: Skin is warm and dry.   Neurological:      Mental Status: He is alert and oriented to person, place, and time.   Psychiatric:         Mood and Affect: Mood normal.         Behavior: Behavior normal.         Thought Content: Thought content normal.         Judgment: Judgment normal.       Carl Hawkins, DO

## 2024-04-11 ENCOUNTER — TELEPHONE (OUTPATIENT)
Age: 84
End: 2024-04-11

## 2024-04-11 NOTE — TELEPHONE ENCOUNTER
I saw the patient yesterday.  He does not have a wound on his leg.  The antibiotic is really for an abscess in the groin which he was seen at the ER for and then subsequently by Dr. Mariee.  That wound is improving.

## 2024-04-11 NOTE — TELEPHONE ENCOUNTER
No redness, heat or swelling. Pt reports this was an isolated incident that occurred around 2am this morning and has not returned. He did confirm he his taking both meds.    Advised pt to keep an eye on it and make sure he's staying well hydrated during the day. Call office if the above symptoms develop or the pain returns and does not go away.

## 2024-04-11 NOTE — TELEPHONE ENCOUNTER
Patient called in and stated that he got Crow pain in his right leg calf area last night. He isn't sure if its from the antibiotic that he is been on for 7 days. No rash - had pain in his cut this morning. Had breakfast this morning and was fine with that. The Antibiotic was for a wound of the right leg    Please call patient and advise on what to do. [phone]    Thank  you

## 2024-04-11 NOTE — TELEPHONE ENCOUNTER
Sorry! No other wounds reported by pt at this time. That was a documentation error from staff taking his original message.

## 2024-04-15 ENCOUNTER — OFFICE VISIT (OUTPATIENT)
Dept: SURGERY | Facility: CLINIC | Age: 84
End: 2024-04-15
Payer: COMMERCIAL

## 2024-04-15 ENCOUNTER — OFFICE VISIT (OUTPATIENT)
Dept: FAMILY MEDICINE CLINIC | Facility: CLINIC | Age: 84
End: 2024-04-15
Payer: COMMERCIAL

## 2024-04-15 VITALS
HEART RATE: 92 BPM | RESPIRATION RATE: 16 BRPM | OXYGEN SATURATION: 97 % | WEIGHT: 168 LBS | DIASTOLIC BLOOD PRESSURE: 68 MMHG | SYSTOLIC BLOOD PRESSURE: 132 MMHG | TEMPERATURE: 99.7 F | BODY MASS INDEX: 24.05 KG/M2 | HEIGHT: 70 IN

## 2024-04-15 VITALS
TEMPERATURE: 98.9 F | BODY MASS INDEX: 24.05 KG/M2 | DIASTOLIC BLOOD PRESSURE: 72 MMHG | HEART RATE: 96 BPM | WEIGHT: 167.6 LBS | OXYGEN SATURATION: 97 % | SYSTOLIC BLOOD PRESSURE: 114 MMHG

## 2024-04-15 DIAGNOSIS — F41.9 ANXIETY: ICD-10-CM

## 2024-04-15 DIAGNOSIS — K58.9 IRRITABLE BOWEL SYNDROME, UNSPECIFIED TYPE: ICD-10-CM

## 2024-04-15 DIAGNOSIS — Z51.89 ENCOUNTER FOR WOUND CARE: Primary | ICD-10-CM

## 2024-04-15 DIAGNOSIS — J06.9 UPPER RESPIRATORY TRACT INFECTION, UNSPECIFIED TYPE: Primary | ICD-10-CM

## 2024-04-15 PROCEDURE — 99213 OFFICE O/P EST LOW 20 MIN: CPT | Performed by: FAMILY MEDICINE

## 2024-04-15 PROCEDURE — G2211 COMPLEX E/M VISIT ADD ON: HCPCS | Performed by: FAMILY MEDICINE

## 2024-04-15 PROCEDURE — 99213 OFFICE O/P EST LOW 20 MIN: CPT | Performed by: PHYSICIAN ASSISTANT

## 2024-04-15 RX ORDER — CEFDINIR 300 MG/1
300 CAPSULE ORAL EVERY 12 HOURS SCHEDULED
Qty: 14 CAPSULE | Refills: 0 | Status: SHIPPED | OUTPATIENT
Start: 2024-04-15 | End: 2024-04-16

## 2024-04-15 RX ORDER — CHLORDIAZEPOXIDE HYDROCHLORIDE 10 MG/1
10 CAPSULE, GELATIN COATED ORAL DAILY
Qty: 90 CAPSULE | Refills: 1 | Status: SHIPPED | OUTPATIENT
Start: 2024-04-15

## 2024-04-15 NOTE — PROGRESS NOTES
Name: Adonis Mallory Jr.      : 1940      MRN: [id number]  Encounter Provider: Carl Hawkins DO  Encounter Date: 4/15/2024   Encounter department: [facility]    Assessment & Plan     1. Upper respiratory tract infection, unspecified type  -     cefdinir (OMNICEF) 300 mg capsule; Take 1 capsule (300 mg total) by mouth every 12 (twelve) hours for 7 days         Subjective      Patient complains of productive cough sinus congestion and postnasal drainage with low-grade fever for the past several days despite having been on Keflex and doxycycline for his groin abscess.  He has completed those antibiotics within the last 2 days and his upper respiratory symptoms have worsened in that time.      Review of Systems   Constitutional:  Positive for fever.   HENT:  Positive for congestion, postnasal drip and rhinorrhea.    Respiratory:  Positive for cough.        Current Outpatient Medications on File Prior to Visit   Medication Sig   • acetaminophen (TYLENOL) 325 mg tablet Take 650 mg by mouth every 6 (six) hours as needed for mild pain   • aspirin 81 mg chewable tablet Chew 81 mg 2 tablets daily   • Azopt 1 % ophthalmic suspension AKA: Brinzolomide   • B Complex Vitamins (B COMPLEX PO) Take by mouth once a week   • chlordiazePOXIDE (LIBRIUM) 10 mg capsule Take 1 capsule (10 mg total) by mouth daily   • clobetasol (OLUX) 0.05 % topical foam Apply topically daily as needed For ears.   • docusate sodium (COLACE) 100 mg capsule Take 100 mg by mouth if needed   • econazole nitrate 1 % cream if needed   • ergocalciferol (VITAMIN D2) 50,000 units TAKE 1 CAPSULE BY MOUTH ONE TIME PER WEEK (Patient not taking: Reported on 2024)   • Erythromycin (ILOTYCIN OP) Apply to eye if needed (Patient not taking: Reported on 2024)   • famotidine (PEPCID) 40 MG tablet Take 1 tablet (40 mg total) by mouth 2 (two) times a day   • fish oil-omega-3 fatty acids 1000 MG capsule Take 2 g by mouth daily   •  glucosamine-chondroitin 500-400 MG tablet Take 1 tablet by mouth 2 (two) times a day   • guaiFENesin (MUCINEX) 600 mg 12 hr tablet Take 1,200 mg by mouth if needed   • hydrocortisone 2.5 % cream Apply topically if needed   • levocetirizine (XYZAL) 5 MG tablet Take 1 tablet by mouth Daily   • meclizine (ANTIVERT) 25 mg tablet Take 1 tablet (25 mg total) by mouth every 8 (eight) hours as needed for dizziness   • mometasone (ELOCON) 0.1 % cream    • Multiple Vitamins-Minerals (CENTRUM SILVER ULTRA MENS) TABS Take 1 tablet by mouth daily   • mupirocin (BACTROBAN) 2 % ointment Apply topically 3 (three) times a day   • Red Yeast Rice 600 MG CAPS Take by mouth in the morning (Patient not taking: Reported on 2/14/2024)   • Rocklatan 0.02-0.005 % SOLN INSTILL 1 DROP INTO BOTH EYES EVERY DAY IN THE EVENING   • Silodosin 8 MG CAPS TAKE 1 CAPSULE BY MOUTH EVERY DAY   • triamcinolone (KENALOG) 0.1 % oral topical paste Apply 1 Application topically 2 (two) times a day       Objective     /72 (BP Location: Left arm, Patient Position: Sitting, Cuff Size: Adult)   Pulse 96   Temp 98.9 °F (37.2 °C)   Wt 76 kg (167 lb 9.6 oz)   SpO2 97%   BMI 24.05 kg/m²     Physical Exam  Vitals and nursing note reviewed.   Constitutional:       General: He is not in acute distress.     Appearance: Normal appearance. He is well-developed. He is not diaphoretic.   HENT:      Head: Normocephalic and atraumatic.      Nose: Congestion and rhinorrhea present.      Mouth/Throat:      Pharynx: Posterior oropharyngeal erythema present.   Eyes:      General:         Right eye: No discharge.      Conjunctiva/sclera: Conjunctivae normal.      Pupils: Pupils are equal, round, and reactive to light.   Neck:      Thyroid: No thyromegaly.   Cardiovascular:      Rate and Rhythm: Normal rate and regular rhythm.   Pulmonary:      Effort: Pulmonary effort is normal. No respiratory distress.      Breath sounds: Normal breath sounds.   Musculoskeletal:       Cervical back: Normal range of motion.   Lymphadenopathy:      Cervical: No cervical adenopathy.   Skin:     General: Skin is warm and dry.   Neurological:      Mental Status: He is alert and oriented to person, place, and time.   Psychiatric:         Mood and Affect: Mood normal.         Behavior: Behavior normal.         Thought Content: Thought content normal.         Judgment: Judgment normal.       Carl Hawkins, DO

## 2024-04-16 ENCOUNTER — TELEPHONE (OUTPATIENT)
Age: 84
End: 2024-04-16

## 2024-04-16 DIAGNOSIS — J06.9 UPPER RESPIRATORY TRACT INFECTION, UNSPECIFIED TYPE: Primary | ICD-10-CM

## 2024-04-16 RX ORDER — DEXTROMETHORPHAN HYDROBROMIDE AND PROMETHAZINE HYDROCHLORIDE 15; 6.25 MG/5ML; MG/5ML
5 SYRUP ORAL 4 TIMES DAILY PRN
Qty: 240 ML | Refills: 0 | Status: SHIPPED | OUTPATIENT
Start: 2024-04-16

## 2024-04-16 RX ORDER — CEFUROXIME AXETIL 500 MG/1
500 TABLET ORAL EVERY 12 HOURS SCHEDULED
Qty: 20 TABLET | Refills: 0 | Status: SHIPPED | OUTPATIENT
Start: 2024-04-16 | End: 2024-04-26

## 2024-04-16 NOTE — TELEPHONE ENCOUNTER
Rcvd call from patient who said that he was seen in the office yesterday. Picked up with cefdinir last night and was able to take 2 doses of the antibiotic. States that it has given him diarrhea and stomach pain. Is wondering if he should keep taking it or could a new abx be ordered for him. Also said that he is coughing so hard that he may need his hernia repaired again. Is there something he can have for a cough? States that he can  meds today if new ones are ordered. Please follow up with patient.

## 2024-04-18 ENCOUNTER — TELEPHONE (OUTPATIENT)
Age: 84
End: 2024-04-18

## 2024-04-18 NOTE — TELEPHONE ENCOUNTER
Pt called stating he was seen on 4/15 by Dr. Hawkins and was put on an antibiotic Cefuroxime 500 mg and he is having terrible diarrhea and not sure what to do.    Should he stop the medication or be put on another medication.  Pt is willing to come in to see Dr. Hawkins tomorrow if an appt opens up.    He wants to know if he can take pepto-bismol?- states that usually helps him, but unsure if he can't with other medications.    Please advise [phone]

## 2024-04-18 NOTE — TELEPHONE ENCOUNTER
Patient should stop antibiotic completely.  He can take Imodium for diarrhea.  Will hold off on additional antibiotics until stomach symptoms improved.

## 2024-04-19 ENCOUNTER — OFFICE VISIT (OUTPATIENT)
Dept: FAMILY MEDICINE CLINIC | Facility: CLINIC | Age: 84
End: 2024-04-19
Payer: COMMERCIAL

## 2024-04-19 VITALS
BODY MASS INDEX: 23.53 KG/M2 | OXYGEN SATURATION: 95 % | SYSTOLIC BLOOD PRESSURE: 130 MMHG | WEIGHT: 164 LBS | DIASTOLIC BLOOD PRESSURE: 74 MMHG | TEMPERATURE: 98.7 F | HEART RATE: 107 BPM

## 2024-04-19 DIAGNOSIS — J40 BRONCHITIS: Primary | ICD-10-CM

## 2024-04-19 PROCEDURE — 99213 OFFICE O/P EST LOW 20 MIN: CPT | Performed by: FAMILY MEDICINE

## 2024-04-19 PROCEDURE — G2211 COMPLEX E/M VISIT ADD ON: HCPCS | Performed by: FAMILY MEDICINE

## 2024-04-19 RX ORDER — DOXYCYCLINE 100 MG/1
100 TABLET ORAL 2 TIMES DAILY
Qty: 14 TABLET | Refills: 0 | Status: SHIPPED | OUTPATIENT
Start: 2024-04-19 | End: 2024-04-26

## 2024-04-19 RX ORDER — METHYLPREDNISOLONE 4 MG/1
TABLET ORAL
Qty: 21 EACH | Refills: 0 | Status: SHIPPED | OUTPATIENT
Start: 2024-04-19

## 2024-04-19 NOTE — TELEPHONE ENCOUNTER
Pt made aware of Dr. Hawkins's message. Stated he stopped the antibiotics yesterday and the diarrhea has subsided. Pt did report that he was experiencing some cold like symptoms, ie. cough, clogged ear and mild headaches. Pt stated he tried OTC medications but nothing is helping. Pt scheduled today at 2pm to see Dr. Hawkins.

## 2024-04-19 NOTE — PROGRESS NOTES
Name: Adonis Mallory Jr.      : 1940      MRN: [id number]  Encounter Provider: Carl Hawkins DO  Encounter Date: 2024   Encounter department: [facility]    Assessment & Plan     1. Bronchitis  -     doxycycline (ADOXA) 100 MG tablet; Take 1 tablet (100 mg total) by mouth 2 (two) times a day for 7 days  -     methylPREDNISolone 4 MG tablet therapy pack; Use as directed on package         Subjective      HPI  Review of Systems    Current Outpatient Medications on File Prior to Visit   Medication Sig   • acetaminophen (TYLENOL) 325 mg tablet Take 650 mg by mouth every 6 (six) hours as needed for mild pain   • aspirin 81 mg chewable tablet Chew 81 mg 2 tablets daily   • Azopt 1 % ophthalmic suspension AKA: Brinzolomide   • B Complex Vitamins (B COMPLEX PO) Take by mouth once a week   • cefuroxime (CEFTIN) 500 mg tablet Take 1 tablet (500 mg total) by mouth every 12 (twelve) hours for 10 days   • chlordiazePOXIDE (LIBRIUM) 10 mg capsule TAKE 1 CAPSULE DAILY   • clobetasol (OLUX) 0.05 % topical foam Apply topically daily as needed For ears.   • docusate sodium (COLACE) 100 mg capsule Take 100 mg by mouth if needed   • econazole nitrate 1 % cream if needed   • famotidine (PEPCID) 40 MG tablet Take 1 tablet (40 mg total) by mouth 2 (two) times a day   • fish oil-omega-3 fatty acids 1000 MG capsule Take 2 g by mouth daily   • glucosamine-chondroitin 500-400 MG tablet Take 1 tablet by mouth 2 (two) times a day   • guaiFENesin (MUCINEX) 600 mg 12 hr tablet Take 1,200 mg by mouth if needed   • hydrocortisone 2.5 % cream Apply topically if needed   • levocetirizine (XYZAL) 5 MG tablet Take 1 tablet by mouth Daily   • meclizine (ANTIVERT) 25 mg tablet Take 1 tablet (25 mg total) by mouth every 8 (eight) hours as needed for dizziness   • mometasone (ELOCON) 0.1 % cream    • Multiple Vitamins-Minerals (CENTRUM SILVER ULTRA MENS) TABS Take 1 tablet by mouth daily   • mupirocin (BACTROBAN) 2 %  ointment Apply topically 3 (three) times a day   • promethazine-dextromethorphan (PHENERGAN-DM) 6.25-15 mg/5 mL oral syrup Take 5 mL by mouth 4 (four) times a day as needed for cough   • Rocklatan 0.02-0.005 % SOLN INSTILL 1 DROP INTO BOTH EYES EVERY DAY IN THE EVENING   • Silodosin 8 MG CAPS TAKE 1 CAPSULE BY MOUTH EVERY DAY   • triamcinolone (KENALOG) 0.1 % oral topical paste Apply 1 Application topically 2 (two) times a day   • ergocalciferol (VITAMIN D2) 50,000 units TAKE 1 CAPSULE BY MOUTH ONE TIME PER WEEK (Patient not taking: Reported on 4/1/2024)   • Erythromycin (ILOTYCIN OP) Apply to eye if needed (Patient not taking: Reported on 4/1/2024)   • Red Yeast Rice 600 MG CAPS Take by mouth in the morning (Patient not taking: Reported on 2/14/2024)       Objective     /74 (BP Location: Left arm, Patient Position: Sitting, Cuff Size: Standard)   Pulse (!) 107   Temp 98.7 °F (37.1 °C) (Temporal)   Wt 74.4 kg (164 lb)   SpO2 95%   BMI 23.53 kg/m²     Physical Exam  Carl Hawkins, DO

## 2024-04-22 DIAGNOSIS — R42 VERTIGO: ICD-10-CM

## 2024-04-23 RX ORDER — MECLIZINE HYDROCHLORIDE 25 MG/1
25 TABLET ORAL EVERY 8 HOURS PRN
Qty: 90 TABLET | Refills: 0 | Status: SHIPPED | OUTPATIENT
Start: 2024-04-23

## 2024-04-27 ENCOUNTER — HOSPITAL ENCOUNTER (EMERGENCY)
Facility: HOSPITAL | Age: 84
Discharge: HOME | End: 2024-04-27
Attending: EMERGENCY MEDICINE | Admitting: EMERGENCY MEDICINE
Payer: COMMERCIAL

## 2024-04-27 VITALS
BODY MASS INDEX: 23.03 KG/M2 | SYSTOLIC BLOOD PRESSURE: 159 MMHG | WEIGHT: 160.5 LBS | HEART RATE: 94 BPM | OXYGEN SATURATION: 98 % | RESPIRATION RATE: 18 BRPM | DIASTOLIC BLOOD PRESSURE: 88 MMHG | TEMPERATURE: 98.7 F

## 2024-04-27 DIAGNOSIS — H53.9 VISUAL DISTURBANCE OF ONE EYE: Primary | ICD-10-CM

## 2024-04-27 DIAGNOSIS — L02.91 PHLEGMON: ICD-10-CM

## 2024-04-27 PROCEDURE — 99283 EMERGENCY DEPT VISIT LOW MDM: CPT | Performed by: EMERGENCY MEDICINE

## 2024-04-27 PROCEDURE — 99284 EMERGENCY DEPT VISIT MOD MDM: CPT

## 2024-04-27 NOTE — ED PROVIDER NOTES
"History  Chief Complaint   Patient presents with    Abscess     Pt states that he was here earlier this month and had abscess to his groin drained, had f/u with general surgery. States that the last two days it has been bothering him again, pain has been keeping him from sleep. Has appointment with general surgery this coming Tuesday but was concerned to wait that long. Denies fever. Also c/o cough and had blurred vision to left eye this morning for 30 minutes which has no resolved.      85 yo M presents with two concerns, first about 10am this morning he was experiencing a visual disturbance on his left, describing \"squiggly\" and \"cut outs\" watching TV.  At baseline he has only motion detection in the right eye due to previous cataract complications.  He denies HA, nausea, or eye pain with the visual disturbance. He did not experience any other weakness, speech change or swallowing difficulty.  His vision returned to what he considers normal after a few mins and has been normal since then .     He is also concerned about a painful area in the left groin taht was treated with I&D in the ED 4/4, and had subsequent follow up with [facility].  Records indicate he had an abscess associated with a chronic sebaceous cyst.  The area is not painful today but was bothering for a couple days this past week.  No fever, no chills, no drainage.            Prior to Admission Medications   Prescriptions Last Dose Informant Patient Reported? Taking?   Azopt 1 % ophthalmic suspension  Self Yes No   Sig: AKA: Brinzolomide   B Complex Vitamins (B COMPLEX PO)  Self Yes No   Sig: Take by mouth once a week   Erythromycin (ILOTYCIN OP)  Self Yes No   Sig: Apply to eye if needed   Patient not taking: Reported on 4/1/2024   Multiple Vitamins-Minerals (CENTRUM SILVER ULTRA MENS) TABS  Self Yes No   Sig: Take 1 tablet by mouth daily   Red Yeast Rice 600 MG CAPS  Self Yes No   Sig: Take by mouth in the morning   Patient not taking: Reported on " 2/14/2024   Rocklatan 0.02-0.005 % SOLN  Self Yes No   Sig: INSTILL 1 DROP INTO BOTH EYES EVERY DAY IN THE EVENING   Silodosin 8 MG CAPS  Self No No   Sig: TAKE 1 CAPSULE BY MOUTH EVERY DAY   acetaminophen (TYLENOL) 325 mg tablet  Self Yes No   Sig: Take 650 mg by mouth every 6 (six) hours as needed for mild pain   aspirin 81 mg chewable tablet  Self Yes No   Sig: Chew 81 mg 2 tablets daily   cefuroxime (CEFTIN) 500 mg tablet   No No   Sig: Take 1 tablet (500 mg total) by mouth every 12 (twelve) hours for 10 days   chlordiazePOXIDE (LIBRIUM) 10 mg capsule   No No   Sig: TAKE 1 CAPSULE DAILY   clobetasol (OLUX) 0.05 % topical foam  Self Yes No   Sig: Apply topically daily as needed For ears.   docusate sodium (COLACE) 100 mg capsule  Self Yes No   Sig: Take 100 mg by mouth if needed   doxycycline (ADOXA) 100 MG tablet   No No   Sig: Take 1 tablet (100 mg total) by mouth 2 (two) times a day for 7 days   econazole nitrate 1 % cream  Self Yes No   Sig: if needed   ergocalciferol (VITAMIN D2) 50,000 units  Self No No   Sig: TAKE 1 CAPSULE BY MOUTH ONE TIME PER WEEK   Patient not taking: Reported on 4/1/2024   famotidine (PEPCID) 40 MG tablet  Self No No   Sig: Take 1 tablet (40 mg total) by mouth 2 (two) times a day   fish oil-omega-3 fatty acids 1000 MG capsule  Self Yes No   Sig: Take 2 g by mouth daily   glucosamine-chondroitin 500-400 MG tablet  Self Yes No   Sig: Take 1 tablet by mouth 2 (two) times a day   guaiFENesin (MUCINEX) 600 mg 12 hr tablet  Self Yes No   Sig: Take 1,200 mg by mouth if needed   hydrocortisone 2.5 % cream  Self Yes No   Sig: Apply topically if needed   levocetirizine (XYZAL) 5 MG tablet  Self Yes No   Sig: Take 1 tablet by mouth Daily   meclizine (ANTIVERT) 25 mg tablet   No No   Sig: TAKE 1 TABLET (25 MG TOTAL) BY MOUTH EVERY 8 (EIGHT) HOURS AS NEEDED FOR DIZZINESS.   methylPREDNISolone 4 MG tablet therapy pack   No No   Sig: Use as directed on package   mometasone (ELOCON) 0.1 % cream  Self  Yes No   mupirocin (BACTROBAN) 2 % ointment  Self No No   Sig: Apply topically 3 (three) times a day   promethazine-dextromethorphan (PHENERGAN-DM) 6.25-15 mg/5 mL oral syrup   No No   Sig: Take 5 mL by mouth 4 (four) times a day as needed for cough   triamcinolone (KENALOG) 0.1 % oral topical paste  Self No No   Sig: Apply 1 Application topically 2 (two) times a day      Facility-Administered Medications: None       Past Medical History:   Diagnosis Date    Abnormal diffusion capacity determined by pulmonary function test 04/25/2017    Actinic keratosis     Acute right-sided low back pain without sciatica 09/01/2017    Adverse effect of correct medicinal substance properly administered     Arthralgia     Candidiasis, mouth     Chronic allergic rhinitis     Chronic bronchitis (HCC)     Chronic sinusitis     Cough     Dermatitis     Diverticulosis     LUCIANO (dyspnea on exertion)     Former smoker     GERD (gastroesophageal reflux disease)     Glaucoma     Hyperlipidemia     Inguinal hernia, left     Lump of skin     Male erectile dysfunction     Open comedone     Palpitations     Persistent cough for 3 weeks or longer 03/15/2017    Renal calculi     Sebaceous cyst     Seborrheic keratosis     Sick sinus syndrome (HCC) 07/12/2022    Skin disorder     Unspecified chronic bronchitis (HCC)     Visual disturbances        Past Surgical History:   Procedure Laterality Date    CARDIAC ELECTROPHYSIOLOGY PROCEDURE N/A 08/09/2022    Procedure: Cardiac pacer implant/ DUAL CHAMBER PACER @ [facility];  Surgeon: Van Gore DO;  Location: [facility];  Service: Cardiology    COLONOSCOPY  12/2014    [facility] Julien MD.-Diverticulosis    COLONOSCOPY  10/2011    BRADY Ross MD.-Diverticulosis    EGD  03/2016    KEENAN Ross MD.-LA Grade A reflux esophagitis, normal stomach, normal jejunum, benign-appearing esophageal stenosis.    EGD  08/2015    KEENAN Ross MD.-Normal upper endoscopy    INGUINAL HERNIA REPAIR  Bilateral     left- last assessed: 14, Resolved: 5/18/15, onset 12    MOUTH SURGERY         Family History   Problem Relation Age of Onset    Brain cancer Mother     Heart failure Mother     Prostate cancer Mother      I have reviewed and agree with the history as documented.    E-Cigarette/Vaping    E-Cigarette Use Never User      E-Cigarette/Vaping Substances    Nicotine No     THC No     CBD No     Flavoring No     Other No     Unknown No      Social History     Tobacco Use    Smoking status: Former     Current packs/day: 0.00     Types: Cigarettes     Quit date:      Years since quittin.3     Passive exposure: Never    Smokeless tobacco: Never   Vaping Use    Vaping status: Never Used   Substance Use Topics    Alcohol use: Not Currently     Alcohol/week: 2.0 standard drinks of alcohol     Types: 2 Cans of beer per week    Drug use: No       Review of Systems    Physical Exam  Physical Exam  Vitals and nursing note reviewed.   Constitutional:       Appearance: He is well-developed.   HENT:      Head: Normocephalic and atraumatic.      Right Ear: External ear normal.      Left Ear: External ear normal.      Nose: Nose normal.      Mouth/Throat:      Mouth: Mucous membranes are moist.   Eyes:      General: Vision grossly intact. No visual field deficit (peripheral and two finger intact on the left, right eye at baseline).        Right eye: No discharge.         Left eye: No discharge.      Extraocular Movements:      Right eye: Normal extraocular motion.      Conjunctiva/sclera: Conjunctivae normal.      Right eye: Right conjunctiva is not injected. No chemosis.     Left eye: Left conjunctiva is not injected. No chemosis.     Pupils: Pupils are equal, round, and reactive to light.   Cardiovascular:      Rate and Rhythm: Normal rate.   Pulmonary:      Effort: Pulmonary effort is normal.   Abdominal:      Tenderness: There is no abdominal tenderness.   Genitourinary:      Musculoskeletal:          General: Normal range of motion.      Cervical back: Normal range of motion and neck supple.   Skin:     General: Skin is warm and dry.      Capillary Refill: Capillary refill takes less than 2 seconds.   Neurological:      Mental Status: He is alert and oriented to person, place, and time.      Cranial Nerves: No cranial nerve deficit.      Coordination: Coordination normal.      Comments: No pronator drift  BUE strength 6/6  BLE strength 6/6  Symmetrical  strength  Bilateral symmetrical rapid alternating movements that cross midline  Bilateral normal finger nose and crosses midline  No nystagmus  CN 2-12 intact      Psychiatric:         Behavior: Behavior normal.         Thought Content: Thought content normal.         Judgment: Judgment normal.         Vital Signs  ED Triage Vitals [04/27/24 1721]   Temperature Pulse Respirations Blood Pressure SpO2   98.7 °F (37.1 °C) 94 18 159/88 98 %      Temp Source Heart Rate Source Patient Position - Orthostatic VS BP Location FiO2 (%)   Oral Monitor -- -- --      Pain Score       6           Vitals:    04/27/24 1721   BP: 159/88   Pulse: 94         Visual Acuity  Visual Acuity      Flowsheet Row Most Recent Value   Visual acuity Left eye is 20/20            ED Medications  Medications - No data to display    Diagnostic Studies  Results Reviewed       None                   No orders to display              Procedures  Procedures         ED Course                               SBIRT 22yo+      Flowsheet Row Most Recent Value   Initial Alcohol Screen: US AUDIT-C     1. How often do you have a drink containing alcohol? 0 Filed at: 04/27/2024 1749   2. How many drinks containing alcohol do you have on a typical day you are drinking?  0 Filed at: 04/27/2024 1749   3a. Male UNDER 65: How often do you have five or more drinks on one occasion? 0 Filed at: 04/27/2024 1749   3b. FEMALE Any Age, or MALE 65+: How often do you have 4 or more drinks on one occassion? 0 Filed at:  04/27/2024 1749   Audit-C Score 0 Filed at: 04/27/2024 1749   MATTY: How many times in the past year have you...    Used an illegal drug or used a prescription medication for non-medical reasons? Never Filed at: 04/27/2024 1749                      Medical Decision Making  In the ED, his visual symptoms are resolved, and there is no abscess.   He has follow up with general surgery in 2 days, so I went over return to ED precautions before then.  I have asked that he follow up with his ophthalmologist, and also return precautions.               Disposition  Final diagnoses:   Visual disturbance of one eye   Phlegmon - left groin     Time reflects when diagnosis was documented in both MDM as applicable and the Disposition within this note       Time User Action Codes Description Comment    4/27/2024  5:50 PM Pascual Xavier Add [H53.9] Visual disturbance of one eye     4/27/2024  5:51 PM Pascual Xavier Add [L02.91] Phlegmon     4/27/2024  5:51 PM Pascual Xavier Modify [L02.91] Phlegmon left groin          ED Disposition       ED Disposition   Discharge    Condition   Good    Date/Time   Sat Apr 27, 2024 1750    Comment   Adonis Mallory Jr. discharge to home/self care.                   Follow-up Information       Follow up With Specialties Details Why Contact Info    Yen Mariee MD General Surgery Go to  For followup 4/29 as scheduled [address] [facility]  [address]  [facility] [address]  [phone]      Carl Hawkins, DO Family Medicine Call  As needed [address]  [address]  [facility] [address]  [phone]              Discharge Medication List as of 4/27/2024  5:54 PM        CONTINUE these medications which have NOT CHANGED    Details   acetaminophen (TYLENOL) 325 mg tablet Take 650 mg by mouth every 6 (six) hours as needed for mild pain, Historical Med      aspirin 81 mg chewable tablet Chew 81 mg 2 tablets daily, Historical Med      Azopt 1 % ophthalmic suspension AKA: Brinzolomide, Starting  Sun 8/15/2021, Historical Med      B Complex Vitamins (B COMPLEX PO) Take by mouth once a week, Historical Med      chlordiazePOXIDE (LIBRIUM) 10 mg capsule TAKE 1 CAPSULE DAILY, Starting Mon 4/15/2024, Normal      clobetasol (OLUX) 0.05 % topical foam Apply topically daily as needed For ears., Historical Med      docusate sodium (COLACE) 100 mg capsule Take 100 mg by mouth if needed, Historical Med      econazole nitrate 1 % cream if needed, Starting u 10/6/2022, Historical Med      ergocalciferol (VITAMIN D2) 50,000 units TAKE 1 CAPSULE BY MOUTH ONE TIME PER WEEK, Normal      Erythromycin (ILOTYCIN OP) Apply to eye if needed, Historical Med      famotidine (PEPCID) 40 MG tablet Take 1 tablet (40 mg total) by mouth 2 (two) times a day, Starting Tue 11/28/2023, Normal      fish oil-omega-3 fatty acids 1000 MG capsule Take 2 g by mouth daily, Historical Med      glucosamine-chondroitin 500-400 MG tablet Take 1 tablet by mouth 2 (two) times a day, Historical Med      guaiFENesin (MUCINEX) 600 mg 12 hr tablet Take 1,200 mg by mouth if needed, Historical Med      hydrocortisone 2.5 % cream Apply topically if needed, Historical Med      levocetirizine (XYZAL) 5 MG tablet Take 1 tablet by mouth Daily, Starting Tue 8/27/2013, Historical Med      meclizine (ANTIVERT) 25 mg tablet TAKE 1 TABLET (25 MG TOTAL) BY MOUTH EVERY 8 (EIGHT) HOURS AS NEEDED FOR DIZZINESS., Starting Tue 4/23/2024, Normal      methylPREDNISolone 4 MG tablet therapy pack Use as directed on package, Normal      mometasone (ELOCON) 0.1 % cream Starting Fri 3/4/2022, Historical Med      Multiple Vitamins-Minerals (CENTRUM SILVER ULTRA MENS) TABS Take 1 tablet by mouth daily, Starting Mon 10/21/2013, Historical Med      mupirocin (BACTROBAN) 2 % ointment Apply topically 3 (three) times a day, Starting Fri 3/29/2024, Normal      promethazine-dextromethorphan (PHENERGAN-DM) 6.25-15 mg/5 mL oral syrup Take 5 mL by mouth 4 (four) times a day as needed for  cough, Starting Tue 4/16/2024, Normal      Red Yeast Rice 600 MG CAPS Take by mouth in the morning, Historical Med      Rocklatan 0.02-0.005 % SOLN INSTILL 1 DROP INTO BOTH EYES EVERY DAY IN THE EVENING, Historical Med      Silodosin 8 MG CAPS TAKE 1 CAPSULE BY MOUTH EVERY DAY, Normal      triamcinolone (KENALOG) 0.1 % oral topical paste Apply 1 Application topically 2 (two) times a day, Starting Mon 10/9/2023, Normal           STOP taking these medications       cefuroxime (CEFTIN) 500 mg tablet Comments:   Reason for Stopping:         doxycycline (ADOXA) 100 MG tablet Comments:   Reason for Stopping:               No discharge procedures on file.    PDMP Review         Value Time User    PDMP Reviewed  Yes 4/15/2024  6:05 PM Carl EDMONDSON&#39;DO Sonny            ED Provider  Electronically Signed by             Pascual Xavier MD  04/27/24 2036

## 2024-04-27 NOTE — DISCHARGE INSTRUCTIONS
Return to ED for increasing pain in the groin, redness, swelling, or fever.  If you are experiencing visual disturbance, call your eye doctor or return to the ED.

## 2024-04-30 ENCOUNTER — OFFICE VISIT (OUTPATIENT)
Dept: SURGERY | Facility: CLINIC | Age: 84
End: 2024-04-30
Payer: COMMERCIAL

## 2024-04-30 VITALS
HEART RATE: 111 BPM | DIASTOLIC BLOOD PRESSURE: 70 MMHG | WEIGHT: 161.2 LBS | SYSTOLIC BLOOD PRESSURE: 109 MMHG | BODY MASS INDEX: 23.13 KG/M2

## 2024-04-30 DIAGNOSIS — Z09 POSTOP CHECK: Primary | ICD-10-CM

## 2024-04-30 PROCEDURE — 99213 OFFICE O/P EST LOW 20 MIN: CPT | Performed by: PHYSICIAN ASSISTANT

## 2024-04-30 PROCEDURE — 1159F MED LIST DOCD IN RCRD: CPT | Performed by: PHYSICIAN ASSISTANT

## 2024-04-30 PROCEDURE — 1160F RVW MEDS BY RX/DR IN RCRD: CPT | Performed by: PHYSICIAN ASSISTANT

## 2024-04-30 NOTE — PROGRESS NOTES
Assessment/Plan:   Adonis Mallory Jr. is a 84 y.o.male who is here for   Chief Complaint   Patient presents with    Follow-up     FOLLOW-UP/ ABSCESS ON GROIN    PT is here for a f/u for an abscess on his left groin. PT was instructed to put ice packs on the area by the staff in the ED when visited on the 04/27/24. PT is still feeling pain/discomfort from the area, the area was irritating him through the night and rated it as a 4 out of 10.         Plan:   Incision is closed and area has no erythema or fluctuation. There is hard area that is non-tender but wants to follow up with urology to speak about prostate and scrotal sebaceous cyst. The cyst still feels intact in the left upper scrotal area. Ice packs to the area. Patient states he is having no pain at today's visit.      In preparation for this visit all relevant known prior office notes, prior consultations, emergency room visits, blood work results, and imaging studies were personally reviewed.  A total of  35 minutes was spent reviewing all of this information, caring for this patient, providing differential diagnosis, instructions for management, counseling and coordination of care.  This also includes planning surgical intervention where indicated.    Patient was discussed and asked her medication list.    Discussion was held regarding anticoagulation, aspirin and Motrin use prior to surgery.  Most anticoagulation needs to stop 7 to 10 days prior to surgery.  Some can be stopped at 48 hours.  Patient has had all questions answered by the physician or physician representative regarding anticoagulation.  Patient is aware that they need to stop their anticoagulation preoperatively.    Discussion was held regarding weight loss medication and diabetic medication with that also is used for weight loss.  These medications have significant perioperative risk.  He has medications need to be stopped 7 to 10 days prior to surgery.  Patient understands and is aware  they need to stop these type of medications preoperatively.    Patient has been given a list of anticoagulation and/or weight loss/diabetes medications that would be affected by this and they have confirmed the are either not on these drugs or have understood their directions to stop them at least 1 week prior to surgery or as instructed.    Most herbal medication should be discontinued a week to 10 days prior to surgery.    Diabetic medication such as insulin should be discussed with her primary care physician or the physician that ordered the insulin or similar medications.  In general patients usually half their diabetic insulin the morning of the surgery but again this should be discussed with the physician.    Patient understands and agrees to this aforementioned protocol.          _______________________________________________________  CC:Follow-up (FOLLOW-UP/ ABSCESS ON GROIN//PT is here for a f/u for an abscess on his left groin. PT was instructed to put ice packs on the area by the staff in the ED when visited on the 04/27/24. PT is still feeling pain/discomfort from the area, the area was irritating him through the night and rated it as a 4 out of 10.)  .    HPI:  Adonis H Mohamud Bullard. is a 84 y.o.male who was referred for evaluation of Follow-up (FOLLOW-UP/ ABSCESS ON GROIN//PT is here for a f/u for an abscess on his left groin. PT was instructed to put ice packs on the area by the staff in the ED when visited on the 04/27/24. PT is still feeling pain/discomfort from the area, the area was irritating him through the night and rated it as a 4 out of 10.)  .    Currently patient reports was seen in the ER twice with fevers over the last week for a left groin infected cyst or hair follicle.  This was drained in the ER.  He has been placed on 2 antibiotics..     Reports: not changing, draining, and infected    Location: Left groin, 1 cm opening slightly cruciate, tracking superiorly and inferiorly without  further abscess.    4/30/24: Patient went to the ER 4/27/24 for visual disturbance and left groin/scrotal pain. The ER did note hardness in the area but no erythema or fluctuation. The did not feel any US or antibiotics were warranted  and had him follow up here in the office. Since 4/27/24 patient states  he was having 4/10 pain. Today he states he has no pain and is having no issues but would like to discuss with urology his prostate.     ROS:  General ROS: negative  negative for - chills, fatigue, fever or night sweats, weight loss  Respiratory ROS: no cough, shortness of breath, or wheezing  Cardiovascular ROS: no chest pain or dyspnea on exertion  Genito-Urinary ROS: no dysuria, trouble voiding, or hematuria  Musculoskeletal ROS: negative for - gait disturbance, joint pain or muscle pain  Neurological ROS: no TIA or stroke symptoms  Skin ROS: See HPI  GI ROS: see HPI  Skin ROS: no new rashes or lesions   Lymphatic ROS: no new adenopathy noted by pt.   GYN ROS: see HPI, no new GYN history or bleeding noted  Psy ROS: no new mental or behavioral disturbances       Patient Active Problem List   Diagnosis    Renal lithiasis    Mild cognitive impairment    Mixed hyperlipidemia    Oropharyngeal dysphagia    Abnormal diffusion capacity determined by pulmonary function test    Sjogren's syndrome (HCC)    Irritable bowel syndrome    Gastroesophageal reflux disease without esophagitis    Enlarged prostate without lower urinary tract symptoms (luts)    Dysplastic nevus of face    Arthritis    Anxiety    Antithrombinemia (HCC)    Allergic rhinitis    Neck pain    Glaucoma    Eczema    Neck mass    Tick bite of left lower leg    Dyspnea on exertion    Right ear pain    1st degree AV block    Sick sinus syndrome (HCC)    Mobitz (type) I (Wenckebach's) atrioventricular block    Aortic stenosis with trileaflet valve    Recent change in frequency of bowel movements    Right lower quadrant abdominal pain    Breast pain, left     Chronic fatigue    Chronic obstructive pulmonary disease, unspecified COPD type (HCC)    Depression, recurrent (HCC)    Vertigo         Allergies:  Levofloxacin, Azithromycin, Ketorolac, and Penicillins      Current Outpatient Medications:     acetaminophen (TYLENOL) 325 mg tablet, Take 650 mg by mouth every 6 (six) hours as needed for mild pain, Disp: , Rfl:     aspirin 81 mg chewable tablet, Chew 81 mg 2 tablets daily, Disp: , Rfl:     Azopt 1 % ophthalmic suspension, AKA: Brinzolomide, Disp: , Rfl:     B Complex Vitamins (B COMPLEX PO), Take by mouth once a week, Disp: , Rfl:     chlordiazePOXIDE (LIBRIUM) 10 mg capsule, TAKE 1 CAPSULE DAILY, Disp: 90 capsule, Rfl: 1    clobetasol (OLUX) 0.05 % topical foam, Apply topically daily as needed For ears., Disp: , Rfl:     docusate sodium (COLACE) 100 mg capsule, Take 100 mg by mouth if needed, Disp: , Rfl:     econazole nitrate 1 % cream, if needed, Disp: , Rfl:     famotidine (PEPCID) 40 MG tablet, Take 1 tablet (40 mg total) by mouth 2 (two) times a day, Disp: 180 tablet, Rfl: 0    fish oil-omega-3 fatty acids 1000 MG capsule, Take 2 g by mouth daily, Disp: , Rfl:     glucosamine-chondroitin 500-400 MG tablet, Take 1 tablet by mouth 2 (two) times a day, Disp: , Rfl:     guaiFENesin (MUCINEX) 600 mg 12 hr tablet, Take 1,200 mg by mouth if needed, Disp: , Rfl:     hydrocortisone 2.5 % cream, Apply topically if needed, Disp: , Rfl:     levocetirizine (XYZAL) 5 MG tablet, Take 1 tablet by mouth Daily, Disp: , Rfl:     meclizine (ANTIVERT) 25 mg tablet, TAKE 1 TABLET (25 MG TOTAL) BY MOUTH EVERY 8 (EIGHT) HOURS AS NEEDED FOR DIZZINESS., Disp: 90 tablet, Rfl: 0    methylPREDNISolone 4 MG tablet therapy pack, Use as directed on package, Disp: 21 each, Rfl: 0    mometasone (ELOCON) 0.1 % cream, , Disp: , Rfl:     Multiple Vitamins-Minerals (CENTRUM SILVER ULTRA MENS) TABS, Take 1 tablet by mouth daily, Disp: , Rfl:     mupirocin (BACTROBAN) 2 % ointment, Apply topically 3  (three) times a day, Disp: 30 g, Rfl: 1    promethazine-dextromethorphan (PHENERGAN-DM) 6.25-15 mg/5 mL oral syrup, Take 5 mL by mouth 4 (four) times a day as needed for cough, Disp: 240 mL, Rfl: 0    Rocklatan 0.02-0.005 % SOLN, INSTILL 1 DROP INTO BOTH EYES EVERY DAY IN THE EVENING, Disp: , Rfl:     Silodosin 8 MG CAPS, TAKE 1 CAPSULE BY MOUTH EVERY DAY, Disp: 90 capsule, Rfl: 3    triamcinolone (KENALOG) 0.1 % oral topical paste, Apply 1 Application topically 2 (two) times a day, Disp: 5 g, Rfl: 5    ergocalciferol (VITAMIN D2) 50,000 units, TAKE 1 CAPSULE BY MOUTH ONE TIME PER WEEK (Patient not taking: Reported on 4/1/2024), Disp: 12 capsule, Rfl: 2    Erythromycin (ILOTYCIN OP), Apply to eye if needed (Patient not taking: Reported on 4/1/2024), Disp: , Rfl:     Red Yeast Rice 600 MG CAPS, Take by mouth in the morning (Patient not taking: Reported on 2/14/2024), Disp: , Rfl:     Past Medical History:   Diagnosis Date    Abnormal diffusion capacity determined by pulmonary function test 04/25/2017    Actinic keratosis     Acute right-sided low back pain without sciatica 09/01/2017    Adverse effect of correct medicinal substance properly administered     Arthralgia     Candidiasis, mouth     Chronic allergic rhinitis     Chronic bronchitis (HCC)     Chronic sinusitis     Cough     Dermatitis     Diverticulosis     LUCIANO (dyspnea on exertion)     Former smoker     GERD (gastroesophageal reflux disease)     Glaucoma     Hyperlipidemia     Inguinal hernia, left     Lump of skin     Male erectile dysfunction     Open comedone     Palpitations     Persistent cough for 3 weeks or longer 03/15/2017    Renal calculi     Sebaceous cyst     Seborrheic keratosis     Sick sinus syndrome (HCC) 07/12/2022    Skin disorder     Unspecified chronic bronchitis (HCC)     Visual disturbances        Past Surgical History:   Procedure Laterality Date    CARDIAC ELECTROPHYSIOLOGY PROCEDURE N/A 08/09/2022    Procedure: Cardiac pacer  implant/ DUAL CHAMBER PACER @ [facility];  Surgeon: Van Gore DO;  Location: [facility];  Service: Cardiology    COLONOSCOPY  12/2014    [facility] Julien MD.-Diverticulosis    COLONOSCOPY  10/2011    BRADY oRss MD.-Diverticulosis    EGD  03/2016    KEENAN Ross MD.-LA Grade A reflux esophagitis, normal stomach, normal jejunum, benign-appearing esophageal stenosis.    EGD  08/2015    KEENAN Ross MD.-Normal upper endoscopy    INGUINAL HERNIA REPAIR Bilateral     left- last assessed: 11/25/14, Resolved: 5/18/15, onset 12/11/13    MOUTH SURGERY         Family History   Problem Relation Age of Onset    Brain cancer Mother     Heart failure Mother     Prostate cancer Mother         reports that he quit smoking about 14 years ago. His smoking use included cigarettes. He has never been exposed to tobacco smoke. He has never used smokeless tobacco. He reports that he does not currently use alcohol after a past usage of about 2.0 standard drinks of alcohol per week. He reports that he does not use drugs.    Vitals:    04/30/24 1327   BP: 109/70   Pulse: (!) 111            PHYSICAL EXAM  General Appearance:    Alert, cooperative, no distress,    Head:    Normocephalic without obvious abnormality   Eyes:    PERRL, conjunctiva/corneas clear     Neck:   Supple, no adenopathy, no JVD   Back:     Symmetric, no spinal or CVA tenderness   Lungs:     Clear to auscultation bilaterally, no wheezing or rhonchi   Heart:    Regular rate and rhythm, S1 and S2 normal, no murmur   Abdomen:     Benign, no rebound or guarding.    Extremities:   Extremities normal. No clubbing, cyanosis or edema   Psych:   Normal Affect, AOx3.    Neurologic:  Skin:   CNII-XII intact. Strength symmetric, speech intact    Warm, dry, intact, no visible rashes or lesions except as follows:   Left groin 1 cm opening, with phlegmon but no obvious progression or fluctuance.               Some portions of this record may have been generated  "with voice recognition software. There may be translation, syntax,  or grammatical errors. Occasional wrong word or \"sound-a-like\" substitutions may have occurred due to the inherent limitations of the voice recognition software. Read the chart carefully and recognize, using context, where substitutions may have occurred. If you have any questions, please contact the dictating provider for clarification or correction, as needed. This encounter has been coded by a non-certified coder.       Jyoti Valadez PA-C    Date: 4/30/2024 Time: 1:30 PM     "

## 2024-05-24 DIAGNOSIS — H40.9 GLAUCOMA, UNSPECIFIED GLAUCOMA TYPE, UNSPECIFIED LATERALITY: Primary | ICD-10-CM

## 2024-05-24 RX ORDER — BRINZOLAMIDE 10 MG/ML
SUSPENSION/ DROPS OPHTHALMIC
Qty: 30 ML | Refills: 3 | Status: SHIPPED | OUTPATIENT
Start: 2024-05-24

## 2024-06-05 DIAGNOSIS — R42 VERTIGO: ICD-10-CM

## 2024-06-06 RX ORDER — MECLIZINE HYDROCHLORIDE 25 MG/1
25 TABLET ORAL EVERY 8 HOURS PRN
Qty: 90 TABLET | Refills: 1 | Status: SHIPPED | OUTPATIENT
Start: 2024-06-06

## 2024-07-17 ENCOUNTER — APPOINTMENT (OUTPATIENT)
Dept: LAB | Facility: HOSPITAL | Age: 84
End: 2024-07-17
Payer: COMMERCIAL

## 2024-07-17 DIAGNOSIS — G45.3 AMAUROSIS FUGAX: ICD-10-CM

## 2024-07-17 DIAGNOSIS — H47.013 ANTERIOR ISCHEMIC OPTIC NEUROPATHY OF BOTH EYES: ICD-10-CM

## 2024-07-17 LAB
CRP SERPL QL: <1 MG/L
ERYTHROCYTE [DISTWIDTH] IN BLOOD BY AUTOMATED COUNT: 14.6 % (ref 11.6–15.1)
ERYTHROCYTE [SEDIMENTATION RATE] IN BLOOD: 13 MM/HOUR (ref 0–19)
FOLATE SERPL-MCNC: 17.1 NG/ML
HCT VFR BLD AUTO: 39.1 % (ref 36.5–49.3)
HGB BLD-MCNC: 13.2 G/DL (ref 12–17)
MCH RBC QN AUTO: 32.7 PG (ref 26.8–34.3)
MCHC RBC AUTO-ENTMCNC: 33.8 G/DL (ref 31.4–37.4)
MCV RBC AUTO: 97 FL (ref 82–98)
PLATELET # BLD AUTO: 201 THOUSANDS/UL (ref 149–390)
PMV BLD AUTO: 9.8 FL (ref 8.9–12.7)
RBC # BLD AUTO: 4.04 MILLION/UL (ref 3.88–5.62)
VIT B12 SERPL-MCNC: 251 PG/ML (ref 180–914)
WBC # BLD AUTO: 6.9 THOUSAND/UL (ref 4.31–10.16)

## 2024-07-17 PROCEDURE — 36415 COLL VENOUS BLD VENIPUNCTURE: CPT

## 2024-07-17 PROCEDURE — 85027 COMPLETE CBC AUTOMATED: CPT

## 2024-07-17 PROCEDURE — 86140 C-REACTIVE PROTEIN: CPT

## 2024-07-17 PROCEDURE — 82746 ASSAY OF FOLIC ACID SERUM: CPT

## 2024-07-17 PROCEDURE — 82607 VITAMIN B-12: CPT

## 2024-07-17 PROCEDURE — 85652 RBC SED RATE AUTOMATED: CPT

## 2024-07-24 DIAGNOSIS — N40.0 BENIGN PROSTATIC HYPERPLASIA, UNSPECIFIED WHETHER LOWER URINARY TRACT SYMPTOMS PRESENT: ICD-10-CM

## 2024-07-24 RX ORDER — SILODOSIN 8 MG/1
CAPSULE ORAL
Qty: 100 CAPSULE | Refills: 1 | Status: SHIPPED | OUTPATIENT
Start: 2024-07-24

## 2024-08-12 DIAGNOSIS — K21.9 GASTROESOPHAGEAL REFLUX DISEASE WITHOUT ESOPHAGITIS: ICD-10-CM

## 2024-08-12 RX ORDER — FAMOTIDINE 40 MG/1
40 TABLET, FILM COATED ORAL 2 TIMES DAILY
Qty: 180 TABLET | Refills: 0 | Status: SHIPPED | OUTPATIENT
Start: 2024-08-12

## 2024-08-20 NOTE — PROGRESS NOTES
Name: Adonis Mallory Jr.      : 1940      MRN: [id number]  Encounter Provider: Carl Hawkins DO  Encounter Date: 2024   Encounter department: [facility]    Assessment & Plan     1. Bronchitis  -     doxycycline (ADOXA) 100 MG tablet; Take 1 tablet (100 mg total) by mouth 2 (two) times a day for 7 days  -     methylPREDNISolone 4 MG tablet therapy pack; Use as directed on package         Subjective      Persistent upper respiratory symptoms with productive cough sinus and head congestion with postnasal drip.  Was treated initially with cefdinir and then subsequently Ceftin.  Patient tolerated those medications poorly with GI symptoms and diarrhea.      Review of Systems   HENT:  Positive for congestion, postnasal drip and sinus pressure.    Respiratory:  Positive for cough.        Current Outpatient Medications on File Prior to Visit   Medication Sig   • acetaminophen (TYLENOL) 325 mg tablet Take 650 mg by mouth every 6 (six) hours as needed for mild pain   • aspirin 81 mg chewable tablet Chew 81 mg 2 tablets daily   • Azopt 1 % ophthalmic suspension AKA: Brinzolomide   • B Complex Vitamins (B COMPLEX PO) Take by mouth once a week   • cefuroxime (CEFTIN) 500 mg tablet Take 1 tablet (500 mg total) by mouth every 12 (twelve) hours for 10 days   • chlordiazePOXIDE (LIBRIUM) 10 mg capsule TAKE 1 CAPSULE DAILY   • clobetasol (OLUX) 0.05 % topical foam Apply topically daily as needed For ears.   • docusate sodium (COLACE) 100 mg capsule Take 100 mg by mouth if needed   • econazole nitrate 1 % cream if needed   • famotidine (PEPCID) 40 MG tablet Take 1 tablet (40 mg total) by mouth 2 (two) times a day   • fish oil-omega-3 fatty acids 1000 MG capsule Take 2 g by mouth daily   • glucosamine-chondroitin 500-400 MG tablet Take 1 tablet by mouth 2 (two) times a day   • guaiFENesin (MUCINEX) 600 mg 12 hr tablet Take 1,200 mg by mouth if needed   • hydrocortisone 2.5 % cream Apply topically  if needed   • levocetirizine (XYZAL) 5 MG tablet Take 1 tablet by mouth Daily   • meclizine (ANTIVERT) 25 mg tablet Take 1 tablet (25 mg total) by mouth every 8 (eight) hours as needed for dizziness   • mometasone (ELOCON) 0.1 % cream    • Multiple Vitamins-Minerals (CENTRUM SILVER ULTRA MENS) TABS Take 1 tablet by mouth daily   • mupirocin (BACTROBAN) 2 % ointment Apply topically 3 (three) times a day   • promethazine-dextromethorphan (PHENERGAN-DM) 6.25-15 mg/5 mL oral syrup Take 5 mL by mouth 4 (four) times a day as needed for cough   • Rocklatan 0.02-0.005 % SOLN INSTILL 1 DROP INTO BOTH EYES EVERY DAY IN THE EVENING   • Silodosin 8 MG CAPS TAKE 1 CAPSULE BY MOUTH EVERY DAY   • triamcinolone (KENALOG) 0.1 % oral topical paste Apply 1 Application topically 2 (two) times a day   • ergocalciferol (VITAMIN D2) 50,000 units TAKE 1 CAPSULE BY MOUTH ONE TIME PER WEEK (Patient not taking: Reported on 4/1/2024)   • Erythromycin (ILOTYCIN OP) Apply to eye if needed (Patient not taking: Reported on 4/1/2024)   • Red Yeast Rice 600 MG CAPS Take by mouth in the morning (Patient not taking: Reported on 2/14/2024)       Objective     /74 (BP Location: Left arm, Patient Position: Sitting, Cuff Size: Standard)   Pulse (!) 107   Temp 98.7 °F (37.1 °C) (Temporal)   Wt 74.4 kg (164 lb)   SpO2 95%   BMI 23.53 kg/m²     Physical Exam  Vitals and nursing note reviewed.   Constitutional:       General: He is not in acute distress.     Appearance: Normal appearance. He is well-developed. He is not diaphoretic.   HENT:      Head: Normocephalic and atraumatic.      Nose: Congestion and rhinorrhea present.      Mouth/Throat:      Pharynx: Posterior oropharyngeal erythema present.   Eyes:      General:         Right eye: No discharge.      Conjunctiva/sclera: Conjunctivae normal.      Pupils: Pupils are equal, round, and reactive to light.   Neck:      Thyroid: No thyromegaly.   Cardiovascular:      Rate and Rhythm: Normal rate  and regular rhythm.   Pulmonary:      Effort: Pulmonary effort is normal. No respiratory distress.      Breath sounds: Rhonchi present.   Musculoskeletal:      Cervical back: Normal range of motion.   Lymphadenopathy:      Cervical: No cervical adenopathy.   Skin:     General: Skin is warm and dry.   Neurological:      Mental Status: He is alert and oriented to person, place, and time.   Psychiatric:         Mood and Affect: Mood normal.         Behavior: Behavior normal.         Thought Content: Thought content normal.         Judgment: Judgment normal.       Carl Hawkins, DO     FACES

## 2024-09-03 ENCOUNTER — OFFICE VISIT (OUTPATIENT)
Dept: FAMILY MEDICINE CLINIC | Facility: CLINIC | Age: 84
End: 2024-09-03
Payer: COMMERCIAL

## 2024-09-03 VITALS
SYSTOLIC BLOOD PRESSURE: 136 MMHG | TEMPERATURE: 97.6 F | HEIGHT: 70 IN | DIASTOLIC BLOOD PRESSURE: 74 MMHG | OXYGEN SATURATION: 96 % | HEART RATE: 80 BPM | BODY MASS INDEX: 23.34 KG/M2 | WEIGHT: 163 LBS

## 2024-09-03 DIAGNOSIS — L30.9 DERMATITIS: ICD-10-CM

## 2024-09-03 DIAGNOSIS — K58.9 IRRITABLE BOWEL SYNDROME, UNSPECIFIED TYPE: ICD-10-CM

## 2024-09-03 DIAGNOSIS — F41.9 ANXIETY: Primary | ICD-10-CM

## 2024-09-03 DIAGNOSIS — Z23 ENCOUNTER FOR IMMUNIZATION: ICD-10-CM

## 2024-09-03 PROCEDURE — 1160F RVW MEDS BY RX/DR IN RCRD: CPT | Performed by: FAMILY MEDICINE

## 2024-09-03 PROCEDURE — 90662 IIV NO PRSV INCREASED AG IM: CPT

## 2024-09-03 PROCEDURE — 1159F MED LIST DOCD IN RCRD: CPT | Performed by: FAMILY MEDICINE

## 2024-09-03 PROCEDURE — G0008 ADMIN INFLUENZA VIRUS VAC: HCPCS

## 2024-09-03 PROCEDURE — 99213 OFFICE O/P EST LOW 20 MIN: CPT | Performed by: FAMILY MEDICINE

## 2024-09-03 RX ORDER — CLOBETASOL PROPIONATE 0.5 MG/G
AEROSOL, FOAM TOPICAL DAILY PRN
Qty: 50 G | Refills: 2 | Status: SHIPPED | OUTPATIENT
Start: 2024-09-03

## 2024-09-03 RX ORDER — CHLORDIAZEPOXIDE HYDROCHLORIDE 5 MG/1
5 CAPSULE, GELATIN COATED ORAL DAILY
Qty: 90 CAPSULE | Refills: 0 | Status: SHIPPED | OUTPATIENT
Start: 2024-09-03

## 2024-09-03 NOTE — PROGRESS NOTES
Ambulatory Visit  Name: Adonis Mallory Jr.      : 1940      MRN: [id number]  Encounter Provider: Carl Hawkins DO  Encounter Date: 9/3/2024   Encounter department: [facility]    Assessment & Plan   1. Anxiety  Assessment & Plan:  Will decrease Librax from 10 mg to 5 mg daily.  Plan on doing this for 3 to 6 months and then consider further taper if patient is tolerating decrease in dosage.  Orders:  -     chlordiazePOXIDE (LIBRIUM) 5 mg capsule; Take 1 capsule (5 mg total) by mouth daily  2. Encounter for immunization  -     influenza vaccine, high-dose, PF 0.5 mL (Fluzone High Dose)  3. Irritable bowel syndrome, unspecified type  -     chlordiazePOXIDE (LIBRIUM) 5 mg capsule; Take 1 capsule (5 mg total) by mouth daily  4. Dermatitis  -     clobetasol (OLUX) 0.05 % topical foam; Apply topically daily as needed (skin irritation) For ears.         History of Present Illness     Patient primarily presents to discuss his Librax.  He has been on 10 mg for many years.  Does not feel strongly that he needs it at this time.  On occasion he has missed several days in a row without any significant withdrawal symptoms.      Review of Systems   Constitutional: Negative.    Respiratory: Negative.     Cardiovascular: Negative.    Gastrointestinal: Negative.    Genitourinary: Negative.    Musculoskeletal: Negative.    Psychiatric/Behavioral: Negative.       Past Medical History:   Diagnosis Date   • Abnormal diffusion capacity determined by pulmonary function test 2017   • Actinic keratosis    • Acute right-sided low back pain without sciatica 2017   • Adverse effect of correct medicinal substance properly administered    • Arthralgia    • Candidiasis, mouth    • Chronic allergic rhinitis    • Chronic bronchitis (HCC)    • Chronic sinusitis    • Cough    • Dermatitis    • Diverticulosis    • LUICANO (dyspnea on exertion)    • Former smoker    • GERD (gastroesophageal reflux disease)    •  Glaucoma    • Hyperlipidemia    • Inguinal hernia, left    • Lump of skin    • Male erectile dysfunction    • Open comedone    • Palpitations    • Persistent cough for 3 weeks or longer 03/15/2017   • Renal calculi    • Sebaceous cyst    • Seborrheic keratosis    • Sick sinus syndrome (HCC) 2022   • Skin disorder    • Unspecified chronic bronchitis (HCC)    • Visual disturbances      Past Surgical History:   Procedure Laterality Date   • CARDIAC ELECTROPHYSIOLOGY PROCEDURE N/A 2022    Procedure: Cardiac pacer implant/ DUAL CHAMBER PACER @ [facility];  Surgeon: Van Gore DO;  Location: [facility];  Service: Cardiology   • COLONOSCOPY  2014    [facility] MD.-Diverticulosis   • COLONOSCOPY  10/2011    BRADY Ross MD.-Diverticulosis   • EGD  2016    KEENAN Ross MD.-LA Grade A reflux esophagitis, normal stomach, normal jejunum, benign-appearing esophageal stenosis.   • EGD  2015    KEENAN Ross MD.-Normal upper endoscopy   • INGUINAL HERNIA REPAIR Bilateral     left- last assessed: 14, Resolved: 5/18/15, onset 13   • MOUTH SURGERY       Family History   Problem Relation Age of Onset   • Brain cancer Mother    • Heart failure Mother    • Prostate cancer Mother      Social History     Tobacco Use   • Smoking status: Former     Current packs/day: 0.00     Types: Cigarettes     Quit date:      Years since quittin.6     Passive exposure: Never   • Smokeless tobacco: Never   Vaping Use   • Vaping status: Never Used   Substance and Sexual Activity   • Alcohol use: Not Currently     Alcohol/week: 2.0 standard drinks of alcohol     Types: 2 Cans of beer per week   • Drug use: No   • Sexual activity: Not Currently     Current Outpatient Medications on File Prior to Visit   Medication Sig   • acetaminophen (TYLENOL) 325 mg tablet Take 650 mg by mouth every 6 (six) hours as needed for mild pain   • aspirin 81 mg chewable tablet Chew 81 mg 2 tablets daily   • B  Complex Vitamins (B COMPLEX PO) Take by mouth once a week   • brinzolamide (AZOPT) 1 % ophthalmic suspension INSTILL 1 DROP INTO BOTH EYES TWICE A DAY   • docusate sodium (COLACE) 100 mg capsule Take 100 mg by mouth if needed   • econazole nitrate 1 % cream if needed   • ergocalciferol (VITAMIN D2) 50,000 units TAKE 1 CAPSULE BY MOUTH ONE TIME PER WEEK (Patient not taking: Reported on 4/1/2024)   • Erythromycin (ILOTYCIN OP) Apply to eye if needed (Patient not taking: Reported on 4/1/2024)   • famotidine (PEPCID) 40 MG tablet TAKE 1 TABLET BY MOUTH TWICE A DAY   • fish oil-omega-3 fatty acids 1000 MG capsule Take 2 g by mouth daily   • glucosamine-chondroitin 500-400 MG tablet Take 1 tablet by mouth 2 (two) times a day   • guaiFENesin (MUCINEX) 600 mg 12 hr tablet Take 1,200 mg by mouth if needed   • hydrocortisone 2.5 % cream Apply topically if needed   • levocetirizine (XYZAL) 5 MG tablet Take 1 tablet by mouth Daily   • meclizine (ANTIVERT) 25 mg tablet TAKE 1 TABLET (25 MG TOTAL) BY MOUTH EVERY 8 (EIGHT) HOURS AS NEEDED FOR DIZZINESS.   • methylPREDNISolone 4 MG tablet therapy pack Use as directed on package   • mometasone (ELOCON) 0.1 % cream    • Multiple Vitamins-Minerals (CENTRUM SILVER ULTRA MENS) TABS Take 1 tablet by mouth daily   • mupirocin (BACTROBAN) 2 % ointment Apply topically 3 (three) times a day   • promethazine-dextromethorphan (PHENERGAN-DM) 6.25-15 mg/5 mL oral syrup Take 5 mL by mouth 4 (four) times a day as needed for cough   • Red Yeast Rice 600 MG CAPS Take by mouth in the morning (Patient not taking: Reported on 2/14/2024)   • Rocklatan 0.02-0.005 % SOLN INSTILL 1 DROP INTO BOTH EYES EVERY DAY IN THE EVENING   • Silodosin 8 MG CAPS TAKE 1 CAPSULE BY MOUTH EVERY DAY   • triamcinolone (KENALOG) 0.1 % oral topical paste Apply 1 Application topically 2 (two) times a day   • [DISCONTINUED] chlordiazePOXIDE (LIBRIUM) 10 mg capsule TAKE 1 CAPSULE DAILY   • [DISCONTINUED] clobetasol (OLUX) 0.05 %  "topical foam Apply topically daily as needed For ears.     Allergies   Allergen Reactions   • Levofloxacin Anaphylaxis   • Azithromycin GI Intolerance   • Ketorolac Other (See Comments)     Swelling of eyes with drops   • Penicillins Hives     Immunization History   Administered Date(s) Administered   • COVID-19 MODERNA VACC 0.5 ML IM 01/19/2021, 02/17/2021, 11/14/2021, 04/27/2022, 08/17/2022, 09/30/2022   • COVID-19 Moderna Vac BIVALENT 12 Yr+ IM 0.5 ML 08/17/2023   • COVID-19 Moderna mRNA Vaccine 12 Yr+ 50 mcg/0.5 mL (Spikevax) 12/16/2023   • INFLUENZA 09/18/2015, 10/18/2016, 09/01/2017, 08/27/2018   • Influenza Quadrivalent, 6-35 Months IM 09/01/2017   • Influenza Split High Dose Preservative Free IM 09/25/2014, 09/18/2015, 10/18/2016, 08/17/2023, 09/03/2024   • Influenza, high dose seasonal 0.7 mL 08/27/2018, 09/24/2019, 09/01/2020, 09/14/2021, 10/05/2022   • Influenza, seasonal, injectable 10/17/2012, 10/09/2013   • Pneumococcal Conjugate 13-Valent 06/08/2015   • Pneumococcal Polysaccharide PPV23 08/30/2019   • Td (adult), adsorbed 10/22/2014   • Tdap 04/16/2020   • Zoster Vaccine Recombinant 03/30/2018, 05/29/2018     Objective     /74 (BP Location: Left arm, Patient Position: Sitting, Cuff Size: Standard)   Pulse 80   Temp 97.6 °F (36.4 °C) (Temporal)   Ht 5' 10\" (1.778 m)   Wt 73.9 kg (163 lb)   SpO2 96%   BMI 23.39 kg/m²     Physical Exam  Constitutional:       General: He is not in acute distress.     Appearance: He is well-developed.   HENT:      Head: Normocephalic and atraumatic.   Eyes:      Pupils: Pupils are equal, round, and reactive to light.   Pulmonary:      Effort: Pulmonary effort is normal.   Musculoskeletal:         General: Normal range of motion.      Cervical back: Normal range of motion.   Skin:     Coloration: Skin is not pale.      Findings: No erythema.   Psychiatric:         Behavior: Behavior normal.         Thought Content: Thought content normal.         Judgment: " Judgment normal.

## 2024-09-03 NOTE — ASSESSMENT & PLAN NOTE
Will decrease Librax from 10 mg to 5 mg daily.  Plan on doing this for 3 to 6 months and then consider further taper if patient is tolerating decrease in dosage.

## 2024-09-26 ENCOUNTER — HOSPITAL ENCOUNTER (OUTPATIENT)
Dept: NON INVASIVE DIAGNOSTICS | Facility: CLINIC | Age: 84
Discharge: HOME | End: 2024-09-26
Payer: COMMERCIAL

## 2024-09-26 DIAGNOSIS — G45.3 AMAUROSIS FUGAX: ICD-10-CM

## 2024-09-26 PROCEDURE — 93880 EXTRACRANIAL BILAT STUDY: CPT

## 2024-09-26 PROCEDURE — 93880 EXTRACRANIAL BILAT STUDY: CPT | Performed by: SURGERY

## 2024-09-28 DIAGNOSIS — R42 VERTIGO: ICD-10-CM

## 2024-09-28 RX ORDER — MECLIZINE HYDROCHLORIDE 25 MG/1
25 TABLET ORAL EVERY 8 HOURS PRN
Qty: 90 TABLET | Refills: 1 | Status: SHIPPED | OUTPATIENT
Start: 2024-09-28

## 2024-10-14 ENCOUNTER — TELEPHONE (OUTPATIENT)
Age: 84
End: 2024-10-14

## 2024-10-14 NOTE — TELEPHONE ENCOUNTER
"Pt called stating that someone from this number called him and said \"Are you there?\"  When he asked who was calling - they hung up.    After reviewing chart and confirming no clinical encounters, advised pt that it may have been a poor connection and that it was likely a confirmation call to confirm his appt for tomorrow w/Dr. Hawkins.      "

## 2024-10-15 ENCOUNTER — APPOINTMENT (OUTPATIENT)
Dept: LAB | Facility: CLINIC | Age: 84
End: 2024-10-15
Payer: COMMERCIAL

## 2024-10-15 ENCOUNTER — OFFICE VISIT (OUTPATIENT)
Dept: FAMILY MEDICINE CLINIC | Facility: CLINIC | Age: 84
End: 2024-10-15
Payer: COMMERCIAL

## 2024-10-15 VITALS
SYSTOLIC BLOOD PRESSURE: 122 MMHG | OXYGEN SATURATION: 98 % | HEIGHT: 70 IN | DIASTOLIC BLOOD PRESSURE: 72 MMHG | TEMPERATURE: 97.8 F | WEIGHT: 160 LBS | HEART RATE: 83 BPM | BODY MASS INDEX: 22.9 KG/M2

## 2024-10-15 DIAGNOSIS — R53.82 CHRONIC FATIGUE: ICD-10-CM

## 2024-10-15 DIAGNOSIS — I49.5 SICK SINUS SYNDROME (HCC): ICD-10-CM

## 2024-10-15 DIAGNOSIS — J44.9 CHRONIC OBSTRUCTIVE PULMONARY DISEASE, UNSPECIFIED COPD TYPE (HCC): ICD-10-CM

## 2024-10-15 DIAGNOSIS — E78.2 MIXED HYPERLIPIDEMIA: ICD-10-CM

## 2024-10-15 DIAGNOSIS — K21.9 GASTROESOPHAGEAL REFLUX DISEASE WITHOUT ESOPHAGITIS: ICD-10-CM

## 2024-10-15 DIAGNOSIS — K58.9 IRRITABLE BOWEL SYNDROME, UNSPECIFIED TYPE: ICD-10-CM

## 2024-10-15 DIAGNOSIS — F33.9 DEPRESSION, RECURRENT (HCC): ICD-10-CM

## 2024-10-15 DIAGNOSIS — N40.0 ENLARGED PROSTATE WITHOUT LOWER URINARY TRACT SYMPTOMS (LUTS): ICD-10-CM

## 2024-10-15 DIAGNOSIS — Z00.00 MEDICARE ANNUAL WELLNESS VISIT, SUBSEQUENT: Primary | ICD-10-CM

## 2024-10-15 DIAGNOSIS — M35.00 SJOGREN'S SYNDROME, WITH UNSPECIFIED ORGAN INVOLVEMENT (HCC): ICD-10-CM

## 2024-10-15 LAB
ALBUMIN SERPL BCG-MCNC: 4 G/DL (ref 3.5–5)
ALP SERPL-CCNC: 73 U/L (ref 34–104)
ALT SERPL W P-5'-P-CCNC: 9 U/L (ref 7–52)
ANION GAP SERPL CALCULATED.3IONS-SCNC: 8 MMOL/L (ref 4–13)
AST SERPL W P-5'-P-CCNC: 19 U/L (ref 13–39)
BASOPHILS # BLD AUTO: 0.03 THOUSANDS/ΜL (ref 0–0.1)
BASOPHILS NFR BLD AUTO: 1 % (ref 0–1)
BILIRUB SERPL-MCNC: 0.77 MG/DL (ref 0.2–1)
BUN SERPL-MCNC: 18 MG/DL (ref 5–25)
CALCIUM SERPL-MCNC: 9 MG/DL (ref 8.4–10.2)
CHLORIDE SERPL-SCNC: 103 MMOL/L (ref 96–108)
CHOLEST SERPL-MCNC: 174 MG/DL
CO2 SERPL-SCNC: 28 MMOL/L (ref 21–32)
CREAT SERPL-MCNC: 0.94 MG/DL (ref 0.6–1.3)
EOSINOPHIL # BLD AUTO: 0.24 THOUSAND/ΜL (ref 0–0.61)
EOSINOPHIL NFR BLD AUTO: 4 % (ref 0–6)
ERYTHROCYTE [DISTWIDTH] IN BLOOD BY AUTOMATED COUNT: 14.4 % (ref 11.6–15.1)
GFR SERPL CREATININE-BSD FRML MDRD: 73 ML/MIN/1.73SQ M
GLUCOSE P FAST SERPL-MCNC: 96 MG/DL (ref 65–99)
HCT VFR BLD AUTO: 42 % (ref 36.5–49.3)
HDLC SERPL-MCNC: 45 MG/DL
HGB BLD-MCNC: 14.2 G/DL (ref 12–17)
IMM GRANULOCYTES # BLD AUTO: 0.01 THOUSAND/UL (ref 0–0.2)
IMM GRANULOCYTES NFR BLD AUTO: 0 % (ref 0–2)
LDLC SERPL CALC-MCNC: 107 MG/DL (ref 0–100)
LYMPHOCYTES # BLD AUTO: 1.65 THOUSANDS/ΜL (ref 0.6–4.47)
LYMPHOCYTES NFR BLD AUTO: 26 % (ref 14–44)
MCH RBC QN AUTO: 32.5 PG (ref 26.8–34.3)
MCHC RBC AUTO-ENTMCNC: 33.8 G/DL (ref 31.4–37.4)
MCV RBC AUTO: 96 FL (ref 82–98)
MONOCYTES # BLD AUTO: 0.67 THOUSAND/ΜL (ref 0.17–1.22)
MONOCYTES NFR BLD AUTO: 11 % (ref 4–12)
NEUTROPHILS # BLD AUTO: 3.72 THOUSANDS/ΜL (ref 1.85–7.62)
NEUTS SEG NFR BLD AUTO: 58 % (ref 43–75)
NRBC BLD AUTO-RTO: 0 /100 WBCS
PLATELET # BLD AUTO: 242 THOUSANDS/UL (ref 149–390)
PMV BLD AUTO: 9.8 FL (ref 8.9–12.7)
POTASSIUM SERPL-SCNC: 4 MMOL/L (ref 3.5–5.3)
PROT SERPL-MCNC: 7.4 G/DL (ref 6.4–8.4)
RBC # BLD AUTO: 4.37 MILLION/UL (ref 3.88–5.62)
SODIUM SERPL-SCNC: 139 MMOL/L (ref 135–147)
TRIGL SERPL-MCNC: 108 MG/DL
TSH SERPL DL<=0.05 MIU/L-ACNC: 1.06 UIU/ML (ref 0.45–4.5)
WBC # BLD AUTO: 6.32 THOUSAND/UL (ref 4.31–10.16)

## 2024-10-15 PROCEDURE — 80053 COMPREHEN METABOLIC PANEL: CPT

## 2024-10-15 PROCEDURE — 80061 LIPID PANEL: CPT

## 2024-10-15 PROCEDURE — 99214 OFFICE O/P EST MOD 30 MIN: CPT | Performed by: FAMILY MEDICINE

## 2024-10-15 PROCEDURE — 84443 ASSAY THYROID STIM HORMONE: CPT

## 2024-10-15 PROCEDURE — G0439 PPPS, SUBSEQ VISIT: HCPCS | Performed by: FAMILY MEDICINE

## 2024-10-15 PROCEDURE — 36415 COLL VENOUS BLD VENIPUNCTURE: CPT

## 2024-10-15 PROCEDURE — 85025 COMPLETE CBC W/AUTO DIFF WBC: CPT

## 2024-10-15 NOTE — PROGRESS NOTES
Ambulatory Visit  Name: Adonis Mallory Jr.      : 1940      MRN: [id number]  Encounter Provider: Carl Hawkins DO  Encounter Date: 10/15/2024   Encounter department: [facility]    Assessment & Plan  Medicare annual wellness visit, subsequent  Questionnaire reviewed.  Immunization and health screening history updated.  Advance directive in place.       Chronic obstructive pulmonary disease, unspecified COPD type (HCC)  Does get intermittent exertional dyspnea.  Does not use inhaler.       Depression, recurrent (HCC)  Stable.  Not on antidepressant at this time.         Gastroesophageal reflux disease without esophagitis  Pepcid 40 mg twice daily       Sick sinus syndrome (HCC)  Has pacemaker in place.  Follows with cardiology       Sjogren's syndrome, with unspecified organ involvement ([facility])  Relatively mild symptoms.  On no specific therapy at this time.       Mixed hyperlipidemia  Due for recheck of lipid panel.  Managing with diet.       Irritable bowel syndrome, unspecified type  Manages with Librium as needed as well as Pepto-Bismol.  Will add probiotic       Enlarged prostate without lower urinary tract symptoms (luts)  Symptoms manageable without medication at this time.       Chronic fatigue  Multifactorial due possibly due to combination of mild COPD, age, deconditioning.         Depression Screening and Follow-up Plan: Patient was screened for depression during today's encounter. They screened negative with a PHQ-9 score of 0.      Preventive health issues were discussed with patient, and age appropriate screening tests were ordered as noted in patient's After Visit Summary. Personalized health advice and appropriate referrals for health education or preventive services given if needed, as noted in patient's After Visit Summary.    History of Present Illness     Patient presents for annual wellness visit.  He is also following up on chronic medical problems.  He is being  treated for Sjogren syndrome, sick sinus syndrome with a pacemaker placement, hyperlipidemia, irritable bowel syndrome, history of COPD.  He has questions regarding supplements including probiotics and fish oil.       Patient Care Team:  Carl Hawkins DO as PCP - General  Maykel Lewis MD    Review of Systems   Constitutional: Negative.    Respiratory: Negative.     Cardiovascular: Negative.    Gastrointestinal: Negative.    Genitourinary: Negative.    Musculoskeletal: Negative.    Psychiatric/Behavioral: Negative.       Medical History Reviewed by provider this encounter:       Annual Wellness Visit Questionnaire   Adonis is here for his Subsequent Wellness visit.     Health Risk Assessment:   Patient rates overall health as fair. Patient feels that their physical health rating is same. Patient is satisfied with their life. Eyesight was rated as slightly worse. Hearing was rated as slightly worse. Patient feels that their emotional and mental health rating is slightly worse. Patients states they are never, rarely angry. Patient states they are always unusually tired/fatigued. Pain experienced in the last 7 days has been some. Patient's pain rating has been 2/10. Patient states that he has experienced no weight loss or gain in last 6 months.     Depression Screening:   PHQ-9 Score: 0      Fall Risk Screening:   In the past year, patient has experienced: history of falling in past year    Number of falls: 1  Injured during fall?: No    Feels unsteady when standing or walking?: No    Worried about falling?: No      Home Safety:  Patient does not have trouble with stairs inside or outside of their home. Patient has working smoke alarms and has working carbon monoxide detector. Home safety hazards include: none.     Nutrition:   Current diet is Regular.     Medications:   Patient is currently taking over-the-counter supplements. OTC medications include: see medication list. Patient is able to manage medications.      Activities of Daily Living (ADLs)/Instrumental Activities of Daily Living (IADLs):   Walk and transfer into and out of bed and chair?: Yes  Dress and groom yourself?: Yes    Bathe or shower yourself?: Yes    Feed yourself? Yes  Do your laundry/housekeeping?: Yes  Manage your money, pay your bills and track your expenses?: Yes  Make your own meals?: Yes    Do your own shopping?: Yes    Previous Hospitalizations:   Any hospitalizations or ED visits within the last 12 months?: Yes    How many hospitalizations have you had in the last year?: 1-2    Advance Care Planning:   Living will: Yes    Durable POA for healthcare: Yes    Advanced directive: Yes      Cognitive Screening:   Provider or family/friend/caregiver concerned regarding cognition?: No    PREVENTIVE SCREENINGS      Cardiovascular Screening:    General: Screening Not Indicated and History Lipid Disorder      Diabetes Screening:     General: Screening Current      Colorectal Cancer Screening:     General: Screening Not Indicated      Prostate Cancer Screening:    General: Screening Not Indicated      Osteoporosis Screening:    General: Screening Not Indicated      Abdominal Aortic Aneurysm (AAA) Screening:    Risk factors include: tobacco use        General: Screening Not Indicated      Lung Cancer Screening:     General: Screening Not Indicated      Hepatitis C Screening:    General: Screening Not Indicated    Screening, Brief Intervention, and Referral to Treatment (SBIRT)    Screening  Typical number of drinks in a day: 0  Typical number of drinks in a week: 0  Interpretation: Low risk drinking behavior.    Single Item Drug Screening:  How often have you used an illegal drug (including marijuana) or a prescription medication for non-medical reasons in the past year? never    Single Item Drug Screen Score: 0  Interpretation: Negative screen for possible drug use disorder    Social Determinants of Health     Financial Resource Strain: Low Risk   "(10/9/2023)    Overall Financial Resource Strain (CARDIA)     Difficulty of Paying Living Expenses: Not hard at all   Food Insecurity: No Food Insecurity (10/15/2024)    Hunger Vital Sign     Worried About Running Out of Food in the Last Year: Never true     Ran Out of Food in the Last Year: Never true   Transportation Needs: No Transportation Needs (10/15/2024)    PRAPARE - Transportation     Lack of Transportation (Medical): No     Lack of Transportation (Non-Medical): No   Housing Stability: Low Risk  (10/15/2024)    Housing Stability Vital Sign     Unable to Pay for Housing in the Last Year: No     Number of Times Moved in the Last Year: 1     Homeless in the Last Year: No   Utilities: Not At Risk (10/15/2024)    [facility] Utilities     Threatened with loss of utilities: No     No results found.    Objective     /72 (BP Location: Left arm, Patient Position: Sitting, Cuff Size: Standard)   Pulse 83   Temp 97.8 °F (36.6 °C) (Temporal)   Ht 5' 10\" (1.778 m)   Wt 72.6 kg (160 lb)   SpO2 98%   BMI 22.96 kg/m²     Physical Exam  Vitals and nursing note reviewed.   Constitutional:       General: He is not in acute distress.     Appearance: Normal appearance.   HENT:      Head: Normocephalic and atraumatic.   Eyes:      Pupils: Pupils are equal, round, and reactive to light.   Cardiovascular:      Rate and Rhythm: Normal rate and regular rhythm.      Pulses: Normal pulses.      Heart sounds: Normal heart sounds.   Pulmonary:      Effort: Pulmonary effort is normal.      Breath sounds: Normal breath sounds.   Musculoskeletal:         General: Normal range of motion.      Cervical back: Normal range of motion.   Skin:     General: Skin is warm and dry.   Neurological:      General: No focal deficit present.      Mental Status: He is alert and oriented to person, place, and time. Mental status is at baseline.   Psychiatric:         Mood and Affect: Mood normal.         Behavior: Behavior normal.         Thought " Content: Thought content normal.         Judgment: Judgment normal.

## 2024-10-15 NOTE — PATIENT INSTRUCTIONS
Medicare Preventive Visit Patient Instructions  Thank you for completing your Welcome to Medicare Visit or Medicare Annual Wellness Visit today. Your next wellness visit will be due in one year (10/16/2025).  The screening/preventive services that you may require over the next 5-10 years are detailed below. Some tests may not apply to you based off risk factors and/or age. Screening tests ordered at today's visit but not completed yet may show as past due. Also, please note that scanned in results may not display below.  Preventive Screenings:  Service Recommendations Previous Testing/Comments   Colorectal Cancer Screening  Colonoscopy    Fecal Occult Blood Test (FOBT)/Fecal Immunochemical Test (FIT)  Fecal DNA/Cologuard Test  Flexible Sigmoidoscopy Age: 45-75 years old   Colonoscopy: every 10 years (May be performed more frequently if at higher risk)  OR  FOBT/FIT: every 1 year  OR  Cologuard: every 3 years  OR  Sigmoidoscopy: every 5 years  Screening may be recommended earlier than age 45 if at higher risk for colorectal cancer. Also, an individualized decision between you and your healthcare provider will decide whether screening between the ages of 76-85 would be appropriate. Colonoscopy: Not on file  FOBT/FIT: 03/07/2023  Cologuard: Not on file  Sigmoidoscopy: Not on file    Screening Not Indicated     Prostate Cancer Screening Individualized decision between patient and health care provider in men between ages of 55-69   Medicare will cover every 12 months beginning on the day after your 50th birthday PSA: No results in last 5 years     Screening Not Indicated     Hepatitis C Screening Once for adults born between 1945 and 1965  More frequently in patients at high risk for Hepatitis C Hep C Antibody: Not on file        Diabetes Screening 1-2 times per year if you're at risk for diabetes or have pre-diabetes Fasting glucose: 91 mg/dL (10/2/2023)  A1C: No results in last 5 years (No results in last 5  years)  Screening Current   Cholesterol Screening Once every 5 years if you don't have a lipid disorder. May order more often based on risk factors. Lipid panel: 10/02/2023  Screening Not Indicated  History Lipid Disorder      Other Preventive Screenings Covered by Medicare:  Abdominal Aortic Aneurysm (AAA) Screening: covered once if your at risk. You're considered to be at risk if you have a family history of AAA or a male between the age of 65-75 who smoking at least 100 cigarettes in your lifetime.  Lung Cancer Screening: covers low dose CT scan once per year if you meet all of the following conditions: (1) Age 55-77; (2) No signs or symptoms of lung cancer; (3) Current smoker or have quit smoking within the last 15 years; (4) You have a tobacco smoking history of at least 20 pack years (packs per day x number of years you smoked); (5) You get a written order from a healthcare provider.  Glaucoma Screening: covered annually if you're considered high risk: (1) You have diabetes OR (2) Family history of glaucoma OR (3)  aged 50 and older OR (4)  American aged 65 and older  Osteoporosis Screening: covered every 2 years if you meet one of the following conditions: (1) Have a vertebral abnormality; (2) On glucocorticoid therapy for more than 3 months; (3) Have primary hyperparathyroidism; (4) On osteoporosis medications and need to assess response to drug therapy.  HIV Screening: covered annually if you're between the age of 15-65. Also covered annually if you are younger than 15 and older than 65 with risk factors for HIV infection. For pregnant patients, it is covered up to 3 times per pregnancy.    Immunizations:  Immunization Recommendations   Influenza Vaccine Annual influenza vaccination during flu season is recommended for all persons aged >= 6 months who do not have contraindications   Pneumococcal Vaccine   * Pneumococcal conjugate vaccine = PCV13 (Prevnar 13), PCV15 (Vaxneuvance),  PCV20 (Prevnar 20)  * Pneumococcal polysaccharide vaccine = PPSV23 (Pneumovax) Adults 19-65 yo with certain risk factors or if 65+ yo  If never received any pneumonia vaccine: recommend Prevnar 20 (PCV20)  Give PCV20 if previously received 1 dose of PCV13 or PPSV23   Hepatitis B Vaccine 3 dose series if at intermediate or high risk (ex: diabetes, end stage renal disease, liver disease)   Respiratory syncytial virus (RSV) Vaccine - COVERED BY MEDICARE PART D  * RSVPreF3 (Arexvy) CDC recommends that adults 60 years of age and older may receive a single dose of RSV vaccine using shared clinical decision-making (SCDM)   Tetanus (Td) Vaccine - COST NOT COVERED BY MEDICARE PART B Following completion of primary series, a booster dose should be given every 10 years to maintain immunity against tetanus. Td may also be given as tetanus wound prophylaxis.   Tdap Vaccine - COST NOT COVERED BY MEDICARE PART B Recommended at least once for all adults. For pregnant patients, recommended with each pregnancy.   Shingles Vaccine (Shingrix) - COST NOT COVERED BY MEDICARE PART B  2 shot series recommended in those 19 years and older who have or will have weakened immune systems or those 50 years and older     Health Maintenance Due:  There are no preventive care reminders to display for this patient.  Immunizations Due:  There are no preventive care reminders to display for this patient.  Advance Directives   What are advance directives?  Advance directives are legal documents that state your wishes and plans for medical care. These plans are made ahead of time in case you lose your ability to make decisions for yourself. Advance directives can apply to any medical decision, such as the treatments you want, and if you want to donate organs.   What are the types of advance directives?  There are many types of advance directives, and each state has rules about how to use them. You may choose a combination of any of the  following:  Living will:  This is a written record of the treatment you want. You can also choose which treatments you do not want, which to limit, and which to stop at a certain time. This includes surgery, medicine, IV fluid, and tube feedings.   Durable power of  for healthcare (DPAHC):  This is a written record that states who you want to make healthcare choices for you when you are unable to make them for yourself. This person, called a proxy, is usually a family member or a friend. You may choose more than 1 proxy.  Do not resuscitate (DNR) order:  A DNR order is used in case your heart stops beating or you stop breathing. It is a request not to have certain forms of treatment, such as CPR. A DNR order may be included in other types of advance directives.  Medical directive:  This covers the care that you want if you are in a coma, near death, or unable to make decisions for yourself. You can list the treatments you want for each condition. Treatment may include pain medicine, surgery, blood transfusions, dialysis, IV or tube feedings, and a ventilator (breathing machine).  Values history:  This document has questions about your views, beliefs, and how you feel and think about life. This information can help others choose the care that you would choose.  Why are advance directives important?  An advance directive helps you control your care. Although spoken wishes may be used, it is better to have your wishes written down. Spoken wishes can be misunderstood, or not followed. Treatments may be given even if you do not want them. An advance directive may make it easier for your family to make difficult choices about your care.   Fall Prevention    Fall prevention  includes ways to make your home and other areas safer. It also includes ways you can move more carefully to prevent a fall. Health conditions that cause changes in your blood pressure, vision, or muscle strength and coordination may increase  your risk for falls. Medicines may also increase your risk for falls if they make you dizzy, weak, or sleepy.   Fall prevention tips:   Stand or sit up slowly.    Use assistive devices as directed.    Wear shoes that fit well and have soles that .    Wear a personal alarm.    Stay active.    Manage your medical conditions.    Home Safety Tips:  Add items to prevent falls in the bathroom.    Keep paths clear.    Install bright lights in your home.    Keep items you use often on shelves within reach.    Paint or place reflective tape on the edges of your stairs.       © Copyright [employer] 2018 Information is for End User's use only and may not be sold, redistributed or otherwise used for commercial purposes. All illustrations and images included in CareNotes® are the copyrighted property of [employer] or [employer]

## 2024-10-21 ENCOUNTER — TELEPHONE (OUTPATIENT)
Age: 84
End: 2024-10-21

## 2024-10-21 ENCOUNTER — HOSPITAL ENCOUNTER (EMERGENCY)
Facility: HOSPITAL | Age: 84
Discharge: HOME | DRG: 177 | End: 2024-10-21
Attending: EMERGENCY MEDICINE
Payer: COMMERCIAL

## 2024-10-21 VITALS
HEART RATE: 91 BPM | WEIGHT: 161.16 LBS | SYSTOLIC BLOOD PRESSURE: 142 MMHG | DIASTOLIC BLOOD PRESSURE: 76 MMHG | OXYGEN SATURATION: 97 % | RESPIRATION RATE: 16 BRPM | BODY MASS INDEX: 23.12 KG/M2 | TEMPERATURE: 98.4 F

## 2024-10-21 DIAGNOSIS — U07.1 COVID-19: Primary | ICD-10-CM

## 2024-10-21 LAB
FLUAV AG UPPER RESP QL IA.RAPID: NEGATIVE
FLUBV AG UPPER RESP QL IA.RAPID: NEGATIVE
SARS-COV+SARS-COV-2 AG RESP QL IA.RAPID: POSITIVE

## 2024-10-21 PROCEDURE — 99284 EMERGENCY DEPT VISIT MOD MDM: CPT

## 2024-10-21 PROCEDURE — 87811 SARS-COV-2 COVID19 W/OPTIC: CPT

## 2024-10-21 PROCEDURE — 87804 INFLUENZA ASSAY W/OPTIC: CPT

## 2024-10-21 NOTE — TELEPHONE ENCOUNTER
Pt called in stating is running fever since this morning and sneezing and having sore throat. Tried to schedule in was unable to for today or tmr. Warm transferred the call to practice went unanswered. If any accommodation can be made kindly call back patient. Thanks

## 2024-10-22 ENCOUNTER — APPOINTMENT (EMERGENCY)
Dept: RADIOLOGY | Facility: HOSPITAL | Age: 84
DRG: 177 | End: 2024-10-22
Payer: COMMERCIAL

## 2024-10-22 ENCOUNTER — HOSPITAL ENCOUNTER (INPATIENT)
Facility: HOSPITAL | Age: 84
LOS: 2 days | Discharge: HOME | DRG: 177 | End: 2024-10-24
Attending: EMERGENCY MEDICINE | Admitting: INTERNAL MEDICINE
Payer: COMMERCIAL

## 2024-10-22 ENCOUNTER — VBI (OUTPATIENT)
Dept: FAMILY MEDICINE CLINIC | Facility: CLINIC | Age: 84
End: 2024-10-22

## 2024-10-22 DIAGNOSIS — R09.02 HYPOXIA: ICD-10-CM

## 2024-10-22 DIAGNOSIS — U07.1 PNEUMONIA DUE TO COVID-19 VIRUS: Primary | ICD-10-CM

## 2024-10-22 DIAGNOSIS — J12.82 PNEUMONIA DUE TO COVID-19 VIRUS: Primary | ICD-10-CM

## 2024-10-22 PROBLEM — I44.0 1ST DEGREE AV BLOCK: Status: RESOLVED | Noted: 2022-07-12 | Resolved: 2024-10-22

## 2024-10-22 LAB
2HR DELTA HS TROPONIN: -2 NG/L
4HR DELTA HS TROPONIN: 0 NG/L
ANION GAP SERPL CALCULATED.3IONS-SCNC: 4 MMOL/L (ref 4–13)
ATRIAL RATE: 84 BPM
ATRIAL RATE: 91 BPM
BASOPHILS # BLD AUTO: 0.03 THOUSANDS/ΜL (ref 0–0.1)
BASOPHILS NFR BLD AUTO: 0 % (ref 0–1)
BUN SERPL-MCNC: 15 MG/DL (ref 5–25)
CALCIUM SERPL-MCNC: 8.8 MG/DL (ref 8.4–10.2)
CARDIAC TROPONIN I PNL SERPL HS: 10 NG/L
CARDIAC TROPONIN I PNL SERPL HS: 10 NG/L
CARDIAC TROPONIN I PNL SERPL HS: 8 NG/L
CHLORIDE SERPL-SCNC: 104 MMOL/L (ref 96–108)
CO2 SERPL-SCNC: 29 MMOL/L (ref 21–32)
CREAT SERPL-MCNC: 1.05 MG/DL (ref 0.6–1.3)
CRP SERPL QL: 51 MG/L
D DIMER PPP FEU-MCNC: 0.51 UG/ML FEU
EOSINOPHIL # BLD AUTO: 0.05 THOUSAND/ΜL (ref 0–0.61)
EOSINOPHIL NFR BLD AUTO: 1 % (ref 0–6)
ERYTHROCYTE [DISTWIDTH] IN BLOOD BY AUTOMATED COUNT: 14.5 % (ref 11.6–15.1)
GFR SERPL CREATININE-BSD FRML MDRD: 64 ML/MIN/1.73SQ M
GLUCOSE SERPL-MCNC: 106 MG/DL (ref 65–140)
HCT VFR BLD AUTO: 37 % (ref 36.5–49.3)
HGB BLD-MCNC: 12.6 G/DL (ref 12–17)
IMM GRANULOCYTES # BLD AUTO: 0.01 THOUSAND/UL (ref 0–0.2)
IMM GRANULOCYTES NFR BLD AUTO: 0 % (ref 0–2)
LYMPHOCYTES # BLD AUTO: 1.23 THOUSANDS/ΜL (ref 0.6–4.47)
LYMPHOCYTES NFR BLD AUTO: 18 % (ref 14–44)
MCH RBC QN AUTO: 32.6 PG (ref 26.8–34.3)
MCHC RBC AUTO-ENTMCNC: 34.1 G/DL (ref 31.4–37.4)
MCV RBC AUTO: 96 FL (ref 82–98)
MONOCYTES # BLD AUTO: 1.02 THOUSAND/ΜL (ref 0.17–1.22)
MONOCYTES NFR BLD AUTO: 15 % (ref 4–12)
NEUTROPHILS # BLD AUTO: 4.66 THOUSANDS/ΜL (ref 1.85–7.62)
NEUTS SEG NFR BLD AUTO: 66 % (ref 43–75)
NRBC BLD AUTO-RTO: 0 /100 WBCS
P AXIS: 62 DEGREES
P AXIS: 66 DEGREES
PLATELET # BLD AUTO: 190 THOUSANDS/UL (ref 149–390)
PLATELET # BLD AUTO: 190 THOUSANDS/UL (ref 149–390)
PMV BLD AUTO: 9.5 FL (ref 8.9–12.7)
PMV BLD AUTO: 9.7 FL (ref 8.9–12.7)
POTASSIUM SERPL-SCNC: 3.8 MMOL/L (ref 3.5–5.3)
PR INTERVAL: 172 MS
PR INTERVAL: 196 MS
QRS AXIS: -65 DEGREES
QRS AXIS: 134 DEGREES
QRSD INTERVAL: 140 MS
QRSD INTERVAL: 142 MS
QT INTERVAL: 400 MS
QT INTERVAL: 420 MS
QTC INTERVAL: 492 MS
QTC INTERVAL: 496 MS
RBC # BLD AUTO: 3.86 MILLION/UL (ref 3.88–5.62)
SODIUM SERPL-SCNC: 137 MMOL/L (ref 135–147)
T WAVE AXIS: 65 DEGREES
T WAVE AXIS: 79 DEGREES
VENTRICULAR RATE: 84 BPM
VENTRICULAR RATE: 91 BPM
WBC # BLD AUTO: 7 THOUSAND/UL (ref 4.31–10.16)

## 2024-10-22 PROCEDURE — 93010 ELECTROCARDIOGRAM REPORT: CPT

## 2024-10-22 PROCEDURE — 99223 1ST HOSP IP/OBS HIGH 75: CPT | Performed by: INTERNAL MEDICINE

## 2024-10-22 PROCEDURE — 71046 X-RAY EXAM CHEST 2 VIEWS: CPT

## 2024-10-22 PROCEDURE — 93005 ELECTROCARDIOGRAM TRACING: CPT

## 2024-10-22 PROCEDURE — 84484 ASSAY OF TROPONIN QUANT: CPT | Performed by: EMERGENCY MEDICINE

## 2024-10-22 PROCEDURE — 99285 EMERGENCY DEPT VISIT HI MDM: CPT | Performed by: EMERGENCY MEDICINE

## 2024-10-22 PROCEDURE — 85025 COMPLETE CBC W/AUTO DIFF WBC: CPT | Performed by: EMERGENCY MEDICINE

## 2024-10-22 PROCEDURE — 85049 AUTOMATED PLATELET COUNT: CPT | Performed by: INTERNAL MEDICINE

## 2024-10-22 PROCEDURE — 99285 EMERGENCY DEPT VISIT HI MDM: CPT

## 2024-10-22 PROCEDURE — 36415 COLL VENOUS BLD VENIPUNCTURE: CPT | Performed by: EMERGENCY MEDICINE

## 2024-10-22 PROCEDURE — 85379 FIBRIN DEGRADATION QUANT: CPT | Performed by: INTERNAL MEDICINE

## 2024-10-22 PROCEDURE — 84484 ASSAY OF TROPONIN QUANT: CPT | Performed by: INTERNAL MEDICINE

## 2024-10-22 PROCEDURE — 80048 BASIC METABOLIC PNL TOTAL CA: CPT | Performed by: EMERGENCY MEDICINE

## 2024-10-22 PROCEDURE — 86140 C-REACTIVE PROTEIN: CPT | Performed by: INTERNAL MEDICINE

## 2024-10-22 RX ORDER — SENNOSIDES 8.6 MG
1 TABLET ORAL DAILY
Status: DISCONTINUED | OUTPATIENT
Start: 2024-10-23 | End: 2024-10-24 | Stop reason: HOSPADM

## 2024-10-22 RX ORDER — ONDANSETRON 2 MG/ML
4 INJECTION INTRAMUSCULAR; INTRAVENOUS EVERY 6 HOURS PRN
Status: DISCONTINUED | OUTPATIENT
Start: 2024-10-22 | End: 2024-10-24 | Stop reason: HOSPADM

## 2024-10-22 RX ORDER — ENOXAPARIN SODIUM 100 MG/ML
40 INJECTION SUBCUTANEOUS DAILY
Status: DISCONTINUED | OUTPATIENT
Start: 2024-10-23 | End: 2024-10-24 | Stop reason: HOSPADM

## 2024-10-22 RX ORDER — ASPIRIN 81 MG/1
81 TABLET, CHEWABLE ORAL DAILY
Status: DISCONTINUED | OUTPATIENT
Start: 2024-10-23 | End: 2024-10-24 | Stop reason: HOSPADM

## 2024-10-22 RX ORDER — TAMSULOSIN HYDROCHLORIDE 0.4 MG/1
0.8 CAPSULE ORAL
Status: DISCONTINUED | OUTPATIENT
Start: 2024-10-22 | End: 2024-10-22

## 2024-10-22 RX ORDER — BRINZOLAMIDE 10 MG/ML
1 SUSPENSION/ DROPS OPHTHALMIC 3 TIMES DAILY
Status: DISCONTINUED | OUTPATIENT
Start: 2024-10-22 | End: 2024-10-24 | Stop reason: HOSPADM

## 2024-10-22 RX ORDER — ACETAMINOPHEN 325 MG/1
650 TABLET ORAL EVERY 6 HOURS PRN
Status: DISCONTINUED | OUTPATIENT
Start: 2024-10-22 | End: 2024-10-24 | Stop reason: HOSPADM

## 2024-10-22 RX ORDER — CHLORDIAZEPOXIDE HYDROCHLORIDE 5 MG/1
5 CAPSULE, GELATIN COATED ORAL DAILY
Status: DISCONTINUED | OUTPATIENT
Start: 2024-10-23 | End: 2024-10-24 | Stop reason: HOSPADM

## 2024-10-22 RX ORDER — DOCUSATE SODIUM 100 MG/1
100 CAPSULE, LIQUID FILLED ORAL 2 TIMES DAILY
Status: DISCONTINUED | OUTPATIENT
Start: 2024-10-22 | End: 2024-10-24 | Stop reason: HOSPADM

## 2024-10-22 RX ORDER — TAMSULOSIN HYDROCHLORIDE 0.4 MG/1
0.8 CAPSULE ORAL EVERY EVENING
Status: DISCONTINUED | OUTPATIENT
Start: 2024-10-22 | End: 2024-10-24 | Stop reason: HOSPADM

## 2024-10-22 RX ORDER — FAMOTIDINE 20 MG/1
20 TABLET, FILM COATED ORAL
Status: DISCONTINUED | OUTPATIENT
Start: 2024-10-23 | End: 2024-10-24 | Stop reason: HOSPADM

## 2024-10-22 RX ADMIN — REMDESIVIR 200 MG: 100 INJECTION, POWDER, LYOPHILIZED, FOR SOLUTION INTRAVENOUS at 19:52

## 2024-10-22 RX ADMIN — TAMSULOSIN HYDROCHLORIDE 0.8 MG: 0.4 CAPSULE ORAL at 19:51

## 2024-10-22 RX ADMIN — BRINZOLAMIDE 1 DROP: 10 SUSPENSION/ DROPS OPHTHALMIC at 19:51

## 2024-10-22 RX ADMIN — ACETAMINOPHEN 650 MG: 325 TABLET, FILM COATED ORAL at 20:54

## 2024-10-22 RX ADMIN — DOCUSATE SODIUM 100 MG: 100 CAPSULE, LIQUID FILLED ORAL at 19:51

## 2024-10-22 NOTE — ED PROVIDER NOTES
Time reflects when diagnosis was documented in both MDM as applicable and the Disposition within this note       Time User Action Codes Description Comment    10/22/2024  5:55 PM Kimi Antonio Add [U07.1,  J12.82] Pneumonia due to COVID-19 virus     10/22/2024  5:55 PM Paco Kimi RAKAN Add [R09.02] Hypoxia           ED Disposition       ED Disposition   Admit    Condition   Stable    Date/Time   Tue Oct 22, 2024  5:55 PM    Comment   Case was discussed with KRISHNA and the patient's admission status was agreed to be Admission Status: inpatient status to the service of Dr. Morton .               Assessment & Plan       Medical Decision Making  83 yo M recently diagnosed with COVID with worsening cough/SOB/LUCIANO- continuous pulse ox/ambulatory sat, CXR to r/o PNA, CBC to assess for leukocytosis/anemia, BMP to r/o ELIGIO/electrolyte derangement    COVID PNA on CXR  O2 sat dropped to 85% with ambulation, therefore admitted for further management     Problems Addressed:  Hypoxia: acute illness or injury  Pneumonia due to COVID-19 virus: acute illness or injury    Amount and/or Complexity of Data Reviewed  External Data Reviewed: labs, radiology, ECG and notes.  Labs: ordered. Decision-making details documented in ED Course.  Radiology: ordered and independent interpretation performed. Decision-making details documented in ED Course.  ECG/medicine tests: ordered and independent interpretation performed. Decision-making details documented in ED Course.    Risk  Decision regarding hospitalization.        ED Course as of 10/22/24 2252   Tue Oct 22, 2024   1546 EKG independently interpreted by myself:  Paced, LAD,qtc 496, nonspecific st changes    1639 hs TnI 0hr: 10   1710 Ambulatory O2 sat 85%   1730 Discussed admission with pt, he is hesitant and I reviewed risks of leaving with him and he would like to talk with his son       Medications   aspirin chewable tablet 81 mg (has no administration in time range)   brinzolamide (AZOPT) 1 %  ophthalmic suspension 1 drop (has no administration in time range)   famotidine (PEPCID) tablet 20 mg (has no administration in time range)   remdesivir (Veklury) 200 mg in sodium chloride 0.9 % 290 mL IVPB (has no administration in time range)     Followed by   remdesivir (Veklury) 100 mg in sodium chloride 0.9 % 270 mL IVPB (has no administration in time range)   acetaminophen (TYLENOL) tablet 650 mg (has no administration in time range)   docusate sodium (COLACE) capsule 100 mg (has no administration in time range)   senna (SENOKOT) tablet 8.6 mg (has no administration in time range)   ondansetron (ZOFRAN) injection 4 mg (has no administration in time range)   enoxaparin (LOVENOX) subcutaneous injection 40 mg (has no administration in time range)   chlordiazePOXIDE (LIBRIUM) capsule 5 mg (has no administration in time range)   tamsulosin (FLOMAX) capsule 0.8 mg (has no administration in time range)       ED Risk Strat Scores                           SBIRT 20yo+      Flowsheet Row Most Recent Value   Initial Alcohol Screen: US AUDIT-C     1. How often do you have a drink containing alcohol? 0 Filed at: 10/22/2024 1540   2. How many drinks containing alcohol do you have on a typical day you are drinking?  0 Filed at: 10/22/2024 1540   3a. Male UNDER 65: How often do you have five or more drinks on one occasion? 0 Filed at: 10/22/2024 1540   Audit-C Score 0 Filed at: 10/22/2024 1540   MATTY: How many times in the past year have you...    Used an illegal drug or used a prescription medication for non-medical reasons? Never Filed at: 10/22/2024 1540                            History of Present Illness       Chief Complaint   Patient presents with    Weakness - Generalized     Pt states he was dx here with covid yesterday- reporting increased weakness and shortness of breath.        Past Medical History:   Diagnosis Date    Abnormal diffusion capacity determined by pulmonary function test 04/25/2017    Actinic  keratosis     Acute right-sided low back pain without sciatica 2017    Adverse effect of correct medicinal substance properly administered     Arthralgia     Candidiasis, mouth     Chronic allergic rhinitis     Chronic bronchitis (HCC)     Chronic sinusitis     Cough     Dermatitis     Diverticulosis     LUCIANO (dyspnea on exertion)     Former smoker     GERD (gastroesophageal reflux disease)     Glaucoma     Hyperlipidemia     Inguinal hernia, left     Lump of skin     Male erectile dysfunction     Open comedone     Palpitations     Persistent cough for 3 weeks or longer 03/15/2017    Renal calculi     Sebaceous cyst     Seborrheic keratosis     Sick sinus syndrome (HCC) 2022    Skin disorder     Unspecified chronic bronchitis (HCC)     Visual disturbances       Past Surgical History:   Procedure Laterality Date    CARDIAC ELECTROPHYSIOLOGY PROCEDURE N/A 2022    Procedure: Cardiac pacer implant/ DUAL CHAMBER PACER @ [place];  Surgeon: Van Gore DO;  Location: [facility];  Service: Cardiology    COLONOSCOPY  2014    [facility] MD.-Diverticulosis    COLONOSCOPY  10/2011    BRADY Ross MD.-Diverticulosis    EGD  2016    KEENAN Ross MD.-LA Grade A reflux esophagitis, normal stomach, normal jejunum, benign-appearing esophageal stenosis.    EGD  2015    KEENAN Ross MD.-Normal upper endoscopy    INGUINAL HERNIA REPAIR Bilateral     left- last assessed: 14, Resolved: 5/18/15, onset 12    MOUTH SURGERY        Family History   Problem Relation Age of Onset    Brain cancer Mother     Heart failure Mother     Prostate cancer Mother       Social History     Tobacco Use    Smoking status: Former     Current packs/day: 0.00     Types: Cigarettes     Quit date:      Years since quittin.8     Passive exposure: Never    Smokeless tobacco: Never   Vaping Use    Vaping status: Never Used   Substance Use Topics    Alcohol use: Not Currently      Alcohol/week: 2.0 standard drinks of alcohol     Types: 2 Cans of beer per week    Drug use: No      E-Cigarette/Vaping    E-Cigarette Use Never User       E-Cigarette/Vaping Substances    Nicotine No     THC No     CBD No     Flavoring No     Other No     Unknown No       I have reviewed and agree with the history as documented.     HPI    85 yo M h/o recently diagnosed COVID presenting for evaluation of worsening COVID sx.  C/o dry cough, SOB/LUCIANO, weakness  Reports decreased appetite, but tolerating po    Denies fevers, chills, CP, abdominal pain, N/V/D/C, numbness, weakness, edema          Per independent review of external records:   - ER visit yesterday- COVID positive  We did discuss possibility of initiating Paxlovid treatment, however patient takes Silodosin daily for history of BPH and epic flags this medication is contraindicated. We did discuss the possibility of discontinuing his silodosin for short period of time. However was decided that we would not initiate Paxlovid treatment at this time, patient can call his PCP tomorrow if he so desires to discuss holding his other medication and initiating Paxil bid.     Review of Systems        Objective       ED Triage Vitals [10/22/24 1441]   Temperature Pulse Blood Pressure Respirations SpO2 Patient Position - Orthostatic VS   98.4 °F (36.9 °C) 77 118/58 18 100 % Sitting      Temp Source Heart Rate Source BP Location FiO2 (%) Pain Score    Oral Monitor Right arm -- 6      Vitals      Date and Time Temp Pulse SpO2 Resp BP Pain Score FACES Pain Rating User   10/22/24 2138 98.7 °F (37.1 °C) 74 97 % -- -- -- -- DII   10/22/24 2054 -- -- -- -- -- Med Not Given for Pain - for MAR use only -- DF   10/22/24 1934 -- -- -- 18 -- -- -- AM   10/22/24 1934 101.4 °F (38.6 °C) 90 96 % -- 137/73 -- -- DII   10/22/24 1720 -- -- 85 % -- -- -- -- JR   10/22/24 1702 -- 86 95 % 20 136/64 6 -- KB   10/22/24 1441 98.4 °F (36.9 °C) 77 100 % 18 118/58 6 -- LB            Physical  Exam  Vitals and nursing note reviewed.   Constitutional:       General: He is not in acute distress.     Appearance: Normal appearance. He is well-developed.   HENT:      Head: Normocephalic and atraumatic.      Nose: Nose normal.   Eyes:      Extraocular Movements: Extraocular movements intact.      Conjunctiva/sclera: Conjunctivae normal.   Cardiovascular:      Rate and Rhythm: Normal rate and regular rhythm.      Heart sounds: Normal heart sounds.   Pulmonary:      Effort: Pulmonary effort is normal. No respiratory distress.      Breath sounds: Normal breath sounds. No stridor. No wheezing or rales.   Chest:      Chest wall: No tenderness.   Abdominal:      General: There is no distension.      Palpations: Abdomen is soft.      Tenderness: There is no abdominal tenderness. There is no guarding or rebound.   Musculoskeletal:         General: No swelling, tenderness or deformity.      Cervical back: Normal range of motion and neck supple.      Right lower leg: No edema.      Left lower leg: No edema.   Skin:     General: Skin is warm and dry.      Findings: No rash.   Neurological:      General: No focal deficit present.      Mental Status: He is alert and oriented to person, place, and time.      Motor: No abnormal muscle tone.      Coordination: Coordination normal.   Psychiatric:         Thought Content: Thought content normal.         Judgment: Judgment normal.         Results Reviewed       Procedure Component Value Units Date/Time    HS Troponin I 4hr [[id number]]  (Normal) Collected: 10/22/24 2022    Lab Status: Final result Specimen: Blood from Arm, Left Updated: 10/22/24 2107     hs TnI 4hr 10 ng/L      Delta 4hr hsTnI 0 ng/L     Platelet count [[id number]]  (Normal) Collected: 10/22/24 2022    Lab Status: Final result Specimen: Blood from Arm, Left Updated: 10/22/24 2045     Platelets 190 Thousands/uL      MPV 9.7 fL     C-reactive protein [[id number]]  (Abnormal) Collected: 10/22/24 1547    Lab Status:  Final result Specimen: Blood from Arm, Right Updated: 10/22/24 2009     CRP 51.0 mg/L     D-dimer, quantitative [[id number]]  (Abnormal) Collected: 10/22/24 1831    Lab Status: Final result Specimen: Blood from Arm, Right Updated: 10/22/24 1855     D-Dimer, Quant 0.51 ug/ml FEU     Narrative:      In the evaluation for possible pulmonary embolism, in the appropriate (Well's Score of 4 or less) patient, the age adjusted d-dimer cutoff for this patient can be calculated as:    Age x 0.01 (in ug/mL) for Age-adjusted D-dimer exclusion threshold for a patient over 50 years.    HS Troponin I 2hr [[id number]]  (Normal) Collected: 10/22/24 1747    Lab Status: Final result Specimen: Blood from Arm, Right Updated: 10/22/24 1816     hs TnI 2hr 8 ng/L      Delta 2hr hsTnI -2 ng/L     Basic metabolic panel [[id number]] Collected: 10/22/24 1547    Lab Status: Final result Specimen: Blood from Arm, Right Updated: 10/22/24 1622     Sodium 137 mmol/L      Potassium 3.8 mmol/L      Chloride 104 mmol/L      CO2 29 mmol/L      ANION GAP 4 mmol/L      BUN 15 mg/dL      Creatinine 1.05 mg/dL      Glucose 106 mg/dL      Calcium 8.8 mg/dL      eGFR 64 ml/min/1.73sq m     Narrative:      National Kid[employer] guidelines for Chronic Kidney Disease (CKD):     Stage 1 with normal or high GFR (GFR > 90 mL/min/1.73 square meters)    Stage 2 Mild CKD (GFR = 60-89 mL/min/1.73 square meters)    Stage 3A Moderate CKD (GFR = 45-59 mL/min/1.73 square meters)    Stage 3B Moderate CKD (GFR = 30-44 mL/min/1.73 square meters)    Stage 4 Severe CKD (GFR = 15-29 mL/min/1.73 square meters)    Stage 5 End Stage CKD (GFR <15 mL/min/1.73 square meters)  Note: GFR calculation is accurate only with a steady state creatinine    HS Troponin 0hr (reflex protocol) [[id number]]  (Normal) Collected: 10/22/24 1547    Lab Status: Final result Specimen: Blood from Arm, Right Updated: 10/22/24 1617     hs TnI 0hr 10 ng/L     CBC and differential [[id number]]   (Abnormal) Collected: 10/22/24 9527    Lab Status: Final result Specimen: Blood from Arm, Right Updated: 10/22/24 1555     WBC 7.00 Thousand/uL      RBC 3.86 Million/uL      Hemoglobin 12.6 g/dL      Hematocrit 37.0 %      MCV 96 fL      MCH 32.6 pg      MCHC 34.1 g/dL      RDW 14.5 %      MPV 9.5 fL      Platelets 190 Thousands/uL      nRBC 0 /100 WBCs      Segmented % 66 %      Immature Grans % 0 %      Lymphocytes % 18 %      Monocytes % 15 %      Eosinophils Relative 1 %      Basophils Relative 0 %      Absolute Neutrophils 4.66 Thousands/µL      Absolute Immature Grans 0.01 Thousand/uL      Absolute Lymphocytes 1.23 Thousands/µL      Absolute Monocytes 1.02 Thousand/µL      Eosinophils Absolute 0.05 Thousand/µL      Basophils Absolute 0.03 Thousands/µL             XR chest 2 views   ED Interpretation by Kimi Antonio DO (10/22 1718)      IMPRESSION:     Subtle patchy areas of groundglass consolidation in both lungs suspicious for COVID-19 pneumonia.        Final Interpretation by Rober Jin MD (10/22 1714)      Subtle patchy areas of groundglass consolidation in both lungs suspicious for COVID-19 pneumonia.      Stable background reticulation related to known interstitial lung disease.            The study was marked in EPIC for immediate notification.            Workstation performed: [id number]             Procedures    ED Medication and Procedure Management   Prior to Admission Medications   Prescriptions Last Dose Informant Patient Reported? Taking?   B Complex Vitamins (B COMPLEX PO)  Self Yes No   Sig: Take by mouth once a week   Erythromycin (ILOTYCIN OP)  Self Yes No   Sig: Apply to eye if needed   Patient not taking: Reported on 4/1/2024   Multiple Vitamins-Minerals (CENTRUM SILVER ULTRA MENS) TABS  Self Yes No   Sig: Take 1 tablet by mouth daily   Red Yeast Rice 600 MG CAPS  Self Yes No   Sig: Take by mouth in the morning   Patient not taking: Reported on 2/14/2024   Rocklatan 0.02-0.005 %  SOLN  Self Yes No   Sig: INSTILL 1 DROP INTO BOTH EYES EVERY DAY IN THE EVENING   Silodosin 8 MG CAPS   No No   Sig: TAKE 1 CAPSULE BY MOUTH EVERY DAY   acetaminophen (TYLENOL) 325 mg tablet  Self Yes No   Sig: Take 650 mg by mouth every 6 (six) hours as needed for mild pain   aspirin 81 mg chewable tablet  Self Yes No   Sig: Chew 81 mg 2 tablets daily   brinzolamide (AZOPT) 1 % ophthalmic suspension   No No   Sig: INSTILL 1 DROP INTO BOTH EYES TWICE A DAY   chlordiazePOXIDE (LIBRIUM) 5 mg capsule   No No   Sig: Take 1 capsule (5 mg total) by mouth daily   clobetasol (OLUX) 0.05 % topical foam   No No   Sig: Apply topically daily as needed (skin irritation) For ears.   docusate sodium (COLACE) 100 mg capsule  Self Yes No   Sig: Take 100 mg by mouth if needed   econazole nitrate 1 % cream  Self Yes No   Sig: if needed   ergocalciferol (VITAMIN D2) 50,000 units  Self No No   Sig: TAKE 1 CAPSULE BY MOUTH ONE TIME PER WEEK   Patient not taking: Reported on 4/1/2024   famotidine (PEPCID) 40 MG tablet   No No   Sig: TAKE 1 TABLET BY MOUTH TWICE A DAY   fish oil-omega-3 fatty acids 1000 MG capsule  Self Yes No   Sig: Take 2 g by mouth daily   glucosamine-chondroitin 500-400 MG tablet  Self Yes No   Sig: Take 1 tablet by mouth 2 (two) times a day   guaiFENesin (MUCINEX) 600 mg 12 hr tablet  Self Yes No   Sig: Take 1,200 mg by mouth if needed   hydrocortisone 2.5 % cream  Self Yes No   Sig: Apply topically if needed   levocetirizine (XYZAL) 5 MG tablet  Self Yes No   Sig: Take 1 tablet by mouth Daily   meclizine (ANTIVERT) 25 mg tablet   No No   Sig: TAKE 1 TABLET (25 MG TOTAL) BY MOUTH EVERY 8 (EIGHT) HOURS AS NEEDED FOR DIZZINESS.   methylPREDNISolone 4 MG tablet therapy pack   No No   Sig: Use as directed on package   mometasone (ELOCON) 0.1 % cream  Self Yes No   mupirocin (BACTROBAN) 2 % ointment  Self No No   Sig: Apply topically 3 (three) times a day   promethazine-dextromethorphan (PHENERGAN-DM) 6.25-15 mg/5 mL oral  syrup   No No   Sig: Take 5 mL by mouth 4 (four) times a day as needed for cough   triamcinolone (KENALOG) 0.1 % oral topical paste  Self No No   Sig: Apply 1 Application topically 2 (two) times a day      Facility-Administered Medications: None     Current Discharge Medication List        CONTINUE these medications which have NOT CHANGED    Details   acetaminophen (TYLENOL) 325 mg tablet Take 650 mg by mouth every 6 (six) hours as needed for mild pain      aspirin 81 mg chewable tablet Chew 81 mg 2 tablets daily      B Complex Vitamins (B COMPLEX PO) Take by mouth once a week      brinzolamide (AZOPT) 1 % ophthalmic suspension INSTILL 1 DROP INTO BOTH EYES TWICE A DAY  Qty: 30 mL, Refills: 3    Associated Diagnoses: Glaucoma, unspecified glaucoma type, unspecified laterality      chlordiazePOXIDE (LIBRIUM) 5 mg capsule Take 1 capsule (5 mg total) by mouth daily  Qty: 90 capsule, Refills: 0    Associated Diagnoses: Anxiety; Irritable bowel syndrome, unspecified type      clobetasol (OLUX) 0.05 % topical foam Apply topically daily as needed (skin irritation) For ears.  Qty: 50 g, Refills: 2    Associated Diagnoses: Dermatitis      docusate sodium (COLACE) 100 mg capsule Take 100 mg by mouth if needed      econazole nitrate 1 % cream if needed      ergocalciferol (VITAMIN D2) 50,000 units TAKE 1 CAPSULE BY MOUTH ONE TIME PER WEEK  Qty: 12 capsule, Refills: 2    Associated Diagnoses: Vitamin D deficiency      Erythromycin (ILOTYCIN OP) Apply to eye if needed      famotidine (PEPCID) 40 MG tablet TAKE 1 TABLET BY MOUTH TWICE A DAY  Qty: 180 tablet, Refills: 0    Associated Diagnoses: Gastroesophageal reflux disease without esophagitis      fish oil-omega-3 fatty acids 1000 MG capsule Take 2 g by mouth daily      glucosamine-chondroitin 500-400 MG tablet Take 1 tablet by mouth 2 (two) times a day      guaiFENesin (MUCINEX) 600 mg 12 hr tablet Take 1,200 mg by mouth if needed      hydrocortisone 2.5 % cream Apply  topically if needed      levocetirizine (XYZAL) 5 MG tablet Take 1 tablet by mouth Daily      meclizine (ANTIVERT) 25 mg tablet TAKE 1 TABLET (25 MG TOTAL) BY MOUTH EVERY 8 (EIGHT) HOURS AS NEEDED FOR DIZZINESS.  Qty: 90 tablet, Refills: 1    Associated Diagnoses: Vertigo      methylPREDNISolone 4 MG tablet therapy pack Use as directed on package  Qty: 21 each, Refills: 0    Associated Diagnoses: Bronchitis      mometasone (ELOCON) 0.1 % cream       Multiple Vitamins-Minerals (CENTRUM SILVER ULTRA MENS) TABS Take 1 tablet by mouth daily      mupirocin (BACTROBAN) 2 % ointment Apply topically 3 (three) times a day  Qty: 30 g, Refills: 1    Associated Diagnoses: Cellulitis, unspecified cellulitis site      promethazine-dextromethorphan (PHENERGAN-DM) 6.25-15 mg/5 mL oral syrup Take 5 mL by mouth 4 (four) times a day as needed for cough  Qty: 240 mL, Refills: 0    Associated Diagnoses: Upper respiratory tract infection, unspecified type      Red Yeast Rice 600 MG CAPS Take by mouth in the morning      Rocklatan 0.02-0.005 % SOLN INSTILL 1 DROP INTO BOTH EYES EVERY DAY IN THE EVENING      Silodosin 8 MG CAPS TAKE 1 CAPSULE BY MOUTH EVERY DAY  Qty: 100 capsule, Refills: 1    Associated Diagnoses: Benign prostatic hyperplasia, unspecified whether lower urinary tract symptoms present      triamcinolone (KENALOG) 0.1 % oral topical paste Apply 1 Application topically 2 (two) times a day  Qty: 5 g, Refills: 5    Associated Diagnoses: Aphthous ulcer           No discharge procedures on file.  ED SEPSIS DOCUMENTATION   Time reflects when diagnosis was documented in both MDM as applicable and the Disposition within this note       Time User Action Codes Description Comment    10/22/2024  5:55 PM Kimi Antonio Add [U07.1,  J12.82] Pneumonia due to COVID-19 virus     10/22/2024  5:55 PM Kimi Antonio Add [R09.02] Hypoxia                  Kimi Antonio DO  10/22/24 1164

## 2024-10-22 NOTE — ED PROVIDER NOTES
Time reflects when diagnosis was documented in both MDM as applicable and the Disposition within this note       Time User Action Codes Description Comment    10/21/2024  9:09 PM Julio C Loya Add [U07.1] COVID-19           ED Disposition       ED Disposition   Discharge    Condition   Stable    Date/Time   Mon Oct 21, 2024  9:09 PM    Comment   Adonis Mallory Jr. discharge to home/self care.                   Assessment & Plan       Medical Decision Making  84-year-old male presents for evaluation of mild viral symptoms x 1 day.  Multiple COVID contacts.  Exam: Patient well-appearing for age, AOx3, vitals WNL, in NAD.  Moist mucous membranes, lungs CTAB, normal respiratory effort, heart RRR.  Rapid test positive for COVID-19.    Educated patient on his results.  Discussed management and expectations for COVID-19 infection.  We did discuss possibility of initiating Paxlovid treatment, however patient takes Silodosin daily for history of BPH and epic flags this medication is contraindicated.  We did discuss the possibility of discontinuing his silodosin for short period of time.  However was decided that we would not initiate Paxlovid treatment at this time, patient can call his PCP tomorrow if he so desires to discuss holding his other medication and initiating Paxlovid.  Currently he is very well-appearing and states that his symptoms only feel like a mild cold.  Vitals are all WNL, saturating well on room air.  Ambulating comfortably, no LUCIANO.  He is aware that he should return to the ER immediately if his symptoms are worsening.    Patient expresses understanding of the condition, treatment plan, follow-up instructions, and return precautions.      Amount and/or Complexity of Data Reviewed  Labs: ordered. Decision-making details documented in ED Course.        ED Course as of 10/22/24 1839   Mon Oct 21, 2024   2059 SARS COV Rapid Antigen(!): Positive       Medications - No data to display    ED Risk Strat Scores                                                History of Present Illness       Chief Complaint   Patient presents with    Fever     Pt reports fever , body aches and sneezing. States a number of people at his [facility] have covid       Past Medical History:   Diagnosis Date    Abnormal diffusion capacity determined by pulmonary function test 04/25/2017    Actinic keratosis     Acute right-sided low back pain without sciatica 09/01/2017    Adverse effect of correct medicinal substance properly administered     Arthralgia     Candidiasis, mouth     Chronic allergic rhinitis     Chronic bronchitis (HCC)     Chronic sinusitis     Cough     Dermatitis     Diverticulosis     LUCIANO (dyspnea on exertion)     Former smoker     GERD (gastroesophageal reflux disease)     Glaucoma     Hyperlipidemia     Inguinal hernia, left     Lump of skin     Male erectile dysfunction     Open comedone     Palpitations     Persistent cough for 3 weeks or longer 03/15/2017    Renal calculi     Sebaceous cyst     Seborrheic keratosis     Sick sinus syndrome (HCC) 07/12/2022    Skin disorder     Unspecified chronic bronchitis (HCC)     Visual disturbances       Past Surgical History:   Procedure Laterality Date    CARDIAC ELECTROPHYSIOLOGY PROCEDURE N/A 08/09/2022    Procedure: Cardiac pacer implant/ DUAL CHAMBER PACER @ [place];  Surgeon: Van Gore DO;  Location: [facility];  Service: Cardiology    COLONOSCOPY  12/2014    [facility] Julien MD.-Diverticulosis    COLONOSCOPY  10/2011    BRADY Ross MD.-Diverticulosis    EGD  03/2016    KEENAN Ross MD.-LA Grade A reflux esophagitis, normal stomach, normal jejunum, benign-appearing esophageal stenosis.    EGD  08/2015    KEENAN Ross MD.-Normal upper endoscopy    INGUINAL HERNIA REPAIR Bilateral     left- last assessed: 11/25/14, Resolved: 5/18/15, onset 12/11/13    MOUTH SURGERY        Family History   Problem Relation Age of Onset    Brain cancer Mother     Heart failure  Mother     Prostate cancer Mother       Social History     Tobacco Use    Smoking status: Former     Current packs/day: 0.00     Types: Cigarettes     Quit date:      Years since quittin.8     Passive exposure: Never    Smokeless tobacco: Never   Vaping Use    Vaping status: Never Used   Substance Use Topics    Alcohol use: Not Currently     Alcohol/week: 2.0 standard drinks of alcohol     Types: 2 Cans of beer per week    Drug use: No      E-Cigarette/Vaping    E-Cigarette Use Never User       E-Cigarette/Vaping Substances    Nicotine No     THC No     CBD No     Flavoring No     Other No     Unknown No       I have reviewed and agree with the history as documented.     84-year-old male presenting for evaluation of nasal congestion, fever, myalgias, and sneezing that began this afternoon.  States multiple people at his [facility] have COVID right now.  He has no chest pain, SOB, palpitations, syncope, abdominal pain, nausea, vomiting.        Review of Systems   Constitutional:  Positive for fever. Negative for chills.   HENT:  Positive for congestion. Negative for ear pain and sore throat.    Eyes:  Negative for pain and visual disturbance.   Respiratory:  Negative for cough and shortness of breath.    Cardiovascular:  Negative for chest pain and palpitations.   Gastrointestinal:  Negative for abdominal pain and vomiting.   Genitourinary:  Negative for dysuria and hematuria.   Musculoskeletal:  Positive for myalgias. Negative for arthralgias and back pain.   Skin:  Negative for color change and rash.   Neurological:  Negative for seizures and syncope.   All other systems reviewed and are negative.          Objective       ED Triage Vitals   Temperature Pulse Blood Pressure Respirations SpO2 Patient Position - Orthostatic VS   10/21/24 1828 10/21/24 1828 10/21/24 1828 10/21/24 1828 10/21/24 1828 10/21/24 1828   98.4 °F (36.9 °C) 91 142/76 16 97 % Sitting      Temp Source Heart Rate Source BP Location FiO2 (%)  Pain Score    10/21/24 1827 10/21/24 1828 10/21/24 1828 -- 10/21/24 1956    Oral Monitor Right arm  5      Vitals      Date and Time Temp Pulse SpO2 Resp BP Pain Score FACES Pain Rating User   10/21/24 1956 -- -- -- -- -- 5 -- TP   10/21/24 1828 98.4 °F (36.9 °C) 91 97 % 16 142/76 -- -- LP            Physical Exam  Vitals and nursing note reviewed.   Constitutional:       General: He is not in acute distress.     Appearance: He is well-developed.   HENT:      Head: Normocephalic and atraumatic.   Eyes:      Conjunctiva/sclera: Conjunctivae normal.   Cardiovascular:      Rate and Rhythm: Normal rate and regular rhythm.      Heart sounds: No murmur heard.  Pulmonary:      Effort: Pulmonary effort is normal. No respiratory distress.      Breath sounds: Normal breath sounds.   Abdominal:      Palpations: Abdomen is soft.      Tenderness: There is no abdominal tenderness.   Musculoskeletal:         General: No swelling.      Cervical back: Neck supple.   Skin:     General: Skin is warm and dry.      Capillary Refill: Capillary refill takes less than 2 seconds.   Neurological:      Mental Status: He is alert.   Psychiatric:         Mood and Affect: Mood normal.         Results Reviewed       Procedure Component Value Units Date/Time    FLU/COVID Rapid Antigen (30 min. TAT) - Preferred screening test in ED [[id number]]  (Abnormal) Collected: 10/21/24 2023    Lab Status: Final result Specimen: Nares from Nose Updated: 10/21/24 2058     SARS COV Rapid Antigen Positive     Influenza A Rapid Antigen Negative     Influenza B Rapid Antigen Negative    Narrative:      This test has been performed using the [employer] Citlalli 2 FLU+SARS Antigen test under the Emergency Use Authorization (EUA). This test has been validated by the  and verified by the performing laboratory. The Citlalli uses lateral flow immunofluorescent sandwich assay to detect SARS-COV, Influenza A and Influenza B Antigen.     The Quidel Citlalli 2 SARS Antigen  test does not differentiate between SARS-CoV and SARS-CoV-2.     Negative results are presumptive and may be confirmed with a molecular assay, if necessary, for patient management. Negative results do not rule out SARS-CoV-2 or influenza infection and should not be used as the sole basis for treatment or patient management decisions. A negative test result may occur if the level of antigen in a sample is below the limit of detection of this test.     Positive results are indicative of the presence of viral antigens, but do not rule out bacterial infection or co-infection with other viruses.     All test results should be used as an adjunct to clinical observations and other information available to the provider.    FOR PEDIATRIC PATIENTS - copy/paste COVID Guidelines URL to browser: [email or web address]            No orders to display       Procedures    ED Medication and Procedure Management   Prior to Admission Medications   Prescriptions Last Dose Informant Patient Reported? Taking?   B Complex Vitamins (B COMPLEX PO)  Self Yes No   Sig: Take by mouth once a week   Erythromycin (ILOTYCIN OP)  Self Yes No   Sig: Apply to eye if needed   Patient not taking: Reported on 4/1/2024   Multiple Vitamins-Minerals (CENTRUM SILVER ULTRA MENS) TABS  Self Yes No   Sig: Take 1 tablet by mouth daily   Red Yeast Rice 600 MG CAPS  Self Yes No   Sig: Take by mouth in the morning   Patient not taking: Reported on 2/14/2024   Rocklatan 0.02-0.005 % SOLN  Self Yes No   Sig: INSTILL 1 DROP INTO BOTH EYES EVERY DAY IN THE EVENING   Silodosin 8 MG CAPS   No No   Sig: TAKE 1 CAPSULE BY MOUTH EVERY DAY   acetaminophen (TYLENOL) 325 mg tablet  Self Yes No   Sig: Take 650 mg by mouth every 6 (six) hours as needed for mild pain   aspirin 81 mg chewable tablet  Self Yes No   Sig: Chew 81 mg 2 tablets daily   brinzolamide (AZOPT) 1 % ophthalmic suspension   No No   Sig: INSTILL 1 DROP INTO  BOTH EYES TWICE A DAY   chlordiazePOXIDE (LIBRIUM) 5 mg capsule   No No   Sig: Take 1 capsule (5 mg total) by mouth daily   clobetasol (OLUX) 0.05 % topical foam   No No   Sig: Apply topically daily as needed (skin irritation) For ears.   docusate sodium (COLACE) 100 mg capsule  Self Yes No   Sig: Take 100 mg by mouth if needed   econazole nitrate 1 % cream  Self Yes No   Sig: if needed   ergocalciferol (VITAMIN D2) 50,000 units  Self No No   Sig: TAKE 1 CAPSULE BY MOUTH ONE TIME PER WEEK   Patient not taking: Reported on 4/1/2024   famotidine (PEPCID) 40 MG tablet   No No   Sig: TAKE 1 TABLET BY MOUTH TWICE A DAY   fish oil-omega-3 fatty acids 1000 MG capsule  Self Yes No   Sig: Take 2 g by mouth daily   glucosamine-chondroitin 500-400 MG tablet  Self Yes No   Sig: Take 1 tablet by mouth 2 (two) times a day   guaiFENesin (MUCINEX) 600 mg 12 hr tablet  Self Yes No   Sig: Take 1,200 mg by mouth if needed   hydrocortisone 2.5 % cream  Self Yes No   Sig: Apply topically if needed   levocetirizine (XYZAL) 5 MG tablet  Self Yes No   Sig: Take 1 tablet by mouth Daily   meclizine (ANTIVERT) 25 mg tablet   No No   Sig: TAKE 1 TABLET (25 MG TOTAL) BY MOUTH EVERY 8 (EIGHT) HOURS AS NEEDED FOR DIZZINESS.   methylPREDNISolone 4 MG tablet therapy pack   No No   Sig: Use as directed on package   mometasone (ELOCON) 0.1 % cream  Self Yes No   mupirocin (BACTROBAN) 2 % ointment  Self No No   Sig: Apply topically 3 (three) times a day   promethazine-dextromethorphan (PHENERGAN-DM) 6.25-15 mg/5 mL oral syrup   No No   Sig: Take 5 mL by mouth 4 (four) times a day as needed for cough   triamcinolone (KENALOG) 0.1 % oral topical paste  Self No No   Sig: Apply 1 Application topically 2 (two) times a day      Facility-Administered Medications: None     Discharge Medication List as of 10/21/2024  9:10 PM        CONTINUE these medications which have NOT CHANGED    Details   acetaminophen (TYLENOL) 325 mg tablet Take 650 mg by mouth every 6  (six) hours as needed for mild pain, Historical Med      aspirin 81 mg chewable tablet Chew 81 mg 2 tablets daily, Historical Med      B Complex Vitamins (B COMPLEX PO) Take by mouth once a week, Historical Med      brinzolamide (AZOPT) 1 % ophthalmic suspension INSTILL 1 DROP INTO BOTH EYES TWICE A DAY, Normal      chlordiazePOXIDE (LIBRIUM) 5 mg capsule Take 1 capsule (5 mg total) by mouth daily, Starting Tue 9/3/2024, Normal      clobetasol (OLUX) 0.05 % topical foam Apply topically daily as needed (skin irritation) For ears., Starting Tue 9/3/2024, Normal      docusate sodium (COLACE) 100 mg capsule Take 100 mg by mouth if needed, Historical Med      econazole nitrate 1 % cream if needed, Starting Thu 10/6/2022, Historical Med      ergocalciferol (VITAMIN D2) 50,000 units TAKE 1 CAPSULE BY MOUTH ONE TIME PER WEEK, Normal      Erythromycin (ILOTYCIN OP) Apply to eye if needed, Historical Med      famotidine (PEPCID) 40 MG tablet TAKE 1 TABLET BY MOUTH TWICE A DAY, Starting Mon 8/12/2024, Normal      fish oil-omega-3 fatty acids 1000 MG capsule Take 2 g by mouth daily, Historical Med      glucosamine-chondroitin 500-400 MG tablet Take 1 tablet by mouth 2 (two) times a day, Historical Med      guaiFENesin (MUCINEX) 600 mg 12 hr tablet Take 1,200 mg by mouth if needed, Historical Med      hydrocortisone 2.5 % cream Apply topically if needed, Historical Med      levocetirizine (XYZAL) 5 MG tablet Take 1 tablet by mouth Daily, Starting Tue 8/27/2013, Historical Med      meclizine (ANTIVERT) 25 mg tablet TAKE 1 TABLET (25 MG TOTAL) BY MOUTH EVERY 8 (EIGHT) HOURS AS NEEDED FOR DIZZINESS., Starting Sat 9/28/2024, Normal      methylPREDNISolone 4 MG tablet therapy pack Use as directed on package, Normal      mometasone (ELOCON) 0.1 % cream Starting Fri 3/4/2022, Historical Med      Multiple Vitamins-Minerals (CENTRUM SILVER ULTRA MENS) TABS Take 1 tablet by mouth daily, Starting Mon 10/21/2013, Historical Med       mupirocin (BACTROBAN) 2 % ointment Apply topically 3 (three) times a day, Starting Fri 3/29/2024, Normal      promethazine-dextromethorphan (PHENERGAN-DM) 6.25-15 mg/5 mL oral syrup Take 5 mL by mouth 4 (four) times a day as needed for cough, Starting Tue 4/16/2024, Normal      Red Yeast Rice 600 MG CAPS Take by mouth in the morning, Historical Med      Rocklatan 0.02-0.005 % SOLN INSTILL 1 DROP INTO BOTH EYES EVERY DAY IN THE EVENING, Historical Med      Silodosin 8 MG CAPS TAKE 1 CAPSULE BY MOUTH EVERY DAY, Normal      triamcinolone (KENALOG) 0.1 % oral topical paste Apply 1 Application topically 2 (two) times a day, Starting Mon 10/9/2023, Normal           No discharge procedures on file.  ED SEPSIS DOCUMENTATION   Time reflects when diagnosis was documented in both MDM as applicable and the Disposition within this note       Time User Action Codes Description Comment    10/21/2024  9:09 PM Julio C Loya Add [U07.1] COVID-19                  Julio C Loya PA-C  10/22/24 1839

## 2024-10-22 NOTE — ASSESSMENT & PLAN NOTE
Patient presenting with mild COVID-19 infection  Vaccination status :Unknown, reports no 2024 vaccination  Oxygen requirements Room air, desat with ambulation  Remdesvir Day #2/3   Decadron: Does not meet criteria for Decadron   D-dimer Level pending, not on oxygen- remains off of oxygen   Trend CRP, Prone as possible, OOB TID  CRP 74  Trend CMP,CBC daily.  Positive test date 10/21/2024

## 2024-10-22 NOTE — H&P
H&P - Hospitalist   Name: Adonis Mallory Jr. 84 y.o. male I MRN: [id number]  Unit/Bed#: ED-26 I Date of Admission: 10/22/2024   Date of Service: 10/22/2024 I Hospital Day: 0       Assessment and Plan  * Pneumonia due to COVID-19 virus  Assessment & Plan  Patient presenting with mild COVID-19 infection  Vaccination status :Unknown, reports no 2024 vaccination  Oxygen requirements Room air, desat with ambulation  Remdesvir Day #1/3   Decadron: Does not meet criteria for Decadron   D-dimer Level pending, not on oxygen  Trend CRP, Prone as possible, OOB TID  CRP Pendng  Trend CMP,CBC daily.  Positive test date 10/21/2024    Depression, recurrent (HCC)  Assessment & Plan  Mood is stable, he is on Librium 5 mg daily for history of irritable bowel  Continue the same for now    Chronic obstructive pulmonary disease, unspecified COPD type (HCC)  Assessment & Plan  No evidence of exacerbation, will add albuterol as needed  Not on maintenance medications    Sick sinus syndrome (HCC)  Assessment & Plan  Status post permanent pacemaker placement    Glaucoma  Assessment & Plan  Continue eyedrops    Enlarged prostate without lower urinary tract symptoms (luts)  Assessment & Plan  Resume alpha-blocker, medication was auto substituted        Code Status: Full code    VTE Prophylaxis: Enoxaparin (Lovenox)  / sequential compression device     Discussion with family: Contacted son at 6:19 PM, no answer, no voicemail left.  Contacted daughter at 6:20 PM-person that answered the phone stated that I had the incorrect phone number    Anticipated Length of Stay:  Patient will be admitted on an Inpatient basis with an anticipated length of stay of greater than 2 midnights.   Justification for Hospital Stay: Pneumonia due to COVID-19 virus     Total Time for Visit, including Counseling / Coordination of Care: 76 minutes.  Greater than 50% of this total time spent on direct patient counseling and coordination of care.    Chief Complaint:      Chief Complaint   Patient presents with    Weakness - Generalized     Pt states he was dx here with covid yesterday- reporting increased weakness and shortness of breath.        History of Present Illness:    Adonis Mallory Jr. is a 84 y.o. male who presents with shortness of breath, weakness lethargy and fatigue.  The patient has a past medical history of chronic bronchitis as well as COPD.  He also has a history of glaucoma, Sjogren's syndrome as well as sick sinus syndrome status post pacemaker placement.  He was recently diagnosed with COVID on 10/21/2024, he had reported multiple sick contacts.  He was initially seen in the ER and discharged home with supportive care.  At that time he was offered Paxlovid but declined that medication as he may have had to discontinue his alpha-blocker.    Patient reports runny nose and congestion.  He has no chest pain.  He denies any wheezing or difficulty breathing.  He reports he feels ill and has body aches.    Review of Systems:    A complete and comprehensive 14 point organ system review was performed and all other systems are negative other than stated above in the HPI    Past Medical and Surgical History:     Past Medical History:   Diagnosis Date    Abnormal diffusion capacity determined by pulmonary function test 04/25/2017    Actinic keratosis     Acute right-sided low back pain without sciatica 09/01/2017    Adverse effect of correct medicinal substance properly administered     Arthralgia     Candidiasis, mouth     Chronic allergic rhinitis     Chronic bronchitis (HCC)     Chronic sinusitis     Cough     Dermatitis     Diverticulosis     LUCIANO (dyspnea on exertion)     Former smoker     GERD (gastroesophageal reflux disease)     Glaucoma     Hyperlipidemia     Inguinal hernia, left     Lump of skin     Male erectile dysfunction     Open comedone     Palpitations     Persistent cough for 3 weeks or longer 03/15/2017    Renal calculi     Sebaceous cyst     Seborrheic  keratosis     Sick sinus syndrome (HCC) 07/12/2022    Skin disorder     Unspecified chronic bronchitis (HCC)     Visual disturbances        Past Surgical History:   Procedure Laterality Date    CARDIAC ELECTROPHYSIOLOGY PROCEDURE N/A 08/09/2022    Procedure: Cardiac pacer implant/ DUAL CHAMBER PACER @ [place];  Surgeon: Van Gore DO;  Location: [facility];  Service: Cardiology    COLONOSCOPY  12/2014    [facility] Julien MD.-Diverticulosis    COLONOSCOPY  10/2011    BRADY Ross MD.-Diverticulosis    EGD  03/2016    KEENAN Ross MD.-LA Grade A reflux esophagitis, normal stomach, normal jejunum, benign-appearing esophageal stenosis.    EGD  08/2015    KEENAN Ross MD.-Normal upper endoscopy    INGUINAL HERNIA REPAIR Bilateral     left- last assessed: 11/25/14, Resolved: 5/18/15, onset 12/11/13    MOUTH SURGERY         Meds/Allergies:    Prior to Admission medications    Medication Sig Start Date End Date Taking? Authorizing Provider   acetaminophen (TYLENOL) 325 mg tablet Take 650 mg by mouth every 6 (six) hours as needed for mild pain    Historical Provider, MD   aspirin 81 mg chewable tablet Chew 81 mg 2 tablets daily    Historical Provider, MD   B Complex Vitamins (B COMPLEX PO) Take by mouth once a week    Historical Provider, MD   brinzolamide (AZOPT) 1 % ophthalmic suspension INSTILL 1 DROP INTO BOTH EYES TWICE A DAY 5/24/24   Carl Hawkins DO   chlordiazePOXIDE (LIBRIUM) 5 mg capsule Take 1 capsule (5 mg total) by mouth daily 9/3/24   Carl Hawkins DO   clobetasol (OLUX) 0.05 % topical foam Apply topically daily as needed (skin irritation) For ears. 9/3/24   Carl Hawkins DO   docusate sodium (COLACE) 100 mg capsule Take 100 mg by mouth if needed    Historical Provider, MD   econazole nitrate 1 % cream if needed 10/6/22   Historical Provider, MD   ergocalciferol (VITAMIN D2) 50,000 units TAKE 1 CAPSULE BY MOUTH ONE TIME PER WEEK  Patient not taking: Reported on 4/1/2024  12/23/22   Carl Hawkins DO   Erythromycin (ILOTYCIN OP) Apply to eye if needed  Patient not taking: Reported on 4/1/2024    Historical Provider, MD   famotidine (PEPCID) 40 MG tablet TAKE 1 TABLET BY MOUTH TWICE A DAY 8/12/24   Carl Hawkins DO   fish oil-omega-3 fatty acids 1000 MG capsule Take 2 g by mouth daily    Historical Provider, MD   glucosamine-chondroitin 500-400 MG tablet Take 1 tablet by mouth 2 (two) times a day    Historical Provider, MD   guaiFENesin (MUCINEX) 600 mg 12 hr tablet Take 1,200 mg by mouth if needed    Historical Provider, MD   hydrocortisone 2.5 % cream Apply topically if needed    Historical Provider, MD   levocetirizine (XYZAL) 5 MG tablet Take 1 tablet by mouth Daily 8/27/13   Historical Provider, MD   meclizine (ANTIVERT) 25 mg tablet TAKE 1 TABLET (25 MG TOTAL) BY MOUTH EVERY 8 (EIGHT) HOURS AS NEEDED FOR DIZZINESS. 9/28/24   Carl Hawkins DO   methylPREDNISolone 4 MG tablet therapy pack Use as directed on package 4/19/24   Carl Hawkins DO   mometasone (ELOCON) 0.1 % cream  3/4/22   Historical Provider, MD   Multiple Vitamins-Minerals (CENTRUM SILVER ULTRA MENS) TABS Take 1 tablet by mouth daily 10/21/13   Historical Provider, MD   mupirocin (BACTROBAN) 2 % ointment Apply topically 3 (three) times a day 3/29/24   Carl Hawkins DO   promethazine-dextromethorphan (PHENERGAN-DM) 6.25-15 mg/5 mL oral syrup Take 5 mL by mouth 4 (four) times a day as needed for cough 4/16/24   Carl Hawkins DO   Red Yeast Rice 600 MG CAPS Take by mouth in the morning  Patient not taking: Reported on 2/14/2024    Historical Provider, MD   Rocklatan 0.02-0.005 % SOLN INSTILL 1 DROP INTO BOTH EYES EVERY DAY IN THE EVENING 8/31/21   Historical Provider, MD   Silodosin 8 MG CAPS TAKE 1 CAPSULE BY MOUTH EVERY DAY 7/24/24   Carl Hawkins DO   triamcinolone (KENALOG) 0.1 % oral topical paste Apply 1 Application topically 2 (two) times a day 10/9/23   Carl Hawkins, DO     I have  reviewed home medications with patient personally.    Allergies:   Allergies   Allergen Reactions    Levofloxacin Anaphylaxis    Azithromycin GI Intolerance    Ketorolac Other (See Comments)     Swelling of eyes with drops    Penicillins Hives       Social History:     Marital Status:    Occupation: Retired    Substance Use History:   Social History     Substance and Sexual Activity   Alcohol Use Not Currently    Alcohol/week: 2.0 standard drinks of alcohol    Types: 2 Cans of beer per week     Social History     Tobacco Use   Smoking Status Former    Current packs/day: 0.00    Types: Cigarettes    Quit date:     Years since quittin.8    Passive exposure: Never   Smokeless Tobacco Never     Social History     Substance and Sexual Activity   Drug Use No       Family History:    Family history non-contributory    Physical Exam:     Vitals:   Blood Pressure: 136/64 (10/22/24 1702)  Pulse: 86 (10/22/24 1702)  Temperature: 98.4 °F (36.9 °C) (10/22/24 1441)  Temp Source: Oral (10/22/24 1441)  Respirations: 20 (10/22/24 1702)  Weight - Scale: 71.8 kg (158 lb 4.6 oz) (10/22/24 1439)  SpO2: (!) 85 % (10/22/24 1720)      General: ill appearing, no acute distress  HEENT: atraumatic, PERRLA, moist mucosa, normal pharynx, normal tonsils and adenoids, normal tongue, no fluid in sinuses  Neck: Trachea midline, no carotid bruit, no masses  Respiratory: normal chest wall expansion, coarse breath sounds, no wheezing, no r/r/w, no rubs  Cardiovascular: RRR, no m/r/g, Normal S1 and S2  Abdomen: Soft, non-tender, non-distended, normal bowel sounds in all quadrants, no hepatosplenomegaly, no tympany  Rectal: deferred  Musculoskeletal: Moves all  Integumentary: warm, dry, and pink, with no rash, purpura, or petechia  Heme/Lymph: no lymphadenopathy, no bruises  Neurological: Cranial Nerves II-XII grossly intact  Psychiatric: cooperative with normal mood, affect, and cognition    Additional Data:     Lab Results: Laboratory  studies reviewed for today    Results from last 7 days   Lab Units 10/22/24  1547   WBC Thousand/uL 7.00   HEMOGLOBIN g/dL 12.6   HEMATOCRIT % 37.0   PLATELETS Thousands/uL 190   SEGS PCT % 66   LYMPHO PCT % 18   MONO PCT % 15*   EOS PCT % 1     Results from last 7 days   Lab Units 10/22/24  1547   SODIUM mmol/L 137   POTASSIUM mmol/L 3.8   CHLORIDE mmol/L 104   CO2 mmol/L 29   BUN mg/dL 15   CREATININE mg/dL 1.05   ANION GAP mmol/L 4   CALCIUM mg/dL 8.8   GLUCOSE RANDOM mg/dL 106                     Results from last 7 days   Lab Units 10/22/24  1747 10/22/24  1547   HS TNI 0HR ng/L  --  10   HS TNI 2HR ng/L 8  --        Imaging: Results Review Statement: I personally reviewed the following image studies/reports in PACS and discussed pertinent findings with Radiology: chest xray. My interpretation of the radiology images/reports is: Groundglass opacities consistent with COVID-19.    XR chest 2 views   ED Interpretation by Kimi Antonio DO (10/22 1718)      IMPRESSION:     Subtle patchy areas of groundglass consolidation in both lungs suspicious for COVID-19 pneumonia.        Final Result by Rober Jin MD (10/22 1714)      Subtle patchy areas of groundglass consolidation in both lungs suspicious for COVID-19 pneumonia.      Stable background reticulation related to known interstitial lung disease.            The study was marked in EPIC for immediate notification.            Workstation performed: [id number]             EKG, Pathology, and Other Studies Reviewed on Admission:   Chest radiograph reviewed as well as previous PCP note    Allscripts / Epic Records Reviewed: Yes    ** Please Note: This note was completed in part utilizing Nuance Dragon Medical One Software.  Grammatical errors, random word insertions, spelling mistakes, and incomplete sentences may be an occasional consequence of this system secondary to software limitations, ambient noise, and hardware issues.  If you have any questions or  concerns about the content, text, or information contained within the body of this dictation, please contact the provider for clarification.**

## 2024-10-22 NOTE — TELEPHONE ENCOUNTER
10/22/24 10:14 AM    Patient contacted post ED visit, [facility] department spoke with patient/caregiver and outreach was successful.    Thank you.  Marshall Blue MA  [facility]

## 2024-10-22 NOTE — DISCHARGE INSTRUCTIONS
You tested positive for COVID-19.  This is a virus and should improve on its own over time.  It should be beneficial that you had the vaccinations before to lessen the severity of the illness.  We do sometimes start people on a medication called Paxlovid and attempts to decrease rates of return and hospitalization, however due to chronic use of medications for BPH there is an interaction between these medications and Paxlovid is contraindicated.  Continue with supportive care at home including rest, hydration, Tylenol, antihistamines, etc.  Recommend following up with your PCP at some point to make sure everything is improving well and return to the ED if symptoms continue to worsen.  Symptoms will likely be worse over the next few days since they just darted today.

## 2024-10-22 NOTE — ASSESSMENT & PLAN NOTE
Mood is stable, he is on Librium 5 mg daily for history of irritable bowel  Continue the same for now

## 2024-10-23 VITALS
BODY MASS INDEX: 22 KG/M2 | HEIGHT: 70 IN | HEART RATE: 86 BPM | SYSTOLIC BLOOD PRESSURE: 122 MMHG | TEMPERATURE: 98.7 F | OXYGEN SATURATION: 95 % | WEIGHT: 153.66 LBS | RESPIRATION RATE: 18 BRPM | DIASTOLIC BLOOD PRESSURE: 63 MMHG

## 2024-10-23 LAB
ALBUMIN SERPL BCG-MCNC: 3.4 G/DL (ref 3.5–5)
ALP SERPL-CCNC: 55 U/L (ref 34–104)
ALT SERPL W P-5'-P-CCNC: 7 U/L (ref 7–52)
ANION GAP SERPL CALCULATED.3IONS-SCNC: 4 MMOL/L (ref 4–13)
AST SERPL W P-5'-P-CCNC: 15 U/L (ref 13–39)
ATRIAL RATE: 90 BPM
BASOPHILS # BLD AUTO: 0.01 THOUSANDS/ΜL (ref 0–0.1)
BASOPHILS NFR BLD AUTO: 0 % (ref 0–1)
BILIRUB SERPL-MCNC: 0.4 MG/DL (ref 0.2–1)
BUN SERPL-MCNC: 16 MG/DL (ref 5–25)
CALCIUM ALBUM COR SERPL-MCNC: 9 MG/DL (ref 8.3–10.1)
CALCIUM SERPL-MCNC: 8.5 MG/DL (ref 8.4–10.2)
CHLORIDE SERPL-SCNC: 105 MMOL/L (ref 96–108)
CO2 SERPL-SCNC: 26 MMOL/L (ref 21–32)
CREAT SERPL-MCNC: 0.84 MG/DL (ref 0.6–1.3)
CRP SERPL QL: 74.4 MG/L
EOSINOPHIL # BLD AUTO: 0.06 THOUSAND/ΜL (ref 0–0.61)
EOSINOPHIL NFR BLD AUTO: 1 % (ref 0–6)
ERYTHROCYTE [DISTWIDTH] IN BLOOD BY AUTOMATED COUNT: 14.5 % (ref 11.6–15.1)
GFR SERPL CREATININE-BSD FRML MDRD: 79 ML/MIN/1.73SQ M
GLUCOSE SERPL-MCNC: 101 MG/DL (ref 65–140)
HCT VFR BLD AUTO: 34.1 % (ref 36.5–49.3)
HGB BLD-MCNC: 11.7 G/DL (ref 12–17)
IMM GRANULOCYTES # BLD AUTO: 0.02 THOUSAND/UL (ref 0–0.2)
IMM GRANULOCYTES NFR BLD AUTO: 0 % (ref 0–2)
LYMPHOCYTES # BLD AUTO: 1.58 THOUSANDS/ΜL (ref 0.6–4.47)
LYMPHOCYTES NFR BLD AUTO: 25 % (ref 14–44)
MCH RBC QN AUTO: 31.3 PG (ref 26.8–34.3)
MCHC RBC AUTO-ENTMCNC: 34.3 G/DL (ref 31.4–37.4)
MCV RBC AUTO: 91 FL (ref 82–98)
MONOCYTES # BLD AUTO: 1.24 THOUSAND/ΜL (ref 0.17–1.22)
MONOCYTES NFR BLD AUTO: 19 % (ref 4–12)
NEUTROPHILS # BLD AUTO: 3.53 THOUSANDS/ΜL (ref 1.85–7.62)
NEUTS SEG NFR BLD AUTO: 55 % (ref 43–75)
NRBC BLD AUTO-RTO: 0 /100 WBCS
P AXIS: 80 DEGREES
PLATELET # BLD AUTO: 173 THOUSANDS/UL (ref 149–390)
PMV BLD AUTO: 9.7 FL (ref 8.9–12.7)
POTASSIUM SERPL-SCNC: 3.8 MMOL/L (ref 3.5–5.3)
PR INTERVAL: 192 MS
PROT SERPL-MCNC: 6.3 G/DL (ref 6.4–8.4)
QRS AXIS: -11 DEGREES
QRSD INTERVAL: 140 MS
QT INTERVAL: 402 MS
QTC INTERVAL: 491 MS
RBC # BLD AUTO: 3.74 MILLION/UL (ref 3.88–5.62)
SODIUM SERPL-SCNC: 135 MMOL/L (ref 135–147)
T WAVE AXIS: 65 DEGREES
VENTRICULAR RATE: 90 BPM
WBC # BLD AUTO: 6.44 THOUSAND/UL (ref 4.31–10.16)

## 2024-10-23 PROCEDURE — 86140 C-REACTIVE PROTEIN: CPT | Performed by: INTERNAL MEDICINE

## 2024-10-23 PROCEDURE — 85025 COMPLETE CBC W/AUTO DIFF WBC: CPT | Performed by: INTERNAL MEDICINE

## 2024-10-23 PROCEDURE — 93010 ELECTROCARDIOGRAM REPORT: CPT

## 2024-10-23 PROCEDURE — 97166 OT EVAL MOD COMPLEX 45 MIN: CPT

## 2024-10-23 PROCEDURE — 97162 PT EVAL MOD COMPLEX 30 MIN: CPT

## 2024-10-23 PROCEDURE — 99232 SBSQ HOSP IP/OBS MODERATE 35: CPT | Performed by: INTERNAL MEDICINE

## 2024-10-23 PROCEDURE — 80053 COMPREHEN METABOLIC PANEL: CPT | Performed by: INTERNAL MEDICINE

## 2024-10-23 RX ORDER — MECLIZINE HYDROCHLORIDE 25 MG/1
25 TABLET ORAL EVERY 8 HOURS PRN
Status: DISCONTINUED | OUTPATIENT
Start: 2024-10-23 | End: 2024-10-24 | Stop reason: HOSPADM

## 2024-10-23 RX ADMIN — BRINZOLAMIDE 1 DROP: 10 SUSPENSION/ DROPS OPHTHALMIC at 22:28

## 2024-10-23 RX ADMIN — ENOXAPARIN SODIUM 40 MG: 40 INJECTION SUBCUTANEOUS at 09:08

## 2024-10-23 RX ADMIN — BRINZOLAMIDE 1 DROP: 10 SUSPENSION/ DROPS OPHTHALMIC at 09:07

## 2024-10-23 RX ADMIN — REMDESIVIR 100 MG: 100 INJECTION, POWDER, LYOPHILIZED, FOR SOLUTION INTRAVENOUS at 22:28

## 2024-10-23 RX ADMIN — CHLORDIAZEPOXIDE HYDROCHLORIDE 5 MG: 5 CAPSULE ORAL at 09:07

## 2024-10-23 RX ADMIN — ASPIRIN 81 MG CHEWABLE TABLET 81 MG: 81 TABLET CHEWABLE at 09:07

## 2024-10-23 RX ADMIN — TAMSULOSIN HYDROCHLORIDE 0.8 MG: 0.4 CAPSULE ORAL at 22:28

## 2024-10-23 RX ADMIN — FAMOTIDINE 20 MG: 20 TABLET, FILM COATED ORAL at 09:09

## 2024-10-23 RX ADMIN — BRINZOLAMIDE 1 DROP: 10 SUSPENSION/ DROPS OPHTHALMIC at 17:46

## 2024-10-23 NOTE — NURSING NOTE
Per chart patient received COVID and Fluzone high dose vaccine 9/3/2024.       Influenza Split High Dose Preservative Free IM     COVID-19 Moderna mRNA Vaccine 12 Yr+ 50 mcg/0.5 mL (Spikevax)    Current Status    Updated on: 9/26/2024 12:42 PM    Name Date Status Dose VIS Date Route Site  Lot# Given By Verified By   COVID-19 Moderna mRNA Vaccine 12 Yr+ 50 mcg/0.5 mL (Spikevax) 9/3/2024 Given -- -- -- [facility] [id number] -- --   Exp. Date NDC # Product Time Location External Inventory Class Comment   -- -- -- -- [id number] Auto Rec -- --   Received from: [facility]  Received on: 9/26/2024 12:42 PM                Linked Order: [id number]   Current Status    Updated on: 9/3/2024 10:58 AM    Name Date Status Dose VIS Date Route Site  Lot# Given By Verified By   Influenza Split High Dose Preservative Free IM 9/3/2024 Given 0.5 mL 08/06/2021 IM LD [employer] [id number] Ned Willis --   Exp. Date NDC # Product Time Location External Inventory Class Comment   6/30/2025 31520-651-75 Fluzone High-Dose -- -- -- -- --   Question Answer Comment   Has this medication/vaccine been supplied by the patient or other third party? NO    Have you ever had a serious reaction to any vaccine in the past? NO    Vaccine information statement provided with this administration? Yes    Have you ever had a serious reaction to eggs?     Have you ever had a serious reaction to Polymyxin B / streptomycin?     Have you ever had a serious reaction to Neomycin or Kanamycin?     Have you ever had a serious reaction to Gelatin?     Vaccine information sheet date given? 9/3/2024    Updated by: Ned Willis               Previous Statuses    Updated on: 9/3/2024 10:50 AM    Name Date Status Dose VIS Date Route Site  Lot# Given By Verified By   Influenza Split High Dose Preservative Free IM 9/3/2024 Incomplete 0.5 mL 08/06/2021 IM -- -- -- -- --   Exp. Date NDC # Product Time Location External Inventory  Class Comment   -- -- -- -- -- -- -- --   Updated by: Calr Hawkins, DO

## 2024-10-23 NOTE — OCCUPATIONAL THERAPY NOTE
Occupational Therapy Evaluation     Patient Name: Adonsi Mallory Jr.  Today's Date: 10/23/2024  Problem List  Principal Problem:    Pneumonia due to COVID-19 virus  Active Problems:    Enlarged prostate without lower urinary tract symptoms (luts)    Glaucoma    Sick sinus syndrome (HCC)    Chronic obstructive pulmonary disease, unspecified COPD type (HCC)    Depression, recurrent (HCC)    Past Medical History  Past Medical History:   Diagnosis Date    Abnormal diffusion capacity determined by pulmonary function test 04/25/2017    Actinic keratosis     Acute right-sided low back pain without sciatica 09/01/2017    Adverse effect of correct medicinal substance properly administered     Arthralgia     Candidiasis, mouth     Chronic allergic rhinitis     Chronic bronchitis (HCC)     Chronic sinusitis     Cough     Dermatitis     Diverticulosis     LUCIANO (dyspnea on exertion)     Former smoker     GERD (gastroesophageal reflux disease)     Glaucoma     Hyperlipidemia     Inguinal hernia, left     Lump of skin     Male erectile dysfunction     Open comedone     Palpitations     Persistent cough for 3 weeks or longer 03/15/2017    Renal calculi     Sebaceous cyst     Seborrheic keratosis     Sick sinus syndrome (HCC) 07/12/2022    Skin disorder     Unspecified chronic bronchitis (HCC)     Visual disturbances      Past Surgical History  Past Surgical History:   Procedure Laterality Date    CARDIAC ELECTROPHYSIOLOGY PROCEDURE N/A 08/09/2022    Procedure: Cardiac pacer implant/ DUAL CHAMBER PACER @ [place];  Surgeon: Van Gore DO;  Location: [facility];  Service: Cardiology    COLONOSCOPY  12/2014    [facility] Julien MD.-Diverticulosis    COLONOSCOPY  10/2011    BRADY Ross MD.-Diverticulosis    EGD  03/2016    KEENAN Ross MD.-LA Grade A reflux esophagitis, normal stomach, normal jejunum, benign-appearing esophageal stenosis.    EGD  08/2015    KEENAN Ross MD.-Normal upper endoscopy    INGUINAL  "HERNIA REPAIR Bilateral     left- last assessed: 11/25/14, Resolved: 5/18/15, onset 12/11/13    MOUTH SURGERY             10/23/24 0947   OT Last Visit   OT Visit Date 10/23/24   Note Type   Note type Evaluation   Pain Assessment   Pain Assessment Tool 0-10   Pain Score No Pain   Restrictions/Precautions   Weight Bearing Precautions Per Order No   Other Precautions Contact/isolation;Airborne/isolation;Chair Alarm;Bed Alarm;Fall Risk   Home Living   Type of Home House   Home Layout Two level;Laundry in basement;Able to live on main level with bedroom/bathroom   Home Equipment Walker   Additional Comments Pt lives with his 2 sons in a ranch level house with \"a few\" LARISA and FOS to laundry and freezer in basement. Pt's sons are home and able to assist as needed upon D/C.   Prior Function   Level of [place] Independent with ADLs;Independent with functional mobility;Independent with IADLS   Lives With Son  (2 sons)   Receives Help From Family   IADLs Independent with driving;Independent with meal prep;Independent with medication management   Falls in the last 6 months 0   Vocational Retired   Comments At baseline, pt was I w/ ADLs, IADLs, and functional transfers/mobility w/o use of AD. (+) . Denies falls PTA.   Lifestyle   Autonomy At baseline, pt was I w/ ADLs, IADLs, and functional transfers/mobility w/o use of AD. (+) . Denies falls PTA.   Reciprocal Relationships Sons   Service to Others Retired   ADL   Where Assessed Chair   Eating Assistance 7  Independent   Grooming Assistance 6  Modified Independent   UB Bathing Assistance 6  Modified Independent   LB Bathing Assistance 5  Supervision/Setup   UB Dressing Assistance 6  Modified independent   LB Dressing Assistance 5  Supervision/Setup   Toileting Assistance  5  Supervision/Setup   Bed Mobility   Supine to Sit 6  Modified independent   Additional items HOB elevated;Bedrails;Increased time required   Sit to Supine Unable to assess   Additional " Comments Pt seated OOB in chair with chair alarm activated at end of session. Call bell and phone within reach. All needs met and pt reports no further questions for OT at this time.   Transfers   Sit to Stand 6  Modified independent   Additional items Bedrails   Stand to Sit 6  Modified independent   Additional items Armrests;Increased time required   Functional Mobility   Functional Mobility 5  Supervision   Additional Comments w/o use of AD. SpO2 >95% on RA throughout   Balance   Static Sitting Fair +   Dynamic Sitting Fair   Static Standing Fair   Dynamic Standing Fair -   Ambulatory Fair -   Activity Tolerance   Activity Tolerance Patient tolerated treatment well;Patient limited by fatigue   Medical Staff Made Aware [facility] PT   Nurse Made Aware yes   RUE Assessment   RUE Assessment WFL  (4-/5 throughout)   LUE Assessment   LUE Assessment WFL  (4-/5 throughout)   Hand Function   Gross Motor Coordination Functional   Fine Motor Coordination Functional   Sensation   Light Touch No apparent deficits   Proprioception   Proprioception No apparent deficits   Vision - Complex Assessment   Acuity Able to read clock/calendar on wall without difficulty;Able to read employee name badge without difficulty   Psychosocial   Psychosocial (WDL) WDL   Perception   Inattention/Neglect Appears intact   Cognition   Overall Cognitive Status WFL   Arousal/Participation Alert;Cooperative   Attention Within functional limits   Orientation Level Oriented X4   Memory Within functional limits   Following Commands Follows all commands and directions without difficulty   Assessment   Prognosis Good   Assessment Pt is a 84 y.o. male seen for OT evaluation s/p adm to [facility] on 10/22/2024 w/ Pneumonia due to COVID-19 virus . Comorbidities affecting pt’s functional performance include a significant PMH of Glaucoma, HLD, Sick sinus syndrome. Pt with active OT orders and activity orders for Ambulate. Pt lives with his 2 sons in a  "ranch level house with \"a few\" LARISA and FOS to laundry and freezer in basement. Pt's sons are home and able to assist as needed upon D/C. At baseline, pt was I w/ ADLs, IADLs, and functional transfers/mobility w/o use of AD. (+) . Denies falls PTA. Upon evaluation, pt currently functioning at a Supervision-Mod I level for ADLs, Mod I for bed mobility, Mod I for transfers, and Supervision for functional mobility 2* the following deficits impacting occupational performance: decreased strength , decreased balance, and decreased activity tolerance. Pt with the following personal factors of: LARISA home environment, steps within home environment, and fall risk . Despite above mentioned deficits and personal factors, pt is functioning near baseline level of performance. Limited ADL deficits. No further acute OT needs identified at this time. Recommend continued mobilization with hospital staff and restorative program while in the hospital to increase pt’s endurance and strength upon D/C. The patient's raw score on the AM-PAC Daily Activity Inpatient Short Form is 21. A raw score of greater than or equal to 19 suggests the patient may benefit from discharge to home. Please refer to the recommendation of the Occupational Therapist for safe discharge planning. D/C pt from OT caseload at this time.   Plan   OT Frequency Eval only  (D/C OT)   Discharge Recommendation   Rehab Resource Intensity Level, OT No post-acute rehabilitation needs   AM-PAC Daily Activity Inpatient   Lower Body Dressing 3   Bathing 3   Toileting 3   Upper Body Dressing 4   Grooming 4   Eating 4   Daily Activity Raw Score 21   Daily Activity Standardized Score (Calc for Raw Score >=11) 44.27   AM-PAC Applied Cognition Inpatient   Following a Speech/Presentation 4   Understanding Ordinary Conversation 4   Taking Medications 3   Remembering Where Things Are Placed or Put Away 4   Remembering List of 4-5 Errands 4   Taking Care of Complicated Tasks 3 "   Applied Cognition Raw Score 22   Applied Cognition Standardized Score 47.83       Scarlett Randhawa OTR/L

## 2024-10-23 NOTE — UTILIZATION REVIEW
Initial Clinical Review    Admission: Date/Time/Statement:   Admission Orders (From admission, onward)       Ordered        10/22/24 1753  INPATIENT ADMISSION  Once                          Orders Placed This Encounter   Procedures    INPATIENT ADMISSION     Standing Status:   Standing     Number of Occurrences:   1     Order Specific Question:   Level of Care     Answer:   Med Surg [16]     Order Specific Question:   Bed Type     Answer:   [facility] [4]     Order Specific Question:   Estimated length of stay     Answer:   More than 2 Midnights     Order Specific Question:   Certification     Answer:   I certify that inpatient services are medically necessary for this patient for a duration of greater than two midnights. See H&P and MD Progress Notes for additional information about the patient's course of treatment.     ED Arrival Information       Expected   -    Arrival   10/22/2024 14:37    Acuity   Urgent              Means of arrival   Walk-In    Escorted by   Self    Service   Hospitalist    Admission type   Emergency              Arrival complaint   Weakness             Chief Complaint   Patient presents with    Weakness - Generalized     Pt states he was dx here with covid yesterday- reporting increased weakness and shortness of breath.        Initial Presentation: 84 y.o. male presents to ED from home with shortness of breath, weakness, fatigue and  lethargy.  Recently diagnosed with COVID  19  on 10/21,  multiple  sick contacts.  Seen in ED and discharged home with supportive care.  Offered Paxlovid but declined.  Has  runny nose and congestion.  Additional PMH  is SSS, S/P pacemaker,   Sjogren's  syndrome, COPD and chronic bronchitis.  CXR suspicious for COVID  pneumonia.  Admit  Ip with  Pneumonia  due to COVID  19 virus and plan is  IV  remdesivir,  monitor labs,  not meeting criteria for steroids,  PT/OT,  prone as  much as possible  and continue home meds.       Anticipated Length of  Stay/Certification Statement:   Patient will be admitted on an Inpatient basis with an anticipated length of stay of greater than 2 midnights.   Justification for Hospital Stay: Pneumonia due to COVID-19 virus     Date:   10/23   Day 2:   Remains on  IV  remdesivir.  No indication for steroids.  Remains off oxygen,  denies  SOB.   Continue current meds.    Date:   10/24  Day 3: Has surpassed a 2nd midnight with active treatments and services.   Remains on  IV  remdesivir.     O2  sats  95  % Ra.   Continue  PT/OT.   Continue current meds/treatment plan.         Scheduled Medications:  aspirin, 81 mg, Oral, Daily  brinzolamide, 1 drop, Both Eyes, TID  chlordiazePOXIDE, 5 mg, Oral, Daily  docusate sodium, 100 mg, Oral, BID  enoxaparin, 40 mg, Subcutaneous, Daily  famotidine, 20 mg, Oral, Daily Before Breakfast  remdesivir, 100 mg, Intravenous, Q24H  senna, 1 tablet, Oral, Daily  tamsulosin, 0.8 mg, Oral, QPM      Continuous IV Infusions:     PRN Meds:  acetaminophen, 650 mg, Oral, Q6H PRN  ondansetron, 4 mg, Intravenous, Q6H PRN      ED Triage Vitals [10/22/24 1441]   Temperature Pulse Respirations Blood Pressure SpO2 Pain Score   98.4 °F (36.9 °C) 77 18 118/58 100 % 6     Weight (last 2 days)       Date/Time Weight    10/22/24 19:34:08 69.7 (153.66)    10/22/24 1439 71.8 (158.29)            Vital Signs (last 3 days)       Date/Time Temp Pulse Resp BP MAP (mmHg) SpO2 O2 Device Patient Position - Orthostatic VS Rajesh Coma Scale Score Pain    10/23/24 0946 -- -- -- -- -- -- -- -- -- No Pain    10/23/24 08:32:41 98.8 °F (37.1 °C) 99 18 133/74 94 96 % -- -- -- --    10/22/24 2349 -- -- -- -- -- -- -- -- -- No Pain    10/22/24 2154 -- -- -- -- -- -- -- -- -- No Pain    10/22/24 21:38:26 98.7 °F (37.1 °C) 74 -- -- -- 97 % -- -- 15 No Pain    10/22/24 2054 -- -- -- -- -- -- -- -- -- Med Not Given for Pain - for MAR use only    10/22/24 19:34:08 101.4 °F (38.6 °C) 90 18 137/73 94 96 % None (Room air) Lying -- --    10/22/24  1720 -- -- -- -- -- 85 % None (Room air) -- -- --    10/22/24 1702 -- 86 20 136/64 92 95 % None (Room air) Sitting -- 6    10/22/24 1553 -- -- -- -- -- -- -- -- 15 --    10/22/24 1552 -- -- -- -- -- -- None (Room air) -- -- --    10/22/24 1441 98.4 °F (36.9 °C) 77 18 118/58 83 100 % None (Room air) Sitting -- 6              Pertinent Labs/Diagnostic Test Results:   Radiology:  XR chest 2 views   ED Interpretation by Kimi Antonio DO (10/22 1718)      IMPRESSION:     Subtle patchy areas of groundglass consolidation in both lungs suspicious for COVID-19 pneumonia.        Final Interpretation by Rober Jin MD (10/22 1714)      Subtle patchy areas of groundglass consolidation in both lungs suspicious for COVID-19 pneumonia.      Stable background reticulation related to known interstitial lung disease.            The study was marked in EPIC for immediate notification.            Workstation performed: [id number]               Results from last 7 days   Lab Units 10/23/24  0447 10/22/24  2022 10/22/24  1547   WBC Thousand/uL 6.44  --  7.00   HEMOGLOBIN g/dL 11.7*  --  12.6   HEMATOCRIT % 34.1*  --  37.0   PLATELETS Thousands/uL 173 190 190   TOTAL NEUT ABS Thousands/µL 3.53  --  4.66         Results from last 7 days   Lab Units 10/23/24  0447 10/22/24  1547   SODIUM mmol/L 135 137   POTASSIUM mmol/L 3.8 3.8   CHLORIDE mmol/L 105 104   CO2 mmol/L 26 29   ANION GAP mmol/L 4 4   BUN mg/dL 16 15   CREATININE mg/dL 0.84 1.05   EGFR ml/min/1.73sq m 79 64   CALCIUM mg/dL 8.5 8.8     Results from last 7 days   Lab Units 10/23/24  0447   AST U/L 15   ALT U/L 7   ALK PHOS U/L 55   TOTAL PROTEIN g/dL 6.3*   ALBUMIN g/dL 3.4*   TOTAL BILIRUBIN mg/dL 0.40         Results from last 7 days   Lab Units 10/23/24  0447 10/22/24  1547   GLUCOSE RANDOM mg/dL 101 106           Results from last 7 days   Lab Units 10/22/24  2022 10/22/24  1747 10/22/24  1547   HS TNI 0HR ng/L  --   --  10   HS TNI 2HR ng/L  --  8  --    HSTNI D2 ng/L   --  -2  --    HS TNI 4HR ng/L 10  --   --    HSTNI D4 ng/L 0  --   --      Results from last 7 days   Lab Units 10/22/24  1831   D-DIMER QUANTITATIVE ug/ml FEU 0.51*               Results from last 7 days   Lab Units 10/23/24  0447 10/22/24  1547   CRP mg/L 74.4* 51.0*                   Present on Admission:   Pneumonia due to COVID-19 virus   (Resolved) 1st degree AV block   Chronic obstructive pulmonary disease, unspecified COPD type (HCC)   Depression, recurrent (HCC)   Enlarged prostate without lower urinary tract symptoms (luts)   Glaucoma   (Resolved) Mixed hyperlipidemia   Sick sinus syndrome (HCC)      Admitting Diagnosis: Weakness [R53.1]  Hypoxia [R09.02]  Pneumonia due to COVID-19 virus [U07.1, J12.82]  Age/Sex: 84 y.o. male    Network Utilization Review Department  ATTENTION: Please call with any questions or concerns to [phone] and carefully listen to the prompts so that you are directed to the right person. All voicemails are confidential.   For Discharge needs, contact Care Management DC Support Team at [phone] opt. 2  Send all requests for admission clinical reviews, approved or denied determinations and any other requests to dedicated fax number below belonging to the campus where the patient is receiving treatment. List of dedicated fax numbers for the Facilities:  FACILITY NAME UR FAX NUMBER   ADMISSION DENIALS (Administrative/Medical Necessity) [phone]   DISCHARGE SUPPORT TEAM (NETWORK) [phone]   PARENT CHILD HEALTH (Maternity/NICU/Pediatrics) [phone]   [facility] [phone]   [facility] [phone]   [facility] [phone]   [facility] [phone]   [facility] [phone]   [facility] [phone]   [facility] [phone]   [facility]  [phone]   [facility] [phone]   [facility] [phone]   [facility] [phone]   [facility] [phone]

## 2024-10-23 NOTE — PROGRESS NOTES
Progress Note - Hospitalist   Name: Adonis Mallory Jr. 84 y.o. male I MRN: [id number]  Unit/Bed#: [place] [address] -01 I Date of Admission: 10/22/2024   Date of Service: 10/23/2024 I Hospital Day: 1    Assessment & Plan  Pneumonia due to COVID-19 virus  Patient presenting with mild COVID-19 infection  Vaccination status :Unknown, reports no 2024 vaccination  Oxygen requirements Room air, desat with ambulation  Remdesvir Day #2/3   Decadron: Does not meet criteria for Decadron   D-dimer Level pending, not on oxygen- remains off of oxygen   Trend CRP, Prone as possible, OOB TID  CRP 74  Trend CMP,CBC daily.  Positive test date 10/21/2024  Enlarged prostate without lower urinary tract symptoms (luts)  Resume alpha-blocker, medication was auto substituted  Glaucoma  Continue eyedrops  Sick sinus syndrome (HCC)  Status post permanent pacemaker placement  Chronic obstructive pulmonary disease, unspecified COPD type (HCC)  No evidence of exacerbation, will add albuterol as needed  Not on maintenance medications  Depression, recurrent (HCC)  Mood is stable, he is on Librium 5 mg daily for history of irritable bowel  Continue the same for now    VTE Pharmacologic Prophylaxis: VTE Score: 4 lovenox     Mobility:   Basic Mobility Inpatient Raw Score: 21  JH-HLM Goal: 6: Walk 10 steps or more  JH-HLM Achieved: 7: Walk 25 feet or more      Patient Centered Rounds:  discussed with his nurse       Education and Discussions with Family / Patient: attempted to call son but no answer     Current Length of Stay: 1 day(s)  Current Patient Status: Inpatient     Discharge Plan: Anticipate discharge in 24-48 hrs to assisted living     Code Status: Level 1 - Full Code    Subjective   Pt seen and examined. He was sitting up in a chair. Denied sob. He states he does want his diet changed and he is very hungry. No other problems were noted     Objective :  Temp:  [98.7 °F (37.1 °C)-101.4 °F (38.6 °C)] 99.3 °F (37.4 °C)  HR:  [74-99] 83  BP:  (122-137)/(61-74) 122/61  Resp:  [18] 18  SpO2:  [95 %-97 %] 95 %  O2 Device: None (Room air)    Body mass index is 22.05 kg/m².     Input and Output Summary (last 24 hours):     Intake/Output Summary (Last 24 hours) at 10/23/2024 1755  Last data filed at 10/23/2024 1316  Gross per 24 hour   Intake 850 ml   Output 450 ml   Net 400 ml       Physical Exam  Constitutional:       Appearance: Normal appearance.   HENT:      Head: Normocephalic and atraumatic.   Eyes:      Extraocular Movements: Extraocular movements intact.      Pupils: Pupils are equal, round, and reactive to light.   Cardiovascular:      Rate and Rhythm: Normal rate and regular rhythm.      Heart sounds: No murmur heard.     No friction rub. No gallop.   Pulmonary:      Effort: Pulmonary effort is normal. No respiratory distress.      Breath sounds: Normal breath sounds. No wheezing, rhonchi or rales.   Abdominal:      General: Bowel sounds are normal. There is no distension.      Palpations: Abdomen is soft.      Tenderness: There is no abdominal tenderness. There is no guarding or rebound.   Musculoskeletal:      Right lower leg: No edema.      Left lower leg: No edema.   Neurological:      Mental Status: He is alert and oriented to person, place, and time.       Lines/Drains:      Lab Results: I have reviewed the following results:   Results from last 7 days   Lab Units 10/23/24  0447   WBC Thousand/uL 6.44   HEMOGLOBIN g/dL 11.7*   HEMATOCRIT % 34.1*   PLATELETS Thousands/uL 173   SEGS PCT % 55   LYMPHO PCT % 25   MONO PCT % 19*   EOS PCT % 1     Results from last 7 days   Lab Units 10/23/24  0447   SODIUM mmol/L 135   POTASSIUM mmol/L 3.8   CHLORIDE mmol/L 105   CO2 mmol/L 26   BUN mg/dL 16   CREATININE mg/dL 0.84   ANION GAP mmol/L 4   CALCIUM mg/dL 8.5   ALBUMIN g/dL 3.4*   TOTAL BILIRUBIN mg/dL 0.40   ALK PHOS U/L 55   ALT U/L 7   AST U/L 15   GLUCOSE RANDOM mg/dL 101                       Recent Cultures (last 7 days):         Imaging Results  Review: No pertinent imaging studies reviewed.  Other Study Results Review: No additional pertinent studies reviewed.    Last 24 Hours Medication List:     Current Facility-Administered Medications:     acetaminophen (TYLENOL) tablet 650 mg, Q6H PRN    aspirin chewable tablet 81 mg, Daily    brinzolamide (AZOPT) 1 % ophthalmic suspension 1 drop, TID    chlordiazePOXIDE (LIBRIUM) capsule 5 mg, Daily    docusate sodium (COLACE) capsule 100 mg, BID    enoxaparin (LOVENOX) subcutaneous injection 40 mg, Daily    famotidine (PEPCID) tablet 20 mg, Daily Before Breakfast    meclizine (ANTIVERT) tablet 25 mg, Q8H PRN    ondansetron (ZOFRAN) injection 4 mg, Q6H PRN    [COMPLETED] remdesivir (Veklury) 200 mg in sodium chloride 0.9 % 290 mL IVPB, Q24H **FOLLOWED BY** remdesivir (Veklury) 100 mg in sodium chloride 0.9 % 270 mL IVPB, Q24H    senna (SENOKOT) tablet 8.6 mg, Daily    tamsulosin (FLOMAX) capsule 0.8 mg, QPM    Administrative Statements   Today, Patient Was Seen By: Lucrecia Dang DO

## 2024-10-23 NOTE — PLAN OF CARE
Problem: Prexisting or High Potential for Compromised Skin Integrity  Goal: Skin integrity is maintained or improved  Description: INTERVENTIONS:  - Identify patients at risk for skin breakdown  - Assess and monitor skin integrity  - Assess and monitor nutrition and hydration status  - Monitor labs   - Assess for incontinence   - Turn and reposition patient  - Assist with mobility/ambulation  - Relieve pressure over bony prominences  - Avoid friction and shearing  - Provide appropriate hygiene as needed including keeping skin clean and dry  - Evaluate need for skin moisturizer/barrier cream  - Collaborate with interdisciplinary team   - Patient/family teaching  - Consider wound care consult   Outcome: Progressing     Problem: PAIN - ADULT  Goal: Verbalizes/displays adequate comfort level or baseline comfort level  Description: Interventions:  - Encourage patient to monitor pain and request assistance  - Assess pain using appropriate pain scale  - Administer analgesics based on type and severity of pain and evaluate response  - Implement non-pharmacological measures as appropriate and evaluate response  - Consider cultural and social influences on pain and pain management  - Notify physician/advanced practitioner if interventions unsuccessful or patient reports new pain  Outcome: Progressing     Problem: INFECTION - ADULT  Goal: Absence or prevention of progression during hospitalization  Description: INTERVENTIONS:  - Assess and monitor for signs and symptoms of infection  - Monitor lab/diagnostic results  - Monitor all insertion sites, i.e. indwelling lines, tubes, and drains  - Monitor endotracheal if appropriate and nasal secretions for changes in amount and color  - [place] appropriate cooling/warming therapies per order  - Administer medications as ordered  - Instruct and encourage patient and family to use good hand hygiene technique  - Identify and instruct in appropriate isolation precautions for  identified infection/condition  Outcome: Progressing  Goal: Absence of fever/infection during neutropenic period  Description: INTERVENTIONS:  - Monitor WBC     Outcome: Progressing     Problem: SAFETY ADULT  Goal: Patient will remain free of falls  Description: INTERVENTIONS:  - Educate patient/family on patient safety including physical limitations  - Instruct patient to call for assistance with activity   - Consult OT/PT to assist with strengthening/mobility   - Keep Call bell within reach  - Keep bed low and locked with side rails adjusted as appropriate  - Keep care items and personal belongings within reach  - Initiate and maintain comfort rounds  - Make Fall Risk Sign visible to staff  - Offer Toileting every 2 Hours, in advance of need  - Initiate/Maintain bed alarm  - Obtain necessary fall risk management equipment: bed alarm  - Apply yellow socks and bracelet for high fall risk patients  - Consider moving patient to room near nurses station  Outcome: Progressing  Goal: Maintain or return to baseline ADL function  Description: INTERVENTIONS:  -  Assess patient's ability to carry out ADLs; assess patient's baseline for ADL function and identify physical deficits which impact ability to perform ADLs (bathing, care of mouth/teeth, toileting, grooming, dressing, etc.)  - Assess/evaluate cause of self-care deficits   - Assess range of motion  - Assess patient's mobility; develop plan if impaired  - Assess patient's need for assistive devices and provide as appropriate  - Encourage maximum independence but intervene and supervise when necessary  - Involve family in performance of ADLs  - Assess for home care needs following discharge   - Consider OT consult to assist with ADL evaluation and planning for discharge  - Provide patient education as appropriate  Outcome: Progressing  Goal: Maintains/Returns to pre admission functional level  Description: INTERVENTIONS:  - Perform AM-PAC 6 Click Basic Mobility/ Daily  Activity assessment daily.  - Set and communicate daily mobility goal to care team and patient/family/caregiver.   - Collaborate with rehabilitation services on mobility goals if consulted  - Perform Range of Motion 4 times a day.  - Reposition patient every 2 hours.  - Dangle patient 3 times a day  - Stand patient 3 times a day  - Ambulate patient 3 times a day  - Out of bed to chair 3 times a day   - Out of bed for meals 3 times a day  - Out of bed for toileting  - Record patient progress and toleration of activity level   Outcome: Progressing     Problem: DISCHARGE PLANNING  Goal: Discharge to home or other facility with appropriate resources  Description: INTERVENTIONS:  - Identify barriers to discharge w/patient and caregiver  - Arrange for needed discharge resources and transportation as appropriate  - Identify discharge learning needs (meds, wound care, etc.)  - Arrange for interpretive services to assist at discharge as needed  - Refer to Case Management Department for coordinating discharge planning if the patient needs post-hospital services based on physician/advanced practitioner order or complex needs related to functional status, cognitive ability, or social support system  Outcome: Progressing     Problem: Knowledge Deficit  Goal: Patient/family/caregiver demonstrates understanding of disease process, treatment plan, medications, and discharge instructions  Description: Complete learning assessment and assess knowledge base.  Interventions:  - Provide teaching at level of understanding  - Provide teaching via preferred learning methods  Outcome: Progressing     Problem: CARDIOVASCULAR - ADULT  Goal: Maintains optimal cardiac output and hemodynamic stability  Description: INTERVENTIONS:  - Monitor I/O, vital signs and rhythm  - Monitor for S/S and trends of decreased cardiac output  - Administer and titrate ordered vasoactive medications to optimize hemodynamic stability  - Assess quality of pulses,  skin color and temperature  - Assess for signs of decreased coronary artery perfusion  - Instruct patient to report change in severity of symptoms  Outcome: Progressing  Goal: Absence of cardiac dysrhythmias or at baseline rhythm  Description: INTERVENTIONS:  - Continuous cardiac monitoring, vital signs, obtain 12 lead EKG if ordered  - Administer antiarrhythmic and heart rate control medications as ordered  - Monitor electrolytes and administer replacement therapy as ordered  Outcome: Progressing     Problem: RESPIRATORY - ADULT  Goal: Achieves optimal ventilation and oxygenation  Description: INTERVENTIONS:  - Assess for changes in respiratory status  - Assess for changes in mentation and behavior  - Position to facilitate oxygenation and minimize respiratory effort  - Oxygen administered by appropriate delivery if ordered  - Initiate smoking cessation education as indicated  - Encourage broncho-pulmonary hygiene including cough, deep breathe, Incentive Spirometry  - Assess the need for suctioning and aspirate as needed  - Assess and instruct to report SOB or any respiratory difficulty  - Respiratory Therapy support as indicated  Outcome: Progressing     Problem: METABOLIC, FLUID AND ELECTROLYTES - ADULT  Goal: Electrolytes maintained within normal limits  Description: INTERVENTIONS:  - Monitor labs and assess patient for signs and symptoms of electrolyte imbalances  - Administer electrolyte replacement as ordered  - Monitor response to electrolyte replacements, including repeat lab results as appropriate  - Instruct patient on fluid and nutrition as appropriate  Outcome: Progressing  Goal: Fluid balance maintained  Description: INTERVENTIONS:  - Monitor labs   - Monitor I/O and WT  - Instruct patient on fluid and nutrition as appropriate  - Assess for signs & symptoms of volume excess or deficit  Outcome: Progressing  Goal: Glucose maintained within target range  Description: INTERVENTIONS:  - Monitor Blood  Glucose as ordered  - Assess for signs and symptoms of hyperglycemia and hypoglycemia  - Administer ordered medications to maintain glucose within target range  - Assess nutritional intake and initiate nutrition service referral as needed  Outcome: Progressing     Problem: MUSCULOSKELETAL - ADULT  Goal: Maintain or return mobility to safest level of function  Description: INTERVENTIONS:  - Assess patient's ability to carry out ADLs; assess patient's baseline for ADL function and identify physical deficits which impact ability to perform ADLs (bathing, care of mouth/teeth, toileting, grooming, dressing, etc.)  - Assess/evaluate cause of self-care deficits   - Assess range of motion  - Assess patient's mobility  - Assess patient's need for assistive devices and provide as appropriate  - Encourage maximum independence but intervene and supervise when necessary  - Involve family in performance of ADLs  - Assess for home care needs following discharge   - Consider OT consult to assist with ADL evaluation and planning for discharge  - Provide patient education as appropriate  Outcome: Progressing  Goal: Maintain proper alignment of affected body part  Description: INTERVENTIONS:  - Support, maintain and protect limb and body alignment  - Provide patient/ family with appropriate education  Outcome: Progressing

## 2024-10-23 NOTE — PHYSICAL THERAPY NOTE
PHYSICAL THERAPY EVALUATION          Patient Name: Adonis Mallory Jr.  Today's Date: 10/23/2024       10/23/24 0946   PT Last Visit   PT Visit Date 10/23/24   Note Type   Note type Evaluation   Pain Assessment   Pain Assessment Tool 0-10   Pain Score No Pain   Restrictions/Precautions   Other Precautions Contact/isolation;Airborne/isolation;Chair Alarm;Bed Alarm;Fall Risk   Home Living   Type of Home House   Home Layout Two level;Laundry in basement;Able to live on main level with bedroom/bathroom   Home Equipment Walker   Additional Comments reports LARISA however unsure #. FOS to basement freezer, laundry.   Prior Function   Level of [place] Independent with ADLs;Independent with functional mobility;Independent with IADLS   Lives With Son  (two sons)   Receives Help From Family  (sons help w housework)   IADLs Independent with driving;Independent with meal prep;Independent with medication management   Falls in the last 6 months 0   Vocational Retired   Comments indep without an AD. participates in [employer] however admits to being sedentary at home. has support from family- one son is retired/on SSI and the other is semi retired.   General   Additional Pertinent History pt admitted 10/22/24 for pna dt covid. up and oob orders. PMHx significant for glaucoma, COPD, depression, arthritis, anxiety, vertigo (reports taking meclizine x4/day)   Cognition   Overall Cognitive Status WFL   Arousal/Participation Cooperative   Orientation Level Oriented X4   Memory Within functional limits   Following Commands Follows all commands and directions without difficulty   Bed Mobility   Supine to Sit 6  Modified independent   Additional items HOB elevated;Bedrails;Increased time required   Transfers   Sit to Stand 6  Modified independent   Additional items Bedrails   Stand to Sit 6  Modified independent   Additional items Armrests;Increased time required   Ambulation/Elevation   Gait pattern  Decreased foot clearance;Short stride;Excessively slow   Gait Assistance 5  Supervision   Additional items Assist x 1   Assistive Device None   Distance about 60-65'   Balance   Static Standing Fair   Dynamic Standing Fair -   Ambulatory Fair   Endurance Deficit   Endurance Deficit No  (O2 95% on RA)   Activity Tolerance   Activity Tolerance Patient tolerated treatment well   Medical Staff Made Aware Scar[place] OT   Assessment   Prognosis Good   Assessment Adonis Mallory Jr. is a 84 y.o. male admitted to [facility] on 10/22/2024 for Pneumonia due to COVID-19 virus. PT was consulted and pt was seen on 10/23/2024 for mobility assessment and d/c planning. Pt presents w low fall risk, airborne isolation. At baseline is indep without an AD. Pt is currently functioning at a mod I- S for bed mobility, transfers and ambulation. Pt demonstrated ability to ambulate household distances. Endorses some fatigue post ambulation however admits to being mostly sedentary at home. No acute deficits impacting function. No obvious barriers to dc. Does not demonstrate need for continued IPPT services however will benefit from continued mobilization via nsg/restorative. The patient's AM-PAC Basic Mobility Inpatient Short Form Raw Score is 21. A Raw score of greater than 16 suggests the patient may benefit from discharge to home.   Barriers to Discharge None   Plan   PT Frequency   (d/c PT: maintain on restorative)   Discharge Recommendation   Rehab Resource Intensity Level, PT No post-acute rehabilitation needs   AM-PAC Basic Mobility Inpatient   Turning in Flat Bed Without Bedrails 4   Lying on Back to Sitting on Edge of Flat Bed Without Bedrails 3   Moving Bed to Chair 4   Standing Up From Chair Using Arms 4   Walk in Room 3   Climb 3-5 Stairs With Railing 3   Basic Mobility Inpatient Raw Score 21   Basic Mobility Standardized Score 45.55   [facility] Highest Level Of Mobility   -HLM Goal 6: Walk 10 steps or more   -HLM Achieved 7: Walk 25  feet or more   End of Consult   Patient Position at End of Consult Bedside chair;Bed/Chair alarm activated;All needs within reach     History: co - morbidities including age, current experience including fall risk, airborne isolation  Exam: impairments in systems including multiple body structures involved; neuromuscular (balance, gait, transfers), cardiopulmonary (vitals), cognition; activity limitations  (difficulties executing an action); participation restrictions (problems associated w involvement in life situations), am-pac  Clinical: stable/unpredictable  Complexity: moderate    Eladia Luna, PT

## 2024-10-24 LAB
ALBUMIN SERPL BCG-MCNC: 3.3 G/DL (ref 3.5–5)
ALP SERPL-CCNC: 55 U/L (ref 34–104)
ALT SERPL W P-5'-P-CCNC: 8 U/L (ref 7–52)
ANION GAP SERPL CALCULATED.3IONS-SCNC: 5 MMOL/L (ref 4–13)
AST SERPL W P-5'-P-CCNC: 16 U/L (ref 13–39)
BASOPHILS # BLD AUTO: 0.01 THOUSANDS/ΜL (ref 0–0.1)
BASOPHILS NFR BLD AUTO: 0 % (ref 0–1)
BILIRUB SERPL-MCNC: 0.37 MG/DL (ref 0.2–1)
BUN SERPL-MCNC: 21 MG/DL (ref 5–25)
CALCIUM ALBUM COR SERPL-MCNC: 8.7 MG/DL (ref 8.3–10.1)
CALCIUM SERPL-MCNC: 8.1 MG/DL (ref 8.4–10.2)
CHLORIDE SERPL-SCNC: 106 MMOL/L (ref 96–108)
CO2 SERPL-SCNC: 25 MMOL/L (ref 21–32)
CREAT SERPL-MCNC: 0.91 MG/DL (ref 0.6–1.3)
CRP SERPL QL: 57.7 MG/L
EOSINOPHIL # BLD AUTO: 0.19 THOUSAND/ΜL (ref 0–0.61)
EOSINOPHIL NFR BLD AUTO: 4 % (ref 0–6)
ERYTHROCYTE [DISTWIDTH] IN BLOOD BY AUTOMATED COUNT: 14.3 % (ref 11.6–15.1)
GFR SERPL CREATININE-BSD FRML MDRD: 76 ML/MIN/1.73SQ M
GLUCOSE SERPL-MCNC: 95 MG/DL (ref 65–140)
HCT VFR BLD AUTO: 34.5 % (ref 36.5–49.3)
HGB BLD-MCNC: 12 G/DL (ref 12–17)
IMM GRANULOCYTES # BLD AUTO: 0.01 THOUSAND/UL (ref 0–0.2)
IMM GRANULOCYTES NFR BLD AUTO: 0 % (ref 0–2)
LYMPHOCYTES # BLD AUTO: 1.86 THOUSANDS/ΜL (ref 0.6–4.47)
LYMPHOCYTES NFR BLD AUTO: 35 % (ref 14–44)
MCH RBC QN AUTO: 32.7 PG (ref 26.8–34.3)
MCHC RBC AUTO-ENTMCNC: 34.8 G/DL (ref 31.4–37.4)
MCV RBC AUTO: 94 FL (ref 82–98)
MONOCYTES # BLD AUTO: 0.98 THOUSAND/ΜL (ref 0.17–1.22)
MONOCYTES NFR BLD AUTO: 18 % (ref 4–12)
NEUTROPHILS # BLD AUTO: 2.34 THOUSANDS/ΜL (ref 1.85–7.62)
NEUTS SEG NFR BLD AUTO: 43 % (ref 43–75)
NRBC BLD AUTO-RTO: 0 /100 WBCS
PLATELET # BLD AUTO: 168 THOUSANDS/UL (ref 149–390)
PMV BLD AUTO: 9.9 FL (ref 8.9–12.7)
POTASSIUM SERPL-SCNC: 3.6 MMOL/L (ref 3.5–5.3)
PROT SERPL-MCNC: 6 G/DL (ref 6.4–8.4)
RBC # BLD AUTO: 3.67 MILLION/UL (ref 3.88–5.62)
SODIUM SERPL-SCNC: 136 MMOL/L (ref 135–147)
WBC # BLD AUTO: 5.39 THOUSAND/UL (ref 4.31–10.16)

## 2024-10-24 PROCEDURE — 99239 HOSP IP/OBS DSCHRG MGMT >30: CPT | Performed by: INTERNAL MEDICINE

## 2024-10-24 PROCEDURE — 80053 COMPREHEN METABOLIC PANEL: CPT | Performed by: INTERNAL MEDICINE

## 2024-10-24 PROCEDURE — 85025 COMPLETE CBC W/AUTO DIFF WBC: CPT | Performed by: INTERNAL MEDICINE

## 2024-10-24 PROCEDURE — 86140 C-REACTIVE PROTEIN: CPT | Performed by: INTERNAL MEDICINE

## 2024-10-24 RX ADMIN — SENNOSIDES 8.6 MG: 8.6 TABLET, FILM COATED ORAL at 07:45

## 2024-10-24 RX ADMIN — ENOXAPARIN SODIUM 40 MG: 40 INJECTION SUBCUTANEOUS at 07:45

## 2024-10-24 RX ADMIN — DOCUSATE SODIUM 100 MG: 100 CAPSULE, LIQUID FILLED ORAL at 07:45

## 2024-10-24 RX ADMIN — MECLIZINE HYDROCHLORIDE 25 MG: 25 TABLET ORAL at 16:22

## 2024-10-24 RX ADMIN — ASPIRIN 81 MG CHEWABLE TABLET 81 MG: 81 TABLET CHEWABLE at 07:45

## 2024-10-24 RX ADMIN — CHLORDIAZEPOXIDE HYDROCHLORIDE 5 MG: 5 CAPSULE ORAL at 07:45

## 2024-10-24 RX ADMIN — BRINZOLAMIDE 1 DROP: 10 SUSPENSION/ DROPS OPHTHALMIC at 07:45

## 2024-10-24 RX ADMIN — FAMOTIDINE 20 MG: 20 TABLET, FILM COATED ORAL at 05:40

## 2024-10-24 RX ADMIN — ACETAMINOPHEN 650 MG: 325 TABLET, FILM COATED ORAL at 12:33

## 2024-10-24 NOTE — RESTORATIVE TECHNICIAN NOTE
Restorative Technician Note      Patient Name: Adonis Hidalgoalec López     Restorative Tech Visit Date: 10/24/24  Note Type: Mobility  Patient Position Upon Consult: Bedside chair  Activity Performed: Ambulated; Range of motion  Patient Position at End of Consult: All needs within reach; Bedside chair

## 2024-10-24 NOTE — PLAN OF CARE
Problem: Prexisting or High Potential for Compromised Skin Integrity  Goal: Skin integrity is maintained or improved  Description: INTERVENTIONS:  - Identify patients at risk for skin breakdown  - Assess and monitor skin integrity  - Assess and monitor nutrition and hydration status  - Monitor labs   - Assess for incontinence   - Turn and reposition patient  - Assist with mobility/ambulation  - Relieve pressure over bony prominences  - Avoid friction and shearing  - Provide appropriate hygiene as needed including keeping skin clean and dry  - Evaluate need for skin moisturizer/barrier cream  - Collaborate with interdisciplinary team   - Patient/family teaching  - Consider wound care consult   Outcome: Progressing     Problem: PAIN - ADULT  Goal: Verbalizes/displays adequate comfort level or baseline comfort level  Description: Interventions:  - Encourage patient to monitor pain and request assistance  - Assess pain using appropriate pain scale  - Administer analgesics based on type and severity of pain and evaluate response  - Implement non-pharmacological measures as appropriate and evaluate response  - Consider cultural and social influences on pain and pain management  - Notify physician/advanced practitioner if interventions unsuccessful or patient reports new pain  Outcome: Progressing     Problem: INFECTION - ADULT  Goal: Absence or prevention of progression during hospitalization  Description: INTERVENTIONS:  - Assess and monitor for signs and symptoms of infection  - Monitor lab/diagnostic results  - Monitor all insertion sites, i.e. indwelling lines, tubes, and drains  - Monitor endotracheal if appropriate and nasal secretions for changes in amount and color  - [place] appropriate cooling/warming therapies per order  - Administer medications as ordered  - Instruct and encourage patient and family to use good hand hygiene technique  - Identify and instruct in appropriate isolation precautions for  identified infection/condition  Outcome: Progressing  Goal: Absence of fever/infection during neutropenic period  Description: INTERVENTIONS:  - Monitor WBC    Outcome: Progressing     Problem: SAFETY ADULT  Goal: Patient will remain free of falls  Description: INTERVENTIONS:  - Educate patient/family on patient safety including physical limitations  - Instruct patient to call for assistance with activity   - Consult OT/PT to assist with strengthening/mobility   - Keep Call bell within reach  - Keep bed low and locked with side rails adjusted as appropriate  - Keep care items and personal belongings within reach  - Initiate and maintain comfort rounds  - Make Fall Risk Sign visible to staff  - Offer Toileting every 2 Hours, in advance of need  - Initiate/Maintain bed alarm  - Obtain necessary fall risk management equipment: bed alarm  - Apply yellow socks and bracelet for high fall risk patients  - Consider moving patient to room near nurses station  Outcome: Progressing  Goal: Maintain or return to baseline ADL function  Description: INTERVENTIONS:  -  Assess patient's ability to carry out ADLs; assess patient's baseline for ADL function and identify physical deficits which impact ability to perform ADLs (bathing, care of mouth/teeth, toileting, grooming, dressing, etc.)  - Assess/evaluate cause of self-care deficits   - Assess range of motion  - Assess patient's mobility; develop plan if impaired  - Assess patient's need for assistive devices and provide as appropriate  - Encourage maximum independence but intervene and supervise when necessary  - Involve family in performance of ADLs  - Assess for home care needs following discharge   - Consider OT consult to assist with ADL evaluation and planning for discharge  - Provide patient education as appropriate  Outcome: Progressing  Goal: Maintains/Returns to pre admission functional level  Description: INTERVENTIONS:  - Perform AM-PAC 6 Click Basic Mobility/ Daily  Activity assessment daily.  - Set and communicate daily mobility goal to care team and patient/family/caregiver.   - Collaborate with rehabilitation services on mobility goals if consulted  - Perform Range of Motion 4 times a day.  - Reposition patient every 2 hours.  - Dangle patient 3 times a day  - Stand patient 3 times a day  - Ambulate patient 3 times a day  - Out of bed to chair 3 times a day   - Out of bed for meals 3 times a day  - Out of bed for toileting  - Record patient progress and toleration of activity level   Outcome: Progressing     Problem: DISCHARGE PLANNING  Goal: Discharge to home or other facility with appropriate resources  Description: INTERVENTIONS:  - Identify barriers to discharge w/patient and caregiver  - Arrange for needed discharge resources and transportation as appropriate  - Identify discharge learning needs (meds, wound care, etc.)  - Arrange for interpretive services to assist at discharge as needed  - Refer to Case Management Department for coordinating discharge planning if the patient needs post-hospital services based on physician/advanced practitioner order or complex needs related to functional status, cognitive ability, or social support system  Outcome: Progressing     Problem: Knowledge Deficit  Goal: Patient/family/caregiver demonstrates understanding of disease process, treatment plan, medications, and discharge instructions  Description: Complete learning assessment and assess knowledge base.  Interventions:  - Provide teaching at level of understanding  - Provide teaching via preferred learning methods  Outcome: Progressing     Problem: CARDIOVASCULAR - ADULT  Goal: Maintains optimal cardiac output and hemodynamic stability  Description: INTERVENTIONS:  - Monitor I/O, vital signs and rhythm  - Monitor for S/S and trends of decreased cardiac output  - Administer and titrate ordered vasoactive medications to optimize hemodynamic stability  - Assess quality of pulses,  skin color and temperature  - Assess for signs of decreased coronary artery perfusion  - Instruct patient to report change in severity of symptoms  Outcome: Progressing  Goal: Absence of cardiac dysrhythmias or at baseline rhythm  Description: INTERVENTIONS:  - Continuous cardiac monitoring, vital signs, obtain 12 lead EKG if ordered  - Administer antiarrhythmic and heart rate control medications as ordered  - Monitor electrolytes and administer replacement therapy as ordered  Outcome: Progressing     Problem: RESPIRATORY - ADULT  Goal: Achieves optimal ventilation and oxygenation  Description: INTERVENTIONS:  - Assess for changes in respiratory status  - Assess for changes in mentation and behavior  - Position to facilitate oxygenation and minimize respiratory effort  - Oxygen administered by appropriate delivery if ordered  - Initiate smoking cessation education as indicated  - Encourage broncho-pulmonary hygiene including cough, deep breathe, Incentive Spirometry  - Assess the need for suctioning and aspirate as needed  - Assess and instruct to report SOB or any respiratory difficulty  - Respiratory Therapy support as indicated  Outcome: Progressing     Problem: METABOLIC, FLUID AND ELECTROLYTES - ADULT  Goal: Electrolytes maintained within normal limits  Description: INTERVENTIONS:  - Monitor labs and assess patient for signs and symptoms of electrolyte imbalances  - Administer electrolyte replacement as ordered  - Monitor response to electrolyte replacements, including repeat lab results as appropriate  - Instruct patient on fluid and nutrition as appropriate  Outcome: Progressing  Goal: Fluid balance maintained  Description: INTERVENTIONS:  - Monitor labs   - Monitor I/O and WT  - Instruct patient on fluid and nutrition as appropriate  - Assess for signs & symptoms of volume excess or deficit  Outcome: Progressing  Goal: Glucose maintained within target range  Description: INTERVENTIONS:  - Monitor Blood  Glucose as ordered  - Assess for signs and symptoms of hyperglycemia and hypoglycemia  - Administer ordered medications to maintain glucose within target range  - Assess nutritional intake and initiate nutrition service referral as needed  Outcome: Progressing     Problem: MUSCULOSKELETAL - ADULT  Goal: Maintain or return mobility to safest level of function  Description: INTERVENTIONS:  - Assess patient's ability to carry out ADLs; assess patient's baseline for ADL function and identify physical deficits which impact ability to perform ADLs (bathing, care of mouth/teeth, toileting, grooming, dressing, etc.)  - Assess/evaluate cause of self-care deficits   - Assess range of motion  - Assess patient's mobility  - Assess patient's need for assistive devices and provide as appropriate  - Encourage maximum independence but intervene and supervise when necessary  - Involve family in performance of ADLs  - Assess for home care needs following discharge   - Consider OT consult to assist with ADL evaluation and planning for discharge  - Provide patient education as appropriate  Outcome: Progressing  Goal: Maintain proper alignment of affected body part  Description: INTERVENTIONS:  - Support, maintain and protect limb and body alignment  - Provide patient/ family with appropriate education  Outcome: Progressing

## 2024-10-24 NOTE — PROGRESS NOTES
Progress Note - Hospitalist   Name: Adonis Mallory Jr. 84 y.o. male I MRN: [id number]  Unit/Bed#: [place] [address] -01 I Date of Admission: 10/22/2024   Date of Service: 10/24/2024 I Hospital Day: 2    Assessment & Plan  Pneumonia due to COVID-19 virus  Patient presenting with mild COVID-19 infection  Vaccination status :Unknown, reports no 2024 vaccination  Oxygen requirements Room air, desat with ambulation  Remdesvir Day #3/3   Decadron: Does not meet criteria for Decadron   D-dimer Level pending, not on oxygen- remains off of oxygen   Trend CRP, Prone as possible, OOB TID  CRP decreased to 57  Trend CMP,CBC daily.  Positive test date 10/21/2024  Enlarged prostate without lower urinary tract symptoms (luts)  Resume alpha-blocker, medication was auto substituted  Glaucoma  Continue eyedrops  Sick sinus syndrome (HCC)  Status post permanent pacemaker placement  Chronic obstructive pulmonary disease, unspecified COPD type (HCC)  No evidence of exacerbation, will add albuterol as needed  Not on maintenance medications  Depression, recurrent (HCC)  Mood is stable, he is on Librium 5 mg daily for history of irritable bowel  Continue the same for now    Discharge Plan: discharge to home     Code Status: Level 1 - Full Code    Subjective   Pt seen and examined. He states he feels well. He does get vertigo at times on and off for years. He takes meclizine as needed. No other problems. He is eating well. No f/c no cp no sob no n/v/d no abd pain     Objective :  Temp:  [98.7 °F (37.1 °C)-99.3 °F (37.4 °C)] 98.7 °F (37.1 °C)  HR:  [83-86] 86  BP: (122)/(61-63) 122/63  Resp:  [18] 18  SpO2:  [95 %] 95 %  O2 Device: None (Room air)    Body mass index is 22.05 kg/m².     Input and Output Summary (last 24 hours):     Intake/Output Summary (Last 24 hours) at 10/24/2024 1529  Last data filed at 10/24/2024 1353  Gross per 24 hour   Intake 830 ml   Output 875 ml   Net -45 ml       Physical Exam  Constitutional:       Appearance: Normal  appearance.   HENT:      Head: Normocephalic and atraumatic.   Eyes:      Extraocular Movements: Extraocular movements intact.      Pupils: Pupils are equal, round, and reactive to light.   Cardiovascular:      Rate and Rhythm: Normal rate and regular rhythm.      Heart sounds: No murmur heard.     No friction rub. No gallop.   Pulmonary:      Effort: Pulmonary effort is normal. No respiratory distress.      Breath sounds: Normal breath sounds. No wheezing, rhonchi or rales.   Abdominal:      General: Bowel sounds are normal. There is no distension.      Palpations: Abdomen is soft.      Tenderness: There is no abdominal tenderness. There is no guarding or rebound.   Musculoskeletal:      Right lower leg: No edema.      Left lower leg: No edema.   Neurological:      Mental Status: He is alert and oriented to person, place, and time.       Lines/Drains:      Lab Results: I have reviewed the following results:   Results from last 7 days   Lab Units 10/24/24  0507   WBC Thousand/uL 5.39   HEMOGLOBIN g/dL 12.0   HEMATOCRIT % 34.5*   PLATELETS Thousands/uL 168   SEGS PCT % 43   LYMPHO PCT % 35   MONO PCT % 18*   EOS PCT % 4     Results from last 7 days   Lab Units 10/24/24  0507   SODIUM mmol/L 136   POTASSIUM mmol/L 3.6   CHLORIDE mmol/L 106   CO2 mmol/L 25   BUN mg/dL 21   CREATININE mg/dL 0.91   ANION GAP mmol/L 5   CALCIUM mg/dL 8.1*   ALBUMIN g/dL 3.3*   TOTAL BILIRUBIN mg/dL 0.37   ALK PHOS U/L 55   ALT U/L 8   AST U/L 16   GLUCOSE RANDOM mg/dL 95                       Recent Cultures (last 7 days):         Imaging Results Review: No pertinent imaging studies reviewed.  Other Study Results Review: No additional pertinent studies reviewed.    Last 24 Hours Medication List:     Current Facility-Administered Medications:     acetaminophen (TYLENOL) tablet 650 mg, Q6H PRN    aspirin chewable tablet 81 mg, Daily    brinzolamide (AZOPT) 1 % ophthalmic suspension 1 drop, TID    chlordiazePOXIDE (LIBRIUM) capsule 5 mg,  Daily    docusate sodium (COLACE) capsule 100 mg, BID    enoxaparin (LOVENOX) subcutaneous injection 40 mg, Daily    famotidine (PEPCID) tablet 20 mg, Daily Before Breakfast    meclizine (ANTIVERT) tablet 25 mg, Q8H PRN    ondansetron (ZOFRAN) injection 4 mg, Q6H PRN    [COMPLETED] remdesivir (Veklury) 200 mg in sodium chloride 0.9 % 290 mL IVPB, Q24H **FOLLOWED BY** remdesivir (Veklury) 100 mg in sodium chloride 0.9 % 270 mL IVPB, Q24H    senna (SENOKOT) tablet 8.6 mg, Daily    tamsulosin (FLOMAX) capsule 0.8 mg, QPM    Administrative Statements   Today, Patient Was Seen By: Lucrecia Dang DO

## 2024-10-24 NOTE — ASSESSMENT & PLAN NOTE
Patient presenting with mild COVID-19 infection  Vaccination status :Unknown, reports no 2024 vaccination  Oxygen requirements Room air, desat with ambulation  Remdesvir Day #3/3   Decadron: Does not meet criteria for Decadron   D-dimer Level pending, not on oxygen- remains off of oxygen   Trend CRP, Prone as possible, OOB TID  CRP decreased to 57  Trend CMP,CBC daily.  Positive test date 10/21/2024

## 2024-10-24 NOTE — DISCHARGE SUMMARY
Discharge Summary - Hospitalist   Name: Adonis Mallory Jr. 84 y.o. male I MRN: [id number]  Unit/Bed#: [place] [address] -01 I Date of Admission: 10/22/2024   Date of Service: 10/24/2024 I Hospital Day: 2     Assessment & Plan  Pneumonia due to COVID-19 virus  Patient presenting with mild COVID-19 infection  Vaccination status :Unknown, reports no 2024 vaccination  Oxygen requirements Room air, desat with ambulation  Remdesvir Day #3/3   Decadron: Does not meet criteria for Decadron   D-dimer Level pending, not on oxygen- remains off of oxygen   Trend CRP, Prone as possible, OOB TID  CRP decreased to 57  Trend CMP,CBC daily.  Positive test date 10/21/2024  Enlarged prostate without lower urinary tract symptoms (luts)  Resume alpha-blocker, medication was auto substituted  Glaucoma  Continue eyedrops  Sick sinus syndrome (HCC)  Status post permanent pacemaker placement  Chronic obstructive pulmonary disease, unspecified COPD type (HCC)  No evidence of exacerbation, will add albuterol as needed  Not on maintenance medications  Depression, recurrent (HCC)  Mood is stable, he is on Librium 5 mg daily for history of irritable bowel  Continue the same for now     Medical Problems       Resolved Problems  Date Reviewed: 10/24/2024            Resolved    Mixed hyperlipidemia 10/22/2024     Resolved by  Cornell Morton DO    1st degree AV block 10/22/2024     Resolved by  Cornell Morton DO        Discharging Physician / Practitioner: Lucrecia Dang DO  PCP: Carl Hawkins DO  Admission Date:   Admission Orders (From admission, onward)       Ordered        10/22/24 1753  INPATIENT ADMISSION  Once                          Discharge Date: 10/24/24    Consultations During Hospital Stay:  none    Procedures Performed:   none    Significant Findings / Test Results:   XR chest 2 views  Result Date: 10/22/2024  Impression: Subtle patchy areas of groundglass consolidation in both lungs suspicious for COVID-19 pneumonia. Stable  background reticulation related to known interstitial lung disease. T    Incidental Findings:   None    Test Results Pending at Discharge (will require follow up):   none     Outpatient Tests Requested:  none    Reason for Admission:     Hospital Course:   Adonis Mallory Jr. is a 84 y.o. male patient who originally presented to the hospital on 10/22/2024 due to covid. Pt presented with shortness of breath weakness and fatigue. He was found to have covid. Pt was treated with remdesivir and improved. He was sating well on room air at discharge. He was evaluated by physical therapy and cleared for discharge to home. He was discharged to home.     Please see above list of diagnoses and related plan for additional information.     Discharge Day Visit / Exam:   Please see progress note from today     Discharge instructions/Information to patient and family:   See after visit summary for information provided to patient and family.      Provisions for Follow-Up Care:  See after visit summary for information related to follow-up care and any pertinent home health orders.      Planned Readmission: no    Discharge Medications:  See after visit summary for reconciled discharge medications provided to patient and/or family.      Administrative Statements   Discharge Statement:  I have spent a total time of 50 minutes in caring for this patient on the day of the visit/encounter.

## 2024-10-24 NOTE — PLAN OF CARE
Problem: Prexisting or High Potential for Compromised Skin Integrity  Goal: Skin integrity is maintained or improved  Description: INTERVENTIONS:  - Identify patients at risk for skin breakdown  - Assess and monitor skin integrity  - Assess and monitor nutrition and hydration status  - Monitor labs   - Assess for incontinence   - Turn and reposition patient  - Assist with mobility/ambulation  - Relieve pressure over bony prominences  - Avoid friction and shearing  - Provide appropriate hygiene as needed including keeping skin clean and dry  - Evaluate need for skin moisturizer/barrier cream  - Collaborate with interdisciplinary team   - Patient/family teaching  - Consider wound care consult   Outcome: Progressing     Problem: PAIN - ADULT  Goal: Verbalizes/displays adequate comfort level or baseline comfort level  Description: Interventions:  - Encourage patient to monitor pain and request assistance  - Assess pain using appropriate pain scale  - Administer analgesics based on type and severity of pain and evaluate response  - Implement non-pharmacological measures as appropriate and evaluate response  - Consider cultural and social influences on pain and pain management  - Notify physician/advanced practitioner if interventions unsuccessful or patient reports new pain  Outcome: Progressing     Problem: INFECTION - ADULT  Goal: Absence or prevention of progression during hospitalization  Description: INTERVENTIONS:  - Assess and monitor for signs and symptoms of infection  - Monitor lab/diagnostic results  - Monitor all insertion sites, i.e. indwelling lines, tubes, and drains  - Monitor endotracheal if appropriate and nasal secretions for changes in amount and color  - Bassett appropriate cooling/warming therapies per order  - Administer medications as ordered  - Instruct and encourage patient and family to use good hand hygiene technique  - Identify and instruct in appropriate isolation precautions for  identified infection/condition  Outcome: Progressing  Goal: Absence of fever/infection during neutropenic period  Description: INTERVENTIONS:  - Monitor WBC    Outcome: Progressing     Problem: SAFETY ADULT  Goal: Patient will remain free of falls  Description: INTERVENTIONS:  - Educate patient/family on patient safety including physical limitations  - Instruct patient to call for assistance with activity   - Consult OT/PT to assist with strengthening/mobility   - Keep Call bell within reach  - Keep bed low and locked with side rails adjusted as appropriate  - Keep care items and personal belongings within reach  - Initiate and maintain comfort rounds  - Make Fall Risk Sign visible to staff  - Offer Toileting every 2 Hours, in advance of need  - Initiate/Maintain bed alarm  - Obtain necessary fall risk management equipment: bed alarm  - Apply yellow socks and bracelet for high fall risk patients  - Consider moving patient to room near nurses station  Outcome: Progressing  Goal: Maintain or return to baseline ADL function  Description: INTERVENTIONS:  -  Assess patient's ability to carry out ADLs; assess patient's baseline for ADL function and identify physical deficits which impact ability to perform ADLs (bathing, care of mouth/teeth, toileting, grooming, dressing, etc.)  - Assess/evaluate cause of self-care deficits   - Assess range of motion  - Assess patient's mobility; develop plan if impaired  - Assess patient's need for assistive devices and provide as appropriate  - Encourage maximum independence but intervene and supervise when necessary  - Involve family in performance of ADLs  - Assess for home care needs following discharge   - Consider OT consult to assist with ADL evaluation and planning for discharge  - Provide patient education as appropriate  Outcome: Progressing  Goal: Maintains/Returns to pre admission functional level  Description: INTERVENTIONS:  - Perform AM-PAC 6 Click Basic Mobility/ Daily  Activity assessment daily.  - Set and communicate daily mobility goal to care team and patient/family/caregiver.   - Collaborate with rehabilitation services on mobility goals if consulted  - Perform Range of Motion 4 times a day.  - Reposition patient every 2 hours.  - Dangle patient 3 times a day  - Stand patient 3 times a day  - Ambulate patient 3 times a day  - Out of bed to chair 3 times a day   - Out of bed for meals 3 times a day  - Out of bed for toileting  - Record patient progress and toleration of activity level   Outcome: Progressing     Problem: DISCHARGE PLANNING  Goal: Discharge to home or other facility with appropriate resources  Description: INTERVENTIONS:  - Identify barriers to discharge w/patient and caregiver  - Arrange for needed discharge resources and transportation as appropriate  - Identify discharge learning needs (meds, wound care, etc.)  - Arrange for interpretive services to assist at discharge as needed  - Refer to Case Management Department for coordinating discharge planning if the patient needs post-hospital services based on physician/advanced practitioner order or complex needs related to functional status, cognitive ability, or social support system  Outcome: Progressing     Problem: Knowledge Deficit  Goal: Patient/family/caregiver demonstrates understanding of disease process, treatment plan, medications, and discharge instructions  Description: Complete learning assessment and assess knowledge base.  Interventions:  - Provide teaching at level of understanding  - Provide teaching via preferred learning methods  Outcome: Progressing     Problem: CARDIOVASCULAR - ADULT  Goal: Maintains optimal cardiac output and hemodynamic stability  Description: INTERVENTIONS:  - Monitor I/O, vital signs and rhythm  - Monitor for S/S and trends of decreased cardiac output  - Administer and titrate ordered vasoactive medications to optimize hemodynamic stability  - Assess quality of pulses,  skin color and temperature  - Assess for signs of decreased coronary artery perfusion  - Instruct patient to report change in severity of symptoms  Outcome: Progressing  Goal: Absence of cardiac dysrhythmias or at baseline rhythm  Description: INTERVENTIONS:  - Continuous cardiac monitoring, vital signs, obtain 12 lead EKG if ordered  - Administer antiarrhythmic and heart rate control medications as ordered  - Monitor electrolytes and administer replacement therapy as ordered  Outcome: Progressing     Problem: RESPIRATORY - ADULT  Goal: Achieves optimal ventilation and oxygenation  Description: INTERVENTIONS:  - Assess for changes in respiratory status  - Assess for changes in mentation and behavior  - Position to facilitate oxygenation and minimize respiratory effort  - Oxygen administered by appropriate delivery if ordered  - Initiate smoking cessation education as indicated  - Encourage broncho-pulmonary hygiene including cough, deep breathe, Incentive Spirometry  - Assess the need for suctioning and aspirate as needed  - Assess and instruct to report SOB or any respiratory difficulty  - Respiratory Therapy support as indicated  Outcome: Progressing     Problem: METABOLIC, FLUID AND ELECTROLYTES - ADULT  Goal: Electrolytes maintained within normal limits  Description: INTERVENTIONS:  - Monitor labs and assess patient for signs and symptoms of electrolyte imbalances  - Administer electrolyte replacement as ordered  - Monitor response to electrolyte replacements, including repeat lab results as appropriate  - Instruct patient on fluid and nutrition as appropriate  Outcome: Progressing  Goal: Fluid balance maintained  Description: INTERVENTIONS:  - Monitor labs   - Monitor I/O and WT  - Instruct patient on fluid and nutrition as appropriate  - Assess for signs & symptoms of volume excess or deficit  Outcome: Progressing  Goal: Glucose maintained within target range  Description: INTERVENTIONS:  - Monitor Blood  Glucose as ordered  - Assess for signs and symptoms of hyperglycemia and hypoglycemia  - Administer ordered medications to maintain glucose within target range  - Assess nutritional intake and initiate nutrition service referral as needed  Outcome: Progressing     Problem: MUSCULOSKELETAL - ADULT  Goal: Maintain or return mobility to safest level of function  Description: INTERVENTIONS:  - Assess patient's ability to carry out ADLs; assess patient's baseline for ADL function and identify physical deficits which impact ability to perform ADLs (bathing, care of mouth/teeth, toileting, grooming, dressing, etc.)  - Assess/evaluate cause of self-care deficits   - Assess range of motion  - Assess patient's mobility  - Assess patient's need for assistive devices and provide as appropriate  - Encourage maximum independence but intervene and supervise when necessary  - Involve family in performance of ADLs  - Assess for home care needs following discharge   - Consider OT consult to assist with ADL evaluation and planning for discharge  - Provide patient education as appropriate  Outcome: Progressing  Goal: Maintain proper alignment of affected body part  Description: INTERVENTIONS:  - Support, maintain and protect limb and body alignment  - Provide patient/ family with appropriate education  Outcome: Progressing

## 2024-10-24 NOTE — CASE MANAGEMENT
Case Management Assessment & Discharge Planning Note    Patient name Adonis Mallory Jr.  Location [facility] M* MRN [id number]  : 1940 Date 10/24/2024       Current Admission Date: 10/22/2024  Current Admission Diagnosis:Pneumonia due to COVID-19 virus   Patient Active Problem List    Diagnosis Date Noted Date Diagnosed    Pneumonia due to COVID-19 virus 10/22/2024     Vertigo 2024     Chronic obstructive pulmonary disease, unspecified COPD type (HCC) 10/09/2023     Depression, recurrent (HCC) 10/09/2023     Right lower quadrant abdominal pain 2023     Breast pain, left 2023     Chronic fatigue 2023     Recent change in frequency of bowel movements 2023     Mobitz (type) I (Wenckebach's) atrioventricular block 2022     Aortic stenosis with trileaflet valve 2022     Sick sinus syndrome (HCC)      Right ear pain 2022     Dyspnea on exertion 10/11/2021     Tick bite of left lower leg 2021     Neck mass 2019     Eczema 2018     Renal lithiasis 10/12/2017     Mild cognitive impairment 10/12/2017     Allergic rhinitis 2017     Oropharyngeal dysphagia 2017     Abnormal diffusion capacity determined by pulmonary function test 2017     Gastroesophageal reflux disease without esophagitis 2017     Glaucoma 2016     Antithrombinemia (HCC) 2016     Dysplastic nevus of face 2016     Neck pain 2015     Sjogren's syndrome (HCC) 2013     Anxiety 2012     Enlarged prostate without lower urinary tract symptoms (luts) 2012     Arthritis 2012     Irritable bowel syndrome 10/05/2012       LOS (days): 2  Geometric Mean LOS (GMLOS) (days): 4.6  Days to GMLOS:2.9     OBJECTIVE:    Risk of Unplanned Readmission Score: 15.2         Current admission status: Inpatient       Preferred Pharmacy:   [facility]/pharmacy #[id number] - [place] - [address]  [address]  [place] [address]  Phone: [phone] Fax: [phone]    [facility] - [place] - [address]  [address]  [place] [address]  Phone: [phone] Fax: [phone]    Primary Care Provider: Carl Hawkins DO    Primary Insurance: [employer] REP  Secondary Insurance:     ASSESSMENT:  Active Health Care Proxies    There are no active Health Care Proxies on file.                 Readmission Root Cause  30 Day Readmission: No    Patient Information  Admitted from:: Home  Mental Status: Alert  During Assessment patient was accompanied by: Not accompanied during assessment  Assessment information provided by:: Patient  Primary Caregiver: Self  Support Systems: Self, Children  County of Residence: [facility] do you live in?: [place]  Living Arrangements: Lives w/ Children  Is patient a ?: No    Activities of Daily Living Prior to Admission  Functional Status: Independent  Completes ADLs independently?: Yes  Ambulates independently?: Yes  Does patient use assisted devices?: No         Patient Information Continued  Income Source: Pension/FCI  Does patient have prescription coverage?: Yes  Does patient receive dialysis treatments?: No         Means of Transportation  Means of Transport to Appts:: Drives Self          DISCHARGE DETAILS:    Discharge planning discussed with:: patient  Freedom of Choice: Yes     CM contacted family/caregiver?: No- see comments (patient alert & oriented)  Were Treatment Team discharge recommendations reviewed with patient/caregiver?: Yes  Did patient/caregiver verbalize understanding of patient care needs?: Yes  Were patient/caregiver advised of the risks associated with not following Treatment Team discharge recommendations?: Yes    Contacts  Patient Contacts: Valeriy Mallory, son  Relationship to Patient:: Family  Contact Method: Phone  Reason/Outcome: Emergency Contact                         Treatment Team Recommendation: Home  Discharge Destination Plan:: Home  Transport at Discharge : Family                                      Additional Comments: Introduced self and role to patient via bedside phone.  Patient independent, lives at home with his 2 sons, still drives, does yard work.  No d/c needs identified at this time, CM will continue to follow.

## 2024-10-25 ENCOUNTER — TRANSITIONAL CARE MANAGEMENT (OUTPATIENT)
Dept: FAMILY MEDICINE CLINIC | Facility: CLINIC | Age: 84
End: 2024-10-25

## 2024-10-25 ENCOUNTER — OFFICE VISIT (OUTPATIENT)
Dept: FAMILY MEDICINE CLINIC | Facility: CLINIC | Age: 84
End: 2024-10-25
Payer: COMMERCIAL

## 2024-10-25 ENCOUNTER — PATIENT OUTREACH (OUTPATIENT)
Dept: CASE MANAGEMENT | Facility: OTHER | Age: 84
End: 2024-10-25

## 2024-10-25 VITALS
WEIGHT: 159.2 LBS | TEMPERATURE: 98 F | HEART RATE: 85 BPM | BODY MASS INDEX: 22.79 KG/M2 | OXYGEN SATURATION: 98 % | DIASTOLIC BLOOD PRESSURE: 70 MMHG | SYSTOLIC BLOOD PRESSURE: 118 MMHG | HEIGHT: 70 IN

## 2024-10-25 DIAGNOSIS — U07.1 PNEUMONIA DUE TO COVID-19 VIRUS: Primary | ICD-10-CM

## 2024-10-25 DIAGNOSIS — J12.82 PNEUMONIA DUE TO COVID-19 VIRUS: Primary | ICD-10-CM

## 2024-10-25 DIAGNOSIS — Z71.89 COMPLEX CARE COORDINATION: Primary | ICD-10-CM

## 2024-10-25 PROCEDURE — 99496 TRANSJ CARE MGMT HIGH F2F 7D: CPT

## 2024-10-25 NOTE — PROGRESS NOTES
"Received referral via in basket message as covering RN Care Manager. Chart reviewed.   Pt was admitted 10/22-10/24/24 with Covid, PMH of COPD, Sick Sinus Syndrome.  Telephone call to patient, introduced self and role of care manager.   He denies shortness of breath or cough.    He manages his medication and uses a pill box.  Unable to review medications at this time as he is to see his PCP in a few minutes.  Has secure transportation, he drives or his sons will transport him.   Lives with his two sons.  Main complaint is a \"rash on buttocks\" going to see PCP for same.  Declines further telephone calls at this time however he did not RN BRANDEN Fritz's contact information if future assistance needed.   Will close referral and remove CM from care team at this time.  "

## 2024-10-25 NOTE — UTILIZATION REVIEW
NOTIFICATION OF ADMISSION DISCHARGE   This is a Notification of Discharge from [facility]. Please be advised that this patient has been discharge from our facility. Below you will find the admission and discharge date and time including the patient’s disposition.   UTILIZATION REVIEW CONTACT:  Salima Gill  Utilization   Network Utilization Review Department  Phone: [phone] x carefully listen to the prompts. All voicemails are confidential.  Email: [email or web address]     ADMISSION INFORMATION  PRESENTATION DATE: 10/22/2024  2:42 PM  OBERVATION ADMISSION DATE: N/A  INPATIENT ADMISSION DATE: 10/22/24  5:53 PM   DISCHARGE DATE: 10/24/2024  5:14 PM   DISPOSITION:Home/Self Care    Network Utilization Review Department  ATTENTION: Please call with any questions or concerns to [phone] and carefully listen to the prompts so that you are directed to the right person. All voicemails are confidential.   For Discharge needs, contact Care Management DC Support Team at [phone] opt. 2  Send all requests for admission clinical reviews, approved or denied determinations and any other requests to dedicated fax number below belonging to the campus where the patient is receiving treatment. List of dedicated fax numbers for the Facilities:  FACILITY NAME UR FAX NUMBER   ADMISSION DENIALS (Administrative/Medical Necessity) [phone]   DISCHARGE SUPPORT TEAM ([facility]) [phone]   PARENT CHILD HEALTH (Maternity/NICU/Pediatrics) [phone]   [facility] [phone]   [facility] [phone]   [facility] [phone]   [facility] [phone]   [facility] [phone]   [facility] [phone]   [facility] [phone]   [facility] [phone]    [facility] [phone]   [facility] [phone]   [facility] [phone]   [facility] [phone]

## 2024-10-25 NOTE — PROGRESS NOTES
Transition of Care Visit  Name: Adonis Mallory Jr.      : 1940      MRN: [id number]  Encounter Provider: Sandra Davila PA-C  Encounter Date: 10/25/2024   Encounter department: [facility]    Assessment & Plan  Pneumonia due to COVID-19 virus  Patient seen today for TCM visit from COVID-pneumonia, he required a 3-day hospital stay.  He was given remdesivir in the hospital.  He is having side effects of increased gas due to the remdesivir otherwise has been feeling better with only residual dry cough.  He has not been taking any cough medicine for it and has been able to deal with it without medication.  No shortness of breath or chest tightness.  He has been using his incentive spirometer as recommended.  His pulmonary exam today is within normal limits, he has good air movement throughout all lung fields with no wheezing or rhonchi heard.  Considering patient's chronic medical history and age, he appears to have good healing from recent COVID infection.  Recommend that he repeat chest x-ray in about a month to ensure resolution of pneumonia.  Patient will follow-up with us as needed or if any symptoms worsen.  Orders:    XR chest pa and lateral; Future     Small red dry patch at the top of the gluteal cleft.  Discussed with patient this is not an open bedsore however can certainly progress it to that if continues pressure is applied to this location.  I redressed his wound with zinc oxide paste, gauze and a bandage.  He does not have to continue dressing the wound however should have a family member look at the wound every now and then to monitor his progress.  Patient understands and will continue to monitor.  Patient counseled to return to care to have it evaluated if any pain occurs.    History of Present Illness     Transitional Care Management Review:   Adonis Mallory Jr. is a 84 y.o. male here for TCM follow up.     During the TCM phone call patient stated:  TCM Call        "Date and time call was made  10/25/2024 10:21 AM    Hospital care reviewed  Records reviewed    Date of Admission  10/22/24    Date of discharge  10/24/24    Diagnosis  pneumonia due to COVID    Disposition  Home    Were the patients medications reviewed and updated  No    Current Symptoms  --  wound on buttock          TCM Call       Post hospital issues  Reduced activity    Did you obtain your prescribed medications  Yes    Do you need help managing your prescriptions or medications  No    Is transportation to your appointment needed  No    I have advised the patient to call PCP with any new or worsening symptoms  Charlotte Estevez MA    Living Arrangements  Alone          Patient presents today for TCM visit.  Patient was hospitalized from 10/22 to 10/24 for COVID-pneumonia.  Hospital course is as follows:    \"Adonis Mallory Jr. is a 84 y.o. male patient who originally presented to the hospital on 10/22/2024 due to covid. Pt presented with shortness of breath weakness and fatigue. He was found to have covid. Pt was treated with remdesivir and improved. He was sating well on room air at discharge. He was evaluated by physical therapy and cleared for discharge to home. He was discharged to home.\"    Patient presents today for his follow-up appointment and is feeling well.  He still has a residual dry cough but has been using his incentive spirometer as recommended.  He reports having a rash at the top of his gluteal cleft which they had put cream on in the hospital for.  They did not tell him he had a bedsore or any type of infection.  He does not have anybody at home with him to help dress the wound, does not want to ask his sons to change the wound.  Otherwise has no concerns today and is feeling better.      Review of Systems   Constitutional:  Negative for chills, fatigue and fever.   Respiratory:  Negative for cough, chest tightness and shortness of breath.    Cardiovascular:  Negative for chest pain, palpitations " "and leg swelling.   Skin:  Positive for rash.   Neurological:  Negative for dizziness, light-headedness and headaches.     Objective     /70 (BP Location: Left arm, Patient Position: Sitting, Cuff Size: Adult)   Pulse 85   Temp 98 °F (36.7 °C)   Ht 5' 9.5\" (1.765 m)   Wt 72.2 kg (159 lb 3.2 oz)   SpO2 98%   BMI 23.17 kg/m²     Physical Exam  Vitals and nursing note reviewed.   Constitutional:       General: He is not in acute distress.     Appearance: Normal appearance. He is normal weight. He is not ill-appearing.   HENT:      Head: Normocephalic and atraumatic.   Cardiovascular:      Rate and Rhythm: Normal rate and regular rhythm.      Pulses: Normal pulses.      Heart sounds: No murmur heard.  Pulmonary:      Effort: Pulmonary effort is normal. No respiratory distress.      Breath sounds: Normal breath sounds.   Skin:            Comments: Small, red dry patch. No open wound or sore. No infection present.    Neurological:      General: No focal deficit present.      Mental Status: He is alert and oriented to person, place, and time. Mental status is at baseline.   Psychiatric:         Mood and Affect: Mood normal.         Behavior: Behavior normal.         Judgment: Judgment normal.       Medications have been reviewed by provider in current encounter      " oral

## 2024-10-25 NOTE — ASSESSMENT & PLAN NOTE
Patient seen today for TCM visit from COVID-pneumonia, he required a 3-day hospital stay.  He was given remdesivir in the hospital.  He is having side effects of increased gas due to the remdesivir otherwise has been feeling better with only residual dry cough.  He has not been taking any cough medicine for it and has been able to deal with it without medication.  No shortness of breath or chest tightness.  He has been using his incentive spirometer as recommended.  His pulmonary exam today is within normal limits, he has good air movement throughout all lung fields with no wheezing or rhonchi heard.  Considering patient's chronic medical history and age, he appears to have good healing from recent COVID infection.  Recommend that he repeat chest x-ray in about a month to ensure resolution of pneumonia.  Patient will follow-up with us as needed or if any symptoms worsen.  Orders:    XR chest pa and lateral; Future

## 2024-11-15 ENCOUNTER — OFFICE VISIT (OUTPATIENT)
Dept: FAMILY MEDICINE CLINIC | Facility: CLINIC | Age: 84
End: 2024-11-15
Payer: COMMERCIAL

## 2024-11-15 VITALS
BODY MASS INDEX: 22.82 KG/M2 | WEIGHT: 159.4 LBS | HEIGHT: 70 IN | DIASTOLIC BLOOD PRESSURE: 70 MMHG | SYSTOLIC BLOOD PRESSURE: 117 MMHG | HEART RATE: 80 BPM | OXYGEN SATURATION: 98 % | TEMPERATURE: 98.6 F

## 2024-11-15 DIAGNOSIS — K58.9 IRRITABLE BOWEL SYNDROME, UNSPECIFIED TYPE: ICD-10-CM

## 2024-11-15 DIAGNOSIS — U07.1 PNEUMONIA DUE TO COVID-19 VIRUS: Primary | ICD-10-CM

## 2024-11-15 DIAGNOSIS — J12.82 PNEUMONIA DUE TO COVID-19 VIRUS: Primary | ICD-10-CM

## 2024-11-15 PROCEDURE — 99213 OFFICE O/P EST LOW 20 MIN: CPT | Performed by: FAMILY MEDICINE

## 2024-11-15 PROCEDURE — G2211 COMPLEX E/M VISIT ADD ON: HCPCS | Performed by: FAMILY MEDICINE

## 2024-11-15 NOTE — ASSESSMENT & PLAN NOTE
Patient appears to have fully recovered.  Pulmonary exam unremarkable.  Denies chest pain or shortness of breath.

## 2024-11-15 NOTE — ASSESSMENT & PLAN NOTE
Reviewed diet and medications.  Discussed his evaluation by GI while in the hospital.  Will continue with probiotics.  Discussed his weight loss.  CT scan abdomen and pelvis done approximately 6 months ago was normal.

## 2024-11-15 NOTE — PROGRESS NOTES
Name: Adonis Mallory Jr.      : 1940      MRN: [id number]  Encounter Provider: Carl Hawkins DO  Encounter Date: 11/15/2024   Encounter department: [facility]    Assessment & Plan  Pneumonia due to COVID-19 virus  Patient appears to have fully recovered.  Pulmonary exam unremarkable.  Denies chest pain or shortness of breath.       Irritable bowel syndrome, unspecified type  Reviewed diet and medications.  Discussed his evaluation by GI while in the hospital.  Will continue with probiotics.  Discussed his weight loss.  CT scan abdomen and pelvis done approximately 6 months ago was normal.            History of Present Illness     Patient presented primarily to ask several questions about medication and diet.  Was hospitalized recently for COVID related pneumonia.  He appears to have recovered well and he has no complaints in that regard.  Does have some pain in the left groin when he exerts himself.  Prior hernia surgery at that site.      Review of Systems   Constitutional: Negative.    Respiratory: Negative.     Cardiovascular: Negative.    Gastrointestinal:  Positive for abdominal pain.   Genitourinary: Negative.    Musculoskeletal: Negative.    Psychiatric/Behavioral: Negative.       Past Medical History:   Diagnosis Date    Abnormal diffusion capacity determined by pulmonary function test 2017    Actinic keratosis     Acute right-sided low back pain without sciatica 2017    Adverse effect of correct medicinal substance properly administered     Arthralgia     Candidiasis, mouth     Chronic allergic rhinitis     Chronic bronchitis (HCC)     Chronic sinusitis     Cough     Dermatitis     Diverticulosis     LUCIANO (dyspnea on exertion)     Former smoker     GERD (gastroesophageal reflux disease)     Glaucoma     Hyperlipidemia     Inguinal hernia, left     Lump of skin     Male erectile dysfunction     Open comedone     Palpitations     Persistent cough for 3 weeks or  longer 03/15/2017    Renal calculi     Sebaceous cyst     Seborrheic keratosis     Sick sinus syndrome (HCC) 2022    Skin disorder     Unspecified chronic bronchitis (HCC)     Visual disturbances      Past Surgical History:   Procedure Laterality Date    CARDIAC ELECTROPHYSIOLOGY PROCEDURE N/A 2022    Procedure: Cardiac pacer implant/ DUAL CHAMBER PACER @ [place];  Surgeon: Van Gore DO;  Location: [facility];  Service: Cardiology    COLONOSCOPY  2014    [facility] MD.-Diverticulosis    COLONOSCOPY  10/2011    BRADY Ross MD.-Diverticulosis    EGD  2016    KEENAN Ross MD.-LA Grade A reflux esophagitis, normal stomach, normal jejunum, benign-appearing esophageal stenosis.    EGD  2015    KEENAN Ross MD.-Normal upper endoscopy    INGUINAL HERNIA REPAIR Bilateral     left- last assessed: 14, Resolved: 5/18/15, onset 13    MOUTH SURGERY       Family History   Problem Relation Age of Onset    Brain cancer Mother     Heart failure Mother     Prostate cancer Mother      Social History     Tobacco Use    Smoking status: Former     Current packs/day: 0.00     Types: Cigarettes     Quit date:      Years since quittin.8     Passive exposure: Never    Smokeless tobacco: Never   Vaping Use    Vaping status: Never Used   Substance and Sexual Activity    Alcohol use: Not Currently     Alcohol/week: 2.0 standard drinks of alcohol     Types: 2 Cans of beer per week    Drug use: No    Sexual activity: Not Currently     Current Outpatient Medications on File Prior to Visit   Medication Sig    acetaminophen (TYLENOL) 325 mg tablet Take 650 mg by mouth every 6 (six) hours as needed for mild pain    aspirin 81 mg chewable tablet Chew 81 mg 2 tablets daily    B Complex Vitamins (B COMPLEX PO) Take by mouth once a week    brinzolamide (AZOPT) 1 % ophthalmic suspension INSTILL 1 DROP INTO BOTH EYES TWICE A DAY    chlordiazePOXIDE (LIBRIUM) 5 mg capsule Take 1  capsule (5 mg total) by mouth daily    clobetasol (OLUX) 0.05 % topical foam Apply topically daily as needed (skin irritation) For ears.    docusate sodium (COLACE) 100 mg capsule Take 100 mg by mouth if needed    famotidine (PEPCID) 40 MG tablet TAKE 1 TABLET BY MOUTH TWICE A DAY    fish oil-omega-3 fatty acids 1000 MG capsule Take 2 g by mouth daily    glucosamine-chondroitin 500-400 MG tablet Take 1 tablet by mouth 2 (two) times a day    guaiFENesin (MUCINEX) 600 mg 12 hr tablet Take 1,200 mg by mouth if needed    hydrocortisone 2.5 % cream Apply topically if needed    levocetirizine (XYZAL) 5 MG tablet Take 1 tablet by mouth Daily    meclizine (ANTIVERT) 25 mg tablet TAKE 1 TABLET (25 MG TOTAL) BY MOUTH EVERY 8 (EIGHT) HOURS AS NEEDED FOR DIZZINESS.    Multiple Vitamins-Minerals (CENTRUM SILVER ULTRA MENS) TABS Take 1 tablet by mouth daily    promethazine-dextromethorphan (PHENERGAN-DM) 6.25-15 mg/5 mL oral syrup Take 5 mL by mouth 4 (four) times a day as needed for cough    Rocklatan 0.02-0.005 % SOLN INSTILL 1 DROP INTO BOTH EYES EVERY DAY IN THE EVENING    Silodosin 8 MG CAPS TAKE 1 CAPSULE BY MOUTH EVERY DAY     Allergies   Allergen Reactions    Levofloxacin Anaphylaxis    Azithromycin GI Intolerance    Ketorolac Other (See Comments)     Swelling of eyes with drops    Penicillins Hives     Immunization History   Administered Date(s) Administered    COVID-19 MODERNA VACC 0.5 ML IM 01/19/2021, 02/17/2021, 11/14/2021, 04/27/2022, 08/17/2022, 09/30/2022    COVID-19 Moderna Vac BIVALENT 12 Yr+ IM 0.5 ML 08/17/2023    COVID-19 Moderna mRNA Vaccine 12 Yr+ 50 mcg/0.5 mL (Spikevax) 12/16/2023, 09/03/2024    INFLUENZA 09/18/2015, 10/18/2016, 09/01/2017, 08/27/2018    Influenza Quadrivalent, 6-35 Months IM 09/01/2017    Influenza Split High Dose Preservative Free IM 09/25/2014, 09/18/2015, 10/18/2016, 08/17/2023, 09/03/2024    Influenza, high dose seasonal 0.7 mL 08/27/2018, 09/24/2019, 09/01/2020, 09/14/2021,  "10/05/2022    Influenza, seasonal, injectable 10/17/2012, 10/09/2013    Pneumococcal Conjugate 13-Valent 06/08/2015    Pneumococcal Polysaccharide PPV23 08/30/2019    Td (adult), adsorbed 10/22/2014    Tdap 04/16/2020    Zoster Vaccine Recombinant 03/30/2018, 05/29/2018     Objective   /70 (BP Location: Left arm, Patient Position: Sitting, Cuff Size: Adult)   Pulse 80   Temp 98.6 °F (37 °C) (Temporal)   Ht 5' 10\" (1.778 m)   Wt 72.3 kg (159 lb 6.4 oz)   SpO2 98%   BMI 22.87 kg/m²     Physical Exam  Vitals and nursing note reviewed.   Constitutional:       General: He is not in acute distress.     Appearance: Normal appearance. He is well-developed. He is not diaphoretic.   HENT:      Head: Normocephalic and atraumatic.   Eyes:      General:         Right eye: No discharge.      Conjunctiva/sclera: Conjunctivae normal.      Pupils: Pupils are equal, round, and reactive to light.   Neck:      Thyroid: No thyromegaly.   Cardiovascular:      Rate and Rhythm: Normal rate and regular rhythm.   Pulmonary:      Effort: Pulmonary effort is normal. No respiratory distress.      Breath sounds: Normal breath sounds.   Musculoskeletal:      Cervical back: Normal range of motion.   Lymphadenopathy:      Cervical: No cervical adenopathy.   Skin:     General: Skin is warm and dry.   Neurological:      Mental Status: He is alert and oriented to person, place, and time.   Psychiatric:         Mood and Affect: Mood normal.         Behavior: Behavior normal.         Thought Content: Thought content normal.         Judgment: Judgment normal.         "

## 2024-11-18 DIAGNOSIS — N40.0 BENIGN PROSTATIC HYPERPLASIA, UNSPECIFIED WHETHER LOWER URINARY TRACT SYMPTOMS PRESENT: ICD-10-CM

## 2024-11-20 RX ORDER — SILODOSIN 8 MG/1
1 CAPSULE ORAL DAILY
Qty: 90 CAPSULE | Refills: 1 | Status: SHIPPED | OUTPATIENT
Start: 2024-11-20

## 2024-11-21 PROBLEM — U07.1 PNEUMONIA DUE TO COVID-19 VIRUS: Status: RESOLVED | Noted: 2024-10-22 | Resolved: 2024-11-21

## 2024-11-21 PROBLEM — J12.82 PNEUMONIA DUE TO COVID-19 VIRUS: Status: RESOLVED | Noted: 2024-10-22 | Resolved: 2024-11-21

## 2024-12-02 DIAGNOSIS — R42 VERTIGO: ICD-10-CM

## 2024-12-02 RX ORDER — MECLIZINE HYDROCHLORIDE 25 MG/1
25 TABLET ORAL EVERY 8 HOURS PRN
Qty: 90 TABLET | Refills: 3 | Status: SHIPPED | OUTPATIENT
Start: 2024-12-02

## 2024-12-02 NOTE — TELEPHONE ENCOUNTER
Reason for call:   [x] Refill   [] Prior Auth  [] Other:     Office:   [x] PCP/Provider -   [] Specialty/Provider -     Medication:   Meclizine 25mg- take 1 tablet by mouth every 8 hours as needed for dizziness      Pharmacy: [facility] [place]    Does the patient have enough for 3 days?   [] Yes   [x] No - Send as HP to POD

## 2024-12-11 ENCOUNTER — REMOTE DEVICE CLINIC VISIT (OUTPATIENT)
Dept: CARDIOLOGY CLINIC | Facility: CLINIC | Age: 84
End: 2024-12-11
Payer: COMMERCIAL

## 2024-12-11 DIAGNOSIS — Z95.0 PRESENCE OF PERMANENT CARDIAC PACEMAKER: Primary | ICD-10-CM

## 2024-12-11 PROCEDURE — 93294 REM INTERROG EVL PM/LDLS PM: CPT | Performed by: INTERNAL MEDICINE

## 2024-12-11 PROCEDURE — 93296 REM INTERROG EVL PM/IDS: CPT | Performed by: INTERNAL MEDICINE

## 2024-12-11 NOTE — PROGRESS NOTES
MDT DC PM/ACTIVE SYSTEM IS MRI CONDITIONAL   CARELINK TRANSMISSION:  BATTERY VOLTAGE ADEQUATE (9.2 YR.).  AP 30.3%  99.2% (AVB/DDD 60 PPM, -180 MS).  ALL LEAD PARAMETERS WITHIN NORMAL LIMITS.  1 EPISODE OF NSVT (13 @ 179 BPM).  1 AT/AF EPISODE LASTING 41 S.  EF 55% (ECHO 03/2024).  PATIENT TAKES ASA ONLY, NO BB DUE TO LOW BP.  NORMAL DEVICE FUNCTION.  RG

## 2024-12-16 DIAGNOSIS — K21.9 GASTROESOPHAGEAL REFLUX DISEASE WITHOUT ESOPHAGITIS: ICD-10-CM

## 2024-12-16 NOTE — TELEPHONE ENCOUNTER
Patient called to request a refill for their  famotidine (PEPCID) 40 MG tablet  advised a refill was requested on 12/16/2024 and is pending approval. Patient verbalized understanding and is in agreement.      Patient stated insurance requires 90 day supply to be sent to [facility]/pharmacy #[id number] - [place] - [address], [phone]

## 2024-12-16 NOTE — TELEPHONE ENCOUNTER
Reason for call:   [x] Refill   [] Prior Auth  [] Other:     Office:   [x] PCP/Provider -   [] Specialty/Provider -     Medication: famotidine (PEPCID) 40 MG tablet TAKE 1 TABLET BY MOUTH TWICE A DAY,       Pharmacy: [facility]/pharmacy #[id number] - [place] - [address]      Does the patient have enough for 3 days?   [x] Yes   [] No - Send as HP to POD

## 2024-12-17 RX ORDER — FAMOTIDINE 40 MG/1
40 TABLET, FILM COATED ORAL 2 TIMES DAILY
Qty: 180 TABLET | Refills: 1 | Status: SHIPPED | OUTPATIENT
Start: 2024-12-17

## 2024-12-25 ENCOUNTER — RESULTS FOLLOW-UP (OUTPATIENT)
Dept: CARDIOLOGY CLINIC | Facility: CLINIC | Age: 84
End: 2024-12-25

## 2025-01-07 DIAGNOSIS — K58.9 IRRITABLE BOWEL SYNDROME, UNSPECIFIED TYPE: ICD-10-CM

## 2025-01-07 DIAGNOSIS — F41.9 ANXIETY: ICD-10-CM

## 2025-01-07 NOTE — TELEPHONE ENCOUNTER
Reason for call:   [x] Refill   [] Prior Auth  [] Other:     Office:   [x] PCP/Provider -   [] Specialty/Provider -     Medication: chlordiazePOXIDE (LIBRIUM) 5 mg capsule Take 1 capsule (5 mg total) by mouth daily         Pharmacy: [id number] - [place] - [address]     Does the patient have enough for 3 days?   [x] Yes   [] No - Send as HP to POD

## 2025-01-08 RX ORDER — CHLORDIAZEPOXIDE HYDROCHLORIDE 5 MG/1
5 CAPSULE, GELATIN COATED ORAL DAILY
Qty: 90 CAPSULE | Refills: 1 | Status: SHIPPED | OUTPATIENT
Start: 2025-01-08

## 2025-01-22 ENCOUNTER — OFFICE VISIT (OUTPATIENT)
Dept: SURGERY | Facility: CLINIC | Age: 85
End: 2025-01-22
Payer: COMMERCIAL

## 2025-01-22 VITALS
RESPIRATION RATE: 16 BRPM | BODY MASS INDEX: 22.76 KG/M2 | DIASTOLIC BLOOD PRESSURE: 66 MMHG | SYSTOLIC BLOOD PRESSURE: 118 MMHG | HEIGHT: 70 IN | HEART RATE: 53 BPM | WEIGHT: 159 LBS | TEMPERATURE: 96.3 F | OXYGEN SATURATION: 99 %

## 2025-01-22 DIAGNOSIS — L72.3 SEBACEOUS CYST OF SCROTUM: ICD-10-CM

## 2025-01-22 DIAGNOSIS — K58.9 IRRITABLE BOWEL SYNDROME: ICD-10-CM

## 2025-01-22 DIAGNOSIS — R10.32 LEFT GROIN PAIN: Primary | ICD-10-CM

## 2025-01-22 PROCEDURE — 99204 OFFICE O/P NEW MOD 45 MIN: CPT | Performed by: SURGERY

## 2025-01-22 RX ORDER — ERGOCALCIFEROL 1.25 MG/1
50000 CAPSULE ORAL WEEKLY
COMMUNITY

## 2025-01-22 RX ORDER — AMPICILLIN TRIHYDRATE 250 MG
600 CAPSULE ORAL
COMMUNITY

## 2025-01-22 NOTE — PROGRESS NOTES
Name: Adonis Mallory Jr.      : 1940      MRN: [id number]  Encounter Provider: Marco Julien MD  Encounter Date: 2025   Encounter department: [facility] [place]  :  Assessment & Plan  Left groin pain  He has been having intermittent pains in his left groin since his previous hernia surgery.  However they are not consistent or specific spot.  On exam there is no evidence of recurrent hernia.  I was looked at his CT scan he had last year which also does not show recurrent hernia.  I recommend he treat this like arthritis or an old injury sometimes get some discomfort that requires stretching and icing it.  If he starts noticing a specific pinpoint tenderness area could consider local injections with a steroid to help decrease the scar tissue.  Otherwise he is safe to continue with his exercises at the gym.  I told him he can speak to the  about good exercise to keep strength up that do not cause him to his discomfort.         Sebaceous cyst of scrotum  On the left side just the top of the scrotum there is a sebaceous cyst about 2 cm across.  There is an old scar here from an I&D that he had last year.  Explained this could be removed other the office or the hospital but currently is asymptomatic and not interested.         Irritable bowel syndrome  He has some fluctuating bowel movements sometimes loose or pasty and sometimes more constipated.  I recommend increase the fiber in his diet and stay hydrated.             History of Present Illness   HPI  Adonis Mallory Jr. is a 84 y.o. male who presents for evaluation of multi complaints.  He states he has pain in his left groin.  This started 6 months after he had his left inguinal hernia repaired in .  He states the pain does come and go and is not there constantly.  Recently he had some pain when he was in bed just 3 days ago and is very concerned about this but did resolve with an ice pack.  He does have some pain when he is  picking up and twisting heavy objects like a crate of water.  He also has complaints of arthritic joint pains on his wrist as well.  He states he does still have a lump in the left groin.  He also has a history of an abscess in this area.  He has some mild red areas on his left thigh but he feels it might be from his pen that he carries in his pocket taking into his leg.  So concerned about his bowel movements that sometimes they are pasty and difficult to wipe the symptoms can be harder and associate with a lot of gas.  History obtained from: patient    Review of Systems   Constitutional:  Negative for appetite change, chills and fever.   HENT:  Negative for congestion and ear pain.    Eyes:  Negative for discharge and itching.   Respiratory:  Negative for chest tightness and shortness of breath.    Cardiovascular:  Negative for chest pain and palpitations.   Gastrointestinal:  Positive for abdominal pain, constipation and diarrhea.   Genitourinary:  Negative for difficulty urinating and frequency.   Musculoskeletal:  Positive for arthralgias. Negative for gait problem.   Skin:  Positive for rash. Negative for color change.   Neurological:  Negative for dizziness and numbness.   Psychiatric/Behavioral:  Negative for agitation and confusion.      Past Medical History   Past Medical History:   Diagnosis Date    Abnormal diffusion capacity determined by pulmonary function test 04/25/2017    Actinic keratosis     Acute right-sided low back pain without sciatica 09/01/2017    Adverse effect of correct medicinal substance properly administered     Arthralgia     Candidiasis, mouth     Chronic allergic rhinitis     Chronic bronchitis (HCC)     Chronic sinusitis     Cough     Dermatitis     Diverticulosis     LUCIANO (dyspnea on exertion)     Former smoker     GERD (gastroesophageal reflux disease)     Glaucoma     Hyperlipidemia     Inguinal hernia, left     Lump of skin     Male erectile dysfunction     Open comedone      Palpitations     Persistent cough for 3 weeks or longer 03/15/2017    Renal calculi     Sebaceous cyst     Seborrheic keratosis     Sick sinus syndrome (HCC) 07/12/2022    Skin disorder     Unspecified chronic bronchitis (HCC)     Visual disturbances      Past Surgical History:   Procedure Laterality Date    CARDIAC ELECTROPHYSIOLOGY PROCEDURE N/A 08/09/2022    Procedure: Cardiac pacer implant/ DUAL CHAMBER PACER @ [place];  Surgeon: Van Gore DO;  Location: [facility];  Service: Cardiology    COLONOSCOPY  12/2014    [facility] MD.-Diverticulosis    COLONOSCOPY  10/2011    BRADY Ross MD.-Diverticulosis    EGD  03/2016    KEENAN Ross MD.-LA Grade A reflux esophagitis, normal stomach, normal jejunum, benign-appearing esophageal stenosis.    EGD  08/2015    KEENAN Ross MD.-Normal upper endoscopy    INGUINAL HERNIA REPAIR Bilateral     left- last assessed: 11/25/14, Resolved: 5/18/15, onset 12/11/13    MOUTH SURGERY       Family History   Problem Relation Age of Onset    Brain cancer Mother     Heart failure Mother     Prostate cancer Mother       reports that he quit smoking about 15 years ago. His smoking use included cigarettes. He has never been exposed to tobacco smoke. He has never used smokeless tobacco. He reports that he does not currently use alcohol after a past usage of about 2.0 standard drinks of alcohol per week. He reports that he does not use drugs.  Current Outpatient Medications on File Prior to Visit   Medication Sig Dispense Refill    acetaminophen (TYLENOL) 325 mg tablet Take 650 mg by mouth every 6 (six) hours as needed for mild pain      aspirin 81 mg chewable tablet Chew 81 mg 2 tablets daily      B Complex Vitamins (B COMPLEX PO) Take by mouth once a week      brinzolamide (AZOPT) 1 % ophthalmic suspension INSTILL 1 DROP INTO BOTH EYES TWICE A DAY 30 mL 3    chlordiazePOXIDE (LIBRIUM) 5 mg capsule Take 1 capsule (5 mg total) by mouth daily 90 capsule 1     clobetasol (OLUX) 0.05 % topical foam Apply topically daily as needed (skin irritation) For ears. 50 g 2    docusate sodium (COLACE) 100 mg capsule Take 100 mg by mouth if needed      ergocalciferol (ERGOCALCIFEROL) 1.25 MG (81707 UT) capsule Take 50,000 Units by mouth once a week      famotidine (PEPCID) 40 MG tablet Take 1 tablet (40 mg total) by mouth 2 (two) times a day 180 tablet 1    fish oil-omega-3 fatty acids 1000 MG capsule Take 2 g by mouth daily      glucosamine-chondroitin 500-400 MG tablet Take 1 tablet by mouth 2 (two) times a day      guaiFENesin (MUCINEX) 600 mg 12 hr tablet Take 1,200 mg by mouth if needed      hydrocortisone 2.5 % cream Apply topically if needed      levocetirizine (XYZAL) 5 MG tablet Take 1 tablet by mouth Daily      meclizine (ANTIVERT) 25 mg tablet Take 1 tablet (25 mg total) by mouth every 8 (eight) hours as needed for dizziness 90 tablet 3    Multiple Vitamins-Minerals (CENTRUM SILVER ULTRA MENS) TABS Take 1 tablet by mouth daily      promethazine-dextromethorphan (PHENERGAN-DM) 6.25-15 mg/5 mL oral syrup Take 5 mL by mouth 4 (four) times a day as needed for cough 240 mL 0    Red Yeast Rice 600 MG CAPS Take 600 mg by mouth daily in the early morning      Rocklatan 0.02-0.005 % SOLN INSTILL 1 DROP INTO BOTH EYES EVERY DAY IN THE EVENING      Silodosin 8 MG CAPS TAKE 1 CAPSULE BY MOUTH EVERY DAY 90 capsule 1     No current facility-administered medications on file prior to visit.     Allergies   Allergen Reactions    Levofloxacin Anaphylaxis    Azithromycin GI Intolerance    Ketorolac Other (See Comments)     Swelling of eyes with drops    Penicillins Hives      Current Outpatient Medications on File Prior to Visit   Medication Sig Dispense Refill    acetaminophen (TYLENOL) 325 mg tablet Take 650 mg by mouth every 6 (six) hours as needed for mild pain      aspirin 81 mg chewable tablet Chew 81 mg 2 tablets daily      B Complex Vitamins (B COMPLEX PO) Take by mouth once a week       brinzolamide (AZOPT) 1 % ophthalmic suspension INSTILL 1 DROP INTO BOTH EYES TWICE A DAY 30 mL 3    chlordiazePOXIDE (LIBRIUM) 5 mg capsule Take 1 capsule (5 mg total) by mouth daily 90 capsule 1    clobetasol (OLUX) 0.05 % topical foam Apply topically daily as needed (skin irritation) For ears. 50 g 2    docusate sodium (COLACE) 100 mg capsule Take 100 mg by mouth if needed      ergocalciferol (ERGOCALCIFEROL) 1.25 MG (73635 UT) capsule Take 50,000 Units by mouth once a week      famotidine (PEPCID) 40 MG tablet Take 1 tablet (40 mg total) by mouth 2 (two) times a day 180 tablet 1    fish oil-omega-3 fatty acids 1000 MG capsule Take 2 g by mouth daily      glucosamine-chondroitin 500-400 MG tablet Take 1 tablet by mouth 2 (two) times a day      guaiFENesin (MUCINEX) 600 mg 12 hr tablet Take 1,200 mg by mouth if needed      hydrocortisone 2.5 % cream Apply topically if needed      levocetirizine (XYZAL) 5 MG tablet Take 1 tablet by mouth Daily      meclizine (ANTIVERT) 25 mg tablet Take 1 tablet (25 mg total) by mouth every 8 (eight) hours as needed for dizziness 90 tablet 3    Multiple Vitamins-Minerals (CENTRUM SILVER ULTRA MENS) TABS Take 1 tablet by mouth daily      promethazine-dextromethorphan (PHENERGAN-DM) 6.25-15 mg/5 mL oral syrup Take 5 mL by mouth 4 (four) times a day as needed for cough 240 mL 0    Red Yeast Rice 600 MG CAPS Take 600 mg by mouth daily in the early morning      Rocklatan 0.02-0.005 % SOLN INSTILL 1 DROP INTO BOTH EYES EVERY DAY IN THE EVENING      Silodosin 8 MG CAPS TAKE 1 CAPSULE BY MOUTH EVERY DAY 90 capsule 1     No current facility-administered medications on file prior to visit.      Social History     Tobacco Use    Smoking status: Former     Current packs/day: 0.00     Types: Cigarettes     Quit date: 2010     Years since quitting: 15.0     Passive exposure: Never    Smokeless tobacco: Never   Vaping Use    Vaping status: Never Used   Substance and Sexual Activity    Alcohol  "use: Not Currently     Alcohol/week: 2.0 standard drinks of alcohol     Types: 2 Cans of beer per week    Drug use: No    Sexual activity: Not Currently        Objective   /66 (BP Location: Left arm, Patient Position: Sitting, Cuff Size: Large)   Pulse (!) 53   Temp (!) 96.3 °F (35.7 °C) (Tympanic)   Resp 16   Ht 5' 10\" (1.778 m)   Wt 72.1 kg (159 lb)   SpO2 99%   BMI 22.81 kg/m²      Physical Exam  Vitals and nursing note reviewed.   Constitutional:       General: He is not in acute distress.     Appearance: He is well-developed. He is not diaphoretic.   HENT:      Head: Normocephalic and atraumatic.   Eyes:      Pupils: Pupils are equal, round, and reactive to light.   Cardiovascular:      Rate and Rhythm: Normal rate and regular rhythm.   Pulmonary:      Effort: Pulmonary effort is normal. No respiratory distress.   Abdominal:      General: Bowel sounds are normal.      Palpations: Abdomen is soft.      Comments: No evidence of recurrent left inguinal hernia.  In the soft tissue just above the scrotum there is a 2 cm cystic mass with an old scar just beside it.   Musculoskeletal:         General: Normal range of motion.      Cervical back: Normal range of motion and neck supple.   Skin:     General: Skin is warm and dry.      Comments: On the left thigh there are a few patchy areas that are red and about 2 to 3 cm across.  No scaling of the skin.  Smooth on inspection.  No abscess or cellulitis.   Neurological:      Mental Status: He is alert and oriented to person, place, and time.   Psychiatric:         Behavior: Behavior normal.           "

## 2025-01-22 NOTE — ASSESSMENT & PLAN NOTE
He has some fluctuating bowel movements sometimes loose or pasty and sometimes more constipated.  I recommend increase the fiber in his diet and stay hydrated.

## 2025-01-22 NOTE — ASSESSMENT & PLAN NOTE
On the left side just the top of the scrotum there is a sebaceous cyst about 2 cm across.  There is an old scar here from an I&D that he had last year.  Explained this could be removed other the office or the hospital but currently is asymptomatic and not interested.

## 2025-01-22 NOTE — ASSESSMENT & PLAN NOTE
He has been having intermittent pains in his left groin since his previous hernia surgery.  However they are not consistent or specific spot.  On exam there is no evidence of recurrent hernia.  I was looked at his CT scan he had last year which also does not show recurrent hernia.  I recommend he treat this like arthritis or an old injury sometimes get some discomfort that requires stretching and icing it.  If he starts noticing a specific pinpoint tenderness area could consider local injections with a steroid to help decrease the scar tissue.  Otherwise he is safe to continue with his exercises at the gym.  I told him he can speak to the  about good exercise to keep strength up that do not cause him to his discomfort.

## 2025-03-04 ENCOUNTER — OFFICE VISIT (OUTPATIENT)
Dept: FAMILY MEDICINE CLINIC | Facility: CLINIC | Age: 85
End: 2025-03-04
Payer: COMMERCIAL

## 2025-03-04 ENCOUNTER — APPOINTMENT (OUTPATIENT)
Dept: RADIOLOGY | Facility: CLINIC | Age: 85
End: 2025-03-04
Payer: COMMERCIAL

## 2025-03-04 VITALS — WEIGHT: 159 LBS | HEART RATE: 92 BPM | OXYGEN SATURATION: 93 % | BODY MASS INDEX: 22.81 KG/M2 | TEMPERATURE: 97.7 F

## 2025-03-04 DIAGNOSIS — M54.50 ACUTE BILATERAL LOW BACK PAIN WITHOUT SCIATICA: ICD-10-CM

## 2025-03-04 DIAGNOSIS — I49.5 SICK SINUS SYNDROME (HCC): ICD-10-CM

## 2025-03-04 DIAGNOSIS — M54.50 ACUTE BILATERAL LOW BACK PAIN WITHOUT SCIATICA: Primary | ICD-10-CM

## 2025-03-04 DIAGNOSIS — J44.9 CHRONIC OBSTRUCTIVE PULMONARY DISEASE, UNSPECIFIED COPD TYPE (HCC): ICD-10-CM

## 2025-03-04 PROCEDURE — 99214 OFFICE O/P EST MOD 30 MIN: CPT

## 2025-03-04 PROCEDURE — 72100 X-RAY EXAM L-S SPINE 2/3 VWS: CPT

## 2025-03-04 PROCEDURE — G2211 COMPLEX E/M VISIT ADD ON: HCPCS

## 2025-03-04 NOTE — PROGRESS NOTES
Name: Adonis Mallory Jr.      : 1940      MRN: [id number]  Encounter Provider: Sandra Davila PA-C  Encounter Date: 3/4/2025   Encounter department: [facility]  :  Assessment & Plan  Acute bilateral low back pain without sciatica  Patient with acute onset of low back pain, has been present for several months but worsened about 2 weeks ago after a fall onto his buttocks.  He has no neurological deficits on examination today.  Denies loss of bowel or bladder function.  He has general achiness in the lumbar area bilaterally, suspect this to be a muscle pain versus arthritis.  Will evaluate further with x-ray, will rule out fracture due to recent trauma.  Patient reports good improvement with Tylenol and heat.  Counseled him to continue this regimen and he can also add in a topical cream such as Biofreeze or IcyHot.  I will be in touch with x-ray results.  He is to let me know if he has any changes in his condition or any questions.  He is agreeable with today's plan.  Briefly discussed PT if pain does not improve.    Patient did have a mild early stage bedsore back in October when he was evaluated here for hospital follow-up.  With patient's permission, I reexamined this area today to ensure that this was not the cause of his pain.  There was no bedsore present, which I explained to the patient and gave him reassurance that there is no open wound causing his pain.  Orders:  •  XR spine lumbar 2 or 3 views injury; Future    Chronic obstructive pulmonary disease, unspecified COPD type (HCC)  Continue to monitor.  No current inhaler use.       Sick sinus syndrome (HCC)  Currently asymptomatic.  Pulse in normal limits.              History of Present Illness   Patient presents today with concerns of low back pain.  Reports it has been present for several months.  He reports getting a new recliner and feels the support is not as good as it should be.  As a result he has had low back pain.   He reports no radiation of symptoms down the legs.  No loss of bowel or bladder function.  He reports pain worsened slightly about 2 weeks ago after having a fall on his buttocks.  He reports getting up to answer the phone and fell backwards onto his buttocks and low back.  He reports a mild increase in pain but did not have any severe pain at that time.  He reports no numbness or tingling.  He has been using Tylenol and heat pack for his symptoms.  No head trauma with recent fall.      Review of Systems   Musculoskeletal:  Positive for back pain. Negative for arthralgias, gait problem and myalgias.   Neurological:  Negative for tremors, weakness and numbness.       Objective   Pulse 92   Temp 97.7 °F (36.5 °C) (Temporal)   Wt 72.1 kg (159 lb)   SpO2 93%   BMI 22.81 kg/m²      Physical Exam  Vitals and nursing note reviewed.   Constitutional:       General: He is not in acute distress.     Appearance: Normal appearance. He is normal weight. He is not ill-appearing.   HENT:      Head: Normocephalic and atraumatic.   Cardiovascular:      Rate and Rhythm: Normal rate and regular rhythm.      Pulses: Normal pulses.      Heart sounds: No murmur heard.  Pulmonary:      Effort: Pulmonary effort is normal. No respiratory distress.      Breath sounds: Normal breath sounds.   Musculoskeletal:        Back:       Comments: General muscle pain in lumbar area.  Not reproducible with palpation.  No bedsore present on low back or top of buttocks.   Neurological:      General: No focal deficit present.      Mental Status: He is alert and oriented to person, place, and time. Mental status is at baseline.   Psychiatric:         Mood and Affect: Mood normal.         Behavior: Behavior normal.         Judgment: Judgment normal.

## 2025-03-05 ENCOUNTER — TELEPHONE (OUTPATIENT)
Dept: CARDIOLOGY CLINIC | Facility: CLINIC | Age: 85
End: 2025-03-05

## 2025-03-05 ENCOUNTER — RESULTS FOLLOW-UP (OUTPATIENT)
Dept: FAMILY MEDICINE CLINIC | Facility: CLINIC | Age: 85
End: 2025-03-05

## 2025-03-05 NOTE — TELEPHONE ENCOUNTER
SPOKE TO PT PT NEEDS F/U AFTER HIS ECHO IS COMPLETE PT COMING FOR DEVICE ,Alana WILL SPEAK WITH HIM AT THAT APPT

## 2025-03-06 ENCOUNTER — IN-CLINIC DEVICE VISIT (OUTPATIENT)
Dept: CARDIOLOGY CLINIC | Facility: CLINIC | Age: 85
End: 2025-03-06
Payer: COMMERCIAL

## 2025-03-06 DIAGNOSIS — Z95.0 CARDIAC PACEMAKER IN SITU: Primary | ICD-10-CM

## 2025-03-06 PROCEDURE — 93280 PM DEVICE PROGR EVAL DUAL: CPT | Performed by: STUDENT IN AN ORGANIZED HEALTH CARE EDUCATION/TRAINING PROGRAM

## 2025-03-06 NOTE — PROGRESS NOTES
Results for orders placed or performed in visit on 03/06/25   Cardiac EP device report    Narrative    MDT DC PM/ACTIVE SYSTEM IS MRI CONDITIONAL  DEVICE INTERROGATED IN THE [place] OFFICE: PRESENTING EGRAM AP @ 92 BPM. BATTERY VOLTAGE ADEQUATE-9 YRS. AP 30%  100%. ALL AVAILABLE LEAD PARAMETERS WITHIN NORMAL LIMITS. NO SIGNIFICANT HIGH RATE EPISODES. NO PROGRAMMING CHANGES MADE TO DEVICE PARAMETERS. NORMAL DEVICE FUNCTION. NC

## 2025-03-07 ENCOUNTER — RESULTS FOLLOW-UP (OUTPATIENT)
Dept: CARDIOLOGY CLINIC | Facility: CLINIC | Age: 85
End: 2025-03-07

## 2025-03-10 ENCOUNTER — HOSPITAL ENCOUNTER (OUTPATIENT)
Dept: NON INVASIVE DIAGNOSTICS | Facility: HOSPITAL | Age: 85
Discharge: HOME | End: 2025-03-10
Attending: INTERNAL MEDICINE
Payer: COMMERCIAL

## 2025-03-10 VITALS
SYSTOLIC BLOOD PRESSURE: 118 MMHG | HEART RATE: 92 BPM | DIASTOLIC BLOOD PRESSURE: 66 MMHG | HEIGHT: 70 IN | WEIGHT: 158.95 LBS | BODY MASS INDEX: 22.76 KG/M2

## 2025-03-10 DIAGNOSIS — I35.0 NONRHEUMATIC AORTIC VALVE STENOSIS: ICD-10-CM

## 2025-03-10 LAB
AORTIC ROOT: 3.7 CM
AORTIC VALVE MEAN VELOCITY: 20 M/S
ASCENDING AORTA: 4.4 CM
AV AREA BY CONTINUOUS VTI: 0.9 CM2
AV AREA PEAK VELOCITY: 1 CM2
AV LVOT MEAN GRADIENT: 1 MMHG
AV LVOT PEAK GRADIENT: 2 MMHG
AV MEAN PRESS GRAD SYS DOP V1V2: 18 MMHG
AV ORIFICE AREA US: 0.95 CM2
AV PEAK GRADIENT: 31 MMHG
AV REGURGITATION PRESSURE HALF TIME: 615 MS
AV VELOCITY RATIO: 0.27
AV VMAX SYS DOP: 2.77 M/S
BSA FOR ECHO PROCEDURE: 1.89 M2
DOP CALC AO VTI: 64.08 CM
DOP CALC LVOT AREA: 3.46 CM2
DOP CALC LVOT CARDIAC INDEX: 2.16 L/MIN/M2
DOP CALC LVOT CARDIAC OUTPUT: 4.09 L/MIN
DOP CALC LVOT DIAMETER: 2.1 CM
DOP CALC LVOT PEAK VEL VTI: 17.5 CM
DOP CALC LVOT PEAK VEL: 0.78 M/S
DOP CALC LVOT STROKE INDEX: 32.8 ML/M2
DOP CALC LVOT STROKE VOLUME: 60.58
E WAVE DECELERATION TIME: 233 MS
E/A RATIO: 0.69
FRACTIONAL SHORTENING: 24 (ref 28–44)
INTERVENTRICULAR SEPTUM IN DIASTOLE (PARASTERNAL SHORT AXIS VIEW): 1.4 CM
INTERVENTRICULAR SEPTUM: 1.4 CM (ref 0.6–1.1)
LAAS-AP2: 21.6 CM2
LAAS-AP4: 14.4 CM2
LEFT ATRIUM SIZE: 3.1 CM
LEFT ATRIUM VOLUME (MOD BIPLANE): 58 ML
LEFT ATRIUM VOLUME INDEX (MOD BIPLANE): 30.7 ML/M2
LEFT INTERNAL DIMENSION IN SYSTOLE: 2.9 CM (ref 2.1–4)
LEFT VENTRICULAR INTERNAL DIMENSION IN DIASTOLE: 3.8 CM (ref 3.5–6)
LEFT VENTRICULAR POSTERIOR WALL IN END DIASTOLE: 1.2 CM
LEFT VENTRICULAR STROKE VOLUME: 31 ML
LV EF US.2D.A4C+ESTIMATED: 57 %
LVSV (TEICH): 31 ML
MV E'TISSUE VEL-LAT: 6 CM/S
MV E'TISSUE VEL-SEP: 5 CM/S
MV PEAK A VEL: 0.84 M/S
MV PEAK E VEL: 58 CM/S
RIGHT ATRIUM AREA SYSTOLE A4C: 7.6 CM2
RIGHT VENTRICLE ID DIMENSION: 3.2 CM
SINOTUBULAR JUNCTION: 4.2 CM
SL CV AV DECELERATION TIME RETROGRADE: 2122 MS
SL CV AV PEAK GRADIENT RETROGRADE: 61 MMHG
SL CV LEFT ATRIUM LENGTH A2C: 5.5 CM
SL CV PED ECHO LEFT VENTRICLE DIASTOLIC VOLUME (MOD BIPLANE) 2D: 63 ML
SL CV PED ECHO LEFT VENTRICLE SYSTOLIC VOLUME (MOD BIPLANE) 2D: 32 ML
SL CV SINUS OF VALSALVA 2D: 4 CM
STJ: 4.2 CM
TR MAX PG: 20 MMHG
TR PEAK VELOCITY: 2.2 M/S
TRICUSPID ANNULAR PLANE SYSTOLIC EXCURSION: 2.2 CM
TRICUSPID VALVE PEAK REGURGITATION VELOCITY: 2.22 M/S

## 2025-03-10 PROCEDURE — 93306 TTE W/DOPPLER COMPLETE: CPT

## 2025-03-10 PROCEDURE — 93306 TTE W/DOPPLER COMPLETE: CPT | Performed by: INTERNAL MEDICINE

## 2025-03-11 ENCOUNTER — DOCUMENTATION (OUTPATIENT)
Dept: CARDIOLOGY CLINIC | Facility: CLINIC | Age: 85
End: 2025-03-11

## 2025-03-11 NOTE — PROGRESS NOTES
Spoke w/ pt about severe AS seen on recent echocardiogram.  He expresses understanding of its pathophysiology and potential symptoms and complications.  He admits to exertional dyspnea but he states this has been stable over the past 6 years.  He goes to the gym to exercise.  He states his biggest problem at this time is vertigo.  I have offered a CT surgery evaluation for consideration of TAVR.  He tells me that at the age of 85 he is not keen on getting surgery done at this time.  However, if his symptoms do get worse he will reconsider and have another conversation with me.  I will see him in the future as scheduled and have welcomed him and encouraged him to call me if he has any changes in his symptoms.

## 2025-03-17 DIAGNOSIS — I35.0 SEVERE AORTIC STENOSIS: Primary | ICD-10-CM

## 2025-03-17 NOTE — PROGRESS NOTES
Spoke with patient after he called to ask some questions.  He's willing to meet with CT Sx to consider TAVR.

## 2025-03-18 ENCOUNTER — TELEPHONE (OUTPATIENT)
Dept: CARDIAC SURGERY | Facility: CLINIC | Age: 85
End: 2025-03-18

## 2025-03-18 NOTE — TELEPHONE ENCOUNTER
Spoke w/patient regarding CT Surgery referral. He states he is not interested in traveling to [place], Informed him I would update Dr. Khan.

## 2025-03-19 ENCOUNTER — TELEPHONE (OUTPATIENT)
Dept: CARDIOLOGY CLINIC | Facility: CLINIC | Age: 85
End: 2025-03-19

## 2025-03-19 NOTE — TELEPHONE ENCOUNTER
Patient call received, patient would like to speak with Dr. Khan regarding future surgical procedure.  Patient confirms phone number for call back is [phone]

## 2025-03-20 NOTE — TELEPHONE ENCOUNTER
Spoke with patient who states he did not want to go to [place] to have surgery done.  He states he will call [facility] and make an appointment with their cardiologist and perhaps consider getting surgery there.

## 2025-03-25 ENCOUNTER — TELEPHONE (OUTPATIENT)
Dept: ADMINISTRATIVE | Facility: OTHER | Age: 85
End: 2025-03-25

## 2025-03-25 NOTE — TELEPHONE ENCOUNTER
03/25/25 11:08 AM    Patient contacted to bring Advance Directive, POLST, or Living Will document to next scheduled pcp visit.[facility] Department SWP     Thank you.  Taylor Lara MA  [facility]

## 2025-03-26 ENCOUNTER — OFFICE VISIT (OUTPATIENT)
Dept: FAMILY MEDICINE CLINIC | Facility: CLINIC | Age: 85
End: 2025-03-26
Payer: COMMERCIAL

## 2025-03-26 VITALS
SYSTOLIC BLOOD PRESSURE: 140 MMHG | BODY MASS INDEX: 23.31 KG/M2 | HEART RATE: 97 BPM | HEIGHT: 70 IN | WEIGHT: 162.8 LBS | TEMPERATURE: 97.5 F | DIASTOLIC BLOOD PRESSURE: 70 MMHG | OXYGEN SATURATION: 98 %

## 2025-03-26 DIAGNOSIS — J44.9 CHRONIC OBSTRUCTIVE PULMONARY DISEASE, UNSPECIFIED COPD TYPE (HCC): ICD-10-CM

## 2025-03-26 DIAGNOSIS — I35.0 AORTIC STENOSIS WITH TRILEAFLET VALVE: Primary | ICD-10-CM

## 2025-03-26 DIAGNOSIS — I49.5 SICK SINUS SYNDROME (HCC): ICD-10-CM

## 2025-03-26 DIAGNOSIS — M35.00 SJOGREN'S SYNDROME, WITH UNSPECIFIED ORGAN INVOLVEMENT (HCC): ICD-10-CM

## 2025-03-26 DIAGNOSIS — N40.0 ENLARGED PROSTATE WITHOUT LOWER URINARY TRACT SYMPTOMS (LUTS): ICD-10-CM

## 2025-03-26 PROCEDURE — G2211 COMPLEX E/M VISIT ADD ON: HCPCS | Performed by: FAMILY MEDICINE

## 2025-03-26 PROCEDURE — 99214 OFFICE O/P EST MOD 30 MIN: CPT | Performed by: FAMILY MEDICINE

## 2025-03-26 NOTE — PROGRESS NOTES
Name: Adonis Mallory Jr.      : 1940      MRN: [id number]  Encounter Provider: Carl Hawkins DO  Encounter Date: 3/26/2025   Encounter department: [facility]    Assessment & Plan  Aortic stenosis with trileaflet valve  Patient with known aortic stenosis.  Will be going to [facility] for a second opinion and then possibly following with cardiothoracic surgery at [facility] for a TAVR procedure.  He is concerned about travel to [place] and would like to stay closer to home for the workup and the surgery.       Sick sinus syndrome (HCC)  Stable.  Pacemaker in place.       Enlarged prostate without lower urinary tract symptoms (luts)  Symptoms managed with silodosin       Chronic obstructive pulmonary disease, unspecified COPD type (HCC)  Not currently employing any inhalers or pulmonary medications.       Sjogren's syndrome, with unspecified organ involvement ([facility])  Does experience some dry mouth but not undergoing any treatment for Sjogren's at this time.            History of Present Illness     Patient presents to follow-up on chronic medical problems.  His primary concern today is his proposed aortic valve procedure.  His current cardiologist is recommended working him up and to consider a possible TAVR procedure.  This workup would involve him going to [place] for part of the workup and the eventual procedure as well.  He is reluctant to go to [place] for this so has arranged a second opinion consultation with [facility] which she will be having on .      Review of Systems   Constitutional:  Positive for fatigue.   Respiratory:  Positive for shortness of breath.    Cardiovascular: Negative.    Gastrointestinal: Negative.    Genitourinary: Negative.    Musculoskeletal: Negative.    Psychiatric/Behavioral: Negative.       Past Medical History:   Diagnosis Date   • Abnormal diffusion capacity determined by pulmonary function test  04/25/2017   • Actinic keratosis    • Acute right-sided low back pain without sciatica 09/01/2017   • Adverse effect of correct medicinal substance properly administered    • Antithrombinemia (HCC) 02/16/2016   • Arthralgia    • Candidiasis, mouth    • Chronic allergic rhinitis    • Chronic bronchitis (HCC)    • Chronic sinusitis    • Cough    • Dermatitis    • Diverticulosis    • LUCIANO (dyspnea on exertion)    • Former smoker    • GERD (gastroesophageal reflux disease)    • Glaucoma    • Hyperlipidemia    • Inguinal hernia, left    • Lump of skin    • Male erectile dysfunction    • Open comedone    • Palpitations    • Persistent cough for 3 weeks or longer 03/15/2017   • Renal calculi    • Sebaceous cyst    • Seborrheic keratosis    • Sick sinus syndrome (HCC) 07/12/2022   • Skin disorder    • Unspecified chronic bronchitis (HCC)    • Visual disturbances      Past Surgical History:   Procedure Laterality Date   • CARDIAC ELECTROPHYSIOLOGY PROCEDURE N/A 08/09/2022    Procedure: Cardiac pacer implant/ DUAL CHAMBER PACER @ [place];  Surgeon: Van Gore DO;  Location: [facility];  Service: Cardiology   • COLONOSCOPY  12/2014    [facility] BRITTANY Julien MD.-Diverticulosis   • COLONOSCOPY  10/2011    BRADY Ross MD.-Diverticulosis   • EGD  03/2016    KEENAN Ross MD.-LA Grade A reflux esophagitis, normal stomach, normal jejunum, benign-appearing esophageal stenosis.   • EGD  08/2015    KEENAN Ross MD.-Normal upper endoscopy   • INGUINAL HERNIA REPAIR Bilateral     left- last assessed: 11/25/14, Resolved: 5/18/15, onset 12/11/13   • MOUTH SURGERY       Family History   Problem Relation Age of Onset   • Brain cancer Mother    • Heart failure Mother    • Prostate cancer Mother      Social History     Tobacco Use   • Smoking status: Former     Current packs/day: 0.00     Types: Cigarettes     Quit date: 2010     Years since quitting: 15.2     Passive exposure: Never   • Smokeless tobacco: Never   Vaping Use    • Vaping status: Never Used   Substance and Sexual Activity   • Alcohol use: Not Currently     Alcohol/week: 2.0 standard drinks of alcohol     Types: 2 Cans of beer per week   • Drug use: No   • Sexual activity: Not Currently     Current Outpatient Medications on File Prior to Visit   Medication Sig   • acetaminophen (TYLENOL) 325 mg tablet Take 650 mg by mouth every 6 (six) hours as needed for mild pain   • aspirin 81 mg chewable tablet Chew 81 mg 2 tablets daily   • B Complex Vitamins (B COMPLEX PO) Take by mouth once a week   • brinzolamide (AZOPT) 1 % ophthalmic suspension INSTILL 1 DROP INTO BOTH EYES TWICE A DAY   • chlordiazePOXIDE (LIBRIUM) 5 mg capsule Take 1 capsule (5 mg total) by mouth daily   • clobetasol (OLUX) 0.05 % topical foam Apply topically daily as needed (skin irritation) For ears.   • docusate sodium (COLACE) 100 mg capsule Take 100 mg by mouth if needed   • ergocalciferol (ERGOCALCIFEROL) 1.25 MG (09276 UT) capsule Take 50,000 Units by mouth once a week   • famotidine (PEPCID) 40 MG tablet Take 1 tablet (40 mg total) by mouth 2 (two) times a day   • fish oil-omega-3 fatty acids 1000 MG capsule Take 2 g by mouth daily   • glucosamine-chondroitin 500-400 MG tablet Take 1 tablet by mouth 2 (two) times a day   • guaiFENesin (MUCINEX) 600 mg 12 hr tablet Take 1,200 mg by mouth if needed   • hydrocortisone 2.5 % cream Apply topically if needed   • levocetirizine (XYZAL) 5 MG tablet Take 1 tablet by mouth Daily   • meclizine (ANTIVERT) 25 mg tablet Take 1 tablet (25 mg total) by mouth every 8 (eight) hours as needed for dizziness   • Multiple Vitamins-Minerals (CENTRUM SILVER ULTRA MENS) TABS Take 1 tablet by mouth daily   • promethazine-dextromethorphan (PHENERGAN-DM) 6.25-15 mg/5 mL oral syrup Take 5 mL by mouth 4 (four) times a day as needed for cough   • Red Yeast Rice 600 MG CAPS Take 600 mg by mouth daily in the early morning   • Rocklatan 0.02-0.005 % SOLN INSTILL 1 DROP INTO BOTH EYES  "EVERY DAY IN THE EVENING   • Silodosin 8 MG CAPS TAKE 1 CAPSULE BY MOUTH EVERY DAY     Allergies   Allergen Reactions   • Levofloxacin Anaphylaxis   • Azithromycin GI Intolerance   • Ketorolac Other (See Comments)     Swelling of eyes with drops   • Penicillins Hives     Immunization History   Administered Date(s) Administered   • COVID-19 MODERNA VACC 0.5 ML IM 01/19/2021, 02/17/2021, 11/14/2021, 04/27/2022, 08/17/2022, 09/30/2022   • COVID-19 Moderna Vac BIVALENT 12 Yr+ IM 0.5 ML 08/17/2023   • COVID-19 Moderna mRNA Vaccine 12 Yr+ 50 mcg/0.5 mL (Spikevax) 12/16/2023, 09/03/2024   • INFLUENZA 09/18/2015, 10/18/2016, 09/01/2017, 08/27/2018   • Influenza Quadrivalent, 6-35 Months IM 09/01/2017   • Influenza Split High Dose Preservative Free IM 09/25/2014, 09/18/2015, 10/18/2016, 08/17/2023, 09/03/2024   • Influenza, high dose seasonal 0.7 mL 08/27/2018, 09/24/2019, 09/01/2020, 09/14/2021, 10/05/2022   • Influenza, seasonal, injectable 10/17/2012, 10/09/2013   • Pneumococcal Conjugate 13-Valent 06/08/2015   • Pneumococcal Polysaccharide PPV23 08/30/2019   • Td (adult), adsorbed 10/22/2014   • Tdap 04/16/2020   • Zoster Vaccine Recombinant 03/30/2018, 05/29/2018     Objective   /70 (BP Location: Left arm, Patient Position: Sitting, Cuff Size: Adult)   Pulse 97   Temp 97.5 °F (36.4 °C) (Temporal)   Ht 5' 10\" (1.778 m)   Wt 73.8 kg (162 lb 12.8 oz)   SpO2 98%   BMI 23.36 kg/m²     Physical Exam  Vitals and nursing note reviewed.   Constitutional:       General: He is not in acute distress.     Appearance: Normal appearance.   HENT:      Head: Normocephalic and atraumatic.   Eyes:      Pupils: Pupils are equal, round, and reactive to light.   Cardiovascular:      Rate and Rhythm: Normal rate and regular rhythm.      Pulses: Normal pulses.      Heart sounds: Murmur heard.   Pulmonary:      Effort: Pulmonary effort is normal.      Breath sounds: Normal breath sounds.   Musculoskeletal:         General: Normal " range of motion.      Cervical back: Normal range of motion.   Skin:     General: Skin is warm and dry.   Neurological:      General: No focal deficit present.      Mental Status: He is alert and oriented to person, place, and time. Mental status is at baseline.   Psychiatric:         Mood and Affect: Mood normal.         Behavior: Behavior normal.         Thought Content: Thought content normal.         Judgment: Judgment normal.

## 2025-03-26 NOTE — ASSESSMENT & PLAN NOTE
Patient with known aortic stenosis.  Will be going to [facility] cardiology for a second opinion and then possibly following with cardiothoracic surgery at [facility] for a TAVR procedure.  He is concerned about travel to [place] and would like to stay closer to home for the workup and the surgery.

## 2025-03-27 ENCOUNTER — TELEPHONE (OUTPATIENT)
Dept: CARDIOLOGY CLINIC | Facility: CLINIC | Age: 85
End: 2025-03-27

## 2025-03-27 DIAGNOSIS — Z95.0 PACEMAKER: Primary | ICD-10-CM

## 2025-03-27 RX ORDER — CLINDAMYCIN HYDROCHLORIDE 300 MG/1
600 CAPSULE ORAL ONCE AS NEEDED
Qty: 2 CAPSULE | Refills: 0 | Status: SHIPPED | OUTPATIENT
Start: 2025-03-27 | End: 2025-03-27

## 2025-03-27 RX ORDER — CEPHALEXIN 500 MG/1
2000 CAPSULE ORAL ONCE AS NEEDED
Qty: 4 CAPSULE | Refills: 0 | Status: SHIPPED | OUTPATIENT
Start: 2025-03-27 | End: 2025-05-27

## 2025-03-27 NOTE — TELEPHONE ENCOUNTER
Dental office call regarding patient needing a crown and dental work done ASAP. Per Terrance Mahmood PA-c Patient needs a one time dose of antibiotics to take one hour before procedure.     Advised office provider will call RX in to patients pharmacy.     Terrance: can you prescribe the antibiotic, pt has allergy to penicillin.

## 2025-04-10 ENCOUNTER — TELEPHONE (OUTPATIENT)
Dept: FAMILY MEDICINE CLINIC | Facility: CLINIC | Age: 85
End: 2025-04-10

## 2025-04-10 DIAGNOSIS — Z79.2 PROPHYLACTIC ANTIBIOTIC: Primary | ICD-10-CM

## 2025-04-10 DIAGNOSIS — R42 VERTIGO: ICD-10-CM

## 2025-04-10 RX ORDER — DOXYCYCLINE 100 MG/1
100 CAPSULE ORAL ONCE
Qty: 1 CAPSULE | Refills: 0 | Status: SHIPPED | OUTPATIENT
Start: 2025-04-10 | End: 2025-04-10

## 2025-04-10 NOTE — TELEPHONE ENCOUNTER
Patient called the RX Refill Line. Message is being forwarded to the office.     Patient is requesting abx for dental appointment on May 15, he stated that the last antibiotic ordered (he cannot remember the name) gave him diarrhea and he is requesting a different abx sent to the pharmacy.    Preferred pharmacy: [facility] on file     Please contact patient at  [phone] with any questions or concerns

## 2025-04-10 NOTE — TELEPHONE ENCOUNTER
Reason for call:   [x] Refill   [] Prior Auth  [] Other:     Office:   [x] PCP/Provider -   [] Specialty/Provider -     Medication: meclizine    Dose/Frequency: 25 mg Q8H as needed     Quantity: 90 with refills     Pharmacy: [facility] on file     Local Pharmacy   Does the patient have enough for 3 days?   [x] Yes   [] No - Send as HP to POD

## 2025-04-11 RX ORDER — MECLIZINE HYDROCHLORIDE 25 MG/1
25 TABLET ORAL EVERY 8 HOURS PRN
Qty: 90 TABLET | Refills: 3 | Status: SHIPPED | OUTPATIENT
Start: 2025-04-11

## 2025-04-24 ENCOUNTER — APPOINTMENT (OUTPATIENT)
Dept: LAB | Facility: CLINIC | Age: 85
End: 2025-04-24
Payer: COMMERCIAL

## 2025-04-24 DIAGNOSIS — Z01.818 PREOP TESTING: ICD-10-CM

## 2025-04-24 DIAGNOSIS — I35.0 AORTIC STENOSIS WITH TRILEAFLET VALVE: ICD-10-CM

## 2025-04-24 LAB
ANION GAP SERPL CALCULATED.3IONS-SCNC: 7 MMOL/L (ref 4–13)
BUN SERPL-MCNC: 21 MG/DL (ref 5–25)
CALCIUM SERPL-MCNC: 8.7 MG/DL (ref 8.4–10.2)
CHLORIDE SERPL-SCNC: 105 MMOL/L (ref 96–108)
CO2 SERPL-SCNC: 27 MMOL/L (ref 21–32)
CREAT SERPL-MCNC: 1.06 MG/DL (ref 0.6–1.3)
ERYTHROCYTE [DISTWIDTH] IN BLOOD BY AUTOMATED COUNT: 14.7 % (ref 11.6–15.1)
GFR SERPL CREATININE-BSD FRML MDRD: 63 ML/MIN/1.73SQ M
GLUCOSE SERPL-MCNC: 113 MG/DL (ref 65–140)
HCT VFR BLD AUTO: 38.5 % (ref 36.5–49.3)
HGB BLD-MCNC: 12.4 G/DL (ref 12–17)
MCH RBC QN AUTO: 32 PG (ref 26.8–34.3)
MCHC RBC AUTO-ENTMCNC: 32.2 G/DL (ref 31.4–37.4)
MCV RBC AUTO: 100 FL (ref 82–98)
PLATELET # BLD AUTO: 224 THOUSANDS/UL (ref 149–390)
PMV BLD AUTO: 10.1 FL (ref 8.9–12.7)
POTASSIUM SERPL-SCNC: 3.8 MMOL/L (ref 3.5–5.3)
RBC # BLD AUTO: 3.87 MILLION/UL (ref 3.88–5.62)
SODIUM SERPL-SCNC: 139 MMOL/L (ref 135–147)
WBC # BLD AUTO: 8.08 THOUSAND/UL (ref 4.31–10.16)

## 2025-04-24 PROCEDURE — 80048 BASIC METABOLIC PNL TOTAL CA: CPT

## 2025-04-24 PROCEDURE — 36415 COLL VENOUS BLD VENIPUNCTURE: CPT

## 2025-04-24 PROCEDURE — 85027 COMPLETE CBC AUTOMATED: CPT

## 2025-05-15 DIAGNOSIS — N40.0 BENIGN PROSTATIC HYPERPLASIA, UNSPECIFIED WHETHER LOWER URINARY TRACT SYMPTOMS PRESENT: ICD-10-CM

## 2025-05-16 RX ORDER — SILODOSIN 8 MG/1
1 CAPSULE ORAL DAILY
Qty: 90 CAPSULE | Refills: 1 | Status: SHIPPED | OUTPATIENT
Start: 2025-05-16

## 2025-05-30 ENCOUNTER — OFFICE VISIT (OUTPATIENT)
Dept: FAMILY MEDICINE CLINIC | Facility: CLINIC | Age: 85
End: 2025-05-30
Payer: COMMERCIAL

## 2025-05-30 VITALS
DIASTOLIC BLOOD PRESSURE: 70 MMHG | SYSTOLIC BLOOD PRESSURE: 140 MMHG | WEIGHT: 157 LBS | OXYGEN SATURATION: 97 % | BODY MASS INDEX: 22.48 KG/M2 | TEMPERATURE: 98.4 F | HEART RATE: 87 BPM | HEIGHT: 70 IN

## 2025-05-30 DIAGNOSIS — H93.8X9 BLOCKED EAR, UNSPECIFIED LATERALITY: Primary | ICD-10-CM

## 2025-05-30 PROCEDURE — G2211 COMPLEX E/M VISIT ADD ON: HCPCS | Performed by: FAMILY MEDICINE

## 2025-05-30 PROCEDURE — 99213 OFFICE O/P EST LOW 20 MIN: CPT | Performed by: FAMILY MEDICINE

## 2025-05-30 RX ORDER — MUPIROCIN 20 MG/G
OINTMENT TOPICAL 3 TIMES DAILY
COMMUNITY

## 2025-05-30 NOTE — PROGRESS NOTES
Name: Adonis Mallory Jr.      : 1940      MRN: [id number]  Encounter Provider: Carl Hawkins DO  Encounter Date: 2025   Encounter department: [facility]    Assessment & Plan  Blocked ear, unspecified laterality  Physical exam today unremarkable with exception of some mild dryness and scaling in external auditory canal right greater than left.  Advised patient to avoid using mupirocin unless there is a documented infection.  Clobetasol can be used as needed for itching inflammation and dryness of canal.            History of Present Illness     Patient presents to discuss his ear symptoms.  He describes some intermittent ear blocking.  He has used both mupirocin ointment and clobetasol foam to treat irritation in his ear canals.  Has no actual blockage symptoms today.      Review of Systems   HENT:  Positive for congestion.      Past Medical History[1]  Past Surgical History[2]  Family History[3]  Social History[4]  Medications[5]  Allergies   Allergen Reactions   • Levofloxacin Anaphylaxis   • Azithromycin GI Intolerance   • Ketorolac Other (See Comments)     Swelling of eyes with drops   • Penicillins Hives     Immunization History   Administered Date(s) Administered   • COVID-19 MODERNA VACC 0.5 ML IM 2021, 2021, 2021, 2022, 2022, 2022   • COVID-19 Moderna Vac BIVALENT 12 Yr+ IM 0.5 ML 2023   • COVID-19 Moderna mRNA Vaccine 12 Yr+ 50 mcg/0.5 mL (Spikevax) 2023, 2024   • INFLUENZA 2015, 10/18/2016, 2017, 2018   • Influenza Quadrivalent, 6-35 Months IM 2017   • Influenza Split High Dose Preservative Free IM 2014, 2015, 10/18/2016, 2023, 2024   • Influenza, high dose seasonal 0.7 mL 2018, 2019, 2020, 2021, 10/05/2022   • Influenza, seasonal, injectable 10/17/2012, 10/09/2013   • Pneumococcal Conjugate 13-Valent 2015   • Pneumococcal Polysaccharide  "PPV23 08/30/2019   • Td (adult), adsorbed 10/22/2014   • Tdap 04/16/2020   • Zoster Vaccine Recombinant 03/30/2018, 05/29/2018     Objective   /70   Pulse 87   Temp 98.4 °F (36.9 °C)   Ht 5' 10\" (1.778 m)   Wt 71.2 kg (157 lb)   SpO2 97%   BMI 22.53 kg/m²     Physical Exam  Vitals and nursing note reviewed.   Constitutional:       General: He is not in acute distress.     Appearance: Normal appearance. He is well-developed. He is not diaphoretic.   HENT:      Head: Normocephalic and atraumatic.     Eyes:      General:         Right eye: No discharge.      Conjunctiva/sclera: Conjunctivae normal.      Pupils: Pupils are equal, round, and reactive to light.     Neck:      Thyroid: No thyromegaly.     Cardiovascular:      Rate and Rhythm: Normal rate and regular rhythm.   Pulmonary:      Effort: Pulmonary effort is normal. No respiratory distress.      Breath sounds: Normal breath sounds.     Musculoskeletal:      Cervical back: Normal range of motion.   Lymphadenopathy:      Cervical: No cervical adenopathy.     Skin:     General: Skin is warm and dry.     Neurological:      Mental Status: He is alert and oriented to person, place, and time.     Psychiatric:         Mood and Affect: Mood normal.         Behavior: Behavior normal.         Thought Content: Thought content normal.         Judgment: Judgment normal.                [1]  Past Medical History:  Diagnosis Date   • Abnormal diffusion capacity determined by pulmonary function test 04/25/2017   • Actinic keratosis    • Acute right-sided low back pain without sciatica 09/01/2017   • Adverse effect of correct medicinal substance properly administered    • Antithrombinemia (HCC) 02/16/2016   • Arthralgia    • Candidiasis, mouth    • Chronic allergic rhinitis    • Chronic bronchitis (HCC)    • Chronic sinusitis    • Cough    • Dermatitis    • Diverticulosis    • LUCIANO (dyspnea on exertion)    • Former smoker    • GERD (gastroesophageal reflux disease)    • " Glaucoma    • Hyperlipidemia    • Inguinal hernia, left    • Lump of skin    • Male erectile dysfunction    • Open comedone    • Palpitations    • Persistent cough for 3 weeks or longer 03/15/2017   • Renal calculi    • Sebaceous cyst    • Seborrheic keratosis    • Sick sinus syndrome (HCC) 07/12/2022   • Skin disorder    • Unspecified chronic bronchitis (HCC)    • Visual disturbances    [2]  Past Surgical History:  Procedure Laterality Date   • CARDIAC ELECTROPHYSIOLOGY PROCEDURE N/A 08/09/2022    Procedure: Cardiac pacer implant/ DUAL CHAMBER PACER @ [place];  Surgeon: Van Gore DO;  Location: [facility];  Service: Cardiology   • COLONOSCOPY  12/2014    [facility] BRITTANY Julien MD.-Diverticulosis   • COLONOSCOPY  10/2011    BRADY Ross MD.-Diverticulosis   • EGD  03/2016    KEENAN Ross MD.-LA Grade A reflux esophagitis, normal stomach, normal jejunum, benign-appearing esophageal stenosis.   • EGD  08/2015    KEENAN Ross MD.-Normal upper endoscopy   • INGUINAL HERNIA REPAIR Bilateral     left- last assessed: 11/25/14, Resolved: 5/18/15, onset 12/11/13   • MOUTH SURGERY     [3]  Family History  Problem Relation Name Age of Onset   • Brain cancer Mother     • Heart failure Mother     • Prostate cancer Mother     [4]  Social History  Tobacco Use   • Smoking status: Former     Current packs/day: 0.00     Types: Cigarettes     Quit date: 2010     Years since quitting: 15.4     Passive exposure: Never   • Smokeless tobacco: Never   Vaping Use   • Vaping status: Never Used   Substance and Sexual Activity   • Alcohol use: Not Currently     Alcohol/week: 2.0 standard drinks of alcohol     Types: 2 Cans of beer per week   • Drug use: No   • Sexual activity: Not Currently   [5]  Current Outpatient Medications on File Prior to Visit   Medication Sig   • acetaminophen (TYLENOL) 325 mg tablet Take 650 mg by mouth every 6 (six) hours as needed for mild pain   • aspirin 81 mg chewable tablet Chew 81 mg 2  tablets daily   • B Complex Vitamins (B COMPLEX PO) Take by mouth once a week   • brinzolamide (AZOPT) 1 % ophthalmic suspension INSTILL 1 DROP INTO BOTH EYES TWICE A DAY   • chlordiazePOXIDE (LIBRIUM) 5 mg capsule Take 1 capsule (5 mg total) by mouth daily   • clobetasol (OLUX) 0.05 % topical foam Apply topically daily as needed (skin irritation) For ears.   • docusate sodium (COLACE) 100 mg capsule Take 100 mg by mouth if needed   • ergocalciferol (ERGOCALCIFEROL) 1.25 MG (40865 UT) capsule Take 50,000 Units by mouth once a week   • famotidine (PEPCID) 40 MG tablet Take 1 tablet (40 mg total) by mouth 2 (two) times a day   • fish oil-omega-3 fatty acids 1000 MG capsule Take 2 g by mouth in the morning.   • glucosamine-chondroitin 500-400 MG tablet Take 1 tablet by mouth in the morning and 1 tablet before bedtime.   • guaiFENesin (MUCINEX) 600 mg 12 hr tablet Take 1,200 mg by mouth if needed   • hydrocortisone 2.5 % cream Apply topically if needed   • levocetirizine (XYZAL) 5 MG tablet Take 1 tablet by mouth in the morning.   • meclizine (ANTIVERT) 25 mg tablet Take 1 tablet (25 mg total) by mouth every 8 (eight) hours as needed for dizziness   • Multiple Vitamins-Minerals (CENTRUM SILVER ULTRA MENS) TABS Take 1 tablet by mouth in the morning.   • mupirocin (BACTROBAN) 2 % ointment Apply topically 3 (three) times a day   • promethazine-dextromethorphan (PHENERGAN-DM) 6.25-15 mg/5 mL oral syrup Take 5 mL by mouth 4 (four) times a day as needed for cough   • Red Yeast Rice 600 MG CAPS Take 600 mg by mouth daily in the early morning   • Rocklatan 0.02-0.005 % SOLN    • Silodosin 8 MG CAPS TAKE 1 CAPSULE BY MOUTH EVERY DAY

## 2025-06-04 DIAGNOSIS — H40.9 GLAUCOMA, UNSPECIFIED GLAUCOMA TYPE, UNSPECIFIED LATERALITY: ICD-10-CM

## 2025-06-04 RX ORDER — BRINZOLAMIDE 10 MG/ML
SUSPENSION/ DROPS OPHTHALMIC
Qty: 30 ML | Refills: 3 | Status: SHIPPED | OUTPATIENT
Start: 2025-06-04

## 2025-06-07 DIAGNOSIS — K21.9 GASTROESOPHAGEAL REFLUX DISEASE WITHOUT ESOPHAGITIS: ICD-10-CM

## 2025-06-08 RX ORDER — FAMOTIDINE 40 MG/1
40 TABLET, FILM COATED ORAL 2 TIMES DAILY
Qty: 180 TABLET | Refills: 1 | Status: SHIPPED | OUTPATIENT
Start: 2025-06-08

## 2025-06-12 ENCOUNTER — TELEPHONE (OUTPATIENT)
Age: 85
End: 2025-06-12

## 2025-06-12 NOTE — TELEPHONE ENCOUNTER
Pt called in stating would want to be seen today no openings with any of the provider at the practice. He is having short ear pain unsure if it is ear ache or anything blocking he do have medication ear drops took those not helping.    He would want the provider to check on it. Kindly see if any accomodation can be made for today before 1 pm has he is scheduled for his foot doctor afternoon.    Call back the patient to get in if not also keep him informed so he would want to get in to urgent care.    Thanks

## 2025-06-12 NOTE — TELEPHONE ENCOUNTER
Called and spoke with Pt. Regarding his ear pain. I informed Pt. We do not have any opening for today or tomorrow. I advised him to go to UC if he is still experiencing pain/symptoms. He states he will monitor it for now. No further questions.

## 2025-08-01 ENCOUNTER — NURSE TRIAGE (OUTPATIENT)
Age: 85
End: 2025-08-01

## 2025-08-02 DIAGNOSIS — R42 VERTIGO: ICD-10-CM

## 2025-08-04 RX ORDER — MECLIZINE HYDROCHLORIDE 25 MG/1
25 TABLET ORAL EVERY 8 HOURS PRN
Qty: 90 TABLET | Refills: 0 | Status: SHIPPED | OUTPATIENT
Start: 2025-08-04 | End: 2025-08-13 | Stop reason: SDUPTHER

## 2025-08-13 ENCOUNTER — OFFICE VISIT (OUTPATIENT)
Dept: FAMILY MEDICINE CLINIC | Facility: CLINIC | Age: 85
End: 2025-08-13
Payer: COMMERCIAL

## (undated) DEVICE — SLITTER ADJUSTABLE

## (undated) DEVICE — RADIFOCUS GLIDEWIRE: Brand: GLIDEWIRE

## (undated) DEVICE — CATH GUIDING FIXED SHAPE 43CM -NC

## (undated) DEVICE — DGW .035 FC J3MM 150CM TEF: Brand: EMERALD

## (undated) DEVICE — INTRO SHEATH PEEL AWAY 7FR

## (undated) DEVICE — INTRO SHEATH PEEL AWAY 9 FR